# Patient Record
Sex: FEMALE | Race: WHITE | NOT HISPANIC OR LATINO | Employment: UNEMPLOYED | URBAN - METROPOLITAN AREA
[De-identification: names, ages, dates, MRNs, and addresses within clinical notes are randomized per-mention and may not be internally consistent; named-entity substitution may affect disease eponyms.]

---

## 2019-04-18 ENCOUNTER — OFFICE VISIT (OUTPATIENT)
Dept: URGENT CARE | Facility: CLINIC | Age: 39
End: 2019-04-18
Payer: COMMERCIAL

## 2019-04-18 VITALS
SYSTOLIC BLOOD PRESSURE: 122 MMHG | WEIGHT: 144 LBS | HEIGHT: 65 IN | RESPIRATION RATE: 16 BRPM | DIASTOLIC BLOOD PRESSURE: 68 MMHG | OXYGEN SATURATION: 98 % | TEMPERATURE: 97.8 F | BODY MASS INDEX: 23.99 KG/M2 | HEART RATE: 88 BPM

## 2019-04-18 DIAGNOSIS — J01.10 ACUTE FRONTAL SINUSITIS, RECURRENCE NOT SPECIFIED: ICD-10-CM

## 2019-04-18 DIAGNOSIS — J02.9 SORE THROAT: Primary | ICD-10-CM

## 2019-04-18 LAB — S PYO AG THROAT QL: NEGATIVE

## 2019-04-18 PROCEDURE — 99283 EMERGENCY DEPT VISIT LOW MDM: CPT | Performed by: FAMILY MEDICINE

## 2019-04-18 PROCEDURE — G0382 LEV 3 HOSP TYPE B ED VISIT: HCPCS | Performed by: FAMILY MEDICINE

## 2019-04-18 RX ORDER — DROSPIRENONE, ETHINYL ESTRADIOL AND LEVOMEFOLATE CALCIUM AND LEVOMEFOLATE CALCIUM 3-0.02(24)
1 KIT ORAL DAILY
Refills: 3 | COMMUNITY
Start: 2019-04-11 | End: 2020-06-18 | Stop reason: HOSPADM

## 2019-04-18 RX ORDER — AZITHROMYCIN 250 MG/1
TABLET, FILM COATED ORAL
Qty: 6 TABLET | Refills: 0 | Status: SHIPPED | OUTPATIENT
Start: 2019-04-18 | End: 2019-04-23

## 2019-04-18 RX ORDER — HYDROXYZINE PAMOATE 50 MG/1
50 CAPSULE ORAL DAILY
COMMUNITY
Start: 2019-04-16 | End: 2020-04-15 | Stop reason: ALTCHOICE

## 2019-04-18 RX ORDER — BUSPIRONE HYDROCHLORIDE 5 MG/1
5 TABLET ORAL 2 TIMES DAILY
Refills: 1 | COMMUNITY
Start: 2019-04-03 | End: 2019-10-16 | Stop reason: ALTCHOICE

## 2019-04-18 RX ORDER — MOMETASONE FUROATE 50 UG/1
1 SPRAY, METERED NASAL 2 TIMES DAILY
Qty: 17 G | Refills: 0 | Status: SHIPPED | OUTPATIENT
Start: 2019-04-18 | End: 2019-11-04

## 2019-04-18 RX ORDER — BECLOMETHASONE DIPROPIONATE HFA 80 UG/1
AEROSOL, METERED RESPIRATORY (INHALATION)
COMMUNITY
Start: 2019-01-24 | End: 2020-03-17 | Stop reason: SDUPTHER

## 2019-04-18 RX ORDER — ALPRAZOLAM 1 MG/1
1 TABLET ORAL
Refills: 1 | COMMUNITY
Start: 2019-04-05

## 2019-04-18 RX ORDER — VENLAFAXINE HYDROCHLORIDE 150 MG/1
150 CAPSULE, EXTENDED RELEASE ORAL DAILY
COMMUNITY
Start: 2019-04-13 | End: 2021-01-18

## 2019-04-18 RX ORDER — ERGOCALCIFEROL 1.25 MG/1
CAPSULE ORAL
Refills: 3 | COMMUNITY
Start: 2019-04-04 | End: 2020-06-18 | Stop reason: HOSPADM

## 2019-04-26 ENCOUNTER — OFFICE VISIT (OUTPATIENT)
Dept: URGENT CARE | Facility: CLINIC | Age: 39
End: 2019-04-26
Payer: COMMERCIAL

## 2019-04-26 VITALS
RESPIRATION RATE: 16 BRPM | BODY MASS INDEX: 24.07 KG/M2 | WEIGHT: 141 LBS | TEMPERATURE: 98.3 F | DIASTOLIC BLOOD PRESSURE: 85 MMHG | HEIGHT: 64 IN | HEART RATE: 117 BPM | OXYGEN SATURATION: 100 % | SYSTOLIC BLOOD PRESSURE: 139 MMHG

## 2019-04-26 DIAGNOSIS — B02.9 HERPES ZOSTER WITHOUT COMPLICATION: Primary | ICD-10-CM

## 2019-04-26 PROCEDURE — 99283 EMERGENCY DEPT VISIT LOW MDM: CPT | Performed by: PREVENTIVE MEDICINE

## 2019-04-26 PROCEDURE — G0382 LEV 3 HOSP TYPE B ED VISIT: HCPCS | Performed by: PREVENTIVE MEDICINE

## 2019-04-26 RX ORDER — DROSPIRENONE AND ETHINYL ESTRADIOL 0.02-3(28)
1 KIT ORAL DAILY
COMMUNITY
End: 2019-10-03 | Stop reason: SDUPTHER

## 2019-04-26 RX ORDER — VALACYCLOVIR HYDROCHLORIDE 1 G/1
1000 TABLET, FILM COATED ORAL 3 TIMES DAILY
Qty: 21 TABLET | Refills: 0 | Status: SHIPPED | OUTPATIENT
Start: 2019-04-26 | End: 2019-10-03 | Stop reason: ALTCHOICE

## 2019-04-28 ENCOUNTER — HOSPITAL ENCOUNTER (EMERGENCY)
Facility: HOSPITAL | Age: 39
Discharge: HOME | End: 2019-04-28
Payer: COMMERCIAL

## 2019-04-28 VITALS
SYSTOLIC BLOOD PRESSURE: 127 MMHG | HEIGHT: 64 IN | DIASTOLIC BLOOD PRESSURE: 79 MMHG | RESPIRATION RATE: 18 BRPM | HEART RATE: 100 BPM | OXYGEN SATURATION: 100 % | BODY MASS INDEX: 24.07 KG/M2 | TEMPERATURE: 97.3 F | WEIGHT: 141 LBS

## 2019-04-28 DIAGNOSIS — B02.9 HERPES ZOSTER: Primary | ICD-10-CM

## 2019-04-28 PROCEDURE — 96372 THER/PROPH/DIAG INJ SC/IM: CPT

## 2019-04-28 PROCEDURE — NC001 PR NO CHARGE: Performed by: EMERGENCY MEDICINE

## 2019-04-28 PROCEDURE — 99282 EMERGENCY DEPT VISIT SF MDM: CPT

## 2019-04-28 RX ORDER — ONDANSETRON 4 MG/1
4 TABLET, ORALLY DISINTEGRATING ORAL ONCE
Status: COMPLETED | OUTPATIENT
Start: 2019-04-28 | End: 2019-04-28

## 2019-04-28 RX ORDER — KETOROLAC TROMETHAMINE 30 MG/ML
30 INJECTION, SOLUTION INTRAMUSCULAR; INTRAVENOUS ONCE
Status: COMPLETED | OUTPATIENT
Start: 2019-04-28 | End: 2019-04-28

## 2019-04-28 RX ORDER — HYDROCODONE BITARTRATE AND ACETAMINOPHEN 5; 325 MG/1; MG/1
1 TABLET ORAL EVERY 6 HOURS PRN
Qty: 12 TABLET | Refills: 0 | Status: SHIPPED | OUTPATIENT
Start: 2019-04-28 | End: 2019-05-02

## 2019-04-28 RX ADMIN — ONDANSETRON 4 MG: 4 TABLET, ORALLY DISINTEGRATING ORAL at 04:28

## 2019-04-28 RX ADMIN — KETOROLAC TROMETHAMINE 30 MG: 30 INJECTION, SOLUTION INTRAMUSCULAR; INTRAVENOUS at 04:25

## 2019-06-13 ENCOUNTER — OFFICE VISIT (OUTPATIENT)
Dept: URGENT CARE | Facility: CLINIC | Age: 39
End: 2019-06-13
Payer: COMMERCIAL

## 2019-06-13 VITALS
WEIGHT: 145 LBS | SYSTOLIC BLOOD PRESSURE: 118 MMHG | OXYGEN SATURATION: 99 % | HEART RATE: 126 BPM | BODY MASS INDEX: 24.16 KG/M2 | TEMPERATURE: 98.3 F | RESPIRATION RATE: 16 BRPM | DIASTOLIC BLOOD PRESSURE: 72 MMHG | HEIGHT: 65 IN

## 2019-06-13 DIAGNOSIS — B02.29 POST HERPETIC NEURALGIA: Primary | ICD-10-CM

## 2019-06-13 PROCEDURE — G0382 LEV 3 HOSP TYPE B ED VISIT: HCPCS | Performed by: PREVENTIVE MEDICINE

## 2019-06-13 PROCEDURE — 99283 EMERGENCY DEPT VISIT LOW MDM: CPT | Performed by: PREVENTIVE MEDICINE

## 2019-08-12 ENCOUNTER — OFFICE VISIT (OUTPATIENT)
Dept: URGENT CARE | Facility: CLINIC | Age: 39
End: 2019-08-12
Payer: COMMERCIAL

## 2019-08-12 VITALS
OXYGEN SATURATION: 99 % | BODY MASS INDEX: 26.53 KG/M2 | SYSTOLIC BLOOD PRESSURE: 127 MMHG | DIASTOLIC BLOOD PRESSURE: 74 MMHG | TEMPERATURE: 97.2 F | RESPIRATION RATE: 16 BRPM | HEIGHT: 64 IN | HEART RATE: 129 BPM | WEIGHT: 155.4 LBS

## 2019-08-12 DIAGNOSIS — J02.9 SORE THROAT: Primary | ICD-10-CM

## 2019-08-12 DIAGNOSIS — J02.0 STREP PHARYNGITIS: ICD-10-CM

## 2019-08-12 LAB — S PYO AG THROAT QL: POSITIVE

## 2019-08-12 PROCEDURE — G0382 LEV 3 HOSP TYPE B ED VISIT: HCPCS | Performed by: EMERGENCY MEDICINE

## 2019-08-12 PROCEDURE — 87880 STREP A ASSAY W/OPTIC: CPT | Performed by: EMERGENCY MEDICINE

## 2019-08-12 PROCEDURE — 99283 EMERGENCY DEPT VISIT LOW MDM: CPT | Performed by: EMERGENCY MEDICINE

## 2019-08-12 RX ORDER — AZITHROMYCIN 250 MG/1
TABLET, FILM COATED ORAL
Qty: 6 TABLET | Refills: 0 | Status: SHIPPED | OUTPATIENT
Start: 2019-08-12 | End: 2019-08-16

## 2019-08-12 NOTE — LETTER
August 12, 2019     Patient: Asif Ards Sites   YOB: 1980   Date of Visit: 8/12/2019       To Whom It May Concern: It is my medical opinion that [employer] may return to work on 8/14/2019  If you have any questions or concerns, please don't hesitate to call           Sincerely,        Hung Kramer MD    CC: An Eason

## 2019-08-12 NOTE — PROGRESS NOTES
Assessment/Plan:    No problem-specific Assessment & Plan notes found for this encounter  Diagnoses and all orders for this visit:    Sore throat  -     azithromycin (ZITHROMAX) 250 mg tablet; Take 2 tablets today then 1 tablet daily x 4 days  -     POCT rapid strepA    Strep pharyngitis  -     POCT rapid strepA          Subjective:      Patient ID: Astrid Eason is a 44 y o  female  Woke up with sore throat achiness, confusion, fatigue - works in [employer]    This is a new problem  The current episode started today  The problem has been gradually worsening  The pain is worse on the left side  There has been no fever  The pain is at a severity of 4/10  The pain is moderate  Associated symptoms include coughing, a hoarse voice and swollen glands  She has had exposure to strep  She has tried nothing for the symptoms  The treatment provided no relief  The following portions of the patient's history were reviewed and updated as appropriate: allergies, current medications, past family history, past medical history, past social history, past surgical history and problem list     Review of Systems   Constitutional: Negative for fever  HENT: Positive for hoarse voice and sore throat  Respiratory: Positive for cough  All other systems reviewed and are negative  Objective:      /74   Pulse (!) 129   Temp (!) 97 2 °F (36 2 °C)   Resp 16   Ht 5' 4" (1 626 m)   Wt 70 5 kg (155 lb 6 4 oz)   SpO2 99%   BMI 26 67 kg/m²          Physical Exam   Constitutional: She is oriented to person, place, and time  She appears well-developed and well-nourished  She appears ill  HENT:   Head: Normocephalic  Right Ear: Hearing and tympanic membrane normal    Left Ear: Hearing and tympanic membrane normal    Mouth/Throat: Mucous membranes are normal  Posterior oropharyngeal erythema present  Neck: Normal range of motion  Cardiovascular: Normal rate and regular rhythm     Pulmonary/Chest: Effort normal and breath sounds normal    Abdominal: Soft  Neurological: She is alert and oriented to person, place, and time  Skin: Skin is warm and dry  Psychiatric: She has a normal mood and affect  Her behavior is normal    Nursing note and vitals reviewed

## 2019-08-31 ENCOUNTER — OFFICE VISIT (OUTPATIENT)
Dept: URGENT CARE | Facility: CLINIC | Age: 39
End: 2019-08-31
Payer: COMMERCIAL

## 2019-08-31 VITALS
OXYGEN SATURATION: 100 % | DIASTOLIC BLOOD PRESSURE: 78 MMHG | TEMPERATURE: 98.1 F | BODY MASS INDEX: 26.09 KG/M2 | WEIGHT: 152.8 LBS | HEIGHT: 64 IN | RESPIRATION RATE: 16 BRPM | SYSTOLIC BLOOD PRESSURE: 128 MMHG | HEART RATE: 90 BPM

## 2019-08-31 DIAGNOSIS — J02.9 SORE THROAT: Primary | ICD-10-CM

## 2019-08-31 DIAGNOSIS — J02.9 ACUTE PHARYNGITIS, UNSPECIFIED ETIOLOGY: ICD-10-CM

## 2019-08-31 LAB — S PYO AG THROAT QL: NEGATIVE

## 2019-08-31 PROCEDURE — G0382 LEV 3 HOSP TYPE B ED VISIT: HCPCS | Performed by: EMERGENCY MEDICINE

## 2019-08-31 PROCEDURE — 87880 STREP A ASSAY W/OPTIC: CPT | Performed by: EMERGENCY MEDICINE

## 2019-08-31 PROCEDURE — 99283 EMERGENCY DEPT VISIT LOW MDM: CPT | Performed by: EMERGENCY MEDICINE

## 2019-08-31 RX ORDER — AMOXICILLIN 875 MG/1
875 TABLET, COATED ORAL 2 TIMES DAILY
Qty: 20 TABLET | Refills: 0 | Status: SHIPPED | OUTPATIENT
Start: 2019-08-31 | End: 2019-09-10

## 2019-08-31 NOTE — PROGRESS NOTES
Assessment/Plan:    No problem-specific Assessment & Plan notes found for this encounter  Diagnoses and all orders for this visit:    Sore throat  -     POCT rapid strepA  -     Throat culture    Acute pharyngitis, unspecified etiology  -     amoxicillin (AMOXIL) 875 mg tablet; Take 1 tablet (875 mg total) by mouth 2 (two) times a day for 10 days          Subjective:      Patient ID: Sondra Eason is a 44 y o  female  Sore throat since yesterday, no other symptoms    Sore Throat    This is a new problem  The current episode started yesterday  The problem has been unchanged  Neither side of throat is experiencing more pain than the other  There has been no fever  The pain is at a severity of 4/10  The pain is moderate  She has tried nothing for the symptoms  The treatment provided no relief  The following portions of the patient's history were reviewed and updated as appropriate: allergies, current medications, past family history, past medical history, past social history, past surgical history and problem list     Review of Systems   HENT: Positive for sore throat  All other systems reviewed and are negative  Objective:      /78   Pulse 90   Temp 98 1 °F (36 7 °C)   Resp 16   Ht 5' 4" (1 626 m)   Wt 69 3 kg (152 lb 12 8 oz)   SpO2 100%   BMI 26 23 kg/m²          Physical Exam   Constitutional: She is oriented to person, place, and time  She appears well-developed and well-nourished  HENT:   Right Ear: Tympanic membrane and ear canal normal    Left Ear: Tympanic membrane and ear canal normal    Mouth/Throat: Mucous membranes are normal  Posterior oropharyngeal erythema present  Eyes: Pupils are equal, round, and reactive to light  Neck: Normal range of motion  Cardiovascular: Normal rate  Pulmonary/Chest: Effort normal    Abdominal: Soft  Neurological: She is alert and oriented to person, place, and time  Skin: Skin is warm and dry     Psychiatric: She has a normal mood and affect  Her behavior is normal    Nursing note and vitals reviewed

## 2019-08-31 NOTE — PATIENT INSTRUCTIONS
Amoxicillin twice a day, call in 3 days for culture report, warm salt water gargles for throat, Chloraseptic, Cepacol or Sucrets for throat pain, recheck as needed

## 2019-09-02 LAB — B-HEM STREP SPEC QL CULT: NEGATIVE

## 2019-10-03 ENCOUNTER — OFFICE VISIT (OUTPATIENT)
Dept: FAMILY MEDICINE CLINIC | Facility: CLINIC | Age: 39
End: 2019-10-03
Payer: COMMERCIAL

## 2019-10-03 VITALS
RESPIRATION RATE: 18 BRPM | BODY MASS INDEX: 26.29 KG/M2 | HEIGHT: 64 IN | SYSTOLIC BLOOD PRESSURE: 120 MMHG | DIASTOLIC BLOOD PRESSURE: 90 MMHG | WEIGHT: 154 LBS | TEMPERATURE: 98.7 F | HEART RATE: 120 BPM

## 2019-10-03 DIAGNOSIS — R53.83 FATIGUE, UNSPECIFIED TYPE: Primary | ICD-10-CM

## 2019-10-03 DIAGNOSIS — Z13.6 SCREENING FOR CARDIOVASCULAR CONDITION: ICD-10-CM

## 2019-10-03 DIAGNOSIS — B02.29 POSTHERPETIC NEURALGIA: ICD-10-CM

## 2019-10-03 DIAGNOSIS — Z23 IMMUNIZATION DUE: ICD-10-CM

## 2019-10-03 PROBLEM — F33.1 MODERATE EPISODE OF RECURRENT MAJOR DEPRESSIVE DISORDER (HCC): Status: ACTIVE | Noted: 2019-10-03

## 2019-10-03 PROBLEM — J45.40 MODERATE PERSISTENT ASTHMA WITHOUT COMPLICATION: Status: ACTIVE | Noted: 2019-10-03

## 2019-10-03 PROCEDURE — 90471 IMMUNIZATION ADMIN: CPT | Performed by: NURSE PRACTITIONER

## 2019-10-03 PROCEDURE — 99214 OFFICE O/P EST MOD 30 MIN: CPT | Performed by: NURSE PRACTITIONER

## 2019-10-03 PROCEDURE — 90686 IIV4 VACC NO PRSV 0.5 ML IM: CPT | Performed by: NURSE PRACTITIONER

## 2019-10-03 RX ORDER — GABAPENTIN 300 MG/1
300 CAPSULE ORAL 3 TIMES DAILY
Qty: 60 CAPSULE | Refills: 0 | Status: SHIPPED | OUTPATIENT
Start: 2019-10-03 | End: 2020-02-27 | Stop reason: SDUPTHER

## 2019-10-03 RX ORDER — VENLAFAXINE HYDROCHLORIDE 75 MG/1
1 CAPSULE, EXTENDED RELEASE ORAL DAILY
COMMUNITY
Start: 2019-10-02 | End: 2021-01-18
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
## 2019-10-03 NOTE — PATIENT INSTRUCTIONS
1  Take 1 tablet daily 3 days, then increase 2 x's daily for three days then 3 x's daily   2  Obtain lab work   3   Follow up in 3 weeks

## 2019-10-10 ENCOUNTER — OFFICE VISIT (OUTPATIENT)
Dept: URGENT CARE | Facility: CLINIC | Age: 39
End: 2019-10-10
Payer: COMMERCIAL

## 2019-10-10 VITALS
TEMPERATURE: 98.4 F | RESPIRATION RATE: 16 BRPM | SYSTOLIC BLOOD PRESSURE: 130 MMHG | HEART RATE: 132 BPM | BODY MASS INDEX: 25.83 KG/M2 | WEIGHT: 155 LBS | DIASTOLIC BLOOD PRESSURE: 88 MMHG | HEIGHT: 65 IN | OXYGEN SATURATION: 96 %

## 2019-10-10 DIAGNOSIS — R68.89 FLU-LIKE SYMPTOMS: ICD-10-CM

## 2019-10-10 DIAGNOSIS — J11.1 FLU: Primary | ICD-10-CM

## 2019-10-10 DIAGNOSIS — J68.3 REACTIVE AIRWAYS DYSFUNCTION SYNDROME (HCC): ICD-10-CM

## 2019-10-10 PROCEDURE — 99283 EMERGENCY DEPT VISIT LOW MDM: CPT | Performed by: FAMILY MEDICINE

## 2019-10-10 PROCEDURE — G0382 LEV 3 HOSP TYPE B ED VISIT: HCPCS | Performed by: FAMILY MEDICINE

## 2019-10-10 RX ORDER — OSELTAMIVIR PHOSPHATE 75 MG/1
75 CAPSULE ORAL EVERY 12 HOURS SCHEDULED
Qty: 10 CAPSULE | Refills: 0 | Status: SHIPPED | OUTPATIENT
Start: 2019-10-10 | End: 2019-10-16 | Stop reason: ALTCHOICE

## 2019-10-10 RX ORDER — METHYLPREDNISOLONE 4 MG/1
TABLET ORAL
Qty: 21 TABLET | Refills: 0 | Status: SHIPPED | OUTPATIENT
Start: 2019-10-10 | End: 2019-10-16 | Stop reason: ALTCHOICE

## 2019-10-10 RX ORDER — MONTELUKAST SODIUM 10 MG/1
10 TABLET ORAL
Qty: 15 TABLET | Refills: 1 | Status: SHIPPED | OUTPATIENT
Start: 2019-10-10 | End: 2019-11-04

## 2019-10-10 NOTE — LETTER
October 10, 2019     Patient: Nilesh Mckeon [facility]   YOB: 1980   Date of Visit: 10/10/2019       To Whom It May Concern: It is my medical opinion that [employer] may return to work on 10/14/2019  If you have any questions or concerns, please don't hesitate to call           Sincerely,        Kalyani Flores MD    CC: An Eason

## 2019-10-10 NOTE — PATIENT INSTRUCTIONS
We are treating this as possible flu  Use medications as written  Call here tomorrow morning for the results of the flu swab  You can stop the Tamiflu if the results come back negative  Start the steroids at that point if you are not improving

## 2019-10-10 NOTE — PROGRESS NOTES
Assessment/Plan:      Diagnoses and all orders for this visit:    Flu    Flu-like symptoms  -     oseltamivir (TAMIFLU) 75 mg capsule; Take 1 capsule (75 mg total) by mouth every 12 (twelve) hours for 5 days  -     Influenza A/B and RSV by PCR    Reactive airways dysfunction syndrome (HCC)  -     montelukast (SINGULAIR) 10 mg tablet; Take 1 tablet (10 mg total) by mouth daily at bedtime  -     methylPREDNISolone 4 MG tablet therapy pack; Use as directed on package          Subjective:     Patient ID: Marium Eason is a 44 y o  female  All of her symptoms began yesterday  She presents with the complaints of allergies and increased cough as well as sinus pressure and congestion  She states her chest feels tight  She is a nonsmoker  Review of Systems   Constitutional: Positive for fatigue  HENT: Positive for congestion and sinus pressure  Eyes: Negative  Respiratory: Positive for cough, chest tightness and wheezing  Objective:     Physical Exam   Constitutional: She appears well-developed and well-nourished  HENT:   Head: Normocephalic and atraumatic     Eyes: EOM are normal    Pulmonary/Chest: Effort normal and breath sounds normal

## 2019-10-12 ENCOUNTER — OFFICE VISIT (OUTPATIENT)
Dept: URGENT CARE | Facility: CLINIC | Age: 39
End: 2019-10-12
Payer: COMMERCIAL

## 2019-10-12 VITALS
RESPIRATION RATE: 16 BRPM | WEIGHT: 155 LBS | SYSTOLIC BLOOD PRESSURE: 136 MMHG | TEMPERATURE: 99 F | BODY MASS INDEX: 25.83 KG/M2 | DIASTOLIC BLOOD PRESSURE: 67 MMHG | OXYGEN SATURATION: 97 % | HEIGHT: 65 IN | HEART RATE: 132 BPM

## 2019-10-12 DIAGNOSIS — J02.9 SORE THROAT: ICD-10-CM

## 2019-10-12 DIAGNOSIS — J11.1 INFLUENZA: Primary | ICD-10-CM

## 2019-10-12 LAB — S PYO AG THROAT QL: NEGATIVE

## 2019-10-12 PROCEDURE — 87880 STREP A ASSAY W/OPTIC: CPT | Performed by: PREVENTIVE MEDICINE

## 2019-10-12 PROCEDURE — G0382 LEV 3 HOSP TYPE B ED VISIT: HCPCS | Performed by: PREVENTIVE MEDICINE

## 2019-10-12 PROCEDURE — 99283 EMERGENCY DEPT VISIT LOW MDM: CPT | Performed by: PREVENTIVE MEDICINE

## 2019-10-12 NOTE — PROGRESS NOTES
[email or web address]        NAME: Ashley Montes is a 44 y o  female  : 1980    MRN: [id number]  DATE: 2019  TIME: 8:48 AM    Assessment and Plan   Influenza [J11 1]  1  Influenza           Patient Instructions       Follow up with PCP in 3-5 days  Proceed to  ER if symptoms worsen  Chief Complaint     Chief Complaint   Patient presents with   Evalee Last Like Symptoms     Pt reports being seen here Thursday  Pt has been taking singulair and tamiflu since then  Pt states she is feeling worse  Productive cough, fever (ethh=613), b/l ear pain  Pt has been taking Ibuprofen prn  History of Present Illness       She feels she is not improved  Harsh cough fever aches and fatigue  Her flu swab is still not evaluated by the lab  Review of Systems   Review of Systems   Constitutional: Positive for fatigue and fever  HENT: Positive for congestion and sore throat  Respiratory: Positive for cough            Current Medications       Current Outpatient Medications:     ALPRAZolam (XANAX) 1 mg tablet, Take 1 mg by mouth 2 (two) times a day, Disp: , Rfl: 1    Drospiren-Eth Estrad-Levomefol 3-0 02-0 451 MG TABS, Take 1 tablet by mouth daily, Disp: , Rfl: 3    ergocalciferol (VITAMIN D2) 50,000 units, TAKE 1 CAPSULE BY MOUTH ONE TIME PER WEEK, Disp: , Rfl: 3    gabapentin (NEURONTIN) 300 mg capsule, Take 1 capsule (300 mg total) by mouth 3 (three) times a day, Disp: 60 capsule, Rfl: 0    hydrOXYzine pamoate (VISTARIL) 50 mg capsule, , Disp: , Rfl:     lurasidone (LATUDA) 20 mg tablet, Take 20 mg by mouth daily with breakfast, Disp: , Rfl:     methylPREDNISolone 4 MG tablet therapy pack, Use as directed on package, Disp: 21 tablet, Rfl: 0    mometasone (NASONEX) 50 mcg/act nasal spray, 1 spray into each nostril 2 (two) times a day, Disp: 17 g, Rfl: 0    montelukast (SINGULAIR) 10 mg tablet, Take 1 tablet (10 mg total) by mouth daily at bedtime, Disp: 15 tablet, Rfl: 1    oseltamivir (TAMIFLU) 75 mg capsule, Take 1 capsule (75 mg total) by mouth every 12 (twelve) hours for 5 days, Disp: 10 capsule, Rfl: 0    QVAR REDIHALER 80 MCG/ACT inhaler, , Disp: , Rfl:     venlafaxine (EFFEXOR-XR) 150 mg 24 hr capsule, , Disp: , Rfl:     venlafaxine (EFFEXOR-XR) 75 mg 24 hr capsule, Take 1 capsule by mouth daily, Disp: , Rfl:     busPIRone (BUSPAR) 5 mg tablet, Take 5 mg by mouth 2 (two) times a day, Disp: , Rfl: 1    Current Allergies     Allergies as of 10/12/2019 - Reviewed 10/12/2019   Allergen Reaction Noted    Zithromax [azithromycin] GI Intolerance 2019    Cephalosporins Hives 2019    Sulfa antibiotics Hives 2019            The following portions of the patient's history were reviewed and updated as appropriate: allergies, current medications, past family history, past medical history, past social history, past surgical history and problem list      Past Medical History:   Diagnosis Date    Anxiety     Asthma     Depression     Hypoglycemia     Moderate episode of recurrent major depressive disorder ([facility] [address] ) 10/3/2019       Past Surgical History:   Procedure Laterality Date     SECTION      KNEE SURGERY      TONSILLECTOMY         Family History   Problem Relation Age of Onset    Depression Mother          Medications have been verified  Objective   /67 (BP Location: Right arm, Patient Position: Sitting)   Pulse (!) 132   Temp 99 °F (37 2 °C) (Tympanic)   Resp 16   Ht 5' 4 5" (1 638 m)   Wt 70 3 kg (155 lb)   SpO2 97%   BMI 26 19 kg/m²        Physical Exam     Physical Exam   HENT:   Right Ear: External ear normal    Left Ear: External ear normal    Mouth/Throat: Oropharynx is clear and moist    Cardiovascular: Normal heart sounds  Pulmonary/Chest: Breath sounds normal    Lymphadenopathy:     She has no cervical adenopathy       Rapid strep screen negative at 5 minutes

## 2019-10-12 NOTE — PATIENT INSTRUCTIONS
This is still acting like influenza  Copious amounts of Motrin or Tylenol for fever chills or aches  Cough medicine decongestant as needed  If you continue to get worse you must be recheck

## 2019-10-14 ENCOUNTER — OFFICE VISIT (OUTPATIENT)
Dept: URGENT CARE | Facility: CLINIC | Age: 39
End: 2019-10-14
Payer: COMMERCIAL

## 2019-10-14 ENCOUNTER — APPOINTMENT (OUTPATIENT)
Dept: RADIOLOGY | Facility: CLINIC | Age: 39
End: 2019-10-14
Payer: COMMERCIAL

## 2019-10-14 VITALS
BODY MASS INDEX: 26.46 KG/M2 | WEIGHT: 155 LBS | HEART RATE: 129 BPM | HEIGHT: 64 IN | TEMPERATURE: 99.4 F | SYSTOLIC BLOOD PRESSURE: 120 MMHG | RESPIRATION RATE: 18 BRPM | OXYGEN SATURATION: 100 % | DIASTOLIC BLOOD PRESSURE: 74 MMHG

## 2019-10-14 DIAGNOSIS — R05.9 COUGH: Primary | ICD-10-CM

## 2019-10-14 DIAGNOSIS — R05.9 COUGH: ICD-10-CM

## 2019-10-14 DIAGNOSIS — R50.9 FEVER, UNSPECIFIED FEVER CAUSE: ICD-10-CM

## 2019-10-14 PROCEDURE — 71046 X-RAY EXAM CHEST 2 VIEWS: CPT

## 2019-10-14 PROCEDURE — 99284 EMERGENCY DEPT VISIT MOD MDM: CPT | Performed by: FAMILY MEDICINE

## 2019-10-14 PROCEDURE — G0383 LEV 4 HOSP TYPE B ED VISIT: HCPCS | Performed by: FAMILY MEDICINE

## 2019-10-14 RX ORDER — BENZONATATE 100 MG/1
100 CAPSULE ORAL 3 TIMES DAILY PRN
Qty: 20 CAPSULE | Refills: 0 | Status: SHIPPED | OUTPATIENT
Start: 2019-10-14 | End: 2019-10-16 | Stop reason: ALTCHOICE

## 2019-10-14 RX ORDER — ALBUTEROL SULFATE 90 UG/1
2 AEROSOL, METERED RESPIRATORY (INHALATION) EVERY 6 HOURS PRN
Qty: 18 G | Refills: 0 | Status: SHIPPED | OUTPATIENT
Start: 2019-10-14

## 2019-10-14 NOTE — PATIENT INSTRUCTIONS
F/u here as needed  Go to ER if worse symptoms  Flu results pending  CXR- no infiltrate or effusion  nml heart size  Albuterol as needed  Cough meds as needed    mucinex

## 2019-10-14 NOTE — PROGRESS NOTES
NAME: Leatha George is a 44 y o  female  : 1980    MRN: [id number]      Assessment and Plan   Cough [R05]  1  Cough  XR chest pa & lateral    benzonatate (TESSALON PERLES) 100 mg capsule    albuterol (VENTOLIN HFA) 90 mcg/act inhaler   2  Fever, unspecified fever cause  XR chest pa & lateral           Patient Instructions   Patient Instructions   F/u here as needed  Go to ER if worse symptoms  Flu results pending  CXR- no infiltrate or effusion  nml heart size  Albuterol as needed  Cough meds as needed  mucinex    Proceed to ER if symptoms worsen  Chief Complaint     Chief Complaint   Patient presents with    Cough     chest congestion and cough since Thursday/pt was seen in Urgent x2 was told to come back if not feeling better after flu diagnosis to r/o pneumonia         History of Present Illness   Here c/o getting worse  Coughing- green sputum  Fevers for about 4 days  She does have a sore throat  Some SOB  Taking tamiflu  Taking singulair  Review of Systems   Review of Systems   Constitutional: Positive for fatigue and fever  HENT: Positive for sore throat  Negative for ear pain and trouble swallowing  Eyes: Negative for visual disturbance  Respiratory: Positive for cough and shortness of breath  Negative for chest tightness  Cardiovascular: Negative for chest pain  Gastrointestinal: Negative for abdominal pain, diarrhea, nausea and vomiting  Genitourinary: Negative for difficulty urinating and dysuria  Skin: Negative for rash and wound  Neurological: Positive for weakness and headaches  Psychiatric/Behavioral: Negative for behavioral problems and confusion           Current Medications       Current Outpatient Medications:     ALPRAZolam (XANAX) 1 mg tablet, Take 1 mg by mouth 2 (two) times a day, Disp: , Rfl: 1    busPIRone (BUSPAR) 5 mg tablet, Take 5 mg by mouth 2 (two) times a day, Disp: , Rfl: 1    Drospiren-Eth Estrad-Levomefol 3-0 02-0 451 MG TABS, Take 1 tablet by mouth daily, Disp: , Rfl: 3    ergocalciferol (VITAMIN D2) 50,000 units, TAKE 1 CAPSULE BY MOUTH ONE TIME PER WEEK, Disp: , Rfl: 3    gabapentin (NEURONTIN) 300 mg capsule, Take 1 capsule (300 mg total) by mouth 3 (three) times a day, Disp: 60 capsule, Rfl: 0    hydrOXYzine pamoate (VISTARIL) 50 mg capsule, , Disp: , Rfl:     lurasidone (LATUDA) 20 mg tablet, Take 20 mg by mouth daily with breakfast, Disp: , Rfl:     methylPREDNISolone 4 MG tablet therapy pack, Use as directed on package, Disp: 21 tablet, Rfl: 0    mometasone (NASONEX) 50 mcg/act nasal spray, 1 spray into each nostril 2 (two) times a day, Disp: 17 g, Rfl: 0    montelukast (SINGULAIR) 10 mg tablet, Take 1 tablet (10 mg total) by mouth daily at bedtime, Disp: 15 tablet, Rfl: 1    oseltamivir (TAMIFLU) 75 mg capsule, Take 1 capsule (75 mg total) by mouth every 12 (twelve) hours for 5 days, Disp: 10 capsule, Rfl: 0    QVAR REDIHALER 80 MCG/ACT inhaler, , Disp: , Rfl:     venlafaxine (EFFEXOR-XR) 150 mg 24 hr capsule, , Disp: , Rfl:     venlafaxine (EFFEXOR-XR) 75 mg 24 hr capsule, Take 1 capsule by mouth daily, Disp: , Rfl:     albuterol (VENTOLIN HFA) 90 mcg/act inhaler, Inhale 2 puffs every 6 (six) hours as needed for wheezing, Disp: 18 g, Rfl: 0    benzonatate (TESSALON PERLES) 100 mg capsule, Take 1 capsule (100 mg total) by mouth 3 (three) times a day as needed for cough, Disp: 20 capsule, Rfl: 0    Current Allergies     Allergies as of 10/14/2019 - Reviewed 10/14/2019   Allergen Reaction Noted    Zithromax [azithromycin] GI Intolerance 2019    Cephalosporins Hives 2019    Sulfa antibiotics Hives 2019              Past Medical History:   Diagnosis Date    Anxiety     Asthma     Depression     Hypoglycemia     Moderate episode of recurrent major depressive disorder ([facility] [address] ) 10/3/2019       Past Surgical History:   Procedure Laterality Date     SECTION      KNEE SURGERY      TONSILLECTOMY Family History   Problem Relation Age of Onset    Depression Mother          Medications have been verified  The following portions of the patient's history were reviewed and updated as appropriate: allergies, current medications, past family history, past medical history, past social history, past surgical history and problem list     Objective   /74   Pulse (!) 129   Temp 99 4 °F (37 4 °C) (Tympanic)   Resp 18   Ht 5' 4" (1 626 m)   Wt 70 3 kg (155 lb)   SpO2 100%   BMI 26 61 kg/m²      Physical Exam     Physical Exam   Constitutional: She is oriented to person, place, and time  She appears well-developed and well-nourished  HENT:   Head: Normocephalic  Mouth/Throat: Posterior oropharyngeal edema present  Eyes: EOM are normal    Neck: Normal range of motion  Cardiovascular: Normal rate, regular rhythm and normal heart sounds  Exam reveals no gallop and no friction rub  No murmur heard  Pulmonary/Chest: Effort normal and breath sounds normal  No respiratory distress  She has no wheezes  She has no rales  RLL rhonchi   Abdominal: Soft  Bowel sounds are normal  She exhibits no distension  There is no tenderness  There is no rebound and no guarding  Musculoskeletal: Normal range of motion  Neurological: She is oriented to person, place, and time  Skin: Skin is warm and dry  No rash noted  Psychiatric: She has a normal mood and affect  Thought content normal    Nursing note and vitals reviewed

## 2019-10-14 NOTE — LETTER
October 14, 2019     Patient: Nguyễn aEson   YOB: 1980   Date of Visit: 10/14/2019       To Whom it May Concern:    An Eason was seen in my clinic on 10/14/2019  She is excused from work due to illness 10/10-16/2019    If you have any questions or concerns, please don't hesitate to call           Sincerely,          [facility]        CC: An Eason

## 2019-10-15 LAB
FLUAV RNA SPEC QL NAA+PROBE: NEGATIVE
FLUBV RNA SPEC QL NAA+PROBE: NEGATIVE

## 2019-10-16 ENCOUNTER — APPOINTMENT (OUTPATIENT)
Dept: RADIOLOGY | Facility: CLINIC | Age: 39
End: 2019-10-16
Payer: COMMERCIAL

## 2019-10-16 ENCOUNTER — TELEPHONE (OUTPATIENT)
Dept: FAMILY MEDICINE CLINIC | Facility: CLINIC | Age: 39
End: 2019-10-16

## 2019-10-16 ENCOUNTER — OFFICE VISIT (OUTPATIENT)
Dept: FAMILY MEDICINE CLINIC | Facility: CLINIC | Age: 39
End: 2019-10-16
Payer: COMMERCIAL

## 2019-10-16 VITALS
SYSTOLIC BLOOD PRESSURE: 118 MMHG | TEMPERATURE: 99.1 F | RESPIRATION RATE: 18 BRPM | DIASTOLIC BLOOD PRESSURE: 76 MMHG | HEART RATE: 84 BPM | OXYGEN SATURATION: 99 % | WEIGHT: 155 LBS | BODY MASS INDEX: 26.46 KG/M2 | HEIGHT: 64 IN

## 2019-10-16 DIAGNOSIS — J40 BRONCHITIS: ICD-10-CM

## 2019-10-16 DIAGNOSIS — J45.40 MODERATE PERSISTENT ASTHMA WITHOUT COMPLICATION: ICD-10-CM

## 2019-10-16 DIAGNOSIS — R05.9 COUGH: Primary | ICD-10-CM

## 2019-10-16 DIAGNOSIS — R05.9 COUGH: ICD-10-CM

## 2019-10-16 DIAGNOSIS — R06.2 WHEEZING: ICD-10-CM

## 2019-10-16 PROCEDURE — 99214 OFFICE O/P EST MOD 30 MIN: CPT | Performed by: FAMILY MEDICINE

## 2019-10-16 PROCEDURE — 71046 X-RAY EXAM CHEST 2 VIEWS: CPT

## 2019-10-16 RX ORDER — DOXYCYCLINE HYCLATE 100 MG/1
100 CAPSULE ORAL EVERY 12 HOURS SCHEDULED
Qty: 14 CAPSULE | Refills: 0 | Status: SHIPPED | OUTPATIENT
Start: 2019-10-16 | End: 2019-10-23

## 2019-10-16 RX ORDER — PREDNISONE 10 MG/1
TABLET ORAL
Qty: 21 TABLET | Refills: 0 | Status: SHIPPED | OUTPATIENT
Start: 2019-10-16 | End: 2019-11-04

## 2019-10-16 NOTE — PROGRESS NOTES
[facility] 1980 female MRN: [id number]    Family Medicine Acute Visit    ASSESSMENT/PLAN  Problem List Items Addressed This Visit        Respiratory    Moderate persistent asthma without complication    Relevant Medications    predniSONE 10 mg tablet    doxycycline hyclate (VIBRAMYCIN) 100 mg capsule    Other Relevant Orders    XR chest pa & lateral    Bronchitis     Over 1 week of symptoms, history of asthma, abnormal exam  Will treat, numerous antibiotic allergies noted  CXR to r/o PNA         Relevant Medications    predniSONE 10 mg tablet    doxycycline hyclate (VIBRAMYCIN) 100 mg capsule    Other Relevant Orders    XR chest pa & lateral       Other    Cough - Primary    Relevant Medications    predniSONE 10 mg tablet    doxycycline hyclate (VIBRAMYCIN) 100 mg capsule    Other Relevant Orders    XR chest pa & lateral    Wheezing    Relevant Orders    XR chest pa & lateral            Patient works in   Provided work note for her  Follow up if not better or worse after above treatment  Future Appointments   Date Time Provider Elaine Novoa   10/24/2019  1:30 PM [facility]          SUBJECTIVE  CC: Cough (1 week - was treated for Flu @ Urgent Care - still sick - cough expectorates green mucous)      HPI:  Nilesh Eason is a 44 y o  female who presents for sick visit,  States 1 week of symptoms  Body aches, cough, chest congestion  History of asthma  Mucous is thick and green  She is having fever and chills  Was taking tamiflu as prescribed by urgent care for possible flu  Was strep tested and this was negative  Using albulter inhaler  Review of Systems   Constitutional: Positive for chills and fever  Negative for fatigue  HENT: Positive for congestion and rhinorrhea  Negative for postnasal drip and sinus pressure  Eyes: Negative for photophobia and visual disturbance  Respiratory: Positive for cough and wheezing  Negative for shortness of breath  Cardiovascular: Negative for chest pain, palpitations and leg swelling  Gastrointestinal: Negative for abdominal pain, constipation, diarrhea, nausea and vomiting  Genitourinary: Negative for difficulty urinating and dysuria  Musculoskeletal: Negative for arthralgias and myalgias  Skin: Negative for color change and rash  Neurological: Negative for dizziness, weakness, light-headedness and headaches         Historical Information   The patient history was reviewed as follows:  Past Medical History:   Diagnosis Date    Anxiety     Asthma     Depression     Hypoglycemia     Moderate episode of recurrent major depressive disorder ([facility] [address] ) 10/3/2019         Past Surgical History:   Procedure Laterality Date     SECTION      KNEE SURGERY      TONSILLECTOMY       Family History   Problem Relation Age of Onset    Depression Mother       Social History   Social History     Substance and Sexual Activity   Alcohol Use Never    Frequency: Never     Social History     Substance and Sexual Activity   Drug Use Never     Social History     Tobacco Use   Smoking Status Never Smoker   Smokeless Tobacco Never Used       Medications:     Current Outpatient Medications:     albuterol (VENTOLIN HFA) 90 mcg/act inhaler, Inhale 2 puffs every 6 (six) hours as needed for wheezing, Disp: 18 g, Rfl: 0    ALPRAZolam (XANAX) 1 mg tablet, Take 1 mg by mouth 2 (two) times a day, Disp: , Rfl: 1    Drospiren-Eth Estrad-Levomefol 3-0 02-0 451 MG TABS, Take 1 tablet by mouth daily, Disp: , Rfl: 3    ergocalciferol (VITAMIN D2) 50,000 units, TAKE 1 CAPSULE BY MOUTH ONE TIME PER WEEK, Disp: , Rfl: 3    gabapentin (NEURONTIN) 300 mg capsule, Take 1 capsule (300 mg total) by mouth 3 (three) times a day, Disp: 60 capsule, Rfl: 0    hydrOXYzine pamoate (VISTARIL) 50 mg capsule, , Disp: , Rfl:     mometasone (NASONEX) 50 mcg/act nasal spray, 1 spray into each nostril 2 (two) times a day, Disp: 17 g, Rfl: 0    montelukast (SINGULAIR) 10 mg tablet, Take 1 tablet (10 mg total) by mouth daily at bedtime, Disp: 15 tablet, Rfl: 1    QVAR REDIHALER 80 MCG/ACT inhaler, , Disp: , Rfl:     venlafaxine (EFFEXOR-XR) 150 mg 24 hr capsule, , Disp: , Rfl:     venlafaxine (EFFEXOR-XR) 75 mg 24 hr capsule, Take 1 capsule by mouth daily, Disp: , Rfl:     doxycycline hyclate (VIBRAMYCIN) 100 mg capsule, Take 1 capsule (100 mg total) by mouth every 12 (twelve) hours for 7 days, Disp: 14 capsule, Rfl: 0    predniSONE 10 mg tablet, Take 60 mg x 1 day, then 50 mg x1 day, then 40 mg x1 day, then 30 mg x1 day, then 20 mg x 1 day, then  10 mg x1 day, Disp: 21 tablet, Rfl: 0    Allergies   Allergen Reactions    Zithromax [Azithromycin] GI Intolerance    Cephalosporins Hives    Sulfa Antibiotics Hives       OBJECTIVE  Vitals:   Vitals:    10/16/19 0902 10/16/19 0919   BP: 118/76    BP Location: Right arm    Patient Position: Sitting    Cuff Size: Standard    Pulse: 84    Resp: 18    Temp: 99 1 °F (37 3 °C)    TempSrc: Tympanic    SpO2:  99%   Weight: 70 3 kg (155 lb)    Height: 5' 4" (1 626 m)          Physical Exam   Constitutional: She is oriented to person, place, and time  She appears well-developed and well-nourished  HENT:   Head: Normocephalic and atraumatic  Right Ear: Tympanic membrane is erythematous and bulging  Nose: Mucosal edema and rhinorrhea present  Mouth/Throat: Oropharynx is clear and moist    Eyes: Pupils are equal, round, and reactive to light  Neck: Normal range of motion  Neck supple  Cardiovascular: Normal rate, regular rhythm and normal heart sounds  Pulmonary/Chest: Effort normal  No respiratory distress  She has wheezes in the right upper field and the right middle field  Abdominal: Soft  Bowel sounds are normal  She exhibits no distension  There is no tenderness  Musculoskeletal: Normal range of motion  She exhibits no edema or tenderness     Neurological: She is alert and oriented to person, place, and time    Skin: Skin is warm and dry  Psychiatric: She has a normal mood and affect   Her behavior is normal                   Yolande Osuna,     10/16/2019

## 2019-10-16 NOTE — ASSESSMENT & PLAN NOTE
Over 1 week of symptoms, history of asthma, abnormal exam  Will treat, numerous antibiotic allergies noted  CXR to r/o PNA

## 2019-10-16 NOTE — LETTER
October 16, 2019     Patient: Yves Eason   YOB: 1980   Date of Visit: 10/16/2019       To Whom it May Concern:    An Yumi is under my professional care  She was seen in my office on 10/16/2019  She may return to work on 10/21/19  If you have any questions or concerns, please don't hesitate to call           Sincerely,          DO Steffen        CC: No Recipients

## 2019-10-16 NOTE — TELEPHONE ENCOUNTER
Patient called about chest xray results   I informed her chest xray was normal and did not show any pneumonia

## 2019-10-17 ENCOUNTER — TELEPHONE (OUTPATIENT)
Dept: FAMILY MEDICINE CLINIC | Facility: CLINIC | Age: 39
End: 2019-10-17

## 2019-10-17 NOTE — TELEPHONE ENCOUNTER
Stephanie Goodwin you call [facility] and see if they will accept a generic doxycycline for this patient  She has numerous medication allergies and I am very limited with treatment options for her  Does the pharmacist have a suggestion for what would be covered  She is allergic to azithromycin, cephalosporins and sulfa drugs

## 2019-10-17 NOTE — TELEPHONE ENCOUNTER
[facility] can't fill doxy that you put her on yesterday because insurance will not pay for it  Is there anything else you can send in   [facility] [place]

## 2019-10-17 NOTE — TELEPHONE ENCOUNTER
I was able to change the medication over the phone with the pharmacy and pharmacy was not aware of any of her allergies

## 2019-10-28 LAB
ALBUMIN SERPL-MCNC: 4.2 G/DL (ref 3.5–5.5)
ALBUMIN/GLOB SERPL: 1.7 {RATIO} (ref 1.2–2.2)
ALP SERPL-CCNC: 78 IU/L (ref 39–117)
ALT SERPL-CCNC: 17 IU/L (ref 0–32)
AST SERPL-CCNC: 12 IU/L (ref 0–40)
BASOPHILS # BLD AUTO: 0.1 X10E3/UL (ref 0–0.2)
BASOPHILS NFR BLD AUTO: 1 %
BILIRUB SERPL-MCNC: <0.2 MG/DL (ref 0–1.2)
BUN SERPL-MCNC: 11 MG/DL (ref 6–20)
BUN/CREAT SERPL: 12 (ref 9–23)
CALCIUM SERPL-MCNC: 9.2 MG/DL (ref 8.7–10.2)
CHLORIDE SERPL-SCNC: 99 MMOL/L (ref 96–106)
CHOLEST SERPL-MCNC: 222 MG/DL (ref 100–199)
CHOLEST/HDLC SERPL: 3.8 RATIO (ref 0–4.4)
CO2 SERPL-SCNC: 23 MMOL/L (ref 20–29)
CREAT SERPL-MCNC: 0.9 MG/DL (ref 0.57–1)
EOSINOPHIL # BLD AUTO: 0.2 X10E3/UL (ref 0–0.4)
EOSINOPHIL NFR BLD AUTO: 3 %
ERYTHROCYTE [DISTWIDTH] IN BLOOD BY AUTOMATED COUNT: 13.4 % (ref 12.3–15.4)
FERRITIN SERPL-MCNC: 29 NG/ML (ref 15–150)
GLOBULIN SER-MCNC: 2.5 G/DL (ref 1.5–4.5)
GLUCOSE SERPL-MCNC: 101 MG/DL (ref 65–99)
HCT VFR BLD AUTO: 37.9 % (ref 34–46.6)
HDLC SERPL-MCNC: 59 MG/DL
HGB BLD-MCNC: 12.6 G/DL (ref 11.1–15.9)
IMM GRANULOCYTES # BLD: 0 X10E3/UL (ref 0–0.1)
IMM GRANULOCYTES NFR BLD: 0 %
IRON SATN MFR SERPL: 37 % (ref 15–55)
IRON SERPL-MCNC: 135 UG/DL (ref 27–159)
LDLC SERPL CALC-MCNC: 106 MG/DL (ref 0–99)
LDLC SERPL DIRECT ASSAY-MCNC: 113 MG/DL (ref 0–99)
LYMPHOCYTES # BLD AUTO: 4.3 X10E3/UL (ref 0.7–3.1)
LYMPHOCYTES NFR BLD AUTO: 52 %
MCH RBC QN AUTO: 27.8 PG (ref 26.6–33)
MCHC RBC AUTO-ENTMCNC: 33.2 G/DL (ref 31.5–35.7)
MCV RBC AUTO: 84 FL (ref 79–97)
MONOCYTES # BLD AUTO: 0.3 X10E3/UL (ref 0.1–0.9)
MONOCYTES NFR BLD AUTO: 4 %
NEUTROPHILS # BLD AUTO: 3.3 X10E3/UL (ref 1.4–7)
NEUTROPHILS NFR BLD AUTO: 40 %
PLATELET # BLD AUTO: 392 X10E3/UL (ref 150–450)
POTASSIUM SERPL-SCNC: 4.5 MMOL/L (ref 3.5–5.2)
PROT SERPL-MCNC: 6.7 G/DL (ref 6–8.5)
RBC # BLD AUTO: 4.53 X10E6/UL (ref 3.77–5.28)
SL AMB EGFR AFRICAN AMERICAN: 93 ML/MIN/1.73
SL AMB EGFR NON AFRICAN AMERICAN: 81 ML/MIN/1.73
SL AMB VLDL CHOLESTEROL CALC: 57 MG/DL (ref 5–40)
SODIUM SERPL-SCNC: 138 MMOL/L (ref 134–144)
T3FREE SERPL-MCNC: 3.1 PG/ML (ref 2–4.4)
T4 FREE SERPL-MCNC: 1.01 NG/DL (ref 0.82–1.77)
TIBC SERPL-MCNC: 362 UG/DL (ref 250–450)
TRIGL SERPL-MCNC: 283 MG/DL (ref 0–149)
TSH SERPL DL<=0.005 MIU/L-ACNC: 1.95 UIU/ML (ref 0.45–4.5)
UIBC SERPL-MCNC: 227 UG/DL (ref 131–425)
VIT B12 SERPL-MCNC: 943 PG/ML (ref 232–1245)
WBC # BLD AUTO: 8.3 X10E3/UL (ref 3.4–10.8)

## 2019-11-01 ENCOUNTER — OCCMED (OUTPATIENT)
Dept: URGENT CARE | Facility: CLINIC | Age: 39
End: 2019-11-01
Payer: OTHER MISCELLANEOUS

## 2019-11-01 ENCOUNTER — APPOINTMENT (OUTPATIENT)
Dept: RADIOLOGY | Facility: CLINIC | Age: 39
End: 2019-11-01
Payer: OTHER MISCELLANEOUS

## 2019-11-01 DIAGNOSIS — S99.912A INJURY OF LEFT ANKLE, INITIAL ENCOUNTER: Primary | ICD-10-CM

## 2019-11-01 DIAGNOSIS — S99.912A INJURY OF LEFT ANKLE, INITIAL ENCOUNTER: ICD-10-CM

## 2019-11-01 PROCEDURE — 99283 EMERGENCY DEPT VISIT LOW MDM: CPT

## 2019-11-01 PROCEDURE — G0382 LEV 3 HOSP TYPE B ED VISIT: HCPCS

## 2019-11-01 PROCEDURE — 73610 X-RAY EXAM OF ANKLE: CPT

## 2019-11-04 ENCOUNTER — OFFICE VISIT (OUTPATIENT)
Dept: FAMILY MEDICINE CLINIC | Facility: CLINIC | Age: 39
End: 2019-11-04
Payer: COMMERCIAL

## 2019-11-04 VITALS
OXYGEN SATURATION: 99 % | SYSTOLIC BLOOD PRESSURE: 120 MMHG | WEIGHT: 156 LBS | TEMPERATURE: 97.4 F | DIASTOLIC BLOOD PRESSURE: 88 MMHG | RESPIRATION RATE: 15 BRPM | BODY MASS INDEX: 26.63 KG/M2 | HEART RATE: 100 BPM | HEIGHT: 64 IN

## 2019-11-04 DIAGNOSIS — Z12.4 SCREENING FOR CERVICAL CANCER: ICD-10-CM

## 2019-11-04 DIAGNOSIS — F41.9 ANXIETY AND DEPRESSION: Primary | ICD-10-CM

## 2019-11-04 DIAGNOSIS — J45.909 ASTHMA DUE TO SEASONAL ALLERGIES: ICD-10-CM

## 2019-11-04 DIAGNOSIS — F32.A ANXIETY AND DEPRESSION: Primary | ICD-10-CM

## 2019-11-04 PROCEDURE — 99213 OFFICE O/P EST LOW 20 MIN: CPT | Performed by: NURSE PRACTITIONER

## 2019-11-04 PROCEDURE — 3008F BODY MASS INDEX DOCD: CPT | Performed by: NURSE PRACTITIONER

## 2019-11-04 RX ORDER — MONTELUKAST SODIUM 10 MG/1
10 TABLET ORAL
Qty: 30 TABLET | Refills: 2 | Status: SHIPPED | OUTPATIENT
Start: 2019-11-04 | End: 2020-02-27 | Stop reason: SDUPTHER

## 2019-11-04 NOTE — PROGRESS NOTES
Assessment/Plan   Diagnoses and all orders for this visit:    Anxiety and depression    Asthma due to seasonal allergies  -     montelukast (SINGULAIR) 10 mg tablet; Take 1 tablet (10 mg total) by mouth daily at bedtime    Screening for cervical cancer    Other orders  -     Cancel: Ambulatory referral to Gynecology; Future        Chief Complaint   Patient presents with    Follow-up     review lab work        Subjective   Patient ID: Ras Pritchett Sites is a 44 y o  female  Vitals:    11/04/19 0838   BP: 120/88   Pulse: 100   Resp: 15   Temp: (!) 97 4 °F (36 3 °C)   SpO2: 99%     Here today for follow up labs  All labs were WNL with exception of cholesterol-  - discussed changes in eating and exercise  Discussed fatigue symptoms are most likely her depression and anxiety- appointment for therapy This week and medication check on the follow up  Increased stress due to hospitalization 5year old son, increased financial difficulties from this       The following portions of the patient's history were reviewed and updated as appropriate: allergies, current medications, past family history, past medical history, past social history and problem list     Review of Systems   Constitutional: Positive for fatigue  HENT: Positive for congestion  Eyes: Negative  Respiratory: Positive for cough  Cardiovascular: Negative  Gastrointestinal: Negative  Endocrine: Negative  Genitourinary: Negative  Musculoskeletal: Negative  Skin: Negative  Allergic/Immunologic: Negative  Neurological: Negative  Hematological: Negative  Psychiatric/Behavioral: Positive for decreased concentration and sleep disturbance  Negative for suicidal ideas  The patient is nervous/anxious  Objective     Physical Exam   Constitutional: She is oriented to person, place, and time  She appears well-developed and well-nourished  No distress  HENT:   Head: Normocephalic and atraumatic     Right Ear: External ear normal    Left Ear: External ear normal    Nose: Nose normal    Mouth/Throat: Oropharynx is clear and moist  No oropharyngeal exudate  Eyes: Conjunctivae are normal  Right eye exhibits no discharge  Left eye exhibits no discharge  Neck: Normal range of motion  Neck supple  No thyromegaly present  Cardiovascular: Normal rate, regular rhythm, normal heart sounds and intact distal pulses  No murmur heard  Pulmonary/Chest: Effort normal and breath sounds normal  No respiratory distress  She has no wheezes  Abdominal: Soft  Bowel sounds are normal    Musculoskeletal: Normal range of motion  She exhibits no edema, tenderness or deformity  Lymphadenopathy:     She has no cervical adenopathy  Neurological: She is alert and oriented to person, place, and time  Skin: Skin is warm and dry  Capillary refill takes less than 2 seconds  She is not diaphoretic  Psychiatric: She has a normal mood and affect  Her behavior is normal  Judgment and thought content normal    Nursing note and vitals reviewed      Allergies   Allergen Reactions    Zithromax [Azithromycin] GI Intolerance    Cephalosporins Hives    Sulfa Antibiotics Hives

## 2019-12-12 DIAGNOSIS — B02.29 POSTHERPETIC NEURALGIA: ICD-10-CM

## 2019-12-12 RX ORDER — GABAPENTIN 300 MG/1
CAPSULE ORAL
Qty: 60 CAPSULE | Refills: 0 | OUTPATIENT
Start: 2019-12-12

## 2020-01-05 DIAGNOSIS — B02.29 POSTHERPETIC NEURALGIA: ICD-10-CM

## 2020-01-06 RX ORDER — GABAPENTIN 300 MG/1
CAPSULE ORAL
Qty: 60 CAPSULE | Refills: 0 | OUTPATIENT
Start: 2020-01-06

## 2020-02-07 ENCOUNTER — HOSPITAL ENCOUNTER (EMERGENCY)
Facility: HOSPITAL | Age: 40
Discharge: HOME | End: 2020-02-07
Attending: EMERGENCY MEDICINE | Admitting: EMERGENCY MEDICINE
Payer: COMMERCIAL

## 2020-02-07 ENCOUNTER — APPOINTMENT (EMERGENCY)
Dept: CT IMAGING | Facility: HOSPITAL | Age: 40
End: 2020-02-07
Payer: COMMERCIAL

## 2020-02-07 VITALS
OXYGEN SATURATION: 100 % | DIASTOLIC BLOOD PRESSURE: 68 MMHG | RESPIRATION RATE: 16 BRPM | HEART RATE: 89 BPM | TEMPERATURE: 97.8 F | HEIGHT: 64 IN | BODY MASS INDEX: 27.31 KG/M2 | SYSTOLIC BLOOD PRESSURE: 112 MMHG | WEIGHT: 160 LBS

## 2020-02-07 DIAGNOSIS — H91.91 ACUTE HEARING LOSS OF RIGHT EAR: Primary | ICD-10-CM

## 2020-02-07 LAB
ALBUMIN SERPL BCP-MCNC: 3.6 G/DL (ref 3.5–5)
ALP SERPL-CCNC: 46 U/L (ref 46–116)
ALT SERPL W P-5'-P-CCNC: 22 U/L (ref 12–78)
ANION GAP SERPL CALCULATED.3IONS-SCNC: 10 MMOL/L (ref 4–13)
AST SERPL W P-5'-P-CCNC: 43 U/L (ref 5–45)
BASOPHILS # BLD AUTO: 0.08 THOUSANDS/ΜL (ref 0–0.1)
BASOPHILS NFR BLD AUTO: 1 % (ref 0–1)
BILIRUB SERPL-MCNC: 0.5 MG/DL (ref 0.2–1)
BUN SERPL-MCNC: 12 MG/DL (ref 5–25)
CALCIUM SERPL-MCNC: 8.8 MG/DL (ref 8.3–10.1)
CHLORIDE SERPL-SCNC: 105 MMOL/L (ref 100–108)
CO2 SERPL-SCNC: 22 MMOL/L (ref 21–32)
CREAT SERPL-MCNC: 0.63 MG/DL (ref 0.6–1.3)
EOSINOPHIL # BLD AUTO: 0.19 THOUSAND/ΜL (ref 0–0.61)
EOSINOPHIL NFR BLD AUTO: 3 % (ref 0–6)
ERYTHROCYTE [DISTWIDTH] IN BLOOD BY AUTOMATED COUNT: 12.5 % (ref 11.6–15.1)
EXT PREG TEST URINE: NORMAL
EXT. CONTROL ED NAV: NORMAL
GFR SERPL CREATININE-BSD FRML MDRD: 113 ML/MIN/1.73SQ M
GLUCOSE SERPL-MCNC: 99 MG/DL (ref 65–140)
HCT VFR BLD AUTO: 38.9 % (ref 34.8–46.1)
HGB BLD-MCNC: 12.8 G/DL (ref 11.5–15.4)
IMM GRANULOCYTES # BLD AUTO: 0.01 THOUSAND/UL (ref 0–0.2)
IMM GRANULOCYTES NFR BLD AUTO: 0 % (ref 0–2)
LYMPHOCYTES # BLD AUTO: 3.43 THOUSANDS/ΜL (ref 0.6–4.47)
LYMPHOCYTES NFR BLD AUTO: 50 % (ref 14–44)
MCH RBC QN AUTO: 28.1 PG (ref 26.8–34.3)
MCHC RBC AUTO-ENTMCNC: 32.9 G/DL (ref 31.4–37.4)
MCV RBC AUTO: 86 FL (ref 82–98)
MONOCYTES # BLD AUTO: 0.34 THOUSAND/ΜL (ref 0.17–1.22)
MONOCYTES NFR BLD AUTO: 5 % (ref 4–12)
NEUTROPHILS # BLD AUTO: 2.81 THOUSANDS/ΜL (ref 1.85–7.62)
NEUTS SEG NFR BLD AUTO: 41 % (ref 43–75)
NRBC BLD AUTO-RTO: 0 /100 WBCS
PLATELET # BLD AUTO: 290 THOUSANDS/UL (ref 149–390)
PMV BLD AUTO: 9.9 FL (ref 8.9–12.7)
POTASSIUM SERPL-SCNC: 5.9 MMOL/L (ref 3.5–5.3)
PROT SERPL-MCNC: 7.4 G/DL (ref 6.4–8.2)
RBC # BLD AUTO: 4.55 MILLION/UL (ref 3.81–5.12)
SODIUM SERPL-SCNC: 137 MMOL/L (ref 136–145)
WBC # BLD AUTO: 6.86 THOUSAND/UL (ref 4.31–10.16)

## 2020-02-07 PROCEDURE — 85025 COMPLETE CBC W/AUTO DIFF WBC: CPT | Performed by: PHYSICIAN ASSISTANT

## 2020-02-07 PROCEDURE — 36415 COLL VENOUS BLD VENIPUNCTURE: CPT | Performed by: PHYSICIAN ASSISTANT

## 2020-02-07 PROCEDURE — 99283 EMERGENCY DEPT VISIT LOW MDM: CPT

## 2020-02-07 PROCEDURE — 99284 EMERGENCY DEPT VISIT MOD MDM: CPT | Performed by: PHYSICIAN ASSISTANT

## 2020-02-07 PROCEDURE — 80053 COMPREHEN METABOLIC PANEL: CPT | Performed by: PHYSICIAN ASSISTANT

## 2020-02-07 PROCEDURE — 81025 URINE PREGNANCY TEST: CPT | Performed by: PHYSICIAN ASSISTANT

## 2020-02-07 PROCEDURE — 70450 CT HEAD/BRAIN W/O DYE: CPT

## 2020-02-07 RX ORDER — VILAZODONE HYDROCHLORIDE 10 MG/1
20 TABLET ORAL DAILY
COMMUNITY
Start: 2019-12-10 | End: 2020-04-15 | Stop reason: ALTCHOICE

## 2020-02-07 NOTE — ED PROVIDER NOTES
History  Chief Complaint   Patient presents with    Ear Problem     patient complaint of "ringing in ear x 2 years, last nigt felt pressure in right ear and now cannot hear out of ear"     Patient is a 43 y/o F with h/o anxiety, depression, asthma that presents to the ED with trouble hearing from right ear  She states she has had tinnitus for 2 years  She states she saw ENT for this and does not know why she has it  She states last night she felt a pressure in her right ear and now cannot hear out of right ear  No discharge from her ear  She states she does feel dizzy and off balance  She states she has allergies  No sinus pressure  No fevers, but has chills  She states she has tingling in her left hand off and on  She states she once had vertigo, but was never diagnosed with Meniere's  No chest pain or SOB  History provided by:  Patient  Ear Fullness   Location:  Right ear  Severity:  Moderate  Onset quality:  Sudden  Duration:  1 day  Timing:  Constant  Progression:  Unchanged  Chronicity:  New  Context:  Patient unable to hear out of right ear since yesterday  Relieved by:  Nothing  Worsened by:  Nothing  Ineffective treatments:  None tried  Associated symptoms: headaches, rhinorrhea and sore throat    Associated symptoms: no chest pain, no congestion, no cough, no diarrhea, no ear pain, no fever, no loss of consciousness, no nausea, no rash, no shortness of breath and no vomiting        Prior to Admission Medications   Prescriptions Last Dose Informant Patient Reported? Taking?    ALPRAZolam (XANAX) 1 mg tablet  Self Yes No   Sig: Take 1 mg by mouth 2 (two) times a day   Drospiren-Eth Estrad-Levomefol 3-0 02-0 451 MG TABS  Self Yes No   Sig: Take 1 tablet by mouth daily   QVAR REDIHALER 80 MCG/ACT inhaler  Self Yes No   VIIBRYD 10 MG tablet   Yes No   Sig: Take 20 mg by mouth daily   albuterol (VENTOLIN HFA) 90 mcg/act inhaler  Self No No   Sig: Inhale 2 puffs every 6 (six) hours as needed for wheezing   ergocalciferol (VITAMIN D2) 50,000 units  Self Yes No   Sig: TAKE 1 CAPSULE BY MOUTH ONE TIME PER WEEK   gabapentin (NEURONTIN) 300 mg capsule  Self No No   Sig: Take 1 capsule (300 mg total) by mouth 3 (three) times a day   hydrOXYzine pamoate (VISTARIL) 50 mg capsule  Self Yes No   Sig: Take 50 mg by mouth daily    montelukast (SINGULAIR) 10 mg tablet   No No   Sig: Take 1 tablet (10 mg total) by mouth daily at bedtime   venlafaxine (EFFEXOR-XR) 150 mg 24 hr capsule  Self Yes No   Sig: Take 150 mg by mouth daily    venlafaxine (EFFEXOR-XR) 75 mg 24 hr capsule  Self Yes No   Sig: Take 1 capsule by mouth daily      Facility-Administered Medications: None       Past Medical History:   Diagnosis Date    Anxiety     Asthma     Depression     Hypoglycemia     Moderate episode of recurrent major depressive disorder (HCC) 10/3/2019    PTSD (post-traumatic stress disorder)        Past Surgical History:   Procedure Laterality Date     SECTION      KNEE SURGERY      TONSILLECTOMY         Family History   Problem Relation Age of Onset    Depression Mother      I have reviewed and agree with the history as documented  Social History     Tobacco Use    Smoking status: Never Smoker    Smokeless tobacco: Never Used   Substance Use Topics    Alcohol use: Not Currently     Frequency: Never     Drinks per session: Patient refused     Binge frequency: Patient refused    Drug use: Never        Review of Systems   Constitutional: Positive for chills  Negative for activity change, appetite change and fever  HENT: Positive for rhinorrhea and sore throat  Negative for congestion, ear pain and trouble swallowing  Respiratory: Negative for cough and shortness of breath  Cardiovascular: Negative for chest pain  Gastrointestinal: Negative for blood in stool, diarrhea, nausea and vomiting  Musculoskeletal: Negative for back pain and neck pain  Skin: Negative for color change and rash  Neurological: Positive for dizziness and headaches  Negative for tremors, loss of consciousness, syncope, speech difficulty and weakness  Psychiatric/Behavioral: Negative for confusion  All other systems reviewed and are negative  Physical Exam  Physical Exam   Constitutional: She is oriented to person, place, and time  She appears well-developed and well-nourished  She is active and cooperative  She does not appear ill  No distress  HENT:   Head: Normocephalic and atraumatic  Right Ear: Tympanic membrane is retracted  Decreased hearing is noted  Left Ear: Tympanic membrane is retracted  No decreased hearing is noted  Nose: Nose normal    Mouth/Throat: Oropharynx is clear and moist and mucous membranes are normal    Eyes: Pupils are equal, round, and reactive to light  Conjunctivae and EOM are normal    Neck: Normal range of motion  Cardiovascular: Regular rhythm and normal heart sounds  Tachycardia present  No murmur heard  Pulmonary/Chest: Effort normal and breath sounds normal  She has no wheezes  She has no rhonchi  She has no rales  Abdominal: Soft  Normal appearance and bowel sounds are normal  There is no tenderness  Musculoskeletal: Normal range of motion  She exhibits no edema or tenderness  Neurological: She is alert and oriented to person, place, and time  She has normal strength and normal reflexes  She displays no tremor  No cranial nerve deficit or sensory deficit  She exhibits normal muscle tone  She displays a negative Romberg sign  Coordination and gait normal  GCS eye subscore is 4  GCS verbal subscore is 5  GCS motor subscore is 6  Skin: Skin is warm and dry  No rash noted  She is not diaphoretic  No pallor  Psychiatric: She has a normal mood and affect  Her speech is normal  Cognition and memory are normal    Nursing note and vitals reviewed        Vital Signs  ED Triage Vitals   Temperature Pulse Respirations Blood Pressure SpO2   02/07/20 1252 02/07/20 1252 02/07/20 1252 02/07/20 1252 02/07/20 1252   97 8 °F (36 6 °C) 101 18 141/70 100 %      Temp src Heart Rate Source Patient Position - Orthostatic VS BP Location FiO2 (%)   -- 02/07/20 1700 -- -- --    Monitor         Pain Score       02/07/20 1337       5           Vitals:    02/07/20 1252 02/07/20 1530 02/07/20 1645 02/07/20 1700   BP: 141/70 112/68     Pulse: 101 89 87 89         Visual Acuity  Visual Acuity      Most Recent Value   L Pupil Size (mm)  3   R Pupil Size (mm)  3          ED Medications  Medications - No data to display    Diagnostic Studies  Results Reviewed     Procedure Component Value Units Date/Time    Comprehensive metabolic panel [[id number]]  (Abnormal) Collected:  02/07/20 1401    Lab Status:  Final result Specimen:  Blood from Arm, Right Updated:  02/07/20 1427     Sodium 137 mmol/L      Potassium 5 9 mmol/L      Chloride 105 mmol/L      CO2 22 mmol/L      ANION GAP 10 mmol/L      BUN 12 mg/dL      Creatinine 0 63 mg/dL      Glucose 99 mg/dL      Calcium 8 8 mg/dL      AST 43 U/L      ALT 22 U/L      Alkaline Phosphatase 46 U/L      Total Protein 7 4 g/dL      Albumin 3 6 g/dL      Total Bilirubin 0 50 mg/dL      eGFR 113 ml/min/1 73sq m     Narrative:       Adri guidelines for Chronic Kidney Disease (CKD):     Stage 1 with normal or high GFR (GFR > 90 mL/min/1 73 square meters)    Stage 2 Mild CKD (GFR = 60-89 mL/min/1 73 square meters)    Stage 3A Moderate CKD (GFR = 45-59 mL/min/1 73 square meters)    Stage 3B Moderate CKD (GFR = 30-44 mL/min/1 73 square meters)    Stage 4 Severe CKD (GFR = 15-29 mL/min/1 73 square meters)    Stage 5 End Stage CKD (GFR <15 mL/min/1 73 square meters)  Note: GFR calculation is accurate only with a steady state creatinine    CBC and differential [[id number]]  (Abnormal) Collected:  02/07/20 1401    Lab Status:  Final result Specimen:  Blood from Arm, Right Updated:  02/07/20 1415     WBC 6 86 Thousand/uL      RBC 4 55 Million/uL      Hemoglobin 12 8 g/dL      Hematocrit 38 9 %      MCV 86 fL      MCH 28 1 pg      MCHC 32 9 g/dL      RDW 12 5 %      MPV 9 9 fL      Platelets 937 Thousands/uL      nRBC 0 /100 WBCs      Neutrophils Relative 41 %      Immat GRANS % 0 %      Lymphocytes Relative 50 %      Monocytes Relative 5 %      Eosinophils Relative 3 %      Basophils Relative 1 %      Neutrophils Absolute 2 81 Thousands/µL      Immature Grans Absolute 0 01 Thousand/uL      Lymphocytes Absolute 3 43 Thousands/µL      Monocytes Absolute 0 34 Thousand/µL      Eosinophils Absolute 0 19 Thousand/µL      Basophils Absolute 0 08 Thousands/µL     POCT pregnancy, urine [[id number]]  (Normal) Resulted:  02/07/20 1341    Lab Status:  Final result Updated:  02/07/20 1341     EXT PREG TEST UR (Ref: Negative) neg     Control valid                 CT head without contrast   Final Result by Peyton Palomino DO (02/07 1710)      No acute intracranial abnormality  Workstation performed: [id number]                    Procedures  Procedures         ED Course  ED Course as of Feb 07 1849   Kristie Andre Feb 07, 2020   1635 CT scan delay due to PACS downtime  Patient aware of delay and states she is feeling better  MDM  Number of Diagnoses or Management Options  Acute hearing loss of right ear: new and requires workup  Diagnosis management comments: Patient with hearing loss right ear, off balance  No visual abnormalities to ear, will order CT scan to r/o tumor or swelling  Patient did improve while in the ER, will refer to ENT  Delay on CT reading due to PACS system downtime          Amount and/or Complexity of Data Reviewed  Clinical lab tests: ordered and reviewed  Tests in the radiology section of CPT®: ordered and reviewed    Patient Progress  Patient progress: improved        Disposition  Final diagnoses:   Acute hearing loss of right ear     Time reflects when diagnosis was documented in both MDM as applicable and the Disposition within this note     Time User Action Codes Description Comment    2/7/2020  5:12 PM Brooke Valle Add [H91 91] Acute hearing loss of right ear       ED Disposition     ED Disposition Condition Date/Time Comment    Discharge Stable Fri Feb 7, 2020  5:12 PM [facility] discharge to home/self care              Follow-up Information     Follow up With Specialties Details Why Contact Info    Bonita Schilder, MD Otolaryngology Schedule an appointment as soon as possible for a visit  For recheck [address] [place] [id number]  [phone]            Discharge Medication List as of 2/7/2020  5:14 PM      CONTINUE these medications which have NOT CHANGED    Details   albuterol (VENTOLIN HFA) 90 mcg/act inhaler Inhale 2 puffs every 6 (six) hours as needed for wheezing, Starting Mon 10/14/2019, Normal      ALPRAZolam (XANAX) 1 mg tablet Take 1 mg by mouth 2 (two) times a day, Starting Fri 4/5/2019, Historical Med      Drospiren-Eth Estrad-Levomefol 3-0 02-0 451 MG TABS Take 1 tablet by mouth daily, Starting Thu 4/11/2019, Historical Med      ergocalciferol (VITAMIN D2) 50,000 units TAKE 1 CAPSULE BY MOUTH ONE TIME PER WEEK, Historical Med      gabapentin (NEURONTIN) 300 mg capsule Take 1 capsule (300 mg total) by mouth 3 (three) times a day, Starting Thu 10/3/2019, Normal      hydrOXYzine pamoate (VISTARIL) 50 mg capsule Take 50 mg by mouth daily , Starting Tue 4/16/2019, Historical Med      montelukast (SINGULAIR) 10 mg tablet Take 1 tablet (10 mg total) by mouth daily at bedtime, Starting Mon 11/4/2019, Normal      QVAR REDIHALER 80 MCG/ACT inhaler Starting Thu 1/24/2019, Historical Med      !! venlafaxine (EFFEXOR-XR) 150 mg 24 hr capsule Take 150 mg by mouth daily , Starting Sat 4/13/2019, Historical Med      !! venlafaxine (EFFEXOR-XR) 75 mg 24 hr capsule Take 1 capsule by mouth daily, Starting Wed 10/2/2019, Historical Med      VIIBRYD 10 MG tablet Take 20 mg by mouth daily, Starting Tue 12/10/2019, Historical Med       !! - Potential duplicate medications found  Please discuss with provider  No discharge procedures on file      ED Provider  Electronically Signed by           Supriya Baxter PA-C  02/07/20 3765

## 2020-02-07 NOTE — DISCHARGE INSTRUCTIONS
Follow up with ENT doctor concerning hearing loss in your right ear  Try a decongestant over the counter

## 2020-02-21 DIAGNOSIS — J45.909 ASTHMA DUE TO SEASONAL ALLERGIES: ICD-10-CM

## 2020-02-21 RX ORDER — MONTELUKAST SODIUM 10 MG/1
TABLET ORAL
Qty: 30 TABLET | Refills: 2 | OUTPATIENT
Start: 2020-02-21

## 2020-02-26 ENCOUNTER — OFFICE VISIT (OUTPATIENT)
Dept: URGENT CARE | Facility: CLINIC | Age: 40
End: 2020-02-26
Payer: COMMERCIAL

## 2020-02-26 VITALS
SYSTOLIC BLOOD PRESSURE: 116 MMHG | OXYGEN SATURATION: 97 % | BODY MASS INDEX: 26.29 KG/M2 | HEIGHT: 64 IN | WEIGHT: 154 LBS | HEART RATE: 112 BPM | TEMPERATURE: 98.7 F | RESPIRATION RATE: 16 BRPM | DIASTOLIC BLOOD PRESSURE: 62 MMHG

## 2020-02-26 DIAGNOSIS — J02.9 SORE THROAT: ICD-10-CM

## 2020-02-26 DIAGNOSIS — J02.9 ACUTE PHARYNGITIS, UNSPECIFIED ETIOLOGY: Primary | ICD-10-CM

## 2020-02-26 LAB — S PYO AG THROAT QL: NEGATIVE

## 2020-02-26 PROCEDURE — 87880 STREP A ASSAY W/OPTIC: CPT | Performed by: PHYSICIAN ASSISTANT

## 2020-02-26 PROCEDURE — 99283 EMERGENCY DEPT VISIT LOW MDM: CPT | Performed by: FAMILY MEDICINE

## 2020-02-26 PROCEDURE — G0382 LEV 3 HOSP TYPE B ED VISIT: HCPCS | Performed by: FAMILY MEDICINE

## 2020-02-26 RX ORDER — PREDNISONE 10 MG/1
10 TABLET ORAL DAILY
Qty: 4 TABLET | Refills: 0 | Status: SHIPPED | OUTPATIENT
Start: 2020-02-26 | End: 2020-03-01

## 2020-02-26 NOTE — PROGRESS NOTES
Assessment/Plan    Acute pharyngitis, unspecified etiology [J02 9]  1  Acute pharyngitis, unspecified etiology  predniSONE 10 mg tablet    al mag oxide-diphenhydramine-lidocaine viscous (MAGIC MOUTHWASH) 1:1:1 suspension   2  Sore throat  POCT rapid strepA    Throat culture         Subjective:     Patient ID: Mary Eason is a 44 y o  female  Reason For Visit / Chief Complaint  Chief Complaint   Patient presents with    Sore Throat     Pt reports on Monday she developed a sore throat, HA and neck discomfort  Denies fevers  17-year-old female presents the clinic with sore throat, headache, neck discomfort that started on Monday  Patient states that she has right-sided sore throat pain and states that she had tonsillectomy at 1years old but her right tonsil started to grow back  Patient states that she feels a swelling in the neck from lymph nodes and patient denies fevers, chills, nausea, vomiting, difficulty breathing, shortness of breath  Past Medical History:   Diagnosis Date    Anxiety     Asthma     Depression     Hypoglycemia     [facility]    PTSD (post-traumatic stress disorder)        Past Surgical History:   Procedure Laterality Date     SECTION      KNEE SURGERY      TONSILLECTOMY         Family History   Problem Relation Age of Onset    Depression Mother        Review of Systems   Constitutional: Negative for chills and fever  HENT: Positive for sore throat (R sided)  Negative for congestion and rhinorrhea  Respiratory: Positive for cough, chest tightness and wheezing  Gastrointestinal: Positive for nausea  Negative for abdominal pain, diarrhea and vomiting  Musculoskeletal: Positive for neck pain (R sided) and neck stiffness (R sided)  Neurological: Positive for headaches (R sided)         Objective:    /62   Pulse (!) 112   Temp 98 7 °F (37 1 °C)   Resp 16   Ht 5' 4" (1 626 m)   Wt 69 9 kg (154 lb)   LMP 01/31/2020   SpO2 97%   BMI 26 43 kg/m²     Physical Exam   Constitutional: She is oriented to person, place, and time  Vital signs are normal  She appears well-developed and well-nourished  No distress  HENT:   Head: Normocephalic and atraumatic  Nose: Nose normal    Mouth/Throat: Uvula is midline and mucous membranes are normal  Posterior oropharyngeal erythema present  Eyes: Conjunctivae are normal    Neck: Normal range of motion  Neck supple  Pulmonary/Chest: Effort normal    Musculoskeletal: Normal range of motion  Lymphadenopathy:        Head (right side): Tonsillar adenopathy present  Head (left side): No tonsillar adenopathy present  Neurological: She is alert and oriented to person, place, and time  Skin: Skin is warm and dry  No rash noted  She is not diaphoretic  Nursing note and vitals reviewed

## 2020-02-26 NOTE — PATIENT INSTRUCTIONS
Pharyngitis   AMBULATORY CARE:   Pharyngitis , or sore throat, is inflammation of the tissues and structures in your pharynx (throat)  Pharyngitis is most often caused by bacteria  It may also be caused by a cold or flu virus  Other causes include smoking, allergies, or acid reflux  Signs and symptoms that may occur with pharyngitis:   · Sore throat or pain when you swallow    · Fever, chills, and body aches    · Hoarse or raspy voice    · Cough, runny or stuffy nose, itchy or watery eyes    · Headache    · Upset stomach and loss of appetite    · Mild neck stiffness    · Swollen glands that feel like hard lumps when you touch your neck    · White and yellow pus-filled blisters in the back of your throat  Call 911 for any of the following:   · You have trouble breathing or swallowing because your throat is swollen or sore  Seek care immediately if:   · You are drooling because it hurts too much to swallow  · Your fever is higher than 102? F (39?C) or lasts longer than 3 days  · You are confused  · You taste blood in your throat  Contact your healthcare provider if:   · Your throat pain gets worse  · You have a painful lump in your throat that does not go away after 5 days  · Your symptoms do not improve after 5 days  · You have questions or concerns about your condition or care  Treatment for pharyngitis:  Viral pharyngitis will go away on its own without treatment  Your sore throat should start to feel better in 3 to 5 days for both viral and bacterial infections  You may need any of the following:  · Antibiotics  treat a bacterial infection  · NSAIDs , such as ibuprofen, help decrease swelling, pain, and fever  NSAIDs can cause stomach bleeding or kidney problems in certain people  If you take blood thinner medicine, always ask your healthcare provider if NSAIDs are safe for you  Always read the medicine label and follow directions  · Acetaminophen  decreases pain and fever   It is available without a doctor's order  Ask how much to take and how often to take it  Follow directions  Acetaminophen can cause liver damage if not taken correctly  Manage your symptoms:   · Gargle salt water  Mix ¼ teaspoon salt in an 8 ounce glass of warm water and gargle  This may help decrease swelling in your throat  · Drink liquids as directed  You may need to drink more liquids than usual  Liquids may help soothe your throat and prevent dehydration  Ask how much liquid to drink each day and which liquids are best for you  · Use a cool-steam humidifier  to help moisten the air in your room and calm your cough  · Soothe your throat  with cough drops, ice, soft foods, or popsicles  Prevent the spread of pharyngitis:  Cover your mouth and nose when you cough or sneeze  Do not share food or drinks  Wash your hands often  Use soap and water  If soap and water are unavailable, use an alcohol based hand   Follow up with your healthcare provider as directed:  Write down your questions so you remember to ask them during your visits  © 2017 [address] [facility] Information is for End User's use only and may not be sold, redistributed or otherwise used for commercial purposes  All illustrations and images included in CareNotes® are the copyrighted property of [email or web address]  or [employer]  The above information is an  only  It is not intended as medical advice for individual conditions or treatments  Talk to your doctor, nurse or pharmacist before following any medical regimen to see if it is safe and effective for you

## 2020-02-27 ENCOUNTER — OFFICE VISIT (OUTPATIENT)
Dept: FAMILY MEDICINE CLINIC | Facility: CLINIC | Age: 40
End: 2020-02-27
Payer: COMMERCIAL

## 2020-02-27 VITALS
HEART RATE: 96 BPM | DIASTOLIC BLOOD PRESSURE: 90 MMHG | SYSTOLIC BLOOD PRESSURE: 130 MMHG | WEIGHT: 154 LBS | RESPIRATION RATE: 18 BRPM | TEMPERATURE: 97.9 F | HEIGHT: 64 IN | BODY MASS INDEX: 26.29 KG/M2

## 2020-02-27 DIAGNOSIS — J45.909 ASTHMA DUE TO SEASONAL ALLERGIES: ICD-10-CM

## 2020-02-27 DIAGNOSIS — B02.29 POSTHERPETIC NEURALGIA: Primary | ICD-10-CM

## 2020-02-27 DIAGNOSIS — E55.9 VITAMIN D DEFICIENCY: ICD-10-CM

## 2020-02-27 PROCEDURE — 3008F BODY MASS INDEX DOCD: CPT | Performed by: NURSE PRACTITIONER

## 2020-02-27 PROCEDURE — 99213 OFFICE O/P EST LOW 20 MIN: CPT | Performed by: NURSE PRACTITIONER

## 2020-02-27 PROCEDURE — 1036F TOBACCO NON-USER: CPT | Performed by: NURSE PRACTITIONER

## 2020-02-27 RX ORDER — MONTELUKAST SODIUM 10 MG/1
10 TABLET ORAL
Qty: 30 TABLET | Refills: 2 | Status: SHIPPED | OUTPATIENT
Start: 2020-02-27

## 2020-02-27 RX ORDER — GABAPENTIN 300 MG/1
300 CAPSULE ORAL 2 TIMES DAILY
Qty: 60 CAPSULE | Refills: 2 | Status: SHIPPED | OUTPATIENT
Start: 2020-02-27 | End: 2022-05-05

## 2020-02-27 NOTE — PROGRESS NOTES
Assessment/Plan   Problem List Items Addressed This Visit     None      Visit Diagnoses     Postherpetic neuralgia    -  Primary    Relevant Medications    gabapentin (NEURONTIN) 300 mg capsule    Vitamin D deficiency        Relevant Orders    Vitamin D 25 hydroxy                Chief Complaint   Patient presents with    Follow-up     Urgent Care visit 02/26/20 -- wants Vitamin D levels checked -- wants to restart Gabapentin       Subjective   Patient ID: Ras Eason is a 44 y o  female  Vitals:    02/27/20 0934   BP: 130/90   Pulse: 96   Resp: 18   Temp: 97 9 °F (36 6 °C)     Here today with c/o low energy, recent vit D load and requesting repeat levels  Also reports was seen in urgent care yesterday, treated with steroids and strep culture pending, reports feeling poorly  No other symptoms, no other meds    Requesting renewal for Neurontin for post neuralgic pain-renewed          The following portions of the patient's history were reviewed and updated as appropriate: allergies, past family history, past medical history, past social history, past surgical history and problem list     Review of Systems   Constitutional: Positive for chills and fever  HENT: Positive for sore throat  Eyes: Negative  Respiratory: Negative  Cardiovascular: Negative  Gastrointestinal: Positive for constipation  Endocrine: Negative  Genitourinary: Negative  Musculoskeletal: Positive for myalgias  Skin: Negative  Allergic/Immunologic: Negative  Neurological: Negative  Hematological: Negative  Psychiatric/Behavioral: Negative  Objective     Physical Exam   Constitutional: She is oriented to person, place, and time  She appears well-developed and well-nourished  No distress  HENT:   Head: Normocephalic and atraumatic  Right Ear: Tympanic membrane is not perforated and not bulging  No middle ear effusion  Left Ear: Tympanic membrane is not perforated and not bulging    No middle ear effusion  Nose: Nose normal    Mouth/Throat: Uvula is midline and mucous membranes are normal  Posterior oropharyngeal erythema present  Tonsils are 1+ on the right  Tonsils are 2+ on the left  No tonsillar exudate  Eyes: Conjunctivae are normal  Right eye exhibits no discharge  Left eye exhibits no discharge  Neck: Normal range of motion  Neck supple  No thyromegaly present  Cardiovascular: Normal rate, regular rhythm, normal heart sounds and intact distal pulses  No murmur heard  Pulmonary/Chest: Effort normal and breath sounds normal    Abdominal: Soft  Bowel sounds are normal    Musculoskeletal: Normal range of motion  She exhibits no edema or tenderness  Lymphadenopathy:     She has cervical adenopathy  Neurological: She is alert and oriented to person, place, and time  Skin: Skin is warm and dry  She is not diaphoretic  Psychiatric: She has a normal mood and affect  Her behavior is normal  Judgment and thought content normal    Nursing note and vitals reviewed      Allergies   Allergen Reactions    Zithromax [Azithromycin] GI Intolerance    Cephalosporins Hives    Sulfa Antibiotics Hives     Patient Active Problem List   Diagnosis    Moderate episode of recurrent major depressive disorder (HCC)    Moderate persistent asthma without complication    Cough    Wheezing    Bronchitis       Current Outpatient Medications:     al mag oxide-diphenhydramine-lidocaine viscous (MAGIC MOUTHWASH) 1:1:1 suspension, Swish and spit 10 mL every 6 (six) hours as needed for mouth pain or discomfort, Disp: 50 mL, Rfl: 0    albuterol (VENTOLIN HFA) 90 mcg/act inhaler, Inhale 2 puffs every 6 (six) hours as needed for wheezing, Disp: 18 g, Rfl: 0    ALPRAZolam (XANAX) 1 mg tablet, Take 1 mg by mouth 2 (two) times a day, Disp: , Rfl: 1    Drospiren-Eth Estrad-Levomefol 3-0 02-0 451 MG TABS, Take 1 tablet by mouth daily, Disp: , Rfl: 3    ergocalciferol (VITAMIN D2) 50,000 units, TAKE 1 CAPSULE BY MOUTH ONE TIME PER WEEK, Disp: , Rfl: 3    hydrOXYzine pamoate (VISTARIL) 50 mg capsule, Take 50 mg by mouth daily , Disp: , Rfl:     montelukast (SINGULAIR) 10 mg tablet, Take 1 tablet (10 mg total) by mouth daily at bedtime, Disp: 30 tablet, Rfl: 2    predniSONE 10 mg tablet, Take 1 tablet (10 mg total) by mouth daily for 4 days, Disp: 4 tablet, Rfl: 0    QVAR REDIHALER 80 MCG/ACT inhaler, , Disp: , Rfl:     venlafaxine (EFFEXOR-XR) 150 mg 24 hr capsule, Take 150 mg by mouth daily , Disp: , Rfl:     venlafaxine (EFFEXOR-XR) 75 mg 24 hr capsule, Take 1 capsule by mouth daily, Disp: , Rfl:     VIIBRYD 10 MG tablet, Take 20 mg by mouth daily, Disp: , Rfl:     gabapentin (NEURONTIN) 300 mg capsule, Take 1 capsule (300 mg total) by mouth 2 (two) times a day, Disp: 60 capsule, Rfl: 2  Social History     Socioeconomic History    Marital status: Single     Spouse name: Not on file    Number of children: Not on file    Years of education: Not on file    Highest education level: Not on file   Occupational History    Not on file   Social Needs    Financial resource strain: Somewhat hard    Food insecurity:     Worry: Sometimes true     Inability: Sometimes true    Transportation needs:     Medical: Yes     Non-medical: Yes   Tobacco Use    Smoking status: Never Smoker    Smokeless tobacco: Never Used   Substance and Sexual Activity    Alcohol use: Not Currently     Frequency: Never     Drinks per session: Patient refused     Binge frequency: Patient refused    Drug use: Never    Sexual activity: Yes     Partners: Male   Lifestyle    Physical activity:     Days per week: 1 day     Minutes per session: 60 min    Stress: Very much   Relationships    Social connections:     Talks on phone: Twice a week     Gets together: Never     Attends Synagogue service: Never     Active member of club or organization: No     Attends meetings of clubs or organizations: Never     Relationship status: Living with partner    Intimate partner violence:     Fear of current or ex partner: No     Emotionally abused: No     Physically abused: No     Forced sexual activity: No   Other Topics Concern    Not on file   Social History Narrative    Not on file     Family History   Problem Relation Age of Onset    Depression Mother

## 2020-02-27 NOTE — PATIENT INSTRUCTIONS
1  Continue meds buy urgent care  2  Will call with strep culture is available  3   Appointment with ENT today

## 2020-02-28 LAB — B-HEM STREP SPEC QL CULT: POSITIVE

## 2020-03-02 ENCOUNTER — TELEPHONE (OUTPATIENT)
Dept: URGENT CARE | Facility: CLINIC | Age: 40
End: 2020-03-02

## 2020-03-02 NOTE — TELEPHONE ENCOUNTER
Patient states that she saw ENT today and they placed her on an antibiotic in case she was positive for strep

## 2020-03-17 ENCOUNTER — OFFICE VISIT (OUTPATIENT)
Dept: FAMILY MEDICINE CLINIC | Facility: CLINIC | Age: 40
End: 2020-03-17
Payer: COMMERCIAL

## 2020-03-17 ENCOUNTER — TELEPHONE (OUTPATIENT)
Dept: FAMILY MEDICINE CLINIC | Facility: CLINIC | Age: 40
End: 2020-03-17

## 2020-03-17 VITALS
SYSTOLIC BLOOD PRESSURE: 122 MMHG | WEIGHT: 159 LBS | HEART RATE: 127 BPM | TEMPERATURE: 97.2 F | BODY MASS INDEX: 27.14 KG/M2 | HEIGHT: 64 IN | OXYGEN SATURATION: 99 % | DIASTOLIC BLOOD PRESSURE: 82 MMHG

## 2020-03-17 DIAGNOSIS — J45.40 MODERATE PERSISTENT ASTHMA WITHOUT COMPLICATION: ICD-10-CM

## 2020-03-17 DIAGNOSIS — J02.0 STREP PHARYNGITIS: ICD-10-CM

## 2020-03-17 DIAGNOSIS — J02.9 SORE THROAT: Primary | ICD-10-CM

## 2020-03-17 LAB — S PYO AG THROAT QL: NEGATIVE

## 2020-03-17 PROCEDURE — 87880 STREP A ASSAY W/OPTIC: CPT | Performed by: NURSE PRACTITIONER

## 2020-03-17 PROCEDURE — 99213 OFFICE O/P EST LOW 20 MIN: CPT | Performed by: NURSE PRACTITIONER

## 2020-03-17 PROCEDURE — 1036F TOBACCO NON-USER: CPT | Performed by: NURSE PRACTITIONER

## 2020-03-17 PROCEDURE — 3008F BODY MASS INDEX DOCD: CPT | Performed by: NURSE PRACTITIONER

## 2020-03-17 RX ORDER — AMOXICILLIN 875 MG/1
875 TABLET, COATED ORAL 2 TIMES DAILY
Qty: 20 TABLET | Refills: 0 | Status: SHIPPED | OUTPATIENT
Start: 2020-03-17 | End: 2020-03-26

## 2020-03-17 RX ORDER — BECLOMETHASONE DIPROPIONATE HFA 80 UG/1
2 AEROSOL, METERED RESPIRATORY (INHALATION) 2 TIMES DAILY
Qty: 1 INHALER | Refills: 2 | Status: SHIPPED | OUTPATIENT
Start: 2020-03-17 | End: 2020-04-15 | Stop reason: ALTCHOICE

## 2020-03-17 RX ORDER — BECLOMETHASONE DIPROPIONATE HFA 80 UG/1
2 AEROSOL, METERED RESPIRATORY (INHALATION) 2 TIMES DAILY
Qty: 1 INHALER | Refills: 2 | Status: SHIPPED | OUTPATIENT
Start: 2020-03-17 | End: 2020-03-17 | Stop reason: SDUPTHER

## 2020-03-17 NOTE — TELEPHONE ENCOUNTER
Please call An and ask her to call her allergist to resend the inhaler to the appropriate pharmacy, they will not fill it for this office without pre-authorization which will take over a week   Since she has obviously alreaddy completed this they should be able to call it in for you

## 2020-03-17 NOTE — PROGRESS NOTES
Assessment/Plan   Problem List Items Addressed This Visit        Respiratory    Moderate persistent asthma without complication    Relevant Medications    QVAR REDIHALER 80 MCG/ACT inhaler      Other Visit Diagnoses     Sore throat    -  Primary    Relevant Orders    POCT rapid strepA (Completed)    Throat culture    Strep pharyngitis        Relevant Medications    amoxicillin (AMOXIL) 875 mg tablet                Chief Complaint   Patient presents with    Cough     sore throat 2 days        Subjective   Patient ID: Nima Eason is a 44 y o  female  Vitals:    03/17/20 1053   BP: 122/82   Pulse: (!) 127   Temp: (!) 97 2 °F (36 2 °C)   SpO2: 99%     Cough   This is a recurrent (Had strep 10 days ago and saw ENT, she reports that when the antibiotic was completed she began with symptoms again  Has appointment with ENT in 5 days, will cover with additional antibiotic until seen  or cultrue results return) problem  The cough is non-productive  Associated symptoms include postnasal drip, a sore throat and wheezing  Pertinent negatives include no chills, ear pain, fever, headaches, heartburn, hemoptysis, myalgias or nasal congestion  Associated symptoms comments: Sinus pressure  The symptoms are aggravated by lying down  Risk factors: nonsmoker  She has tried a beta-agonist inhaler and rest for the symptoms  The treatment provided mild relief  Her past medical history is significant for asthma, bronchitis and environmental allergies  The following portions of the patient's history were reviewed and updated as appropriate: allergies, current medications, past medical history, past social history, past surgical history and problem list     Review of Systems   Constitutional: Positive for activity change  Negative for chills and fever  HENT: Positive for postnasal drip and sore throat  Negative for congestion and ear pain  Eyes: Negative  Respiratory: Positive for cough and wheezing  Negative for hemoptysis  Cardiovascular: Negative  Gastrointestinal: Negative  Negative for heartburn  Endocrine: Negative  Genitourinary: Negative  Musculoskeletal: Negative  Negative for myalgias  Skin: Negative  Allergic/Immunologic: Positive for environmental allergies  Neurological: Negative  Negative for headaches  Hematological: Negative  Psychiatric/Behavioral: Negative  Objective     Physical Exam   Constitutional: She is oriented to person, place, and time  She appears well-developed and well-nourished  No distress  HENT:   Head: Normocephalic and atraumatic  Right Ear: Hearing, tympanic membrane, external ear and ear canal normal    Left Ear: Hearing, tympanic membrane, external ear and ear canal normal    Nose: Nose normal  No mucosal edema or sinus tenderness  Mouth/Throat: Uvula is midline and mucous membranes are normal  Posterior oropharyngeal erythema (right gretaer than left) present  No oropharyngeal exudate  Tonsils are 1+ on the right  Tonsils are 0 on the left  No tonsillar exudate  Eyes: Pupils are equal, round, and reactive to light  Conjunctivae are normal  Right eye exhibits no discharge  Left eye exhibits no discharge  Neck: Normal range of motion  Neck supple  No thyromegaly present  Cardiovascular: Normal rate, regular rhythm, normal heart sounds and intact distal pulses  No murmur heard  Pulmonary/Chest: Effort normal and breath sounds normal  She has no wheezes  Abdominal: Soft  Bowel sounds are normal  She exhibits no distension  There is no rebound  Musculoskeletal: Normal range of motion  She exhibits no edema or tenderness  Lymphadenopathy:     She has no cervical adenopathy  Neurological: She is alert and oriented to person, place, and time  Skin: Skin is warm and dry  Capillary refill takes less than 2 seconds  No rash noted  She is not diaphoretic  Psychiatric: She has a normal mood and affect   Her behavior is normal  Judgment and thought content normal    Nursing note and vitals reviewed

## 2020-03-17 NOTE — TELEPHONE ENCOUNTER
Pharmacy called to informed that this medication is not covered under the patients insurance, they gave 2 options   Start a prior authorization or sent in a new medication     Please advice

## 2020-03-17 NOTE — PATIENT INSTRUCTIONS
Strep Throat   WHAT YOU NEED TO KNOW:   Strep throat is a throat infection caused by bacteria  It is easily spread from person to person  DISCHARGE INSTRUCTIONS:   Call 911 for any of the following:   · You have trouble breathing  Return to the emergency department if:   · You have new symptoms like a bad headache, stiff neck, chest pain, or vomiting  · You are drooling because you cannot swallow your spit  Contact your healthcare provider if:   · You have a fever  · You have a rash or ear pain  · You have green, yellow-brown, or bloody mucus when you cough or blow your nose  · You are unable to drink anything  · You have questions or concerns about your condition or care  Medicines:   · Antibiotics  help treat your strep throat  You should feel better within 2 to 3 days after you start antibiotics  · Take your medicine as directed  Contact your healthcare provider if you think your medicine is not helping or if you have side effects  Tell him or her if you are allergic to any medicine  Keep a list of the medicines, vitamins, and herbs you take  Include the amounts, and when and why you take them  Bring the list or the pill bottles to follow-up visits  Carry your medicine list with you in case of an emergency  Manage your symptoms:   · Use lozenges, ice, soft foods, or popsicles  to soothe your throat  · Drink juice, milk shakes, or soup  if your throat is too sore to eat solid food  Drinking liquids can also help prevent dehydration  · Gargle with salt water  Mix ¼ teaspoon salt in a glass of warm water and gargle  This may help reduce swelling in your throat  · Do not smoke  Nicotine and other chemicals in cigarettes and cigars can cause lung damage and make your symptoms worse  Ask your healthcare provider for information if you currently smoke and need help to quit  E-cigarettes or smokeless tobacco still contain nicotine   Talk to your healthcare provider before you use these products  Return to work or school  24 hours after you start antibiotic medicine  Prevent the spread of strep throat:   · Wash your hands often  Use soap and water  Wash your hands after you use the bathroom, change a child's diapers, or sneeze  Wash your hands before you prepare or eat food  · Do not share food or drinks  Replace your toothbrush after you have taken antibiotics for 24 hours  Follow up with your healthcare provider as directed:  Write down your questions so you remember to ask them during your visits  © 2017 [address] [employer] Information is for End User's use only and may not be sold, redistributed or otherwise used for commercial purposes  All illustrations and images included in CareNotes® are the copyrighted property of [employer] M [employer] Inc  or [employer] Faust[employer]  The above information is an  only  It is not intended as medical advice for individual conditions or treatments  Talk to your doctor, nurse or pharmacist before following any medical regimen to see if it is safe and effective for you

## 2020-03-19 LAB — B-HEM STREP SPEC QL CULT: NEGATIVE

## 2020-03-26 ENCOUNTER — NURSE TRIAGE (OUTPATIENT)
Dept: OTHER | Facility: OTHER | Age: 40
End: 2020-03-26

## 2020-03-26 ENCOUNTER — HOSPITAL ENCOUNTER (EMERGENCY)
Facility: HOSPITAL | Age: 40
Discharge: HOME | End: 2020-03-27
Attending: EMERGENCY MEDICINE | Admitting: EMERGENCY MEDICINE
Payer: COMMERCIAL

## 2020-03-26 DIAGNOSIS — R10.9 RIGHT FLANK PAIN: Primary | ICD-10-CM

## 2020-03-26 LAB
ANION GAP SERPL CALCULATED.3IONS-SCNC: 6 MMOL/L (ref 4–13)
BASOPHILS # BLD AUTO: 0.05 THOUSANDS/ΜL (ref 0–0.1)
BASOPHILS NFR BLD AUTO: 1 % (ref 0–1)
BUN SERPL-MCNC: 13 MG/DL (ref 5–25)
CALCIUM SERPL-MCNC: 8.8 MG/DL (ref 8.3–10.1)
CHLORIDE SERPL-SCNC: 102 MMOL/L (ref 100–108)
CO2 SERPL-SCNC: 29 MMOL/L (ref 21–32)
CREAT SERPL-MCNC: 0.92 MG/DL (ref 0.6–1.3)
EOSINOPHIL # BLD AUTO: 0.29 THOUSAND/ΜL (ref 0–0.61)
EOSINOPHIL NFR BLD AUTO: 3 % (ref 0–6)
ERYTHROCYTE [DISTWIDTH] IN BLOOD BY AUTOMATED COUNT: 13 % (ref 11.6–15.1)
GFR SERPL CREATININE-BSD FRML MDRD: 79 ML/MIN/1.73SQ M
GLUCOSE SERPL-MCNC: 93 MG/DL (ref 65–140)
HCT VFR BLD AUTO: 38.1 % (ref 34.8–46.1)
HGB BLD-MCNC: 12.4 G/DL (ref 11.5–15.4)
IMM GRANULOCYTES # BLD AUTO: 0.03 THOUSAND/UL (ref 0–0.2)
IMM GRANULOCYTES NFR BLD AUTO: 0 % (ref 0–2)
LYMPHOCYTES # BLD AUTO: 3.95 THOUSANDS/ΜL (ref 0.6–4.47)
LYMPHOCYTES NFR BLD AUTO: 40 % (ref 14–44)
MCH RBC QN AUTO: 28 PG (ref 26.8–34.3)
MCHC RBC AUTO-ENTMCNC: 32.5 G/DL (ref 31.4–37.4)
MCV RBC AUTO: 86 FL (ref 82–98)
MONOCYTES # BLD AUTO: 0.45 THOUSAND/ΜL (ref 0.17–1.22)
MONOCYTES NFR BLD AUTO: 5 % (ref 4–12)
NEUTROPHILS # BLD AUTO: 5.13 THOUSANDS/ΜL (ref 1.85–7.62)
NEUTS SEG NFR BLD AUTO: 51 % (ref 43–75)
NRBC BLD AUTO-RTO: 0 /100 WBCS
PLATELET # BLD AUTO: 296 THOUSANDS/UL (ref 149–390)
PMV BLD AUTO: 9.9 FL (ref 8.9–12.7)
POTASSIUM SERPL-SCNC: 4 MMOL/L (ref 3.5–5.3)
RBC # BLD AUTO: 4.43 MILLION/UL (ref 3.81–5.12)
SODIUM SERPL-SCNC: 137 MMOL/L (ref 136–145)
WBC # BLD AUTO: 9.9 THOUSAND/UL (ref 4.31–10.16)

## 2020-03-26 PROCEDURE — 36415 COLL VENOUS BLD VENIPUNCTURE: CPT | Performed by: EMERGENCY MEDICINE

## 2020-03-26 PROCEDURE — 81025 URINE PREGNANCY TEST: CPT | Performed by: EMERGENCY MEDICINE

## 2020-03-26 PROCEDURE — 80048 BASIC METABOLIC PNL TOTAL CA: CPT | Performed by: EMERGENCY MEDICINE

## 2020-03-26 PROCEDURE — 96374 THER/PROPH/DIAG INJ IV PUSH: CPT

## 2020-03-26 PROCEDURE — 93005 ELECTROCARDIOGRAM TRACING: CPT

## 2020-03-26 PROCEDURE — 99285 EMERGENCY DEPT VISIT HI MDM: CPT | Performed by: EMERGENCY MEDICINE

## 2020-03-26 PROCEDURE — 85025 COMPLETE CBC W/AUTO DIFF WBC: CPT | Performed by: EMERGENCY MEDICINE

## 2020-03-26 PROCEDURE — 99284 EMERGENCY DEPT VISIT MOD MDM: CPT

## 2020-03-26 RX ORDER — KETOROLAC TROMETHAMINE 30 MG/ML
15 INJECTION, SOLUTION INTRAMUSCULAR; INTRAVENOUS ONCE
Status: COMPLETED | OUTPATIENT
Start: 2020-03-26 | End: 2020-03-26

## 2020-03-26 RX ADMIN — KETOROLAC TROMETHAMINE 15 MG: 30 INJECTION, SOLUTION INTRAMUSCULAR; INTRAVENOUS at 23:35

## 2020-03-27 ENCOUNTER — APPOINTMENT (EMERGENCY)
Dept: CT IMAGING | Facility: HOSPITAL | Age: 40
End: 2020-03-27
Payer: COMMERCIAL

## 2020-03-27 VITALS
HEART RATE: 85 BPM | SYSTOLIC BLOOD PRESSURE: 136 MMHG | WEIGHT: 158.95 LBS | OXYGEN SATURATION: 99 % | HEIGHT: 64 IN | DIASTOLIC BLOOD PRESSURE: 83 MMHG | RESPIRATION RATE: 20 BRPM | BODY MASS INDEX: 27.14 KG/M2

## 2020-03-27 LAB
ATRIAL RATE: 100 BPM
CLARITY, POC: CLEAR
COLOR, POC: YELLOW
EXT BILIRUBIN, UA: NEGATIVE
EXT BLOOD URINE: NEGATIVE
EXT GLUCOSE, UA: NEGATIVE
EXT KETONES: NEGATIVE
EXT NITRITE, UA: NEGATIVE
EXT PH, UA: 8
EXT PREG TEST URINE: NEGATIVE
EXT PROTEIN, UA: NEGATIVE
EXT SPECIFIC GRAVITY, UA: 1.01
EXT UROBILINOGEN: 0.2
EXT. CONTROL ED NAV: NORMAL
P AXIS: 54 DEGREES
PR INTERVAL: 142 MS
QRS AXIS: 28 DEGREES
QRSD INTERVAL: 78 MS
QT INTERVAL: 350 MS
QTC INTERVAL: 451 MS
T WAVE AXIS: 37 DEGREES
VENTRICULAR RATE: 100 BPM
WBC # BLD EST: NEGATIVE 10*3/UL

## 2020-03-27 PROCEDURE — 74176 CT ABD & PELVIS W/O CONTRAST: CPT

## 2020-03-27 PROCEDURE — 93010 ELECTROCARDIOGRAM REPORT: CPT | Performed by: INTERNAL MEDICINE

## 2020-03-27 NOTE — ED PROVIDER NOTES
History  Chief Complaint   Patient presents with    Flank Pain     flank pain that started on the right side and that travels upward and to bilateral scapula for one hour  Pt  states she has nausea, abdominal pain  States she took aleve at  around 2015 with no relief  Denies shortness of breath but states she has an asthmatic cough  44year old female presents for evaluation of right flank pain described as a burning sensation associated with generalized chest pressure for the past hour  Patient states the she had been in her usual state of health prior to the onset of symptoms  Flank pain is similar to prior kidney stone  No history of chest pressure previously, however  She states she has a chronic mild nonproductive cough associated with her asthma which is unchanged from baseline  She denies shortness of breath  She has been compliant with her asthma controller inhaler and has not required the use of her rescue inhaler  Patient denies fevers or chills  She states that her only sick contact has been her son who has croup  History provided by:  Patient  Flank Pain   Pain location:  R flank  Pain quality: burning    Pain radiates to:  Chest (pressure)  Pain severity:  Severe  Onset quality:  Sudden  Duration:  1 hour  Timing:  Constant  Progression:  Worsening  Chronicity:  New  Relieved by:  None tried  Worsened by:  Nothing  Ineffective treatments:  None tried  Associated symptoms: chest pain (generalized, pressure) and cough (chronic, mild, nonproductive, unchanged)    Associated symptoms: no chills, no constipation, no diarrhea, no dysuria, no fever, no hematuria, no nausea, no shortness of breath, no sore throat and no vomiting    Risk factors comment:  History of kidney stone      Prior to Admission Medications   Prescriptions Last Dose Informant Patient Reported? Taking?    ALPRAZolam (XANAX) 1 mg tablet  Self Yes No   Sig: Take 1 mg by mouth 2 (two) times a day   Drospiren-Eth Estrad-Levomefol 3-0  02-0 451 MG TABS  Self Yes No   Sig: Take 1 tablet by mouth daily   QVAR REDIHALER 80 MCG/ACT inhaler   No No   Sig: Inhale 2 puffs 2 (two) times a day   VIIBRYD 10 MG tablet  Self Yes No   Sig: Take 20 mg by mouth daily   albuterol (VENTOLIN HFA) 90 mcg/act inhaler  Self No No   Sig: Inhale 2 puffs every 6 (six) hours as needed for wheezing   ergocalciferol (VITAMIN D2) 50,000 units  Self Yes No   Sig: TAKE 1 CAPSULE BY MOUTH ONE TIME PER WEEK   gabapentin (NEURONTIN) 300 mg capsule   No No   Sig: Take 1 capsule (300 mg total) by mouth 2 (two) times a day   hydrOXYzine pamoate (VISTARIL) 50 mg capsule  Self Yes No   Sig: Take 50 mg by mouth daily    montelukast (SINGULAIR) 10 mg tablet   No No   Sig: Take 1 tablet (10 mg total) by mouth daily at bedtime   venlafaxine (EFFEXOR-XR) 150 mg 24 hr capsule  Self Yes No   Sig: Take 150 mg by mouth daily    venlafaxine (EFFEXOR-XR) 75 mg 24 hr capsule  Self Yes No   Sig: Take 1 capsule by mouth daily      Facility-Administered Medications: None       Past Medical History:   Diagnosis Date    Anxiety     Asthma     Depression     Hypoglycemia     Moderate episode of recurrent major depressive disorder (HCC) 10/3/2019    PTSD (post-traumatic stress disorder)        Past Surgical History:   Procedure Laterality Date    ANKLE LIGAMENT RECONSTRUCTION       SECTION      KNEE SURGERY      SINUS SURGERY      TONSILLECTOMY         Family History   Problem Relation Age of Onset    Depression Mother      I have reviewed and agree with the history as documented      E-Cigarette/Vaping    E-Cigarette Use Never User      E-Cigarette/Vaping Substances     Social History     Tobacco Use    Smoking status: Never Smoker    Smokeless tobacco: Never Used   Substance Use Topics    Alcohol use: Yes     Frequency: Monthly or less     Drinks per session: 1 or 2     Binge frequency: Never    Drug use: Never       Review of Systems   Constitutional: Negative for appetite change, chills and fever  HENT: Negative for congestion, rhinorrhea and sore throat  Respiratory: Positive for cough (chronic, mild, nonproductive, unchanged)  Negative for chest tightness and shortness of breath  Cardiovascular: Positive for chest pain (generalized, pressure)  Negative for palpitations and leg swelling  Gastrointestinal: Negative for abdominal pain, constipation, diarrhea, nausea and vomiting  Genitourinary: Positive for flank pain  Negative for dysuria, frequency and hematuria  Musculoskeletal: Negative for myalgias, neck pain and neck stiffness  Skin: Negative for pallor  Neurological: Negative for syncope, weakness and headaches  All other systems reviewed and are negative  Physical Exam  Physical Exam   Constitutional: She is oriented to person, place, and time  She appears well-developed and well-nourished  Non-toxic appearance  No distress  HENT:   Head: Normocephalic and atraumatic  Eyes: Pupils are equal, round, and reactive to light  Conjunctivae and EOM are normal    Neck: Normal range of motion  Neck supple  No tracheal deviation present  No thyromegaly present  Cardiovascular: Normal rate, regular rhythm, normal heart sounds and intact distal pulses  Pulmonary/Chest: Effort normal and breath sounds normal    Abdominal: Soft  Bowel sounds are normal  She exhibits no distension  There is no tenderness  There is CVA tenderness (right)  Lymphadenopathy:     She has no cervical adenopathy  Neurological: She is alert and oriented to person, place, and time  Skin: Skin is warm and dry  She is not diaphoretic  Psychiatric: Her mood appears anxious  Nursing note and vitals reviewed        Vital Signs  ED Triage Vitals   Temp Pulse Respirations Blood Pressure SpO2   -- 03/26/20 2309 03/26/20 2309 03/26/20 2309 03/26/20 2309    97 18 136/83 100 %      Temp src Heart Rate Source Patient Position - Orthostatic VS BP Location FiO2 (%)   -- 03/26/20 2309 03/26/20 2309 03/26/20 2309 --    Monitor Lying Right arm       Pain Score       03/26/20 2335       7           Vitals:    03/26/20 2309   BP: 136/83   Pulse: 97   Patient Position - Orthostatic VS: Lying         Visual Acuity      ED Medications  Medications   ketorolac (TORADOL) injection 15 mg (15 mg Intravenous Given 3/26/20 2335)       Diagnostic Studies  Results Reviewed     Procedure Component Value Units Date/Time    POCT urinalysis dipstick [[id number]]  (Normal) Resulted:  03/27/20 0001    Lab Status:  Final result Specimen:  Urine Updated:  03/27/20 0001     Color, UA yellow     Clarity, UA clear     Glucose, UA (Ref: Negative) negative     Bilirubin, UA (Ref: Negative) negative     Ketones, UA (Ref: Negative) negative     Spec Grav, UA (Ref:1 003-1 030) 1 010     Blood, UA (Ref: Negative) negative     pH, UA (Ref: 4 5-8 0) 8 0     Protein, UA (Ref: Negative) negative     Urobilinogen, UA (Ref: 0 2- 1 0) 0 2      Leukocytes, UA (Ref: Negative) negative     Nitrite, UA (Ref: Negative) negative    POCT pregnancy, urine [[id number]]  (Normal) Resulted:  03/27/20 0001    Lab Status:  Final result Updated:  03/27/20 0001     EXT PREG TEST UR (Ref: Negative) negative     Control valid    Basic metabolic panel [[id number]] Collected:  03/26/20 2335    Lab Status:  Final result Specimen:  Blood from Arm, Right Updated:  03/26/20 2359     Sodium 137 mmol/L      Potassium 4 0 mmol/L      Chloride 102 mmol/L      CO2 29 mmol/L      ANION GAP 6 mmol/L      BUN 13 mg/dL      Creatinine 0 92 mg/dL      Glucose 93 mg/dL      Calcium 8 8 mg/dL      eGFR 79 ml/min/1 73sq m     Narrative:       [place] guidelines for Chronic Kidney Disease (CKD):     Stage 1 with normal or high GFR (GFR > 90 mL/min/1 73 square meters)    Stage 2 Mild CKD (GFR = 60-89 mL/min/1 73 square meters)    Stage 3A Moderate CKD (GFR = 45-59 mL/min/1 73 square meters)    Stage 3B Moderate CKD (GFR = 30-44 mL/min/1 73 square meters)    Stage 4 Severe CKD (GFR = 15-29 mL/min/1 73 square meters)    Stage 5 End Stage CKD (GFR <15 mL/min/1 73 square meters)  Note: GFR calculation is accurate only with a steady state creatinine    CBC and differential [[id number]] Collected:  03/26/20 2335    Lab Status:  Final result Specimen:  Blood from Arm, Right Updated:  03/26/20 2348     WBC 9 90 Thousand/uL      RBC 4 43 Million/uL      Hemoglobin 12 4 g/dL      Hematocrit 38 1 %      MCV 86 fL      MCH 28 0 pg      MCHC 32 5 g/dL      RDW 13 0 %      MPV 9 9 fL      Platelets 509 Thousands/uL      nRBC 0 /100 WBCs      Neutrophils Relative 51 %      Immat GRANS % 0 %      Lymphocytes Relative 40 %      Monocytes Relative 5 %      Eosinophils Relative 3 %      Basophils Relative 1 %      Neutrophils Absolute 5 13 Thousands/µL      Immature Grans Absolute 0 03 Thousand/uL      Lymphocytes Absolute 3 95 Thousands/µL      Monocytes Absolute 0 45 Thousand/µL      Eosinophils Absolute 0 29 Thousand/µL      Basophils Absolute 0 05 Thousands/µL                  CT renal stone study abdomen pelvis without contrast   Final Result by Ina Chapa MD (03/27 0040)      No significant renal, ureteral, or bladder calculi  No hydronephrosis               Workstation performed: [id number]                    Procedures  ECG 12 Lead Documentation Only  Date/Time: 3/26/2020 11:24 PM  Performed by: Jim Bobo MD  Authorized by: Jim Bobo MD     Indications / Diagnosis:  Chest pressure  ECG reviewed by me, the ED Provider: yes    Patient location:  ED  Previous ECG:     Previous ECG:  Unavailable  Interpretation:     Interpretation: normal    Rate:     ECG rate:  100    ECG rate assessment: tachycardic    Rhythm:     Rhythm: sinus tachycardia    Ectopy:     Ectopy: none    QRS:     QRS axis:  Normal    QRS intervals:  Normal  Conduction:     Conduction: normal    ST segments:     ST segments:  Normal  T waves:     T waves: normal               ED Course                                 MDM  Number of Diagnoses or Management Options  Right flank pain: new and requires workup  Diagnosis management comments: 44year old female presents for evaluation of right flank pain  CVA tenderness on exam  Toradol for pain  Labs unremarkable  CT negative for kidney stones  Pain well controlled with toradol  PCP follow up  Discussed return precautions with patient  Amount and/or Complexity of Data Reviewed  Clinical lab tests: ordered and reviewed  Tests in the radiology section of CPT®: ordered and reviewed    Patient Progress  Patient progress: stable        Disposition  Final diagnoses:   Right flank pain     Time reflects when diagnosis was documented in both MDM as applicable and the Disposition within this note     Time User Action Codes Description Comment    3/27/2020  1:00 AM Emilysarah Zulyl Add [R10 9] Right flank pain       ED Disposition     ED Disposition Condition Date/Time Comment    Discharge Stable Fri Mar 27, 2020  1:00 AM [facility] discharge to home/self care  Follow-up Information     Follow up With Specialties Details Why Contact Info Additional [address], [address], Nurse Practitioner Schedule an appointment as soon as possible for a visit in 3 days for re-evaluation [address] [facility] 200  [facility] [phone]        [address] Emergency Department Emergency Medicine Go to  If symptoms worsen [address] [place] [id number] [phone]  ED, [address] [place] [address], [facility] [address] 10          Patient's Medications   Discharge Prescriptions    No medications on file     No discharge procedures on file      PDMP Review     None          ED Provider  Electronically Signed by           Quinn Vela MD  03/27/20 9724

## 2020-03-27 NOTE — DISCHARGE INSTRUCTIONS
Flank Pain   WHAT YOU NEED TO KNOW:   Flank pain is felt in the area below your ribcage and above your hip bones, often in the lower back  Your pain may be dull or so severe that you cannot get comfortable  The pain may stay in one area or radiate to another area  It may worsen and lighten in waves  Flank pain is often a sign of problems with your urinary tract, such as a kidney stone or infection  DISCHARGE INSTRUCTIONS:   Return to the emergency department if:   · You have a fever  · Your heart is fluttering or jumping  · You see blood in your urine  · Your pain radiates into your lower abdomen and genital area  · You have intense pain in your low back next to your spine  · You are much more tired than usual and have no desire to eat  · You have a headache and your muscles jerk  Contact your healthcare provider if:   · You have an upset stomach and are vomiting  · You have to urinate more often, and with urgency  · Your pain worsens or does not improve, and you cannot get comfortable  · You pass a stone when you urinate  · You have questions or concerns about your condition or care  Medicines: The following medicines may be ordered for you:  · Pain medicine  may help decrease or relieve your pain  Do not wait until the pain is severe before you take your medicine  · Antibiotics  may help treat a urinary tract infection caused by bacteria  · Take your medicine as directed  Contact your healthcare provider if you think your medicine is not helping or if you have side effects  Tell him of her if you are allergic to any medicine  Keep a list of the medicines, vitamins, and herbs you take  Include the amounts, and when and why you take them  Bring the list or the pill bottles to follow-up visits  Carry your medicine list with you in case of an emergency    Follow up with your healthcare provider in 1 to 2 days or as directed:  Write down your questions so you remember to ask them during your visits  © 2017 [address] [employer] Information is for End User's use only and may not be sold, redistributed or otherwise used for commercial purposes  All illustrations and images included in CareNotes® are the copyrighted property of [employer]  or Curtis Faustin  The above information is an  only  It is not intended as medical advice for individual conditions or treatments  Talk to your doctor, nurse or pharmacist before following any medical regimen to see if it is safe and effective for you

## 2020-03-27 NOTE — TELEPHONE ENCOUNTER
Reason for Disposition   [1] Cough occurs AND [2] within 14 days of COVID-19 EXPOSURE    Answer Assessment - Initial Assessment Questions  1  CONFIRMED CASE: "Who is the person with the confirmed COVID-19 infection that you were exposed to?"      Stay at home mom  2  PLACE of CONTACT: "Where were you when you were exposed to COVID-19  (coronavirus disease 2019)?" (e g , city, state, country)      Community spread  3  TYPE of CONTACT: "How much contact was there?" (e g , live in same house, work in same office, same school)      N/A  4  DATE of CONTACT: "When did you have contact with a coronavirus patient?" (e g , days)      N/A  5  DURATION of CONTACT: "How long were you in contact with the COVID-19 (coronavirus disease) patient?" (e g , a few seconds, passed by person, a few minutes, live with the patient)        6  SYMPTOMS: "Do you have any symptoms?" (e g , fever, cough, breathing difficulty)     Fever 98 9 (temporal)   7  PREGNANCY OR POSTPARTUM: "Is there any chance you are pregnant?" "When was your last menstrual period?" "Did you deliver in the last 2 weeks?"     Irregular menses due to pain  8  HIGH RISK: "Do you have any heart or lung problems? Do you have a weakened immune system?" (e g , CHF, COPD, asthma, HIV positive, chemotherapy, renal failure, diabetes mellitus, sickle cell anemia)      Asthma    Asthmanex LS 1900  Albuterol MDI - not used    Chest pressure/pain  "I feel like someone is grabbing my lungs " Voice is hoarse  Pt able to converse with ease, no increased RR    Nausea    Denies: vomiting    Protocols used: CORONAVIRUS (COVID-19) EXPOSURE-ADULT-

## 2020-03-27 NOTE — TELEPHONE ENCOUNTER
Regarding: Chest pains/breathing  ----- Message from Mookie Champion sent at 3/26/2020 10:00 PM EDT -----  "I have asthma and am having extreme chest pains  "

## 2020-03-30 ENCOUNTER — VBI (OUTPATIENT)
Dept: FAMILY MEDICINE CLINIC | Facility: CLINIC | Age: 40
End: 2020-03-30

## 2020-03-30 NOTE — TELEPHONE ENCOUNTER
[facility]    [facility]     Ed visit date: 3/26/2020  Diagnosis Description: Right flank pain  In Network? Yes [place]  Discharge status: Home  Discharged with meds ? No  Number of ED visits to date: 2  ED Severity:N/A     Outreach Information    Outreach successful: Yes 1  Date letter mailed:N/A  Date Finalized:3/30/2020    Care Coordination    Follow up appointment with pcp: no (advised to schedule with PCP in 3 days)  Transportation issues ? No    [employer] type:  3 Day Outreach  Emergent necessity warranted by diagnosis:  No  [facility] PCP:  Yes  Transportation:  Friend/Family Transport  Called PCP first?:  Yes  Told to go to ED by PCP?:  Yes  Feels able to call PCP for urgent problems ?:  Yes  Understands what emergencies can be handled by PCP ?:  Yes  Ever any problems getting appointment with PCP for minor emergency/urgency problems?:  No  Urgent care Education?:  Yes  03/30/2020 02:48 PM Phone ([place]) Sammie Eason (Self) [phone] (H) Call Complete    Spoke with patient regarding ED visit on 3/26/2020  for Right flank pain  Patient states she feels better (chest pain is gone)  Patient called and spoke with [facility] office prior ED visit  Patient is educated about the after-hours on-call triaging service and UC locations and treatments  Current concerns: cough

## 2020-04-15 ENCOUNTER — TELEMEDICINE (OUTPATIENT)
Dept: FAMILY MEDICINE CLINIC | Facility: CLINIC | Age: 40
End: 2020-04-15
Payer: COMMERCIAL

## 2020-04-15 VITALS — HEIGHT: 64 IN | WEIGHT: 155 LBS | BODY MASS INDEX: 26.46 KG/M2 | TEMPERATURE: 98.3 F

## 2020-04-15 DIAGNOSIS — G47.00 INSOMNIA, UNSPECIFIED TYPE: Primary | ICD-10-CM

## 2020-04-15 PROCEDURE — 1036F TOBACCO NON-USER: CPT | Performed by: NURSE PRACTITIONER

## 2020-04-15 PROCEDURE — 3008F BODY MASS INDEX DOCD: CPT | Performed by: NURSE PRACTITIONER

## 2020-04-15 PROCEDURE — 99214 OFFICE O/P EST MOD 30 MIN: CPT | Performed by: NURSE PRACTITIONER

## 2020-05-20 ENCOUNTER — HOSPITAL ENCOUNTER (INPATIENT)
Facility: HOSPITAL | Age: 40
LOS: 1 days | Discharge: HOME | DRG: 284 | End: 2020-05-23
Attending: EMERGENCY MEDICINE | Admitting: INTERNAL MEDICINE
Payer: COMMERCIAL

## 2020-05-20 ENCOUNTER — APPOINTMENT (EMERGENCY)
Dept: CT IMAGING | Facility: HOSPITAL | Age: 40
DRG: 284 | End: 2020-05-20
Payer: COMMERCIAL

## 2020-05-20 ENCOUNTER — APPOINTMENT (EMERGENCY)
Dept: ULTRASOUND IMAGING | Facility: HOSPITAL | Age: 40
DRG: 284 | End: 2020-05-20
Payer: COMMERCIAL

## 2020-05-20 DIAGNOSIS — K83.8 COMMON BILE DUCT DILATATION: Primary | ICD-10-CM

## 2020-05-20 DIAGNOSIS — D72.829 LEUKOCYTOSIS: ICD-10-CM

## 2020-05-20 DIAGNOSIS — K81.9 CHOLECYSTITIS: ICD-10-CM

## 2020-05-20 DIAGNOSIS — R10.9 INTRACTABLE ABDOMINAL PAIN: ICD-10-CM

## 2020-05-20 PROBLEM — F32.A DEPRESSION: Status: ACTIVE | Noted: 2020-05-20

## 2020-05-20 PROBLEM — J45.909 ASTHMA: Status: ACTIVE | Noted: 2020-05-20

## 2020-05-20 LAB
ALBUMIN SERPL BCP-MCNC: 4 G/DL (ref 3.5–5)
ALP SERPL-CCNC: 59 U/L (ref 46–116)
ALT SERPL W P-5'-P-CCNC: 71 U/L (ref 12–78)
ANION GAP SERPL CALCULATED.3IONS-SCNC: 12 MMOL/L (ref 4–13)
AST SERPL W P-5'-P-CCNC: 85 U/L (ref 5–45)
BASOPHILS # BLD AUTO: 0.06 THOUSANDS/ΜL (ref 0–0.1)
BASOPHILS NFR BLD AUTO: 1 % (ref 0–1)
BILIRUB SERPL-MCNC: 0.7 MG/DL (ref 0.2–1)
BUN SERPL-MCNC: 14 MG/DL (ref 5–25)
CALCIUM SERPL-MCNC: 8.7 MG/DL (ref 8.3–10.1)
CHLORIDE SERPL-SCNC: 102 MMOL/L (ref 100–108)
CLARITY, POC: CLEAR
CO2 SERPL-SCNC: 24 MMOL/L (ref 21–32)
COLOR, POC: YELLOW
CREAT SERPL-MCNC: 0.94 MG/DL (ref 0.6–1.3)
EOSINOPHIL # BLD AUTO: 0.13 THOUSAND/ΜL (ref 0–0.61)
EOSINOPHIL NFR BLD AUTO: 1 % (ref 0–6)
ERYTHROCYTE [DISTWIDTH] IN BLOOD BY AUTOMATED COUNT: 12.4 % (ref 11.6–15.1)
EXT BILIRUBIN, UA: NORMAL
EXT BLOOD URINE: NORMAL
EXT GLUCOSE, UA: NORMAL
EXT KETONES: NORMAL
EXT NITRITE, UA: NORMAL
EXT PH, UA: 5
EXT PREG TEST URINE: NEGATIVE
EXT PROTEIN, UA: NORMAL
EXT SPECIFIC GRAVITY, UA: 1.03
EXT UROBILINOGEN: NORMAL
EXT. CONTROL ED NAV: NORMAL
GFR SERPL CREATININE-BSD FRML MDRD: 77 ML/MIN/1.73SQ M
GLUCOSE SERPL-MCNC: 109 MG/DL (ref 65–140)
HCT VFR BLD AUTO: 41.8 % (ref 34.8–46.1)
HGB BLD-MCNC: 13.4 G/DL (ref 11.5–15.4)
IMM GRANULOCYTES # BLD AUTO: 0.04 THOUSAND/UL (ref 0–0.2)
IMM GRANULOCYTES NFR BLD AUTO: 0 % (ref 0–2)
LIPASE SERPL-CCNC: 131 U/L (ref 73–393)
LYMPHOCYTES # BLD AUTO: 2.67 THOUSANDS/ΜL (ref 0.6–4.47)
LYMPHOCYTES NFR BLD AUTO: 23 % (ref 14–44)
MCH RBC QN AUTO: 27.7 PG (ref 26.8–34.3)
MCHC RBC AUTO-ENTMCNC: 32.1 G/DL (ref 31.4–37.4)
MCV RBC AUTO: 86 FL (ref 82–98)
MONOCYTES # BLD AUTO: 0.64 THOUSAND/ΜL (ref 0.17–1.22)
MONOCYTES NFR BLD AUTO: 6 % (ref 4–12)
NEUTROPHILS # BLD AUTO: 8 THOUSANDS/ΜL (ref 1.85–7.62)
NEUTS SEG NFR BLD AUTO: 69 % (ref 43–75)
NRBC BLD AUTO-RTO: 0 /100 WBCS
PLATELET # BLD AUTO: 332 THOUSANDS/UL (ref 149–390)
PMV BLD AUTO: 10.2 FL (ref 8.9–12.7)
POTASSIUM SERPL-SCNC: 3.7 MMOL/L (ref 3.5–5.3)
PROT SERPL-MCNC: 7.8 G/DL (ref 6.4–8.2)
RBC # BLD AUTO: 4.84 MILLION/UL (ref 3.81–5.12)
SODIUM SERPL-SCNC: 138 MMOL/L (ref 136–145)
WBC # BLD AUTO: 11.54 THOUSAND/UL (ref 4.31–10.16)
WBC # BLD EST: NORMAL 10*3/UL

## 2020-05-20 PROCEDURE — 99285 EMERGENCY DEPT VISIT HI MDM: CPT

## 2020-05-20 PROCEDURE — 80053 COMPREHEN METABOLIC PANEL: CPT

## 2020-05-20 PROCEDURE — 83690 ASSAY OF LIPASE: CPT

## 2020-05-20 PROCEDURE — 99220 PR INITIAL OBSERVATION CARE/DAY 70 MINUTES: CPT | Performed by: PHYSICIAN ASSISTANT

## 2020-05-20 PROCEDURE — 96375 TX/PRO/DX INJ NEW DRUG ADDON: CPT

## 2020-05-20 PROCEDURE — 96361 HYDRATE IV INFUSION ADD-ON: CPT

## 2020-05-20 PROCEDURE — 96376 TX/PRO/DX INJ SAME DRUG ADON: CPT

## 2020-05-20 PROCEDURE — 81025 URINE PREGNANCY TEST: CPT

## 2020-05-20 PROCEDURE — 99285 EMERGENCY DEPT VISIT HI MDM: CPT | Performed by: EMERGENCY MEDICINE

## 2020-05-20 PROCEDURE — 96374 THER/PROPH/DIAG INJ IV PUSH: CPT

## 2020-05-20 PROCEDURE — C9113 INJ PANTOPRAZOLE SODIUM, VIA: HCPCS | Performed by: PHYSICIAN ASSISTANT

## 2020-05-20 PROCEDURE — 76705 ECHO EXAM OF ABDOMEN: CPT

## 2020-05-20 PROCEDURE — 74177 CT ABD & PELVIS W/CONTRAST: CPT

## 2020-05-20 PROCEDURE — 81002 URINALYSIS NONAUTO W/O SCOPE: CPT

## 2020-05-20 PROCEDURE — 85025 COMPLETE CBC W/AUTO DIFF WBC: CPT

## 2020-05-20 PROCEDURE — 36415 COLL VENOUS BLD VENIPUNCTURE: CPT

## 2020-05-20 RX ORDER — MONTELUKAST SODIUM 10 MG/1
10 TABLET ORAL
Status: DISCONTINUED | OUTPATIENT
Start: 2020-05-20 | End: 2020-05-23 | Stop reason: HOSPADM

## 2020-05-20 RX ORDER — HYDROXYZINE PAMOATE 50 MG/1
50 CAPSULE ORAL
COMMUNITY
End: 2021-01-18

## 2020-05-20 RX ORDER — PANTOPRAZOLE SODIUM 40 MG/1
40 INJECTION, POWDER, FOR SOLUTION INTRAVENOUS
Status: DISCONTINUED | OUTPATIENT
Start: 2020-05-20 | End: 2020-05-23 | Stop reason: HOSPADM

## 2020-05-20 RX ORDER — ONDANSETRON 2 MG/ML
4 INJECTION INTRAMUSCULAR; INTRAVENOUS ONCE
Status: COMPLETED | OUTPATIENT
Start: 2020-05-20 | End: 2020-05-20

## 2020-05-20 RX ORDER — FLUTICASONE PROPIONATE 110 UG/1
2 AEROSOL, METERED RESPIRATORY (INHALATION) 2 TIMES DAILY
Status: DISCONTINUED | OUTPATIENT
Start: 2020-05-21 | End: 2020-05-23 | Stop reason: HOSPADM

## 2020-05-20 RX ORDER — CALCIUM CARBONATE 200(500)MG
1000 TABLET,CHEWABLE ORAL DAILY PRN
Status: DISCONTINUED | OUTPATIENT
Start: 2020-05-20 | End: 2020-05-23 | Stop reason: HOSPADM

## 2020-05-20 RX ORDER — BUPROPION HYDROCHLORIDE 150 MG/1
150 TABLET ORAL DAILY
Status: DISCONTINUED | OUTPATIENT
Start: 2020-05-21 | End: 2020-05-23 | Stop reason: HOSPADM

## 2020-05-20 RX ORDER — ALPRAZOLAM 0.5 MG/1
0.5 TABLET ORAL
Status: DISCONTINUED | OUTPATIENT
Start: 2020-05-20 | End: 2020-05-23 | Stop reason: HOSPADM

## 2020-05-20 RX ORDER — ONDANSETRON 2 MG/ML
4 INJECTION INTRAMUSCULAR; INTRAVENOUS EVERY 6 HOURS PRN
Status: DISCONTINUED | OUTPATIENT
Start: 2020-05-20 | End: 2020-05-22

## 2020-05-20 RX ORDER — SODIUM CHLORIDE 9 MG/ML
125 INJECTION, SOLUTION INTRAVENOUS CONTINUOUS
Status: DISCONTINUED | OUTPATIENT
Start: 2020-05-20 | End: 2020-05-22

## 2020-05-20 RX ORDER — GABAPENTIN 300 MG/1
300 CAPSULE ORAL 2 TIMES DAILY
Status: DISCONTINUED | OUTPATIENT
Start: 2020-05-20 | End: 2020-05-23 | Stop reason: HOSPADM

## 2020-05-20 RX ORDER — ALBUTEROL SULFATE 90 UG/1
2 AEROSOL, METERED RESPIRATORY (INHALATION) EVERY 6 HOURS PRN
Status: DISCONTINUED | OUTPATIENT
Start: 2020-05-20 | End: 2020-05-23 | Stop reason: HOSPADM

## 2020-05-20 RX ORDER — HYDROMORPHONE HCL/PF 1 MG/ML
0.5 SYRINGE (ML) INJECTION ONCE
Status: COMPLETED | OUTPATIENT
Start: 2020-05-20 | End: 2020-05-20

## 2020-05-20 RX ORDER — ACETAMINOPHEN 325 MG/1
650 TABLET ORAL EVERY 6 HOURS PRN
Status: DISCONTINUED | OUTPATIENT
Start: 2020-05-20 | End: 2020-05-23 | Stop reason: HOSPADM

## 2020-05-20 RX ORDER — HYDROMORPHONE HCL/PF 1 MG/ML
1 SYRINGE (ML) INJECTION
Status: DISCONTINUED | OUTPATIENT
Start: 2020-05-20 | End: 2020-05-22

## 2020-05-20 RX ORDER — HYDROXYZINE HYDROCHLORIDE 25 MG/1
50 TABLET, FILM COATED ORAL
Status: DISCONTINUED | OUTPATIENT
Start: 2020-05-20 | End: 2020-05-23 | Stop reason: HOSPADM

## 2020-05-20 RX ORDER — DROSPIRENONE, ETHINYL ESTRADIOL AND LEVOMEFOLATE CALCIUM AND LEVOMEFOLATE CALCIUM 3-0.02(24)
1 KIT ORAL DAILY
Status: DISCONTINUED | OUTPATIENT
Start: 2020-05-21 | End: 2020-05-23 | Stop reason: HOSPADM

## 2020-05-20 RX ORDER — KETOROLAC TROMETHAMINE 30 MG/ML
15 INJECTION, SOLUTION INTRAMUSCULAR; INTRAVENOUS ONCE
Status: COMPLETED | OUTPATIENT
Start: 2020-05-20 | End: 2020-05-20

## 2020-05-20 RX ORDER — VENLAFAXINE HYDROCHLORIDE 37.5 MG/1
150 CAPSULE, EXTENDED RELEASE ORAL DAILY
Status: DISCONTINUED | OUTPATIENT
Start: 2020-05-21 | End: 2020-05-23 | Stop reason: HOSPADM

## 2020-05-20 RX ORDER — BUPROPION HYDROCHLORIDE 150 MG/1
150 TABLET ORAL DAILY
COMMUNITY
End: 2020-06-18 | Stop reason: HOSPADM

## 2020-05-20 RX ORDER — VENLAFAXINE HYDROCHLORIDE 37.5 MG/1
75 CAPSULE, EXTENDED RELEASE ORAL DAILY
Status: DISCONTINUED | OUTPATIENT
Start: 2020-05-21 | End: 2020-05-23 | Stop reason: HOSPADM

## 2020-05-20 RX ADMIN — ONDANSETRON 4 MG: 2 INJECTION INTRAMUSCULAR; INTRAVENOUS at 20:35

## 2020-05-20 RX ADMIN — HYDROMORPHONE HYDROCHLORIDE 0.5 MG: 1 INJECTION, SOLUTION INTRAMUSCULAR; INTRAVENOUS; SUBCUTANEOUS at 16:50

## 2020-05-20 RX ADMIN — MONTELUKAST 10 MG: 10 TABLET, FILM COATED ORAL at 22:38

## 2020-05-20 RX ADMIN — PANTOPRAZOLE SODIUM 40 MG: 40 INJECTION, POWDER, FOR SOLUTION INTRAVENOUS at 22:38

## 2020-05-20 RX ADMIN — SODIUM CHLORIDE 500 ML: 0.9 INJECTION, SOLUTION INTRAVENOUS at 16:26

## 2020-05-20 RX ADMIN — HYDROXYZINE HYDROCHLORIDE 50 MG: 25 TABLET ORAL at 22:38

## 2020-05-20 RX ADMIN — HYDROMORPHONE HYDROCHLORIDE 0.5 MG: 1 INJECTION, SOLUTION INTRAMUSCULAR; INTRAVENOUS; SUBCUTANEOUS at 20:35

## 2020-05-20 RX ADMIN — IOHEXOL 100 ML: 350 INJECTION, SOLUTION INTRAVENOUS at 18:20

## 2020-05-20 RX ADMIN — SODIUM CHLORIDE 125 ML/HR: 0.9 INJECTION, SOLUTION INTRAVENOUS at 22:38

## 2020-05-20 RX ADMIN — KETOROLAC TROMETHAMINE 15 MG: 30 INJECTION, SOLUTION INTRAMUSCULAR at 16:50

## 2020-05-20 RX ADMIN — ALPRAZOLAM 0.5 MG: 0.5 TABLET ORAL at 22:39

## 2020-05-20 RX ADMIN — ONDANSETRON 4 MG: 2 INJECTION INTRAMUSCULAR; INTRAVENOUS at 16:26

## 2020-05-21 ENCOUNTER — APPOINTMENT (OUTPATIENT)
Dept: MRI IMAGING | Facility: HOSPITAL | Age: 40
DRG: 284 | End: 2020-05-21
Payer: COMMERCIAL

## 2020-05-21 PROBLEM — R10.11 RUQ PAIN: Status: ACTIVE | Noted: 2020-05-21

## 2020-05-21 LAB
ALBUMIN SERPL BCP-MCNC: 3.2 G/DL (ref 3.5–5)
ALP SERPL-CCNC: 61 U/L (ref 46–116)
ALT SERPL W P-5'-P-CCNC: 101 U/L (ref 12–78)
ANION GAP SERPL CALCULATED.3IONS-SCNC: 11 MMOL/L (ref 4–13)
AST SERPL W P-5'-P-CCNC: 67 U/L (ref 5–45)
BACTERIA UR QL AUTO: ABNORMAL /HPF
BILIRUB DIRECT SERPL-MCNC: 0.11 MG/DL (ref 0–0.2)
BILIRUB SERPL-MCNC: 0.5 MG/DL (ref 0.2–1)
BILIRUB UR QL STRIP: NEGATIVE
BUN SERPL-MCNC: 15 MG/DL (ref 5–25)
CALCIUM SERPL-MCNC: 7.8 MG/DL (ref 8.3–10.1)
CHLORIDE SERPL-SCNC: 105 MMOL/L (ref 100–108)
CLARITY UR: ABNORMAL
CO2 SERPL-SCNC: 20 MMOL/L (ref 21–32)
COLOR UR: YELLOW
CREAT SERPL-MCNC: 0.81 MG/DL (ref 0.6–1.3)
ERYTHROCYTE [DISTWIDTH] IN BLOOD BY AUTOMATED COUNT: 12.2 % (ref 11.6–15.1)
GFR SERPL CREATININE-BSD FRML MDRD: 92 ML/MIN/1.73SQ M
GLUCOSE P FAST SERPL-MCNC: 95 MG/DL (ref 65–99)
GLUCOSE SERPL-MCNC: 95 MG/DL (ref 65–140)
GLUCOSE UR STRIP-MCNC: NEGATIVE MG/DL
HCT VFR BLD AUTO: 37 % (ref 34.8–46.1)
HGB BLD-MCNC: 11.8 G/DL (ref 11.5–15.4)
HGB UR QL STRIP.AUTO: ABNORMAL
KETONES UR STRIP-MCNC: NEGATIVE MG/DL
LEUKOCYTE ESTERASE UR QL STRIP: NEGATIVE
MCH RBC QN AUTO: 28.2 PG (ref 26.8–34.3)
MCHC RBC AUTO-ENTMCNC: 31.9 G/DL (ref 31.4–37.4)
MCV RBC AUTO: 88 FL (ref 82–98)
MUCOUS THREADS UR QL AUTO: ABNORMAL
NITRITE UR QL STRIP: NEGATIVE
NON-SQ EPI CELLS URNS QL MICRO: ABNORMAL /HPF
PH UR STRIP.AUTO: 5.5 [PH]
PLATELET # BLD AUTO: 276 THOUSANDS/UL (ref 149–390)
PMV BLD AUTO: 10.7 FL (ref 8.9–12.7)
POTASSIUM SERPL-SCNC: 3.4 MMOL/L (ref 3.5–5.3)
PROT SERPL-MCNC: 6.4 G/DL (ref 6.4–8.2)
PROT UR STRIP-MCNC: ABNORMAL MG/DL
RBC # BLD AUTO: 4.19 MILLION/UL (ref 3.81–5.12)
RBC #/AREA URNS AUTO: ABNORMAL /HPF
SODIUM SERPL-SCNC: 136 MMOL/L (ref 136–145)
SP GR UR STRIP.AUTO: 1.02 (ref 1–1.03)
UROBILINOGEN UR QL STRIP.AUTO: 0.2 E.U./DL
WBC # BLD AUTO: 7.42 THOUSAND/UL (ref 4.31–10.16)
WBC #/AREA URNS AUTO: ABNORMAL /HPF

## 2020-05-21 PROCEDURE — 74181 MRI ABDOMEN W/O CONTRAST: CPT

## 2020-05-21 PROCEDURE — 80048 BASIC METABOLIC PNL TOTAL CA: CPT | Performed by: PHYSICIAN ASSISTANT

## 2020-05-21 PROCEDURE — 99254 IP/OBS CNSLTJ NEW/EST MOD 60: CPT | Performed by: INTERNAL MEDICINE

## 2020-05-21 PROCEDURE — 80076 HEPATIC FUNCTION PANEL: CPT | Performed by: NURSE PRACTITIONER

## 2020-05-21 PROCEDURE — 99225 PR SBSQ OBSERVATION CARE/DAY 25 MINUTES: CPT | Performed by: INTERNAL MEDICINE

## 2020-05-21 PROCEDURE — 81001 URINALYSIS AUTO W/SCOPE: CPT | Performed by: INTERNAL MEDICINE

## 2020-05-21 PROCEDURE — 85027 COMPLETE CBC AUTOMATED: CPT | Performed by: PHYSICIAN ASSISTANT

## 2020-05-21 PROCEDURE — 99244 OFF/OP CNSLTJ NEW/EST MOD 40: CPT | Performed by: SURGERY

## 2020-05-21 PROCEDURE — C9113 INJ PANTOPRAZOLE SODIUM, VIA: HCPCS | Performed by: PHYSICIAN ASSISTANT

## 2020-05-21 RX ADMIN — HYDROMORPHONE HYDROCHLORIDE 1 MG: 1 INJECTION, SOLUTION INTRAMUSCULAR; INTRAVENOUS; SUBCUTANEOUS at 13:04

## 2020-05-21 RX ADMIN — HYDROMORPHONE HYDROCHLORIDE 1 MG: 1 INJECTION, SOLUTION INTRAMUSCULAR; INTRAVENOUS; SUBCUTANEOUS at 22:15

## 2020-05-21 RX ADMIN — PANTOPRAZOLE SODIUM 40 MG: 40 INJECTION, POWDER, FOR SOLUTION INTRAVENOUS at 11:29

## 2020-05-21 RX ADMIN — HYDROMORPHONE HYDROCHLORIDE 1 MG: 1 INJECTION, SOLUTION INTRAMUSCULAR; INTRAVENOUS; SUBCUTANEOUS at 19:18

## 2020-05-21 RX ADMIN — ACETAMINOPHEN 650 MG: 325 TABLET ORAL at 19:17

## 2020-05-21 RX ADMIN — ALPRAZOLAM 0.5 MG: 0.5 TABLET ORAL at 22:07

## 2020-05-21 RX ADMIN — MONTELUKAST 10 MG: 10 TABLET, FILM COATED ORAL at 22:07

## 2020-05-21 RX ADMIN — SODIUM CHLORIDE 125 ML/HR: 0.9 INJECTION, SOLUTION INTRAVENOUS at 23:46

## 2020-05-21 RX ADMIN — HYDROXYZINE HYDROCHLORIDE 50 MG: 25 TABLET ORAL at 22:06

## 2020-05-21 RX ADMIN — SODIUM CHLORIDE 125 ML/HR: 0.9 INJECTION, SOLUTION INTRAVENOUS at 13:04

## 2020-05-21 RX ADMIN — HYDROMORPHONE HYDROCHLORIDE 1 MG: 1 INJECTION, SOLUTION INTRAMUSCULAR; INTRAVENOUS; SUBCUTANEOUS at 16:12

## 2020-05-21 RX ADMIN — HYDROMORPHONE HYDROCHLORIDE 1 MG: 1 INJECTION, SOLUTION INTRAMUSCULAR; INTRAVENOUS; SUBCUTANEOUS at 08:48

## 2020-05-21 RX ADMIN — ONDANSETRON 4 MG: 2 INJECTION INTRAMUSCULAR; INTRAVENOUS at 07:24

## 2020-05-21 RX ADMIN — ONDANSETRON 4 MG: 2 INJECTION INTRAMUSCULAR; INTRAVENOUS at 22:15

## 2020-05-21 RX ADMIN — GABAPENTIN 300 MG: 300 CAPSULE ORAL at 19:17

## 2020-05-21 RX ADMIN — ONDANSETRON 4 MG: 2 INJECTION INTRAMUSCULAR; INTRAVENOUS at 15:18

## 2020-05-22 ENCOUNTER — APPOINTMENT (OUTPATIENT)
Dept: ULTRASOUND IMAGING | Facility: HOSPITAL | Age: 40
DRG: 284 | End: 2020-05-22
Payer: COMMERCIAL

## 2020-05-22 LAB
ALBUMIN SERPL BCP-MCNC: 3.6 G/DL (ref 3.5–5)
ALP SERPL-CCNC: 67 U/L (ref 46–116)
ALT SERPL W P-5'-P-CCNC: 86 U/L (ref 12–78)
ANION GAP SERPL CALCULATED.3IONS-SCNC: 9 MMOL/L (ref 4–13)
AST SERPL W P-5'-P-CCNC: 42 U/L (ref 5–45)
BILIRUB DIRECT SERPL-MCNC: 0.08 MG/DL (ref 0–0.2)
BILIRUB SERPL-MCNC: 0.4 MG/DL (ref 0.2–1)
BUN SERPL-MCNC: 9 MG/DL (ref 5–25)
CALCIUM SERPL-MCNC: 8 MG/DL (ref 8.3–10.1)
CHLORIDE SERPL-SCNC: 104 MMOL/L (ref 100–108)
CO2 SERPL-SCNC: 25 MMOL/L (ref 21–32)
CREAT SERPL-MCNC: 0.73 MG/DL (ref 0.6–1.3)
ERYTHROCYTE [DISTWIDTH] IN BLOOD BY AUTOMATED COUNT: 12.2 % (ref 11.6–15.1)
GFR SERPL CREATININE-BSD FRML MDRD: 104 ML/MIN/1.73SQ M
GLUCOSE P FAST SERPL-MCNC: 97 MG/DL (ref 65–99)
GLUCOSE SERPL-MCNC: 97 MG/DL (ref 65–140)
HCT VFR BLD AUTO: 40.6 % (ref 34.8–46.1)
HGB BLD-MCNC: 12.8 G/DL (ref 11.5–15.4)
MCH RBC QN AUTO: 28.1 PG (ref 26.8–34.3)
MCHC RBC AUTO-ENTMCNC: 31.5 G/DL (ref 31.4–37.4)
MCV RBC AUTO: 89 FL (ref 82–98)
PLATELET # BLD AUTO: 266 THOUSANDS/UL (ref 149–390)
PMV BLD AUTO: 10.4 FL (ref 8.9–12.7)
POTASSIUM SERPL-SCNC: 3.6 MMOL/L (ref 3.5–5.3)
PROT SERPL-MCNC: 7.1 G/DL (ref 6.4–8.2)
RBC # BLD AUTO: 4.56 MILLION/UL (ref 3.81–5.12)
SODIUM SERPL-SCNC: 138 MMOL/L (ref 136–145)
WBC # BLD AUTO: 7.12 THOUSAND/UL (ref 4.31–10.16)

## 2020-05-22 PROCEDURE — 76770 US EXAM ABDO BACK WALL COMP: CPT

## 2020-05-22 PROCEDURE — 80048 BASIC METABOLIC PNL TOTAL CA: CPT | Performed by: INTERNAL MEDICINE

## 2020-05-22 PROCEDURE — C9113 INJ PANTOPRAZOLE SODIUM, VIA: HCPCS | Performed by: PHYSICIAN ASSISTANT

## 2020-05-22 PROCEDURE — 99232 SBSQ HOSP IP/OBS MODERATE 35: CPT | Performed by: INTERNAL MEDICINE

## 2020-05-22 PROCEDURE — 85027 COMPLETE CBC AUTOMATED: CPT | Performed by: INTERNAL MEDICINE

## 2020-05-22 PROCEDURE — 99232 SBSQ HOSP IP/OBS MODERATE 35: CPT | Performed by: SURGERY

## 2020-05-22 PROCEDURE — 80076 HEPATIC FUNCTION PANEL: CPT | Performed by: NURSE PRACTITIONER

## 2020-05-22 PROCEDURE — 99232 SBSQ HOSP IP/OBS MODERATE 35: CPT | Performed by: NURSE PRACTITIONER

## 2020-05-22 RX ORDER — PROMETHAZINE HYDROCHLORIDE 25 MG/ML
25 INJECTION, SOLUTION INTRAMUSCULAR; INTRAVENOUS EVERY 6 HOURS PRN
Status: DISCONTINUED | OUTPATIENT
Start: 2020-05-22 | End: 2020-05-22

## 2020-05-22 RX ORDER — HYDROMORPHONE HCL/PF 1 MG/ML
0.2 SYRINGE (ML) INJECTION
Status: DISCONTINUED | OUTPATIENT
Start: 2020-05-22 | End: 2020-05-23 | Stop reason: HOSPADM

## 2020-05-22 RX ORDER — SODIUM CHLORIDE, SODIUM GLUCONATE, SODIUM ACETATE, POTASSIUM CHLORIDE, MAGNESIUM CHLORIDE, SODIUM PHOSPHATE, DIBASIC, AND POTASSIUM PHOSPHATE .53; .5; .37; .037; .03; .012; .00082 G/100ML; G/100ML; G/100ML; G/100ML; G/100ML; G/100ML; G/100ML
75 INJECTION, SOLUTION INTRAVENOUS CONTINUOUS
Status: DISCONTINUED | OUTPATIENT
Start: 2020-05-22 | End: 2020-05-23 | Stop reason: HOSPADM

## 2020-05-22 RX ORDER — OXYCODONE HYDROCHLORIDE 5 MG/1
5 TABLET ORAL EVERY 4 HOURS PRN
Status: DISCONTINUED | OUTPATIENT
Start: 2020-05-22 | End: 2020-05-23 | Stop reason: HOSPADM

## 2020-05-22 RX ORDER — PROMETHAZINE HYDROCHLORIDE 25 MG/ML
25 INJECTION, SOLUTION INTRAMUSCULAR; INTRAVENOUS EVERY 6 HOURS PRN
Status: DISCONTINUED | OUTPATIENT
Start: 2020-05-22 | End: 2020-05-23 | Stop reason: HOSPADM

## 2020-05-22 RX ADMIN — SODIUM CHLORIDE, SODIUM GLUCONATE, SODIUM ACETATE, POTASSIUM CHLORIDE, MAGNESIUM CHLORIDE, SODIUM PHOSPHATE, DIBASIC, AND POTASSIUM PHOSPHATE 75 ML/HR: .53; .5; .37; .037; .03; .012; .00082 INJECTION, SOLUTION INTRAVENOUS at 22:19

## 2020-05-22 RX ADMIN — HYDROXYZINE HYDROCHLORIDE 50 MG: 25 TABLET ORAL at 22:18

## 2020-05-22 RX ADMIN — OXYCODONE HYDROCHLORIDE 5 MG: 5 TABLET ORAL at 20:30

## 2020-05-22 RX ADMIN — BUPROPION HYDROCHLORIDE 150 MG: 150 TABLET, FILM COATED, EXTENDED RELEASE ORAL at 18:52

## 2020-05-22 RX ADMIN — HYDROMORPHONE HYDROCHLORIDE 1 MG: 1 INJECTION, SOLUTION INTRAMUSCULAR; INTRAVENOUS; SUBCUTANEOUS at 02:05

## 2020-05-22 RX ADMIN — HYDROMORPHONE HYDROCHLORIDE 0.2 MG: 1 INJECTION, SOLUTION INTRAMUSCULAR; INTRAVENOUS; SUBCUTANEOUS at 18:50

## 2020-05-22 RX ADMIN — OXYCODONE HYDROCHLORIDE 5 MG: 5 TABLET ORAL at 16:10

## 2020-05-22 RX ADMIN — ALPRAZOLAM 0.5 MG: 0.5 TABLET ORAL at 22:18

## 2020-05-22 RX ADMIN — SODIUM CHLORIDE, SODIUM GLUCONATE, SODIUM ACETATE, POTASSIUM CHLORIDE, MAGNESIUM CHLORIDE, SODIUM PHOSPHATE, DIBASIC, AND POTASSIUM PHOSPHATE 75 ML/HR: .53; .5; .37; .037; .03; .012; .00082 INJECTION, SOLUTION INTRAVENOUS at 08:17

## 2020-05-22 RX ADMIN — VENLAFAXINE HYDROCHLORIDE 150 MG: 37.5 CAPSULE, EXTENDED RELEASE ORAL at 18:52

## 2020-05-22 RX ADMIN — PANTOPRAZOLE SODIUM 40 MG: 40 INJECTION, POWDER, FOR SOLUTION INTRAVENOUS at 08:18

## 2020-05-22 RX ADMIN — MONTELUKAST 10 MG: 10 TABLET, FILM COATED ORAL at 22:18

## 2020-05-22 RX ADMIN — GABAPENTIN 300 MG: 300 CAPSULE ORAL at 18:52

## 2020-05-22 RX ADMIN — VENLAFAXINE HYDROCHLORIDE 75 MG: 37.5 CAPSULE, EXTENDED RELEASE ORAL at 18:56

## 2020-05-22 RX ADMIN — PROMETHAZINE HYDROCHLORIDE 25 MG: 25 INJECTION INTRAMUSCULAR; INTRAVENOUS at 20:34

## 2020-05-22 RX ADMIN — ONDANSETRON 4 MG: 2 INJECTION INTRAMUSCULAR; INTRAVENOUS at 05:17

## 2020-05-22 RX ADMIN — PROMETHAZINE HYDROCHLORIDE 25 MG: 25 INJECTION INTRAMUSCULAR; INTRAVENOUS at 08:18

## 2020-05-23 ENCOUNTER — APPOINTMENT (INPATIENT)
Dept: NUCLEAR MEDICINE | Facility: HOSPITAL | Age: 40
DRG: 284 | End: 2020-05-23
Payer: COMMERCIAL

## 2020-05-23 VITALS
SYSTOLIC BLOOD PRESSURE: 129 MMHG | BODY MASS INDEX: 27.66 KG/M2 | DIASTOLIC BLOOD PRESSURE: 84 MMHG | RESPIRATION RATE: 17 BRPM | HEIGHT: 64 IN | TEMPERATURE: 98.7 F | WEIGHT: 162 LBS | OXYGEN SATURATION: 98 % | HEART RATE: 106 BPM

## 2020-05-23 PROCEDURE — 99239 HOSP IP/OBS DSCHRG MGMT >30: CPT | Performed by: INTERNAL MEDICINE

## 2020-05-23 PROCEDURE — 78227 HEPATOBIL SYST IMAGE W/DRUG: CPT

## 2020-05-23 PROCEDURE — A9537 TC99M MEBROFENIN: HCPCS

## 2020-05-23 PROCEDURE — C9113 INJ PANTOPRAZOLE SODIUM, VIA: HCPCS | Performed by: PHYSICIAN ASSISTANT

## 2020-05-23 PROCEDURE — 99232 SBSQ HOSP IP/OBS MODERATE 35: CPT | Performed by: NURSE PRACTITIONER

## 2020-05-23 RX ADMIN — OXYCODONE HYDROCHLORIDE 5 MG: 5 TABLET ORAL at 13:13

## 2020-05-23 RX ADMIN — GABAPENTIN 300 MG: 300 CAPSULE ORAL at 11:56

## 2020-05-23 RX ADMIN — BUPROPION HYDROCHLORIDE 150 MG: 150 TABLET, FILM COATED, EXTENDED RELEASE ORAL at 11:57

## 2020-05-23 RX ADMIN — VENLAFAXINE HYDROCHLORIDE 150 MG: 37.5 CAPSULE, EXTENDED RELEASE ORAL at 11:53

## 2020-05-23 RX ADMIN — SODIUM CHLORIDE, SODIUM GLUCONATE, SODIUM ACETATE, POTASSIUM CHLORIDE, MAGNESIUM CHLORIDE, SODIUM PHOSPHATE, DIBASIC, AND POTASSIUM PHOSPHATE 75 ML/HR: .53; .5; .37; .037; .03; .012; .00082 INJECTION, SOLUTION INTRAVENOUS at 11:52

## 2020-05-23 RX ADMIN — FLUTICASONE PROPIONATE 2 PUFF: 110 AEROSOL, METERED RESPIRATORY (INHALATION) at 12:02

## 2020-05-23 RX ADMIN — ALBUTEROL SULFATE 2 PUFF: 90 AEROSOL, METERED RESPIRATORY (INHALATION) at 11:59

## 2020-05-23 RX ADMIN — VENLAFAXINE HYDROCHLORIDE 75 MG: 37.5 CAPSULE, EXTENDED RELEASE ORAL at 12:00

## 2020-05-23 RX ADMIN — VENLAFAXINE HYDROCHLORIDE 150 MG: 37.5 CAPSULE, EXTENDED RELEASE ORAL at 11:55

## 2020-05-23 RX ADMIN — PANTOPRAZOLE SODIUM 40 MG: 40 INJECTION, POWDER, FOR SOLUTION INTRAVENOUS at 11:56

## 2020-05-23 RX ADMIN — SINCALIDE 1.5 MCG: 5 INJECTION, POWDER, LYOPHILIZED, FOR SOLUTION INTRAVENOUS at 09:40

## 2020-05-26 ENCOUNTER — HOSPITAL ENCOUNTER (EMERGENCY)
Facility: HOSPITAL | Age: 40
Discharge: HOME | End: 2020-05-26
Attending: EMERGENCY MEDICINE | Admitting: EMERGENCY MEDICINE
Payer: COMMERCIAL

## 2020-05-26 ENCOUNTER — TRANSITIONAL CARE MANAGEMENT (OUTPATIENT)
Dept: FAMILY MEDICINE CLINIC | Facility: CLINIC | Age: 40
End: 2020-05-26

## 2020-05-26 ENCOUNTER — APPOINTMENT (EMERGENCY)
Dept: CT IMAGING | Facility: HOSPITAL | Age: 40
End: 2020-05-26
Payer: COMMERCIAL

## 2020-05-26 ENCOUNTER — TELEPHONE (OUTPATIENT)
Dept: GASTROENTEROLOGY | Facility: CLINIC | Age: 40
End: 2020-05-26

## 2020-05-26 VITALS
DIASTOLIC BLOOD PRESSURE: 93 MMHG | RESPIRATION RATE: 18 BRPM | TEMPERATURE: 98.7 F | OXYGEN SATURATION: 100 % | SYSTOLIC BLOOD PRESSURE: 146 MMHG | HEART RATE: 100 BPM | HEIGHT: 64 IN | BODY MASS INDEX: 27.66 KG/M2 | WEIGHT: 162 LBS

## 2020-05-26 DIAGNOSIS — R10.9 ABDOMINAL PAIN: Primary | ICD-10-CM

## 2020-05-26 DIAGNOSIS — K59.00 CONSTIPATION: ICD-10-CM

## 2020-05-26 LAB
ALBUMIN SERPL BCP-MCNC: 4.1 G/DL (ref 3.5–5)
ALP SERPL-CCNC: 67 U/L (ref 46–116)
ALT SERPL W P-5'-P-CCNC: 52 U/L (ref 12–78)
ANION GAP SERPL CALCULATED.3IONS-SCNC: 8 MMOL/L (ref 4–13)
AST SERPL W P-5'-P-CCNC: 48 U/L (ref 5–45)
BASOPHILS # BLD AUTO: 0.06 THOUSANDS/ΜL (ref 0–0.1)
BASOPHILS NFR BLD AUTO: 1 % (ref 0–1)
BILIRUB SERPL-MCNC: 0.3 MG/DL (ref 0.2–1)
BUN SERPL-MCNC: 10 MG/DL (ref 5–25)
CALCIUM SERPL-MCNC: 9.2 MG/DL (ref 8.3–10.1)
CHLORIDE SERPL-SCNC: 103 MMOL/L (ref 100–108)
CLARITY, POC: NORMAL
CO2 SERPL-SCNC: 30 MMOL/L (ref 21–32)
COLOR, POC: YELLOW
CREAT SERPL-MCNC: 0.87 MG/DL (ref 0.6–1.3)
EOSINOPHIL # BLD AUTO: 0.21 THOUSAND/ΜL (ref 0–0.61)
EOSINOPHIL NFR BLD AUTO: 3 % (ref 0–6)
ERYTHROCYTE [DISTWIDTH] IN BLOOD BY AUTOMATED COUNT: 13.2 % (ref 11.6–15.1)
EXT BILIRUBIN, UA: NEGATIVE
EXT BLOOD URINE: NEGATIVE
EXT GLUCOSE, UA: NEGATIVE
EXT KETONES: NORMAL
EXT NITRITE, UA: NEGATIVE
EXT PH, UA: 6
EXT PREG TEST URINE: NORMAL
EXT PROTEIN, UA: NORMAL
EXT SPECIFIC GRAVITY, UA: 1.02
EXT UROBILINOGEN: 0.2
EXT. CONTROL ED NAV: NORMAL
GFR SERPL CREATININE-BSD FRML MDRD: 84 ML/MIN/1.73SQ M
GLUCOSE SERPL-MCNC: 98 MG/DL (ref 65–140)
HCT VFR BLD AUTO: 37.3 % (ref 34.8–46.1)
HGB BLD-MCNC: 12 G/DL (ref 11.5–15.4)
IMM GRANULOCYTES # BLD AUTO: 0.01 THOUSAND/UL (ref 0–0.2)
IMM GRANULOCYTES NFR BLD AUTO: 0 % (ref 0–2)
LIPASE SERPL-CCNC: 276 U/L (ref 73–393)
LYMPHOCYTES # BLD AUTO: 2.92 THOUSANDS/ΜL (ref 0.6–4.47)
LYMPHOCYTES NFR BLD AUTO: 40 % (ref 14–44)
MCH RBC QN AUTO: 27.5 PG (ref 26.8–34.3)
MCHC RBC AUTO-ENTMCNC: 32.2 G/DL (ref 31.4–37.4)
MCV RBC AUTO: 85 FL (ref 82–98)
MONOCYTES # BLD AUTO: 0.53 THOUSAND/ΜL (ref 0.17–1.22)
MONOCYTES NFR BLD AUTO: 7 % (ref 4–12)
NEUTROPHILS # BLD AUTO: 3.52 THOUSANDS/ΜL (ref 1.85–7.62)
NEUTS SEG NFR BLD AUTO: 49 % (ref 43–75)
PLATELET # BLD AUTO: 341 THOUSANDS/UL (ref 149–390)
PMV BLD AUTO: 10.3 FL (ref 8.9–12.7)
POTASSIUM SERPL-SCNC: 3.8 MMOL/L (ref 3.5–5.3)
PROT SERPL-MCNC: 7.6 G/DL (ref 6.4–8.2)
RBC # BLD AUTO: 4.37 MILLION/UL (ref 3.81–5.12)
SODIUM SERPL-SCNC: 141 MMOL/L (ref 136–145)
WBC # BLD AUTO: 7.25 THOUSAND/UL (ref 4.31–10.16)
WBC # BLD EST: NEGATIVE 10*3/UL

## 2020-05-26 PROCEDURE — 74177 CT ABD & PELVIS W/CONTRAST: CPT

## 2020-05-26 PROCEDURE — 36415 COLL VENOUS BLD VENIPUNCTURE: CPT | Performed by: PHYSICIAN ASSISTANT

## 2020-05-26 PROCEDURE — 80053 COMPREHEN METABOLIC PANEL: CPT | Performed by: PHYSICIAN ASSISTANT

## 2020-05-26 PROCEDURE — 96375 TX/PRO/DX INJ NEW DRUG ADDON: CPT

## 2020-05-26 PROCEDURE — 96374 THER/PROPH/DIAG INJ IV PUSH: CPT

## 2020-05-26 PROCEDURE — 99285 EMERGENCY DEPT VISIT HI MDM: CPT | Performed by: PHYSICIAN ASSISTANT

## 2020-05-26 PROCEDURE — 99284 EMERGENCY DEPT VISIT MOD MDM: CPT

## 2020-05-26 PROCEDURE — 96361 HYDRATE IV INFUSION ADD-ON: CPT

## 2020-05-26 PROCEDURE — C9113 INJ PANTOPRAZOLE SODIUM, VIA: HCPCS | Performed by: PHYSICIAN ASSISTANT

## 2020-05-26 PROCEDURE — 85025 COMPLETE CBC W/AUTO DIFF WBC: CPT | Performed by: PHYSICIAN ASSISTANT

## 2020-05-26 PROCEDURE — 83690 ASSAY OF LIPASE: CPT | Performed by: PHYSICIAN ASSISTANT

## 2020-05-26 PROCEDURE — 81025 URINE PREGNANCY TEST: CPT | Performed by: PHYSICIAN ASSISTANT

## 2020-05-26 RX ORDER — KETOROLAC TROMETHAMINE 30 MG/ML
30 INJECTION, SOLUTION INTRAMUSCULAR; INTRAVENOUS ONCE
Status: COMPLETED | OUTPATIENT
Start: 2020-05-26 | End: 2020-05-26

## 2020-05-26 RX ORDER — PANTOPRAZOLE SODIUM 40 MG/1
40 INJECTION, POWDER, FOR SOLUTION INTRAVENOUS ONCE
Status: COMPLETED | OUTPATIENT
Start: 2020-05-26 | End: 2020-05-26

## 2020-05-26 RX ORDER — METOCLOPRAMIDE HYDROCHLORIDE 5 MG/ML
10 INJECTION INTRAMUSCULAR; INTRAVENOUS ONCE
Status: COMPLETED | OUTPATIENT
Start: 2020-05-26 | End: 2020-05-26

## 2020-05-26 RX ORDER — SODIUM CHLORIDE 9 MG/ML
125 INJECTION, SOLUTION INTRAVENOUS CONTINUOUS
Status: DISCONTINUED | OUTPATIENT
Start: 2020-05-26 | End: 2020-05-27 | Stop reason: HOSPADM

## 2020-05-26 RX ORDER — ONDANSETRON 2 MG/ML
4 INJECTION INTRAMUSCULAR; INTRAVENOUS ONCE
Status: COMPLETED | OUTPATIENT
Start: 2020-05-26 | End: 2020-05-26

## 2020-05-26 RX ORDER — DICYCLOMINE HCL 20 MG
20 TABLET ORAL ONCE
Status: COMPLETED | OUTPATIENT
Start: 2020-05-26 | End: 2020-05-26

## 2020-05-26 RX ORDER — METOCLOPRAMIDE 5 MG/1
5 TABLET ORAL EVERY 6 HOURS PRN
Qty: 12 TABLET | Refills: 0 | Status: SHIPPED | OUTPATIENT
Start: 2020-05-26 | End: 2020-05-29

## 2020-05-26 RX ADMIN — KETOROLAC TROMETHAMINE 30 MG: 30 INJECTION, SOLUTION INTRAMUSCULAR; INTRAVENOUS at 17:57

## 2020-05-26 RX ADMIN — PANTOPRAZOLE SODIUM 40 MG: 40 INJECTION, POWDER, FOR SOLUTION INTRAVENOUS at 20:59

## 2020-05-26 RX ADMIN — DICYCLOMINE HYDROCHLORIDE 20 MG: 20 TABLET ORAL at 20:59

## 2020-05-26 RX ADMIN — SODIUM CHLORIDE 125 ML/HR: 0.9 INJECTION, SOLUTION INTRAVENOUS at 17:56

## 2020-05-26 RX ADMIN — METOCLOPRAMIDE HYDROCHLORIDE 10 MG: 5 INJECTION INTRAMUSCULAR; INTRAVENOUS at 20:59

## 2020-05-26 RX ADMIN — ONDANSETRON 4 MG: 2 INJECTION INTRAMUSCULAR; INTRAVENOUS at 17:57

## 2020-05-26 RX ADMIN — IOHEXOL 100 ML: 350 INJECTION, SOLUTION INTRAVENOUS at 18:53

## 2020-05-27 ENCOUNTER — OFFICE VISIT (OUTPATIENT)
Dept: GASTROENTEROLOGY | Facility: CLINIC | Age: 40
End: 2020-05-27
Payer: COMMERCIAL

## 2020-05-27 ENCOUNTER — TELEPHONE (OUTPATIENT)
Dept: GASTROENTEROLOGY | Facility: CLINIC | Age: 40
End: 2020-05-27

## 2020-05-27 VITALS
WEIGHT: 162 LBS | SYSTOLIC BLOOD PRESSURE: 140 MMHG | TEMPERATURE: 97.3 F | BODY MASS INDEX: 27.66 KG/M2 | HEIGHT: 64 IN | DIASTOLIC BLOOD PRESSURE: 86 MMHG

## 2020-05-27 DIAGNOSIS — K21.9 GASTROESOPHAGEAL REFLUX DISEASE, ESOPHAGITIS PRESENCE NOT SPECIFIED: Primary | ICD-10-CM

## 2020-05-27 DIAGNOSIS — K59.00 CONSTIPATION, UNSPECIFIED CONSTIPATION TYPE: ICD-10-CM

## 2020-05-27 DIAGNOSIS — Z11.59 SCREENING FOR VIRAL DISEASE: ICD-10-CM

## 2020-05-27 DIAGNOSIS — R10.11 INTERMITTENT RIGHT UPPER QUADRANT ABDOMINAL PAIN: ICD-10-CM

## 2020-05-27 DIAGNOSIS — K83.8 COMMON BILE DUCT DILATATION: ICD-10-CM

## 2020-05-27 PROCEDURE — U0003 INFECTIOUS AGENT DETECTION BY NUCLEIC ACID (DNA OR RNA); SEVERE ACUTE RESPIRATORY SYNDROME CORONAVIRUS 2 (SARS-COV-2) (CORONAVIRUS DISEASE [COVID-19]), AMPLIFIED PROBE TECHNIQUE, MAKING USE OF HIGH THROUGHPUT TECHNOLOGIES AS DESCRIBED BY CMS-2020-01-R: HCPCS

## 2020-05-27 PROCEDURE — 1036F TOBACCO NON-USER: CPT | Performed by: NURSE PRACTITIONER

## 2020-05-27 PROCEDURE — 99214 OFFICE O/P EST MOD 30 MIN: CPT | Performed by: NURSE PRACTITIONER

## 2020-05-27 PROCEDURE — 3008F BODY MASS INDEX DOCD: CPT | Performed by: NURSE PRACTITIONER

## 2020-05-27 PROCEDURE — 1111F DSCHRG MED/CURRENT MED MERGE: CPT | Performed by: NURSE PRACTITIONER

## 2020-05-27 RX ORDER — PANTOPRAZOLE SODIUM 40 MG/1
40 TABLET, DELAYED RELEASE ORAL DAILY
Qty: 30 TABLET | Refills: 2 | Status: SHIPPED | OUTPATIENT
Start: 2020-05-27 | End: 2020-08-10

## 2020-05-28 LAB — SARS-COV-2 RNA SPEC QL NAA+PROBE: NOT DETECTED

## 2020-06-03 ENCOUNTER — HOSPITAL ENCOUNTER (OUTPATIENT)
Dept: GASTROENTEROLOGY | Facility: AMBULATORY SURGERY CENTER | Age: 40
Discharge: HOME | End: 2020-06-03
Payer: COMMERCIAL

## 2020-06-03 ENCOUNTER — ANESTHESIA (OUTPATIENT)
Dept: GASTROENTEROLOGY | Facility: AMBULATORY SURGERY CENTER | Age: 40
End: 2020-06-03

## 2020-06-03 ENCOUNTER — ANESTHESIA EVENT (OUTPATIENT)
Dept: GASTROENTEROLOGY | Facility: AMBULATORY SURGERY CENTER | Age: 40
End: 2020-06-03

## 2020-06-03 VITALS
OXYGEN SATURATION: 100 % | HEART RATE: 93 BPM | TEMPERATURE: 98.7 F | DIASTOLIC BLOOD PRESSURE: 68 MMHG | RESPIRATION RATE: 20 BRPM | SYSTOLIC BLOOD PRESSURE: 116 MMHG

## 2020-06-03 DIAGNOSIS — K21.9 GASTROESOPHAGEAL REFLUX DISEASE, ESOPHAGITIS PRESENCE NOT SPECIFIED: ICD-10-CM

## 2020-06-03 DIAGNOSIS — R10.11 INTERMITTENT RIGHT UPPER QUADRANT ABDOMINAL PAIN: ICD-10-CM

## 2020-06-03 LAB
EXT PREGNANCY TEST URINE: NEGATIVE
EXT. CONTROL: NORMAL

## 2020-06-03 PROCEDURE — 43239 EGD BIOPSY SINGLE/MULTIPLE: CPT | Performed by: INTERNAL MEDICINE

## 2020-06-03 RX ORDER — PROPOFOL 10 MG/ML
INJECTION, EMULSION INTRAVENOUS AS NEEDED
Status: DISCONTINUED | OUTPATIENT
Start: 2020-06-03 | End: 2020-06-03 | Stop reason: SURG

## 2020-06-03 RX ORDER — SODIUM CHLORIDE 9 MG/ML
50 INJECTION, SOLUTION INTRAVENOUS CONTINUOUS
Status: DISCONTINUED | OUTPATIENT
Start: 2020-06-03 | End: 2020-06-07 | Stop reason: HOSPADM

## 2020-06-03 RX ADMIN — PROPOFOL 50 MG: 10 INJECTION, EMULSION INTRAVENOUS at 12:10

## 2020-06-03 RX ADMIN — PROPOFOL 150 MG: 10 INJECTION, EMULSION INTRAVENOUS at 12:05

## 2020-06-03 RX ADMIN — SODIUM CHLORIDE 50 ML/HR: 9 INJECTION, SOLUTION INTRAVENOUS at 11:34

## 2020-06-10 DIAGNOSIS — B02.29 POSTHERPETIC NEURALGIA: ICD-10-CM

## 2020-06-10 RX ORDER — GABAPENTIN 300 MG/1
CAPSULE ORAL
Qty: 60 CAPSULE | Refills: 2 | OUTPATIENT
Start: 2020-06-10

## 2020-06-12 ENCOUNTER — TELEPHONE (OUTPATIENT)
Dept: GASTROENTEROLOGY | Facility: CLINIC | Age: 40
End: 2020-06-12

## 2020-06-15 ENCOUNTER — HOSPITAL ENCOUNTER (EMERGENCY)
Facility: HOSPITAL | Age: 40
Discharge: HOME | End: 2020-06-15
Attending: EMERGENCY MEDICINE | Admitting: EMERGENCY MEDICINE
Payer: COMMERCIAL

## 2020-06-15 ENCOUNTER — APPOINTMENT (EMERGENCY)
Dept: ULTRASOUND IMAGING | Facility: HOSPITAL | Age: 40
End: 2020-06-15
Payer: COMMERCIAL

## 2020-06-15 VITALS
HEART RATE: 79 BPM | DIASTOLIC BLOOD PRESSURE: 93 MMHG | RESPIRATION RATE: 18 BRPM | SYSTOLIC BLOOD PRESSURE: 163 MMHG | TEMPERATURE: 97.6 F | OXYGEN SATURATION: 100 %

## 2020-06-15 DIAGNOSIS — K80.50 RECURRENT BILIARY COLIC: Primary | ICD-10-CM

## 2020-06-15 LAB
ALBUMIN SERPL BCP-MCNC: 4.1 G/DL (ref 3.5–5)
ALP SERPL-CCNC: 58 U/L (ref 46–116)
ALT SERPL W P-5'-P-CCNC: 24 U/L (ref 12–78)
ANION GAP SERPL CALCULATED.3IONS-SCNC: 8 MMOL/L (ref 4–13)
AST SERPL W P-5'-P-CCNC: 27 U/L (ref 5–45)
BASOPHILS # BLD AUTO: 0.08 THOUSANDS/ΜL (ref 0–0.1)
BASOPHILS NFR BLD AUTO: 1 % (ref 0–1)
BILIRUB SERPL-MCNC: 0.4 MG/DL (ref 0.2–1)
BUN SERPL-MCNC: 14 MG/DL (ref 5–25)
CALCIUM SERPL-MCNC: 8.9 MG/DL (ref 8.3–10.1)
CHLORIDE SERPL-SCNC: 102 MMOL/L (ref 100–108)
CO2 SERPL-SCNC: 27 MMOL/L (ref 21–32)
CREAT SERPL-MCNC: 1.03 MG/DL (ref 0.6–1.3)
EOSINOPHIL # BLD AUTO: 0.15 THOUSAND/ΜL (ref 0–0.61)
EOSINOPHIL NFR BLD AUTO: 1 % (ref 0–6)
ERYTHROCYTE [DISTWIDTH] IN BLOOD BY AUTOMATED COUNT: 12.6 % (ref 11.6–15.1)
GFR SERPL CREATININE-BSD FRML MDRD: 68 ML/MIN/1.73SQ M
GLUCOSE SERPL-MCNC: 98 MG/DL (ref 65–140)
HCT VFR BLD AUTO: 39.2 % (ref 34.8–46.1)
HGB BLD-MCNC: 12.7 G/DL (ref 11.5–15.4)
IMM GRANULOCYTES # BLD AUTO: 0.04 THOUSAND/UL (ref 0–0.2)
IMM GRANULOCYTES NFR BLD AUTO: 0 % (ref 0–2)
LIPASE SERPL-CCNC: 171 U/L (ref 73–393)
LYMPHOCYTES # BLD AUTO: 4.78 THOUSANDS/ΜL (ref 0.6–4.47)
LYMPHOCYTES NFR BLD AUTO: 40 % (ref 14–44)
MCH RBC QN AUTO: 27.8 PG (ref 26.8–34.3)
MCHC RBC AUTO-ENTMCNC: 32.4 G/DL (ref 31.4–37.4)
MCV RBC AUTO: 86 FL (ref 82–98)
MONOCYTES # BLD AUTO: 0.55 THOUSAND/ΜL (ref 0.17–1.22)
MONOCYTES NFR BLD AUTO: 5 % (ref 4–12)
NEUTROPHILS # BLD AUTO: 6.44 THOUSANDS/ΜL (ref 1.85–7.62)
NEUTS SEG NFR BLD AUTO: 53 % (ref 43–75)
NRBC BLD AUTO-RTO: 0 /100 WBCS
PLATELET # BLD AUTO: 370 THOUSANDS/UL (ref 149–390)
PMV BLD AUTO: 9.7 FL (ref 8.9–12.7)
POTASSIUM SERPL-SCNC: 4.3 MMOL/L (ref 3.5–5.3)
PROT SERPL-MCNC: 7.8 G/DL (ref 6.4–8.2)
RBC # BLD AUTO: 4.57 MILLION/UL (ref 3.81–5.12)
SODIUM SERPL-SCNC: 137 MMOL/L (ref 136–145)
WBC # BLD AUTO: 12.04 THOUSAND/UL (ref 4.31–10.16)

## 2020-06-15 PROCEDURE — 99284 EMERGENCY DEPT VISIT MOD MDM: CPT

## 2020-06-15 PROCEDURE — 36415 COLL VENOUS BLD VENIPUNCTURE: CPT | Performed by: EMERGENCY MEDICINE

## 2020-06-15 PROCEDURE — 96375 TX/PRO/DX INJ NEW DRUG ADDON: CPT

## 2020-06-15 PROCEDURE — 80053 COMPREHEN METABOLIC PANEL: CPT | Performed by: EMERGENCY MEDICINE

## 2020-06-15 PROCEDURE — 83690 ASSAY OF LIPASE: CPT | Performed by: EMERGENCY MEDICINE

## 2020-06-15 PROCEDURE — 76705 ECHO EXAM OF ABDOMEN: CPT

## 2020-06-15 PROCEDURE — 96361 HYDRATE IV INFUSION ADD-ON: CPT

## 2020-06-15 PROCEDURE — 99285 EMERGENCY DEPT VISIT HI MDM: CPT | Performed by: EMERGENCY MEDICINE

## 2020-06-15 PROCEDURE — 96374 THER/PROPH/DIAG INJ IV PUSH: CPT

## 2020-06-15 PROCEDURE — 85025 COMPLETE CBC W/AUTO DIFF WBC: CPT | Performed by: EMERGENCY MEDICINE

## 2020-06-15 RX ORDER — HYDROCODONE BITARTRATE AND ACETAMINOPHEN 5; 325 MG/1; MG/1
1 TABLET ORAL EVERY 6 HOURS PRN
Qty: 12 TABLET | Refills: 0 | Status: SHIPPED | OUTPATIENT
Start: 2020-06-15 | End: 2020-06-25

## 2020-06-15 RX ORDER — ONDANSETRON 4 MG/1
4 TABLET, ORALLY DISINTEGRATING ORAL EVERY 6 HOURS PRN
Qty: 20 TABLET | Refills: 0 | Status: ON HOLD | OUTPATIENT
Start: 2020-06-15 | End: 2020-06-18 | Stop reason: SDUPTHER

## 2020-06-15 RX ORDER — KETOROLAC TROMETHAMINE 30 MG/ML
15 INJECTION, SOLUTION INTRAMUSCULAR; INTRAVENOUS ONCE
Status: COMPLETED | OUTPATIENT
Start: 2020-06-15 | End: 2020-06-15

## 2020-06-15 RX ORDER — ONDANSETRON 2 MG/ML
4 INJECTION INTRAMUSCULAR; INTRAVENOUS ONCE
Status: COMPLETED | OUTPATIENT
Start: 2020-06-15 | End: 2020-06-15

## 2020-06-15 RX ORDER — FENTANYL CITRATE 50 UG/ML
25 INJECTION, SOLUTION INTRAMUSCULAR; INTRAVENOUS ONCE
Status: COMPLETED | OUTPATIENT
Start: 2020-06-15 | End: 2020-06-15

## 2020-06-15 RX ADMIN — SODIUM CHLORIDE 1000 ML: 0.9 INJECTION, SOLUTION INTRAVENOUS at 17:44

## 2020-06-15 RX ADMIN — FENTANYL CITRATE 25 MCG: 50 INJECTION, SOLUTION INTRAMUSCULAR; INTRAVENOUS at 18:08

## 2020-06-15 RX ADMIN — KETOROLAC TROMETHAMINE 15 MG: 30 INJECTION, SOLUTION INTRAMUSCULAR at 17:47

## 2020-06-15 RX ADMIN — ONDANSETRON 4 MG: 2 INJECTION INTRAMUSCULAR; INTRAVENOUS at 17:48

## 2020-06-16 ENCOUNTER — TELEPHONE (OUTPATIENT)
Dept: GASTROENTEROLOGY | Facility: CLINIC | Age: 40
End: 2020-06-16

## 2020-06-18 ENCOUNTER — HOSPITAL ENCOUNTER (OUTPATIENT)
Facility: HOSPITAL | Age: 40
Setting detail: OBSERVATION
Discharge: HOME | End: 2020-06-18
Attending: EMERGENCY MEDICINE | Admitting: INTERNAL MEDICINE
Payer: COMMERCIAL

## 2020-06-18 ENCOUNTER — ANESTHESIA EVENT (OUTPATIENT)
Dept: PERIOP | Facility: HOSPITAL | Age: 40
End: 2020-06-18
Payer: COMMERCIAL

## 2020-06-18 ENCOUNTER — ANESTHESIA (OUTPATIENT)
Dept: PERIOP | Facility: HOSPITAL | Age: 40
End: 2020-06-18
Payer: COMMERCIAL

## 2020-06-18 VITALS
HEART RATE: 114 BPM | BODY MASS INDEX: 27.66 KG/M2 | WEIGHT: 162 LBS | DIASTOLIC BLOOD PRESSURE: 98 MMHG | OXYGEN SATURATION: 97 % | SYSTOLIC BLOOD PRESSURE: 153 MMHG | TEMPERATURE: 98.3 F | RESPIRATION RATE: 18 BRPM | HEIGHT: 64 IN

## 2020-06-18 DIAGNOSIS — R10.11 RIGHT UPPER QUADRANT ABDOMINAL PAIN: ICD-10-CM

## 2020-06-18 DIAGNOSIS — K80.20 CALCULUS OF GALLBLADDER WITHOUT CHOLECYSTITIS WITHOUT OBSTRUCTION: Primary | ICD-10-CM

## 2020-06-18 DIAGNOSIS — R11.2 NAUSEA AND VOMITING: ICD-10-CM

## 2020-06-18 DIAGNOSIS — K80.20 CHOLELITHIASIS: ICD-10-CM

## 2020-06-18 DIAGNOSIS — K80.50 RECURRENT BILIARY COLIC: ICD-10-CM

## 2020-06-18 DIAGNOSIS — R10.9 INTRACTABLE ABDOMINAL PAIN: ICD-10-CM

## 2020-06-18 PROBLEM — K81.0 ACUTE CHOLECYSTITIS: Status: ACTIVE | Noted: 2020-06-18

## 2020-06-18 PROBLEM — K21.00 GASTROESOPHAGEAL REFLUX DISEASE WITH ESOPHAGITIS: Status: ACTIVE | Noted: 2020-05-27

## 2020-06-18 LAB
ALBUMIN SERPL BCP-MCNC: 3.9 G/DL (ref 3.5–5)
ALP SERPL-CCNC: 57 U/L (ref 46–116)
ALT SERPL W P-5'-P-CCNC: 21 U/L (ref 12–78)
ANION GAP SERPL CALCULATED.3IONS-SCNC: 9 MMOL/L (ref 4–13)
AST SERPL W P-5'-P-CCNC: 13 U/L (ref 5–45)
BASOPHILS # BLD AUTO: 0.04 THOUSANDS/ΜL (ref 0–0.1)
BASOPHILS NFR BLD AUTO: 0 % (ref 0–1)
BILIRUB SERPL-MCNC: 0.3 MG/DL (ref 0.2–1)
BUN SERPL-MCNC: 11 MG/DL (ref 5–25)
CALCIUM SERPL-MCNC: 9 MG/DL (ref 8.3–10.1)
CHLORIDE SERPL-SCNC: 103 MMOL/L (ref 100–108)
CO2 SERPL-SCNC: 26 MMOL/L (ref 21–32)
CREAT SERPL-MCNC: 1.05 MG/DL (ref 0.6–1.3)
EOSINOPHIL # BLD AUTO: 0.06 THOUSAND/ΜL (ref 0–0.61)
EOSINOPHIL NFR BLD AUTO: 1 % (ref 0–6)
ERYTHROCYTE [DISTWIDTH] IN BLOOD BY AUTOMATED COUNT: 13 % (ref 11.6–15.1)
EXT PREG TEST URINE: NEGATIVE
EXT. CONTROL ED NAV: NORMAL
GFR SERPL CREATININE-BSD FRML MDRD: 67 ML/MIN/1.73SQ M
GLUCOSE SERPL-MCNC: 111 MG/DL (ref 65–140)
HCT VFR BLD AUTO: 38.5 % (ref 34.8–46.1)
HGB BLD-MCNC: 12.6 G/DL (ref 11.5–15.4)
IMM GRANULOCYTES # BLD AUTO: 0.01 THOUSAND/UL (ref 0–0.2)
IMM GRANULOCYTES NFR BLD AUTO: 0 % (ref 0–2)
LACTATE SERPL-SCNC: 1.3 MMOL/L (ref 0.5–2)
LYMPHOCYTES # BLD AUTO: 3.24 THOUSANDS/ΜL (ref 0.6–4.47)
LYMPHOCYTES NFR BLD AUTO: 36 % (ref 14–44)
MCH RBC QN AUTO: 27.3 PG (ref 26.8–34.3)
MCHC RBC AUTO-ENTMCNC: 32.7 G/DL (ref 31.4–37.4)
MCV RBC AUTO: 83 FL (ref 82–98)
MONOCYTES # BLD AUTO: 0.42 THOUSAND/ΜL (ref 0.17–1.22)
MONOCYTES NFR BLD AUTO: 5 % (ref 4–12)
NEUTROPHILS # BLD AUTO: 5.21 THOUSANDS/ΜL (ref 1.85–7.62)
NEUTS SEG NFR BLD AUTO: 58 % (ref 43–75)
PLATELET # BLD AUTO: 342 THOUSANDS/UL (ref 149–390)
PMV BLD AUTO: 9.9 FL (ref 8.9–12.7)
POTASSIUM SERPL-SCNC: 3.5 MMOL/L (ref 3.5–5.3)
PROT SERPL-MCNC: 7.5 G/DL (ref 6.4–8.2)
RBC # BLD AUTO: 4.62 MILLION/UL (ref 3.81–5.12)
SARS-COV-2 RNA RESP QL NAA+PROBE: NEGATIVE
SODIUM SERPL-SCNC: 138 MMOL/L (ref 136–145)
WBC # BLD AUTO: 8.98 THOUSAND/UL (ref 4.31–10.16)

## 2020-06-18 PROCEDURE — 96375 TX/PRO/DX INJ NEW DRUG ADDON: CPT

## 2020-06-18 PROCEDURE — 96361 HYDRATE IV INFUSION ADD-ON: CPT

## 2020-06-18 PROCEDURE — 88304 TISSUE EXAM BY PATHOLOGIST: CPT | Performed by: PATHOLOGY

## 2020-06-18 PROCEDURE — 99244 OFF/OP CNSLTJ NEW/EST MOD 40: CPT | Performed by: SURGERY

## 2020-06-18 PROCEDURE — 85025 COMPLETE CBC W/AUTO DIFF WBC: CPT | Performed by: EMERGENCY MEDICINE

## 2020-06-18 PROCEDURE — 96374 THER/PROPH/DIAG INJ IV PUSH: CPT

## 2020-06-18 PROCEDURE — 99284 EMERGENCY DEPT VISIT MOD MDM: CPT

## 2020-06-18 PROCEDURE — 83605 ASSAY OF LACTIC ACID: CPT | Performed by: EMERGENCY MEDICINE

## 2020-06-18 PROCEDURE — 87635 SARS-COV-2 COVID-19 AMP PRB: CPT | Performed by: PHYSICIAN ASSISTANT

## 2020-06-18 PROCEDURE — 47562 LAPAROSCOPIC CHOLECYSTECTOMY: CPT | Performed by: SURGERY

## 2020-06-18 PROCEDURE — 81025 URINE PREGNANCY TEST: CPT | Performed by: EMERGENCY MEDICINE

## 2020-06-18 PROCEDURE — 80053 COMPREHEN METABOLIC PANEL: CPT | Performed by: EMERGENCY MEDICINE

## 2020-06-18 PROCEDURE — 47562 LAPAROSCOPIC CHOLECYSTECTOMY: CPT | Performed by: PHYSICIAN ASSISTANT

## 2020-06-18 PROCEDURE — 99236 HOSP IP/OBS SAME DATE HI 85: CPT | Performed by: INTERNAL MEDICINE

## 2020-06-18 PROCEDURE — 36415 COLL VENOUS BLD VENIPUNCTURE: CPT | Performed by: EMERGENCY MEDICINE

## 2020-06-18 PROCEDURE — 99285 EMERGENCY DEPT VISIT HI MDM: CPT | Performed by: EMERGENCY MEDICINE

## 2020-06-18 RX ORDER — ONDANSETRON 2 MG/ML
4 INJECTION INTRAMUSCULAR; INTRAVENOUS ONCE
Status: COMPLETED | OUTPATIENT
Start: 2020-06-18 | End: 2020-06-18

## 2020-06-18 RX ORDER — ONDANSETRON 2 MG/ML
4 INJECTION INTRAMUSCULAR; INTRAVENOUS EVERY 6 HOURS PRN
Status: DISCONTINUED | OUTPATIENT
Start: 2020-06-18 | End: 2020-06-18 | Stop reason: HOSPADM

## 2020-06-18 RX ORDER — ACETAMINOPHEN 325 MG/1
650 TABLET ORAL EVERY 6 HOURS PRN
Status: DISCONTINUED | OUTPATIENT
Start: 2020-06-18 | End: 2020-06-18 | Stop reason: HOSPADM

## 2020-06-18 RX ORDER — ALBUTEROL SULFATE 90 UG/1
2 AEROSOL, METERED RESPIRATORY (INHALATION) EVERY 6 HOURS PRN
Status: DISCONTINUED | OUTPATIENT
Start: 2020-06-18 | End: 2020-06-18 | Stop reason: HOSPADM

## 2020-06-18 RX ORDER — ONDANSETRON 4 MG/1
4 TABLET, ORALLY DISINTEGRATING ORAL EVERY 6 HOURS PRN
Qty: 12 TABLET | Refills: 0 | Status: SHIPPED | OUTPATIENT
Start: 2020-06-18 | End: 2020-06-21

## 2020-06-18 RX ORDER — SODIUM CHLORIDE 9 MG/ML
125 INJECTION, SOLUTION INTRAVENOUS CONTINUOUS
Status: DISCONTINUED | OUTPATIENT
Start: 2020-06-18 | End: 2020-06-18 | Stop reason: HOSPADM

## 2020-06-18 RX ORDER — DIPHENHYDRAMINE HYDROCHLORIDE 50 MG/ML
25 INJECTION INTRAMUSCULAR; INTRAVENOUS ONCE
Status: COMPLETED | OUTPATIENT
Start: 2020-06-18 | End: 2020-06-18

## 2020-06-18 RX ORDER — ROCURONIUM BROMIDE 10 MG/ML
INJECTION, SOLUTION INTRAVENOUS AS NEEDED
Status: DISCONTINUED | OUTPATIENT
Start: 2020-06-18 | End: 2020-06-18 | Stop reason: SURG

## 2020-06-18 RX ORDER — DOCUSATE SODIUM 100 MG/1
100 CAPSULE, LIQUID FILLED ORAL 2 TIMES DAILY
Status: DISCONTINUED | OUTPATIENT
Start: 2020-06-18 | End: 2020-06-18

## 2020-06-18 RX ORDER — DEXAMETHASONE SODIUM PHOSPHATE 10 MG/ML
INJECTION, SOLUTION INTRAMUSCULAR; INTRAVENOUS AS NEEDED
Status: DISCONTINUED | OUTPATIENT
Start: 2020-06-18 | End: 2020-06-18 | Stop reason: SURG

## 2020-06-18 RX ORDER — POTASSIUM CHLORIDE 20 MEQ/1
40 TABLET, EXTENDED RELEASE ORAL ONCE
Status: DISCONTINUED | OUTPATIENT
Start: 2020-06-18 | End: 2020-06-18 | Stop reason: HOSPADM

## 2020-06-18 RX ORDER — LIDOCAINE HYDROCHLORIDE 10 MG/ML
INJECTION, SOLUTION EPIDURAL; INFILTRATION; INTRACAUDAL; PERINEURAL AS NEEDED
Status: DISCONTINUED | OUTPATIENT
Start: 2020-06-18 | End: 2020-06-18 | Stop reason: SURG

## 2020-06-18 RX ORDER — METOCLOPRAMIDE HYDROCHLORIDE 5 MG/ML
10 INJECTION INTRAMUSCULAR; INTRAVENOUS ONCE
Status: COMPLETED | OUTPATIENT
Start: 2020-06-18 | End: 2020-06-18

## 2020-06-18 RX ORDER — FENTANYL CITRATE/PF 50 MCG/ML
25 SYRINGE (ML) INJECTION
Status: DISCONTINUED | OUTPATIENT
Start: 2020-06-18 | End: 2020-06-18

## 2020-06-18 RX ORDER — MIDAZOLAM HYDROCHLORIDE 2 MG/2ML
INJECTION, SOLUTION INTRAMUSCULAR; INTRAVENOUS AS NEEDED
Status: DISCONTINUED | OUTPATIENT
Start: 2020-06-18 | End: 2020-06-18 | Stop reason: SURG

## 2020-06-18 RX ORDER — HEPARIN SODIUM 5000 [USP'U]/ML
5000 INJECTION, SOLUTION INTRAVENOUS; SUBCUTANEOUS EVERY 8 HOURS SCHEDULED
Status: DISCONTINUED | OUTPATIENT
Start: 2020-06-18 | End: 2020-06-18 | Stop reason: HOSPADM

## 2020-06-18 RX ORDER — VENLAFAXINE HYDROCHLORIDE 150 MG/1
150 CAPSULE, EXTENDED RELEASE ORAL DAILY
Status: DISCONTINUED | OUTPATIENT
Start: 2020-06-18 | End: 2020-06-18

## 2020-06-18 RX ORDER — HYDROMORPHONE HCL/PF 1 MG/ML
0.5 SYRINGE (ML) INJECTION
Status: DISCONTINUED | OUTPATIENT
Start: 2020-06-18 | End: 2020-06-18

## 2020-06-18 RX ORDER — LEVOFLOXACIN 5 MG/ML
750 INJECTION, SOLUTION INTRAVENOUS ONCE
Status: COMPLETED | OUTPATIENT
Start: 2020-06-18 | End: 2020-06-18

## 2020-06-18 RX ORDER — HYDROMORPHONE HCL/PF 1 MG/ML
0.5 SYRINGE (ML) INJECTION EVERY 4 HOURS PRN
Status: DISCONTINUED | OUTPATIENT
Start: 2020-06-18 | End: 2020-06-18 | Stop reason: HOSPADM

## 2020-06-18 RX ORDER — HYDROXYZINE HYDROCHLORIDE 25 MG/1
50 TABLET, FILM COATED ORAL
Status: DISCONTINUED | OUTPATIENT
Start: 2020-06-18 | End: 2020-06-18 | Stop reason: HOSPADM

## 2020-06-18 RX ORDER — SUCCINYLCHOLINE/SOD CL,ISO/PF 100 MG/5ML
SYRINGE (ML) INTRAVENOUS AS NEEDED
Status: DISCONTINUED | OUTPATIENT
Start: 2020-06-18 | End: 2020-06-18 | Stop reason: SURG

## 2020-06-18 RX ORDER — VENLAFAXINE HYDROCHLORIDE 75 MG/1
75 CAPSULE, EXTENDED RELEASE ORAL DAILY
Status: DISCONTINUED | OUTPATIENT
Start: 2020-06-18 | End: 2020-06-18

## 2020-06-18 RX ORDER — PROPOFOL 10 MG/ML
INJECTION, EMULSION INTRAVENOUS AS NEEDED
Status: DISCONTINUED | OUTPATIENT
Start: 2020-06-18 | End: 2020-06-18 | Stop reason: SURG

## 2020-06-18 RX ORDER — MONTELUKAST SODIUM 10 MG/1
10 TABLET ORAL
Status: DISCONTINUED | OUTPATIENT
Start: 2020-06-18 | End: 2020-06-18

## 2020-06-18 RX ORDER — ALPRAZOLAM 0.25 MG/1
0.5 TABLET ORAL
Status: DISCONTINUED | OUTPATIENT
Start: 2020-06-18 | End: 2020-06-18

## 2020-06-18 RX ORDER — OXYCODONE HYDROCHLORIDE 5 MG/1
10 TABLET ORAL EVERY 6 HOURS PRN
Status: DISCONTINUED | OUTPATIENT
Start: 2020-06-18 | End: 2020-06-18 | Stop reason: HOSPADM

## 2020-06-18 RX ORDER — GABAPENTIN 300 MG/1
300 CAPSULE ORAL 2 TIMES DAILY
Status: DISCONTINUED | OUTPATIENT
Start: 2020-06-18 | End: 2020-06-18

## 2020-06-18 RX ORDER — FLUTICASONE PROPIONATE 110 UG/1
2 AEROSOL, METERED RESPIRATORY (INHALATION)
Status: DISCONTINUED | OUTPATIENT
Start: 2020-06-18 | End: 2020-06-18 | Stop reason: HOSPADM

## 2020-06-18 RX ORDER — NEOSTIGMINE METHYLSULFATE 1 MG/ML
INJECTION INTRAVENOUS AS NEEDED
Status: DISCONTINUED | OUTPATIENT
Start: 2020-06-18 | End: 2020-06-18 | Stop reason: SURG

## 2020-06-18 RX ORDER — FENTANYL CITRATE 50 UG/ML
50 INJECTION, SOLUTION INTRAMUSCULAR; INTRAVENOUS ONCE
Status: COMPLETED | OUTPATIENT
Start: 2020-06-18 | End: 2020-06-18

## 2020-06-18 RX ORDER — PANTOPRAZOLE SODIUM 40 MG/1
40 TABLET, DELAYED RELEASE ORAL DAILY
Status: DISCONTINUED | OUTPATIENT
Start: 2020-06-18 | End: 2020-06-18 | Stop reason: HOSPADM

## 2020-06-18 RX ORDER — OXYCODONE HYDROCHLORIDE 5 MG/1
5 TABLET ORAL EVERY 6 HOURS PRN
Status: DISCONTINUED | OUTPATIENT
Start: 2020-06-18 | End: 2020-06-18 | Stop reason: HOSPADM

## 2020-06-18 RX ORDER — SENNOSIDES 8.6 MG
1 TABLET ORAL DAILY
Status: DISCONTINUED | OUTPATIENT
Start: 2020-06-18 | End: 2020-06-18 | Stop reason: HOSPADM

## 2020-06-18 RX ORDER — GLYCOPYRROLATE 0.2 MG/ML
INJECTION INTRAMUSCULAR; INTRAVENOUS AS NEEDED
Status: DISCONTINUED | OUTPATIENT
Start: 2020-06-18 | End: 2020-06-18 | Stop reason: SURG

## 2020-06-18 RX ADMIN — DIPHENHYDRAMINE HYDROCHLORIDE 25 MG: 50 INJECTION INTRAMUSCULAR; INTRAVENOUS at 02:34

## 2020-06-18 RX ADMIN — OXYCODONE HYDROCHLORIDE 5 MG: 5 TABLET ORAL at 16:27

## 2020-06-18 RX ADMIN — Medication 100 MG: at 12:02

## 2020-06-18 RX ADMIN — FENTANYL CITRATE 50 MCG: 50 INJECTION, SOLUTION INTRAMUSCULAR; INTRAVENOUS at 01:06

## 2020-06-18 RX ADMIN — SODIUM CHLORIDE 1000 ML: 0.9 INJECTION, SOLUTION INTRAVENOUS at 01:09

## 2020-06-18 RX ADMIN — ONDANSETRON 4 MG: 2 INJECTION INTRAMUSCULAR; INTRAVENOUS at 01:06

## 2020-06-18 RX ADMIN — SODIUM CHLORIDE 125 ML/HR: 0.9 INJECTION, SOLUTION INTRAVENOUS at 15:51

## 2020-06-18 RX ADMIN — FENTANYL CITRATE 25 MCG: 50 INJECTION, SOLUTION INTRAMUSCULAR; INTRAVENOUS at 13:32

## 2020-06-18 RX ADMIN — Medication 3.38 G: at 04:00

## 2020-06-18 RX ADMIN — LEVOFLOXACIN 750 MG: 5 INJECTION, SOLUTION INTRAVENOUS at 02:34

## 2020-06-18 RX ADMIN — PROPOFOL 150 MG: 10 INJECTION, EMULSION INTRAVENOUS at 12:01

## 2020-06-18 RX ADMIN — HEPARIN SODIUM 5000 UNITS: 5000 INJECTION INTRAVENOUS; SUBCUTANEOUS at 04:51

## 2020-06-18 RX ADMIN — PHENYLEPHRINE HYDROCHLORIDE 50 MCG: 10 INJECTION INTRAVENOUS at 12:26

## 2020-06-18 RX ADMIN — Medication 3.38 G: at 16:08

## 2020-06-18 RX ADMIN — ROCURONIUM BROMIDE 30 MG: 10 INJECTION, SOLUTION INTRAVENOUS at 12:15

## 2020-06-18 RX ADMIN — PHENYLEPHRINE HYDROCHLORIDE 100 MCG: 10 INJECTION INTRAVENOUS at 12:22

## 2020-06-18 RX ADMIN — HYDROMORPHONE HYDROCHLORIDE 0.5 MG: 1 INJECTION, SOLUTION INTRAMUSCULAR; INTRAVENOUS; SUBCUTANEOUS at 14:33

## 2020-06-18 RX ADMIN — SODIUM CHLORIDE: 0.9 INJECTION, SOLUTION INTRAVENOUS at 12:01

## 2020-06-18 RX ADMIN — METOCLOPRAMIDE HYDROCHLORIDE 10 MG: 5 INJECTION INTRAMUSCULAR; INTRAVENOUS at 02:34

## 2020-06-18 RX ADMIN — HYDROMORPHONE HYDROCHLORIDE 0.5 MG: 1 INJECTION, SOLUTION INTRAMUSCULAR; INTRAVENOUS; SUBCUTANEOUS at 13:54

## 2020-06-18 RX ADMIN — MIDAZOLAM 2 MG: 1 INJECTION INTRAMUSCULAR; INTRAVENOUS at 11:49

## 2020-06-18 RX ADMIN — ONDANSETRON 4 MG: 2 INJECTION INTRAMUSCULAR; INTRAVENOUS at 08:14

## 2020-06-18 RX ADMIN — FENTANYL CITRATE 25 MCG: 50 INJECTION, SOLUTION INTRAMUSCULAR; INTRAVENOUS at 13:41

## 2020-06-18 RX ADMIN — FENTANYL CITRATE 75 MCG: 50 INJECTION, SOLUTION INTRAMUSCULAR; INTRAVENOUS at 11:54

## 2020-06-18 RX ADMIN — SODIUM CHLORIDE 125 ML/HR: 0.9 INJECTION, SOLUTION INTRAVENOUS at 02:35

## 2020-06-18 RX ADMIN — FENTANYL CITRATE 25 MCG: 50 INJECTION, SOLUTION INTRAMUSCULAR; INTRAVENOUS at 12:00

## 2020-06-18 RX ADMIN — Medication 3.38 G: at 10:30

## 2020-06-18 RX ADMIN — OXYCODONE HYDROCHLORIDE 10 MG: 5 TABLET ORAL at 08:14

## 2020-06-18 RX ADMIN — ONDANSETRON 4 MG: 2 INJECTION INTRAMUSCULAR; INTRAVENOUS at 12:32

## 2020-06-18 RX ADMIN — PROPOFOL 50 MG: 10 INJECTION, EMULSION INTRAVENOUS at 12:18

## 2020-06-18 RX ADMIN — GLYCOPYRROLATE 0.15 MG: 0.2 INJECTION, SOLUTION INTRAMUSCULAR; INTRAVENOUS at 11:49

## 2020-06-18 RX ADMIN — DEXAMETHASONE SODIUM PHOSPHATE 8 MG: 10 INJECTION, SOLUTION INTRAMUSCULAR; INTRAVENOUS at 12:10

## 2020-06-18 RX ADMIN — HYDROMORPHONE HYDROCHLORIDE 0.5 MG: 1 INJECTION, SOLUTION INTRAMUSCULAR; INTRAVENOUS; SUBCUTANEOUS at 04:51

## 2020-06-18 RX ADMIN — METOCLOPRAMIDE 10 MG: 5 INJECTION, SOLUTION INTRAMUSCULAR; INTRAVENOUS at 13:00

## 2020-06-18 RX ADMIN — PANTOPRAZOLE SODIUM 40 MG: 40 TABLET, DELAYED RELEASE ORAL at 08:59

## 2020-06-18 RX ADMIN — NEOSTIGMINE METHYLSULFATE 3 MG: 1 INJECTION, SOLUTION INTRAVENOUS at 12:40

## 2020-06-18 RX ADMIN — LIDOCAINE HYDROCHLORIDE 30 MG: 10 INJECTION, SOLUTION EPIDURAL; INFILTRATION; INTRACAUDAL; PERINEURAL at 12:00

## 2020-06-18 RX ADMIN — GLYCOPYRROLATE 0.5 MG: 0.2 INJECTION, SOLUTION INTRAMUSCULAR; INTRAVENOUS at 12:40

## 2020-06-19 ENCOUNTER — ANTICOAG VISIT (OUTPATIENT)
Dept: FAMILY MEDICINE CLINIC | Facility: CLINIC | Age: 40
End: 2020-06-19

## 2020-06-19 ENCOUNTER — TRANSITIONAL CARE MANAGEMENT (OUTPATIENT)
Dept: FAMILY MEDICINE CLINIC | Facility: CLINIC | Age: 40
End: 2020-06-19

## 2020-06-25 DIAGNOSIS — J45.909 ASTHMA DUE TO SEASONAL ALLERGIES: ICD-10-CM

## 2020-06-25 RX ORDER — MONTELUKAST SODIUM 10 MG/1
TABLET ORAL
Qty: 30 TABLET | Refills: 2 | OUTPATIENT
Start: 2020-06-25

## 2020-07-02 ENCOUNTER — OFFICE VISIT (OUTPATIENT)
Dept: SURGERY | Facility: HOSPITAL | Age: 40
End: 2020-07-02

## 2020-07-02 VITALS
DIASTOLIC BLOOD PRESSURE: 85 MMHG | HEART RATE: 91 BPM | RESPIRATION RATE: 16 BRPM | BODY MASS INDEX: 28.38 KG/M2 | SYSTOLIC BLOOD PRESSURE: 118 MMHG | HEIGHT: 64 IN | TEMPERATURE: 98.4 F | WEIGHT: 166.2 LBS

## 2020-07-02 DIAGNOSIS — Z09 POSTOP CHECK: Primary | ICD-10-CM

## 2020-07-02 PROCEDURE — 1111F DSCHRG MED/CURRENT MED MERGE: CPT | Performed by: SURGERY

## 2020-07-02 PROCEDURE — 99024 POSTOP FOLLOW-UP VISIT: CPT | Performed by: SURGERY

## 2020-07-22 NOTE — PROGRESS NOTES
Seen and examined no acute events no issues  AVSS afebrile  Soft +BS ND NT incision cdi    S/p lap chantel and UHR    Path reviewed, cont light duty for two weeks f/u prn

## 2020-08-08 DIAGNOSIS — K21.9 GASTROESOPHAGEAL REFLUX DISEASE, ESOPHAGITIS PRESENCE NOT SPECIFIED: ICD-10-CM

## 2020-08-10 RX ORDER — PANTOPRAZOLE SODIUM 40 MG/1
TABLET, DELAYED RELEASE ORAL
Qty: 30 TABLET | Refills: 2 | Status: SHIPPED | OUTPATIENT
Start: 2020-08-10 | End: 2020-12-28

## 2020-12-22 DIAGNOSIS — K21.9 GASTROESOPHAGEAL REFLUX DISEASE: ICD-10-CM

## 2020-12-28 RX ORDER — PANTOPRAZOLE SODIUM 40 MG/1
TABLET, DELAYED RELEASE ORAL
Qty: 30 TABLET | Refills: 6 | OUTPATIENT
Start: 2020-12-28 | End: 2022-05-05

## 2021-01-18 ENCOUNTER — HOSPITAL ENCOUNTER (EMERGENCY)
Facility: HOSPITAL | Age: 41
Discharge: HOME | End: 2021-01-18
Attending: EMERGENCY MEDICINE | Admitting: EMERGENCY MEDICINE
Payer: COMMERCIAL

## 2021-01-18 ENCOUNTER — APPOINTMENT (EMERGENCY)
Dept: CT IMAGING | Facility: HOSPITAL | Age: 41
End: 2021-01-18
Payer: COMMERCIAL

## 2021-01-18 VITALS
RESPIRATION RATE: 18 BRPM | OXYGEN SATURATION: 100 % | DIASTOLIC BLOOD PRESSURE: 71 MMHG | SYSTOLIC BLOOD PRESSURE: 122 MMHG | WEIGHT: 166 LBS | BODY MASS INDEX: 28.34 KG/M2 | TEMPERATURE: 98.6 F | HEART RATE: 88 BPM | HEIGHT: 64 IN

## 2021-01-18 DIAGNOSIS — R10.9 ABDOMINAL PAIN: Primary | ICD-10-CM

## 2021-01-18 LAB
ALBUMIN SERPL BCP-MCNC: 4 G/DL (ref 3.5–5)
ALP SERPL-CCNC: 58 U/L (ref 46–116)
ALT SERPL W P-5'-P-CCNC: 35 U/L (ref 12–78)
ANION GAP SERPL CALCULATED.3IONS-SCNC: 9 MMOL/L (ref 4–13)
APTT PPP: 27 SECONDS (ref 23–37)
AST SERPL W P-5'-P-CCNC: 22 U/L (ref 5–45)
BASOPHILS # BLD AUTO: 0.06 THOUSANDS/ΜL (ref 0–0.1)
BASOPHILS NFR BLD AUTO: 1 % (ref 0–1)
BILIRUB SERPL-MCNC: 0.2 MG/DL (ref 0.2–1)
BUN SERPL-MCNC: 12 MG/DL (ref 5–25)
CALCIUM SERPL-MCNC: 9.1 MG/DL (ref 8.3–10.1)
CHLORIDE SERPL-SCNC: 101 MMOL/L (ref 100–108)
CLARITY, POC: CLEAR
CO2 SERPL-SCNC: 28 MMOL/L (ref 21–32)
COLOR, POC: YELLOW
CREAT SERPL-MCNC: 0.87 MG/DL (ref 0.6–1.3)
EOSINOPHIL # BLD AUTO: 0.13 THOUSAND/ΜL (ref 0–0.61)
EOSINOPHIL NFR BLD AUTO: 2 % (ref 0–6)
ERYTHROCYTE [DISTWIDTH] IN BLOOD BY AUTOMATED COUNT: 12 % (ref 11.6–15.1)
EXT BILIRUBIN, UA: NEGATIVE
EXT BLOOD URINE: NORMAL
EXT GLUCOSE, UA: NEGATIVE
EXT KETONES: NEGATIVE
EXT NITRITE, UA: NEGATIVE
EXT PH, UA: 7
EXT PREG TEST URINE: NEGATIVE
EXT PROTEIN, UA: NEGATIVE
EXT SPECIFIC GRAVITY, UA: 1.01
EXT UROBILINOGEN: 0.2
EXT. CONTROL ED NAV: NORMAL
GFR SERPL CREATININE-BSD FRML MDRD: 84 ML/MIN/1.73SQ M
GLUCOSE SERPL-MCNC: 97 MG/DL (ref 65–140)
HCT VFR BLD AUTO: 40.2 % (ref 34.8–46.1)
HGB BLD-MCNC: 13 G/DL (ref 11.5–15.4)
IMM GRANULOCYTES # BLD AUTO: 0.01 THOUSAND/UL (ref 0–0.2)
IMM GRANULOCYTES NFR BLD AUTO: 0 % (ref 0–2)
INR PPP: 0.95 (ref 0.84–1.19)
LYMPHOCYTES # BLD AUTO: 2.56 THOUSANDS/ΜL (ref 0.6–4.47)
LYMPHOCYTES NFR BLD AUTO: 33 % (ref 14–44)
MCH RBC QN AUTO: 28.3 PG (ref 26.8–34.3)
MCHC RBC AUTO-ENTMCNC: 32.3 G/DL (ref 31.4–37.4)
MCV RBC AUTO: 88 FL (ref 82–98)
MONOCYTES # BLD AUTO: 0.37 THOUSAND/ΜL (ref 0.17–1.22)
MONOCYTES NFR BLD AUTO: 5 % (ref 4–12)
NEUTROPHILS # BLD AUTO: 4.58 THOUSANDS/ΜL (ref 1.85–7.62)
NEUTS SEG NFR BLD AUTO: 59 % (ref 43–75)
NRBC BLD AUTO-RTO: 0 /100 WBCS
PLATELET # BLD AUTO: 274 THOUSANDS/UL (ref 149–390)
PMV BLD AUTO: 10.1 FL (ref 8.9–12.7)
POTASSIUM SERPL-SCNC: 3.9 MMOL/L (ref 3.5–5.3)
PROT SERPL-MCNC: 7.7 G/DL (ref 6.4–8.2)
PROTHROMBIN TIME: 12.6 SECONDS (ref 11.6–14.5)
RBC # BLD AUTO: 4.59 MILLION/UL (ref 3.81–5.12)
SODIUM SERPL-SCNC: 138 MMOL/L (ref 136–145)
WBC # BLD AUTO: 7.71 THOUSAND/UL (ref 4.31–10.16)
WBC # BLD EST: NEGATIVE 10*3/UL

## 2021-01-18 PROCEDURE — 96375 TX/PRO/DX INJ NEW DRUG ADDON: CPT

## 2021-01-18 PROCEDURE — 85025 COMPLETE CBC W/AUTO DIFF WBC: CPT | Performed by: EMERGENCY MEDICINE

## 2021-01-18 PROCEDURE — G1004 CDSM NDSC: HCPCS

## 2021-01-18 PROCEDURE — 81002 URINALYSIS NONAUTO W/O SCOPE: CPT | Performed by: EMERGENCY MEDICINE

## 2021-01-18 PROCEDURE — 99285 EMERGENCY DEPT VISIT HI MDM: CPT | Performed by: EMERGENCY MEDICINE

## 2021-01-18 PROCEDURE — 80053 COMPREHEN METABOLIC PANEL: CPT | Performed by: EMERGENCY MEDICINE

## 2021-01-18 PROCEDURE — 85610 PROTHROMBIN TIME: CPT | Performed by: EMERGENCY MEDICINE

## 2021-01-18 PROCEDURE — 36415 COLL VENOUS BLD VENIPUNCTURE: CPT | Performed by: EMERGENCY MEDICINE

## 2021-01-18 PROCEDURE — 99284 EMERGENCY DEPT VISIT MOD MDM: CPT

## 2021-01-18 PROCEDURE — 74177 CT ABD & PELVIS W/CONTRAST: CPT

## 2021-01-18 PROCEDURE — 96374 THER/PROPH/DIAG INJ IV PUSH: CPT

## 2021-01-18 PROCEDURE — 85730 THROMBOPLASTIN TIME PARTIAL: CPT | Performed by: EMERGENCY MEDICINE

## 2021-01-18 PROCEDURE — 81025 URINE PREGNANCY TEST: CPT | Performed by: EMERGENCY MEDICINE

## 2021-01-18 PROCEDURE — 96361 HYDRATE IV INFUSION ADD-ON: CPT

## 2021-01-18 RX ORDER — SERTRALINE HYDROCHLORIDE 100 MG/1
100 TABLET, FILM COATED ORAL DAILY
COMMUNITY

## 2021-01-18 RX ORDER — KETOROLAC TROMETHAMINE 30 MG/ML
30 INJECTION, SOLUTION INTRAMUSCULAR; INTRAVENOUS ONCE
Status: COMPLETED | OUTPATIENT
Start: 2021-01-18 | End: 2021-01-18

## 2021-01-18 RX ORDER — FENTANYL CITRATE 50 UG/ML
25 INJECTION, SOLUTION INTRAMUSCULAR; INTRAVENOUS ONCE
Status: COMPLETED | OUTPATIENT
Start: 2021-01-18 | End: 2021-01-18

## 2021-01-18 RX ORDER — ONDANSETRON 2 MG/ML
4 INJECTION INTRAMUSCULAR; INTRAVENOUS ONCE
Status: COMPLETED | OUTPATIENT
Start: 2021-01-18 | End: 2021-01-18

## 2021-01-18 RX ADMIN — ONDANSETRON 4 MG: 2 INJECTION INTRAMUSCULAR; INTRAVENOUS at 16:46

## 2021-01-18 RX ADMIN — FENTANYL CITRATE 25 MCG: 50 INJECTION, SOLUTION INTRAMUSCULAR; INTRAVENOUS at 18:22

## 2021-01-18 RX ADMIN — IOHEXOL 100 ML: 350 INJECTION, SOLUTION INTRAVENOUS at 19:01

## 2021-01-18 RX ADMIN — KETOROLAC TROMETHAMINE 30 MG: 30 INJECTION, SOLUTION INTRAMUSCULAR; INTRAVENOUS at 16:46

## 2021-01-18 RX ADMIN — SODIUM CHLORIDE 1000 ML: 0.9 INJECTION, SOLUTION INTRAVENOUS at 16:47

## 2021-01-18 NOTE — ED PROVIDER NOTES
History  Chief Complaint   Patient presents with    Abdominal Pain     Patient states that she has  been having intermitent abmdominal pain for the past two months and over wally past week it has become consistent  RLQ     This is a 40-year-old female who presents for evaluation of right-sided abdominal pain that has present for approximately 2 months but worsening over the past few weeks  She has nausea but no significant vomiting or diarrhea denies any vaginal bleeding or discharge  Has some pain into the right groin area and right low back area  Patient was seen at her primary care physician's office today for same complaints also at [facility] on the 15th of this month where she had an ultrasound of the pelvis which did not show any ovarian cyst or torsion or pelvic fluid  She also had an unenhanced CT scan at that time which was unremarkable  Also her white blood cell count during that visit was 7 7 prior surgery includes laparoscopic cholecystectomy and C-sections  History provided by:  Patient  Medical Problem  Location:  Right abdominal  Quality:  Achy pain  Severity:  Moderate  Onset quality:  Gradual  Duration:  2 months  Progression:  Worsening  Context:  Right-sided abdominal pain increasing over the past 2 months  Relieved by:  Nothing  Worsened by:  Palpation  Associated symptoms: abdominal pain and nausea        Prior to Admission Medications   Prescriptions Last Dose Informant Patient Reported? Taking?    ALPRAZolam (XANAX) 1 mg tablet  Self Yes No   Sig: Take 1 mg by mouth daily at bedtime    albuterol (VENTOLIN HFA) 90 mcg/act inhaler  Self No No   Sig: Inhale 2 puffs every 6 (six) hours as needed for wheezing   gabapentin (NEURONTIN) 300 mg capsule  Self No No   Sig: Take 1 capsule (300 mg total) by mouth 2 (two) times a day   Patient taking differently: Take 300 mg by mouth 2 (two) times a day as needed    mometasone (Asmanex, 60 Metered Doses,) 220 MCG/INH inhaler Self Yes No   Sig: Inhale 220 mcg daily   montelukast (SINGULAIR) 10 mg tablet  Self No No   Sig: Take 1 tablet (10 mg total) by mouth daily at bedtime   ondansetron (ZOFRAN-ODT) 4 mg disintegrating tablet   No No   Sig: Take 1 tablet (4 mg total) by mouth every 6 (six) hours as needed for nausea or vomiting for up to 3 days   pantoprazole (PROTONIX) 40 mg tablet   No No   Sig: TAKE 1 TABLET BY MOUTH EVERY DAY   sertraline (ZOLOFT) 100 mg tablet   Yes Yes   Sig: Take 100 mg by mouth daily      Facility-Administered Medications: None       Past Medical History:   Diagnosis Date    Anxiety     Asthma     Depression     Hypoglycemia     Moderate episode of recurrent major depressive disorder ([facility] [address] ) 10/3/2019    PTSD (post-traumatic stress disorder)        Past Surgical History:   Procedure Laterality Date    ANKLE LIGAMENT RECONSTRUCTION       SECTION      CHOLECYSTECTOMY LAPAROSCOPIC N/A 2020    Procedure: CHOLECYSTECTOMY LAPAROSCOPIC;  Surgeon: Lisa Ramirez MD;  Location:  MAIN OR;  Service: General    KNEE SURGERY      SINUS SURGERY      TONSILLECTOMY      TONSILLECTOMY      TUBAL LIGATION      UMBILICAL HERNIA REPAIR N/A 2020    Procedure: REPAIR HERNIA UMBILICAL, Primary;  Surgeon: Lisa Ramirez MD;  Location:  MAIN OR;  Service: General       Family History   Problem Relation Age of Onset    Depression Mother      I have reviewed and agree with the history as documented  E-Cigarette/Vaping    E-Cigarette Use Never User      E-Cigarette/Vaping Substances     Social History     Tobacco Use    Smoking status: Never Smoker    Smokeless tobacco: Never Used   Substance Use Topics    Alcohol use: Yes     Frequency: Monthly or less     Drinks per session: 1 or 2     Binge frequency: Never    Drug use: Never       Review of Systems   Gastrointestinal: Positive for abdominal pain and nausea  Genitourinary: Positive for flank pain   Negative for vaginal bleeding and vaginal discharge  All other systems reviewed and are negative  Physical Exam  Physical Exam  Vitals signs and nursing note reviewed  Constitutional:       General: She is not in acute distress  Appearance: She is not ill-appearing, toxic-appearing or diaphoretic  HENT:      Head: Normocephalic and atraumatic  Right Ear: Tympanic membrane, ear canal and external ear normal       Left Ear: Tympanic membrane, ear canal and external ear normal    Eyes:      General:         Right eye: No discharge  Left eye: No discharge  Extraocular Movements: Extraocular movements intact  Pupils: Pupils are equal, round, and reactive to light  Neck:      Musculoskeletal: Normal range of motion and neck supple  No neck rigidity or muscular tenderness  Cardiovascular:      Rate and Rhythm: Normal rate and regular rhythm  Pulses: Normal pulses  Heart sounds: Normal heart sounds  No murmur  No friction rub  No gallop  Pulmonary:      Effort: Pulmonary effort is normal  No respiratory distress  Breath sounds: Normal breath sounds  No stridor  No wheezing, rhonchi or rales  Abdominal:      General: Abdomen is flat  There is no distension  Tenderness: There is abdominal tenderness (Generalized right lower quadrant area)  There is guarding ( voluntary)  There is no rebound  Musculoskeletal: Normal range of motion  General: No swelling, tenderness, deformity or signs of injury  Right lower leg: No edema  Left lower leg: No edema  Skin:     General: Skin is warm and dry  Coloration: Skin is not jaundiced  Findings: No rash  Neurological:      General: No focal deficit present  Mental Status: She is alert and oriented to person, place, and time  Cranial Nerves: No cranial nerve deficit  Sensory: No sensory deficit  Motor: No weakness        Coordination: Coordination normal    Psychiatric:         Mood and Affect: Mood normal  Behavior: Behavior normal          Thought Content:  Thought content normal          Vital Signs  ED Triage Vitals   Temperature Pulse Respirations Blood Pressure SpO2   01/18/21 1557 01/18/21 1557 01/18/21 1557 01/18/21 1557 01/18/21 1557   98 6 °F (37 °C) (!) 106 18 128/72 100 %      Temp Source Heart Rate Source Patient Position - Orthostatic VS BP Location FiO2 (%)   01/18/21 1557 -- 01/18/21 1557 01/18/21 1557 --   Tympanic  Sitting Left arm       Pain Score       01/18/21 1646       9           Vitals:    01/18/21 1557   BP: 128/72   Pulse: (!) 106   Patient Position - Orthostatic VS: Sitting         Visual Acuity      ED Medications  Medications   iohexol (OMNIPAQUE) 240 MG/ML solution 50 mL (has no administration in time range)   sodium chloride 0 9 % bolus 1,000 mL (1,000 mL Intravenous New Bag 1/18/21 1647)   ketorolac (TORADOL) injection 30 mg (30 mg Intravenous Given 1/18/21 1646)   ondansetron (ZOFRAN) injection 4 mg (4 mg Intravenous Given 1/18/21 1646)   fentanyl citrate (PF) 100 MCG/2ML 25 mcg (25 mcg Intravenous Given 1/18/21 1822)   iohexol (OMNIPAQUE) 350 MG/ML injection (MULTI-DOSE) 100 mL (100 mL Intravenous Given 1/18/21 1901)       Diagnostic Studies  Results Reviewed     Procedure Component Value Units Date/Time    Comprehensive metabolic panel [[id number]] Collected: 01/18/21 1646    Lab Status: Final result Specimen: Blood from Arm, Right Updated: 01/18/21 1725     Sodium 138 mmol/L      Potassium 3 9 mmol/L      Chloride 101 mmol/L      CO2 28 mmol/L      ANION GAP 9 mmol/L      BUN 12 mg/dL      Creatinine 0 87 mg/dL      Glucose 97 mg/dL      Calcium 9 1 mg/dL      AST 22 U/L      ALT 35 U/L      Alkaline Phosphatase 58 U/L      Total Protein 7 7 g/dL      Albumin 4 0 g/dL      Total Bilirubin 0 20 mg/dL      eGFR 84 ml/min/1 73sq m     Narrative:      [place] guidelines for Chronic Kidney Disease (CKD):     Stage 1 with normal or high GFR (GFR > 90 mL/min/1 73 square meters)    Stage 2 Mild CKD (GFR = 60-89 mL/min/1 73 square meters)    Stage 3A Moderate CKD (GFR = 45-59 mL/min/1 73 square meters)    Stage 3B Moderate CKD (GFR = 30-44 mL/min/1 73 square meters)    Stage 4 Severe CKD (GFR = 15-29 mL/min/1 73 square meters)    Stage 5 End Stage CKD (GFR <15 mL/min/1 73 square meters)  Note: GFR calculation is accurate only with a steady state creatinine    Protime-INR [[id number]]  (Normal) Collected: 01/18/21 1646    Lab Status: Final result Specimen: Blood from Arm, Right Updated: 01/18/21 1713     Protime 12 6 seconds      INR 0 95    APTT [[id number]]  (Normal) Collected: 01/18/21 1646    Lab Status: Final result Specimen: Blood from Arm, Right Updated: 01/18/21 1713     PTT 27 seconds     CBC and differential [[id number]] Collected: 01/18/21 1646    Lab Status: Final result Specimen: Blood from Arm, Right Updated: 01/18/21 1655     WBC 7 71 Thousand/uL      RBC 4 59 Million/uL      Hemoglobin 13 0 g/dL      Hematocrit 40 2 %      MCV 88 fL      MCH 28 3 pg      MCHC 32 3 g/dL      RDW 12 0 %      MPV 10 1 fL      Platelets 229 Thousands/uL      nRBC 0 /100 WBCs      Neutrophils Relative 59 %      Immat GRANS % 0 %      Lymphocytes Relative 33 %      Monocytes Relative 5 %      Eosinophils Relative 2 %      Basophils Relative 1 %      Neutrophils Absolute 4 58 Thousands/µL      Immature Grans Absolute 0 01 Thousand/uL      Lymphocytes Absolute 2 56 Thousands/µL      Monocytes Absolute 0 37 Thousand/µL      Eosinophils Absolute 0 13 Thousand/µL      Basophils Absolute 0 06 Thousands/µL     POCT urinalysis dipstick [[id number]]  (Normal) Resulted: 01/18/21 1649    Lab Status: Final result Specimen: Urine Updated: 01/18/21 1649     Color, UA yellow     Clarity, UA clear     Glucose, UA (Ref: Negative) negative     Bilirubin, UA (Ref: Negative) negative     Ketones, UA (Ref: Negative) negative     Spec Grav, UA (Ref:1 003-1 030) 1 015     Blood, UA (Ref: Negative) trace     pH, UA (Ref: 4 5-8 0) 7 0     Protein, UA (Ref: Negative) negative     Urobilinogen, UA (Ref: 0 2- 1 0) 0 2      Leukocytes, UA (Ref: Negative) negative     Nitrite, UA (Ref: Negative) negative    POCT pregnancy, urine [[id number]]  (Normal) Resulted: 01/18/21 1649    Lab Status: Final result Updated: 01/18/21 1649     EXT PREG TEST UR (Ref: Negative) negative     Control valid                 CT abdomen pelvis with contrast   Final Result by Kurt Black MD (01/18 1950)      1  No acute CT findings in the abdomen or pelvis  Workstation performed: [id number]                    Procedures  Procedures         ED Course                                           MDM  Number of Diagnoses or Management Options  Diagnosis management comments: Right-sided abdominal pain with recent noncontrast CT scan and ultrasound which were unremarkable for kidney stone ovarian cyst or torsion  Differential includes appendicitis musculoskeletal radicular back pain versus inflammatory bowel disease will check CT scan with contrast and recheck lab work  Amount and/or Complexity of Data Reviewed  Clinical lab tests: ordered  Tests in the radiology section of CPT®: ordered        Disposition  Final diagnoses:   Abdominal pain     Time reflects when diagnosis was documented in both MDM as applicable and the Disposition within this note     Time User Action Codes Description Comment    1/18/2021  8:09 PM Genaro Singh Add [R10 9] Abdominal pain       ED Disposition     ED Disposition Condition Date/Time Comment    Discharge Stable Mon Jan 18, 2021  8:09 PM [facility] discharge to home/self care              Follow-up Information     Follow up With Specialties Details Why Contact Info    Marine Mccarty MD [facility]   [address] [id number] [facility] In 1 week Further evaluation abdominal pain [address] [facility] [place] [facility] [id number]  [phone]            Patient's Medications   Discharge Prescriptions    No medications on file     No discharge procedures on file      PDMP Review       Value Time User    PDMP Reviewed  Yes 1/18/2021  6:12 PM Melia Hui DO          ED Provider  Electronically Signed by           Melia Hui DO  01/18/21 2010

## 2021-01-27 ENCOUNTER — HOSPITAL ENCOUNTER (EMERGENCY)
Facility: HOSPITAL | Age: 41
Discharge: HOME | End: 2021-01-27
Attending: EMERGENCY MEDICINE | Admitting: EMERGENCY MEDICINE
Payer: COMMERCIAL

## 2021-01-27 VITALS
OXYGEN SATURATION: 98 % | RESPIRATION RATE: 18 BRPM | BODY MASS INDEX: 27.49 KG/M2 | HEART RATE: 68 BPM | WEIGHT: 165 LBS | HEIGHT: 65 IN | DIASTOLIC BLOOD PRESSURE: 75 MMHG | SYSTOLIC BLOOD PRESSURE: 120 MMHG | TEMPERATURE: 97.1 F

## 2021-01-27 DIAGNOSIS — R10.13 EPIGASTRIC PAIN: Primary | ICD-10-CM

## 2021-01-27 LAB
ALBUMIN SERPL BCP-MCNC: 3.8 G/DL (ref 3.5–5)
ALP SERPL-CCNC: 57 U/L (ref 46–116)
ALT SERPL W P-5'-P-CCNC: 43 U/L (ref 12–78)
ANION GAP SERPL CALCULATED.3IONS-SCNC: 9 MMOL/L (ref 4–13)
AST SERPL W P-5'-P-CCNC: 22 U/L (ref 5–45)
BASOPHILS # BLD AUTO: 0.04 THOUSANDS/ΜL (ref 0–0.1)
BASOPHILS NFR BLD AUTO: 1 % (ref 0–1)
BILIRUB SERPL-MCNC: 0.3 MG/DL (ref 0.2–1)
BUN SERPL-MCNC: 11 MG/DL (ref 5–25)
CALCIUM SERPL-MCNC: 8.9 MG/DL (ref 8.3–10.1)
CHLORIDE SERPL-SCNC: 102 MMOL/L (ref 100–108)
CLARITY, POC: CLEAR
CO2 SERPL-SCNC: 29 MMOL/L (ref 21–32)
COLOR, POC: YELLOW
CREAT SERPL-MCNC: 1 MG/DL (ref 0.6–1.3)
EOSINOPHIL # BLD AUTO: 0.12 THOUSAND/ΜL (ref 0–0.61)
EOSINOPHIL NFR BLD AUTO: 2 % (ref 0–6)
ERYTHROCYTE [DISTWIDTH] IN BLOOD BY AUTOMATED COUNT: 12.6 % (ref 11.6–15.1)
EXT BILIRUBIN, UA: NORMAL
EXT BLOOD URINE: NORMAL
EXT GLUCOSE, UA: NORMAL
EXT KETONES: NORMAL
EXT NITRITE, UA: NORMAL
EXT PH, UA: 5
EXT PREG TEST URINE: NEGATIVE
EXT PROTEIN, UA: NORMAL
EXT SPECIFIC GRAVITY, UA: 1.02
EXT UROBILINOGEN: 1
EXT. CONTROL ED NAV: NORMAL
GFR SERPL CREATININE-BSD FRML MDRD: 71 ML/MIN/1.73SQ M
GLUCOSE SERPL-MCNC: 97 MG/DL (ref 65–140)
HCT VFR BLD AUTO: 37.6 % (ref 34.8–46.1)
HGB BLD-MCNC: 12.4 G/DL (ref 11.5–15.4)
IMM GRANULOCYTES # BLD AUTO: 0.01 THOUSAND/UL (ref 0–0.2)
IMM GRANULOCYTES NFR BLD AUTO: 0 % (ref 0–2)
LIPASE SERPL-CCNC: 151 U/L (ref 73–393)
LYMPHOCYTES # BLD AUTO: 2.92 THOUSANDS/ΜL (ref 0.6–4.47)
LYMPHOCYTES NFR BLD AUTO: 39 % (ref 14–44)
MCH RBC QN AUTO: 28.6 PG (ref 26.8–34.3)
MCHC RBC AUTO-ENTMCNC: 33 G/DL (ref 31.4–37.4)
MCV RBC AUTO: 87 FL (ref 82–98)
MONOCYTES # BLD AUTO: 0.4 THOUSAND/ΜL (ref 0.17–1.22)
MONOCYTES NFR BLD AUTO: 5 % (ref 4–12)
NEUTROPHILS # BLD AUTO: 3.93 THOUSANDS/ΜL (ref 1.85–7.62)
NEUTS SEG NFR BLD AUTO: 53 % (ref 43–75)
PLATELET # BLD AUTO: 260 THOUSANDS/UL (ref 149–390)
PMV BLD AUTO: 10.1 FL (ref 8.9–12.7)
POTASSIUM SERPL-SCNC: 3.7 MMOL/L (ref 3.5–5.3)
PROT SERPL-MCNC: 7.3 G/DL (ref 6.4–8.2)
RBC # BLD AUTO: 4.33 MILLION/UL (ref 3.81–5.12)
SODIUM SERPL-SCNC: 140 MMOL/L (ref 136–145)
WBC # BLD AUTO: 7.42 THOUSAND/UL (ref 4.31–10.16)
WBC # BLD EST: NORMAL 10*3/UL

## 2021-01-27 PROCEDURE — 83690 ASSAY OF LIPASE: CPT | Performed by: EMERGENCY MEDICINE

## 2021-01-27 PROCEDURE — 96361 HYDRATE IV INFUSION ADD-ON: CPT

## 2021-01-27 PROCEDURE — 80053 COMPREHEN METABOLIC PANEL: CPT | Performed by: EMERGENCY MEDICINE

## 2021-01-27 PROCEDURE — 99284 EMERGENCY DEPT VISIT MOD MDM: CPT

## 2021-01-27 PROCEDURE — 36415 COLL VENOUS BLD VENIPUNCTURE: CPT | Performed by: EMERGENCY MEDICINE

## 2021-01-27 PROCEDURE — 81025 URINE PREGNANCY TEST: CPT | Performed by: EMERGENCY MEDICINE

## 2021-01-27 PROCEDURE — 81002 URINALYSIS NONAUTO W/O SCOPE: CPT | Performed by: EMERGENCY MEDICINE

## 2021-01-27 PROCEDURE — 85025 COMPLETE CBC W/AUTO DIFF WBC: CPT | Performed by: EMERGENCY MEDICINE

## 2021-01-27 PROCEDURE — 99284 EMERGENCY DEPT VISIT MOD MDM: CPT | Performed by: EMERGENCY MEDICINE

## 2021-01-27 PROCEDURE — 96374 THER/PROPH/DIAG INJ IV PUSH: CPT

## 2021-01-27 RX ORDER — KETOROLAC TROMETHAMINE 30 MG/ML
15 INJECTION, SOLUTION INTRAMUSCULAR; INTRAVENOUS ONCE
Status: COMPLETED | OUTPATIENT
Start: 2021-01-27 | End: 2021-01-27

## 2021-01-27 RX ORDER — DICYCLOMINE HCL 20 MG
20 TABLET ORAL 3 TIMES DAILY PRN
Qty: 20 TABLET | Refills: 0 | Status: SHIPPED | OUTPATIENT
Start: 2021-01-27 | End: 2022-05-05

## 2021-01-27 RX ADMIN — SODIUM CHLORIDE 1000 ML: 0.9 INJECTION, SOLUTION INTRAVENOUS at 17:03

## 2021-01-27 RX ADMIN — KETOROLAC TROMETHAMINE 15 MG: 30 INJECTION, SOLUTION INTRAMUSCULAR; INTRAVENOUS at 17:54

## 2021-01-27 NOTE — ED PROVIDER NOTES
History  Chief Complaint   Patient presents with    Abdominal Pain     Pt with RLQ pain "for a couple months when I would try to go to the bathroom to stool" Pt reports diarrhea this am, started vomiting 1/22/21  Reports she has not vomited today  This 70-year-old female with history of anxiety, depression, PTSD, asthma and hypoglycemia complains of recurrent right-sided abdominal pain  She states that she has had intermittent right-sided abdominal pain over the past 2 months  Over the last week or so it is become more frequent  It is sharp and nonradiating  Over the last 48 hours it has been constant  She says at times certain movements do make it a little bit worse  Patient had associated nausea with intermittent vomiting and diarrhea over the past 6 days  She says the diarrhea is watery and looks a little  orange and is foul-smelling  She had a few episodes of subjective fever over last 2 days  She denies exposure to others with recent GI illness, spoiled food and recent use of antibiotics  Patient felt there was some blood in her urine recently but denies dysuria and abnormal vaginal discharge  She is compliant with oral contraceptives  Patient denies any respiratory complaints  She is status post remote cholecystectomy and C-sections  There is no family history of inflammatory bowel disease  States her grandmother had ulcerative colitis  Patient was seen by a gastroenterologist for her gallbladder workup in the past           Prior to Admission Medications   Prescriptions Last Dose Informant Patient Reported? Taking?    ALPRAZolam (XANAX) 1 mg tablet  Self Yes No   Sig: Take 1 mg by mouth daily at bedtime    albuterol (VENTOLIN HFA) 90 mcg/act inhaler  Self No No   Sig: Inhale 2 puffs every 6 (six) hours as needed for wheezing   gabapentin (NEURONTIN) 300 mg capsule  Self No No   Sig: Take 1 capsule (300 mg total) by mouth 2 (two) times a day   Patient taking differently: Take 300 mg by mouth 2 (two) times a day as needed    mometasone (Asmanex, 60 Metered Doses,) 220 MCG/INH inhaler  Self Yes No   Sig: Inhale 220 mcg daily   montelukast (SINGULAIR) 10 mg tablet  Self No No   Sig: Take 1 tablet (10 mg total) by mouth daily at bedtime   ondansetron (ZOFRAN-ODT) 4 mg disintegrating tablet   No No   Sig: Take 1 tablet (4 mg total) by mouth every 6 (six) hours as needed for nausea or vomiting for up to 3 days   pantoprazole (PROTONIX) 40 mg tablet   No No   Sig: TAKE 1 TABLET BY MOUTH EVERY DAY   sertraline (ZOLOFT) 100 mg tablet   Yes No   Sig: Take 100 mg by mouth daily      Facility-Administered Medications: None       Past Medical History:   Diagnosis Date    Anxiety     Asthma     Depression     Hypoglycemia     Moderate episode of recurrent major depressive disorder ([facility] [address] ) 10/3/2019    PTSD (post-traumatic stress disorder)        Past Surgical History:   Procedure Laterality Date    ANKLE LIGAMENT RECONSTRUCTION       SECTION      CHOLECYSTECTOMY LAPAROSCOPIC N/A 2020    Procedure: CHOLECYSTECTOMY LAPAROSCOPIC;  Surgeon: Denae Almeida MD;  Location:  MAIN OR;  Service: General    KNEE SURGERY      SINUS SURGERY      TONSILLECTOMY      TONSILLECTOMY      TUBAL LIGATION      UMBILICAL HERNIA REPAIR N/A 2020    Procedure: REPAIR HERNIA UMBILICAL, Primary;  Surgeon: Denae Almeida MD;  Location:  MAIN OR;  Service: General       Family History   Problem Relation Age of Onset    Depression Mother      I have reviewed and agree with the history as documented      E-Cigarette/Vaping    E-Cigarette Use Never User      E-Cigarette/Vaping Substances     Social History     Tobacco Use    Smoking status: Never Smoker    Smokeless tobacco: Never Used   Substance Use Topics    Alcohol use: Yes     Frequency: Monthly or less     Drinks per session: 1 or 2     Binge frequency: Never    Drug use: Never       Review of Systems   Constitutional: Positive for appetite change and chills  Fever: Subjective  HENT: Negative  Eyes: Negative  Respiratory: Negative  Cardiovascular: Negative  Gastrointestinal: Positive for abdominal pain, diarrhea ( intermittently), nausea and vomiting ( intermittently)  Negative for abdominal distention, anal bleeding, blood in stool, constipation and rectal pain  Endocrine: Negative  Genitourinary: Positive for hematuria  Negative for difficulty urinating, dysuria, flank pain, frequency, pelvic pain, vaginal bleeding and vaginal discharge  Musculoskeletal: Negative  Skin: Negative  Allergic/Immunologic: Negative  Neurological: Negative  Hematological: Negative  Psychiatric/Behavioral: The patient is nervous/anxious  All other systems reviewed and are negative  Physical Exam  Physical Exam  Vitals signs and nursing note reviewed  Constitutional:       General: She is not in acute distress  Appearance: She is well-developed and normal weight  She is not ill-appearing, toxic-appearing or diaphoretic  HENT:      Head: Normocephalic and atraumatic  Mouth/Throat:      Pharynx: No pharyngeal swelling or oropharyngeal exudate  Comments: Dry oral mucosa  Eyes:      General: No scleral icterus  Conjunctiva/sclera: Conjunctivae normal       Pupils: Pupils are equal, round, and reactive to light  Neck:      Musculoskeletal: Normal range of motion and neck supple  Cardiovascular:      Rate and Rhythm: Normal rate and regular rhythm  Heart sounds: Normal heart sounds  No murmur  Pulmonary:      Effort: Pulmonary effort is normal       Breath sounds: Normal breath sounds  Abdominal:      General: Abdomen is flat  Bowel sounds are normal  There is no distension  There are no signs of injury  Palpations: Abdomen is soft  There is no fluid wave or mass  Tenderness: There is abdominal tenderness in the right upper quadrant and periumbilical area   There is no right CVA tenderness, left CVA tenderness, guarding or rebound  Negative signs include Pena's sign, Rovsing's sign, McBurney's sign and psoas sign  Hernia: No hernia is present  Musculoskeletal: Normal range of motion  Skin:     General: Skin is warm and dry  Capillary Refill: Capillary refill takes less than 2 seconds  Findings: No rash  Neurological:      General: No focal deficit present  Mental Status: She is alert and oriented to person, place, and time  Cranial Nerves: No cranial nerve deficit  Coordination: Coordination normal       Deep Tendon Reflexes: Reflexes are normal and symmetric     Psychiatric:         Mood and Affect: Mood normal          Behavior: Behavior normal          Vital Signs  ED Triage Vitals [01/27/21 1438]   Temperature Pulse Respirations Blood Pressure SpO2   (!) 97 1 °F (36 2 °C) 85 16 131/64 99 %      Temp Source Heart Rate Source Patient Position - Orthostatic VS BP Location FiO2 (%)   Temporal Monitor Sitting Left arm --      Pain Score       Worst Possible Pain           Vitals:    01/27/21 1438   BP: 131/64   Pulse: 85   Patient Position - Orthostatic VS: Sitting         Visual Acuity      ED Medications  Medications   sodium chloride 0 9 % bolus 1,000 mL (1,000 mL Intravenous New Bag 1/27/21 1703)   ketorolac (TORADOL) injection 15 mg (has no administration in time range)       Diagnostic Studies  Results Reviewed     Procedure Component Value Units Date/Time    Comprehensive metabolic panel [[id number]] Collected: 01/27/21 1702    Lab Status: Final result Specimen: Blood from Arm, Left Updated: 01/27/21 1728     Sodium 140 mmol/L      Potassium 3 7 mmol/L      Chloride 102 mmol/L      CO2 29 mmol/L      ANION GAP 9 mmol/L      BUN 11 mg/dL      Creatinine 1 00 mg/dL      Glucose 97 mg/dL      Calcium 8 9 mg/dL      AST 22 U/L      ALT 43 U/L      Alkaline Phosphatase 57 U/L      Total Protein 7 3 g/dL      Albumin 3 8 g/dL      Total Bilirubin 0 30 mg/dL      eGFR 70 ml/min/1 73sq m     Narrative:      [place] guidelines for Chronic Kidney Disease (CKD):     Stage 1 with normal or high GFR (GFR > 90 mL/min/1 73 square meters)    Stage 2 Mild CKD (GFR = 60-89 mL/min/1 73 square meters)    Stage 3A Moderate CKD (GFR = 45-59 mL/min/1 73 square meters)    Stage 3B Moderate CKD (GFR = 30-44 mL/min/1 73 square meters)    Stage 4 Severe CKD (GFR = 15-29 mL/min/1 73 square meters)    Stage 5 End Stage CKD (GFR <15 mL/min/1 73 square meters)  Note: GFR calculation is accurate only with a steady state creatinine    Lipase [[id number]]  (Normal) Collected: 01/27/21 1702    Lab Status: Final result Specimen: Blood from Arm, Left Updated: 01/27/21 1728     Lipase 151 u/L     CBC and differential [[id number]]  (Normal) Collected: 01/27/21 1702    Lab Status: Final result Specimen: Blood from Arm, Left Updated: 01/27/21 1711     WBC 7 42 Thousand/uL      RBC 4 33 Million/uL      Hemoglobin 12 4 g/dL      Hematocrit 37 6 %      MCV 87 fL      MCH 28 6 pg      MCHC 33 0 g/dL      RDW 12 6 %      MPV 10 1 fL      Platelets 808 Thousands/uL      Neutrophils Relative 53 %      Immat GRANS % 0 %      Lymphocytes Relative 39 %      Monocytes Relative 5 %      Eosinophils Relative 2 %      Basophils Relative 1 %      Neutrophils Absolute 3 93 Thousands/µL      Immature Grans Absolute 0 01 Thousand/uL      Lymphocytes Absolute 2 92 Thousands/µL      Monocytes Absolute 0 40 Thousand/µL      Eosinophils Absolute 0 12 Thousand/µL      Basophils Absolute 0 04 Thousands/µL     POCT urinalysis dipstick [[id number]]  (Normal) Resulted: 01/27/21 1605    Lab Status: Final result Specimen: Urine Updated: 01/27/21 1606     Color, UA yellow     Clarity, UA clear     Glucose, UA (Ref: Negative) neg     Bilirubin, UA (Ref: Negative) neg     Ketones, UA (Ref: Negative) moderate     Spec Grav, UA (Ref:1 003-1 030) 1 025     Blood, UA (Ref: Negative) neg     pH, UA (Ref: 4 5-8 0) 5 0 Protein, UA (Ref: Negative) trace     Urobilinogen, UA (Ref: 0 2- 1 0) 1      Leukocytes, UA (Ref: Negative) neg     Nitrite, UA (Ref: Negative) neg    POCT pregnancy, urine [[id number]]  (Normal) Resulted: 01/27/21 1605    Lab Status: Final result Updated: 01/27/21 1605     EXT PREG TEST UR (Ref: Negative) negative     Control valid    Clostridium difficile toxin by PCR with EIA [[id number]]     Lab Status: No result Specimen: Stool from Per Rectum                  No orders to display              Procedures  Procedures         ED Course                             SBIRT 20yo+      Most Recent Value   SBIRT (24 yo +)   In order to provide better care to our patients, we are screening all of our patients for alcohol and drug use  Would it be okay to ask you these screening questions? Yes Filed at: 01/27/2021 1440   Initial Alcohol Screen: US AUDIT-C    1  How often do you have a drink containing alcohol?  0 Filed at: 01/27/2021 1440   2  How many drinks containing alcohol do you have on a typical day you are drinking? 0 Filed at: 01/27/2021 1440   3a  Male UNDER 65: How often do you have five or more drinks on one occasion? 0 Filed at: 01/27/2021 1440   3b  FEMALE Any Age, or MALE 65+: How often do you have 4 or more drinks on one occassion? 0 Filed at: 01/27/2021 1440   Audit-C Score  0 Filed at: 01/27/2021 1440   UMAIR: How many times in the past year have you    Used an illegal drug or used a prescription medication for non-medical reasons? Never Filed at: 01/27/2021 1440                    MDM  Number of Diagnoses or Management Options  Epigastric pain: established and worsening  Diagnosis management comments: Patient with subacute abdominal pain  This has been recurrent and at times associated with nausea and diarrhea  There has been no fever  Patient has no peritoneal signs  She has had recent pelvic ultrasound and CT of abdomen and pelvis with contrast with no abnormalities noted    Today's vital signs and repeat labs are within normal range  Amount and/or Complexity of Data Reviewed  Clinical lab tests: ordered and reviewed  Review and summarize past medical records: yes        Disposition  Final diagnoses:   Epigastric pain     Time reflects when diagnosis was documented in both MDM as applicable and the Disposition within this note     Time User Action Codes Description Comment    1/27/2021  5:46 PM Caleb Brown Add [R10 13] Epigastric pain       ED Disposition     ED Disposition Condition Date/Time Comment    Discharge Stable Wed Jan 27, 2021  5:46 PM [facility] discharge to home/self care  Follow-up Information     Follow up With Specialties Details Why Contact Info Additional [phone] [facility] Schedule an appointment as soon as possible for a visit   [address] [place] [id number]  [facility] [address], [id number] [facility], [phone]          Patient's Medications   Discharge Prescriptions    DICYCLOMINE (BENTYL) 20 MG TABLET    Take 1 tablet (20 mg total) by mouth 3 (three) times a day as needed (Abdominal pain or diarrhea)       Start Date: 1/27/2021 End Date: --       Order Dose: 20 mg       Quantity: 20 tablet    Refills: 0     No discharge procedures on file      PDMP Review       Value Time User    PDMP Reviewed  Yes 1/27/2021  5:46 PM Kimberly Pruett DO          ED Provider  Electronically Signed by           Kimberly Pruett DO  01/27/21 2241

## 2021-01-27 NOTE — ED NOTES
Two attempts for IV access/blood draw  Patient states "I'm always a hard stick they needed to use that ultrasound machine " provider notified        Elder Hernandez RN  01/27/21 0384

## 2021-03-18 ENCOUNTER — APPOINTMENT (OUTPATIENT)
Dept: RADIOLOGY | Facility: CLINIC | Age: 41
End: 2021-03-18
Payer: COMMERCIAL

## 2021-03-18 ENCOUNTER — OFFICE VISIT (OUTPATIENT)
Dept: OBGYN CLINIC | Facility: CLINIC | Age: 41
End: 2021-03-18
Payer: COMMERCIAL

## 2021-03-18 ENCOUNTER — OFFICE VISIT (OUTPATIENT)
Dept: URGENT CARE | Facility: CLINIC | Age: 41
End: 2021-03-18
Payer: COMMERCIAL

## 2021-03-18 ENCOUNTER — TELEPHONE (OUTPATIENT)
Dept: OBGYN CLINIC | Facility: HOSPITAL | Age: 41
End: 2021-03-18

## 2021-03-18 VITALS
BODY MASS INDEX: 27.49 KG/M2 | TEMPERATURE: 99.6 F | HEIGHT: 65 IN | OXYGEN SATURATION: 98 % | HEART RATE: 82 BPM | WEIGHT: 165 LBS | RESPIRATION RATE: 18 BRPM

## 2021-03-18 VITALS
SYSTOLIC BLOOD PRESSURE: 116 MMHG | WEIGHT: 166 LBS | HEIGHT: 64 IN | BODY MASS INDEX: 28.34 KG/M2 | DIASTOLIC BLOOD PRESSURE: 72 MMHG | TEMPERATURE: 97.5 F

## 2021-03-18 DIAGNOSIS — M25.511 ACUTE PAIN OF RIGHT SHOULDER: ICD-10-CM

## 2021-03-18 DIAGNOSIS — M25.511 RIGHT SHOULDER PAIN, UNSPECIFIED CHRONICITY: ICD-10-CM

## 2021-03-18 DIAGNOSIS — S46.211A SPRAIN, BICEP, RIGHT, INITIAL ENCOUNTER: Primary | ICD-10-CM

## 2021-03-18 DIAGNOSIS — M75.31 CALCIFIC TENDONITIS OF RIGHT SHOULDER: Primary | ICD-10-CM

## 2021-03-18 PROCEDURE — 3008F BODY MASS INDEX DOCD: CPT | Performed by: PHYSICIAN ASSISTANT

## 2021-03-18 PROCEDURE — 1036F TOBACCO NON-USER: CPT | Performed by: PHYSICIAN ASSISTANT

## 2021-03-18 PROCEDURE — G0382 LEV 3 HOSP TYPE B ED VISIT: HCPCS | Performed by: PHYSICIAN ASSISTANT

## 2021-03-18 PROCEDURE — 99203 OFFICE O/P NEW LOW 30 MIN: CPT | Performed by: PHYSICIAN ASSISTANT

## 2021-03-18 PROCEDURE — 20610 DRAIN/INJ JOINT/BURSA W/O US: CPT | Performed by: PHYSICIAN ASSISTANT

## 2021-03-18 PROCEDURE — 73030 X-RAY EXAM OF SHOULDER: CPT

## 2021-03-18 PROCEDURE — 99283 EMERGENCY DEPT VISIT LOW MDM: CPT | Performed by: PHYSICIAN ASSISTANT

## 2021-03-18 RX ORDER — LIDOCAINE HYDROCHLORIDE 10 MG/ML
7 INJECTION, SOLUTION INFILTRATION; PERINEURAL
Status: COMPLETED | OUTPATIENT
Start: 2021-03-18 | End: 2021-03-18

## 2021-03-18 RX ORDER — BETAMETHASONE SODIUM PHOSPHATE AND BETAMETHASONE ACETATE 3; 3 MG/ML; MG/ML
6 INJECTION, SUSPENSION INTRA-ARTICULAR; INTRALESIONAL; INTRAMUSCULAR; SOFT TISSUE
Status: COMPLETED | OUTPATIENT
Start: 2021-03-18 | End: 2021-03-18

## 2021-03-18 RX ORDER — INDOMETHACIN 50 MG/1
50 CAPSULE ORAL 2 TIMES DAILY WITH MEALS
Qty: 10 CAPSULE | Refills: 0 | Status: SHIPPED | OUTPATIENT
Start: 2021-03-18

## 2021-03-18 RX ORDER — TRAZODONE HYDROCHLORIDE 50 MG/1
50 TABLET ORAL
COMMUNITY

## 2021-03-18 RX ADMIN — BETAMETHASONE SODIUM PHOSPHATE AND BETAMETHASONE ACETATE 6 MG: 3; 3 INJECTION, SUSPENSION INTRA-ARTICULAR; INTRALESIONAL; INTRAMUSCULAR; SOFT TISSUE at 16:49

## 2021-03-18 RX ADMIN — LIDOCAINE HYDROCHLORIDE 7 ML: 10 INJECTION, SOLUTION INFILTRATION; PERINEURAL at 16:49

## 2021-03-18 NOTE — PROGRESS NOTES
Assessment:     1  Calcific tendonitis of right shoulder    2  Acute pain of right shoulder        Plan:     Problem List Items Addressed This Visit        Musculoskeletal and Integument    Calcific tendonitis of right shoulder - Primary    Relevant Medications    indomethacin (INDOCIN) 50 mg capsule    lidocaine (XYLOCAINE) 1 % injection 7 mL (Completed)    betamethasone acetate-betamethasone sodium phosphate (CELESTONE) injection 6 mg (Completed)    Other Relevant Orders    Ambulatory referral to Physical Therapy    Large joint arthrocentesis: R subacromial bursa (Completed)       Other    Acute pain of right shoulder    Relevant Medications    lidocaine (XYLOCAINE) 1 % injection 7 mL (Completed)    betamethasone acetate-betamethasone sodium phosphate (CELESTONE) injection 6 mg (Completed)    Other Relevant Orders    Large joint arthrocentesis: R subacromial bursa (Completed)            The patient was seen and examined by Dr Ruba Campos and myself  Findings consistent with right acute shoulder pain most likely calcific tendinitis  Findings and treatment options were discussed with the patient  X-rays were reviewed with her  Recommend cortisone injection to the subacromial space today  She tolerated the procedure well  Advised to apply cold compress today  She was also given a prescription to start formal physical therapy  She was prescribed a 5 day supply of Indocin to take to reduce inflammation  She is then to start taking an over-the-counter NSAIDs such as Motrin, ibuprofen, Advil or Aleve  Follow-up in 6 weeks for re-evaluation  All questions were answered to patient's satisfaction  Subjective:     Patient ID: Manohar Eason is a 36 y o  female  Chief Complaint: This is a 80-year-old right-hand-dominant female here for evaluation of her right shoulder  She states that she woke up with sudden and severe pain in the right shoulder  She denies any change in her activities leading up to this morning  She has been unable to move her arm due to pain  She was seen at urgent care today and x-rays were taken  No other treatment  She is taking 2 Aleve at home with no relief  She denies any history of similar symptoms in the past   She has a history of a right shoulder dislocation as a teenager with no complications  Patient intake form was reviewed today  Allergy:  Allergies   Allergen Reactions    Azithromycin GI Intolerance     Other reaction(s): GI Intolerance    Cephalosporins Hives    Sulfa Antibiotics Hives     Medications:  all current active meds have been reviewed  Past Medical History:  Past Medical History:   Diagnosis Date    Anxiety     Asthma     Depression     Hypoglycemia     Moderate episode of recurrent major depressive disorder ([facility] [address] ) 10/3/2019    PTSD (post-traumatic stress disorder)      Past Surgical History:  Past Surgical History:   Procedure Laterality Date    ANKLE LIGAMENT RECONSTRUCTION       SECTION      CHOLECYSTECTOMY LAPAROSCOPIC N/A 2020    Procedure: CHOLECYSTECTOMY LAPAROSCOPIC;  Surgeon: Breonna Sagastume MD;  Location:  MAIN OR;  Service: General    KNEE SURGERY      SINUS SURGERY      TONSILLECTOMY      TONSILLECTOMY      TUBAL LIGATION      UMBILICAL HERNIA REPAIR N/A 2020    Procedure: REPAIR HERNIA UMBILICAL, Primary;  Surgeon: Breonna Sagastume MD;  Location:  MAIN OR;  Service: General     Family History:  Family History   Problem Relation Age of Onset    Depression Mother      Social History:  Social History     Substance and Sexual Activity   Alcohol Use Yes    Frequency: Monthly or less    Drinks per session: 1 or 2    Binge frequency: Never     Social History     Substance and Sexual Activity   Drug Use Never     Social History     Tobacco Use   Smoking Status Never Smoker   Smokeless Tobacco Never Used     Review of Systems   Constitutional: Negative  HENT: Positive for sinus pain and tinnitus  Eyes: Negative  Respiratory: Negative  Cardiovascular: Negative  Gastrointestinal: Positive for diarrhea  Endocrine: Negative  Genitourinary: Positive for frequency  Musculoskeletal: Positive for arthralgias and myalgias  Skin: Negative  Allergic/Immunologic: Negative  Neurological: Negative  Hematological: Negative  Psychiatric/Behavioral: Positive for dysphoric mood  The patient is nervous/anxious  Objective:  BP Readings from Last 1 Encounters:   03/18/21 116/72      Wt Readings from Last 1 Encounters:   03/18/21 75 3 kg (166 lb)      BMI:   Estimated body mass index is 28 49 kg/m² as calculated from the following:    Height as of this encounter: 5' 4" (1 626 m)  Weight as of this encounter: 75 3 kg (166 lb)  BSA:   Estimated body surface area is 1 81 meters squared as calculated from the following:    Height as of this encounter: 5' 4" (1 626 m)  Weight as of this encounter: 75 3 kg (166 lb)  Physical Exam  Constitutional:       General: She is not in acute distress  Appearance: She is well-developed  HENT:      Head: Normocephalic  Eyes:      Conjunctiva/sclera: Conjunctivae normal       Pupils: Pupils are equal, round, and reactive to light  Pulmonary:      Effort: Pulmonary effort is normal  No respiratory distress  Musculoskeletal:         General: Tenderness present  Skin:     General: Skin is warm and dry  Neurological:      Mental Status: She is alert and oriented to person, place, and time  Psychiatric:         Behavior: Behavior normal        Right Shoulder Exam     Tenderness   The patient is experiencing tenderness in the biceps tendon      Range of Motion   Active abduction: 30 (pain)   Forward flexion: 30 (pain)     Muscle Strength   Abduction: 3/5   Internal rotation: 5/5   External rotation: 5/5   Biceps: 5/5     Tests   Drop arm: negative    Other   Erythema: absent  Scars: absent  Sensation: normal  Pulse: present    Comments:  Pain with resisted abduction            I have personally reviewed pertinent films in PACS and my interpretation is x-rays of the right shoulder reveal a type 2 acromion  Large joint arthrocentesis: R subacromial bursa  Universal Protocol:  Consent: Verbal consent obtained    Risks and benefits: risks, benefits and alternatives were discussed  Consent given by: patient  Patient understanding: patient states understanding of the procedure being performed    Supporting Documentation  Indications: pain   Procedure Details  Location: shoulder - R subacromial bursa  Preparation: Patient was prepped and draped in the usual sterile fashion  Needle size: 22 G  Approach: posterior  Medications administered: 7 mL lidocaine 1 %; 6 mg betamethasone acetate-betamethasone sodium phosphate 6 (3-3) mg/mL    Patient tolerance: patient tolerated the procedure well with no immediate complications  Dressing:  Sterile dressing applied

## 2021-03-18 NOTE — PATIENT INSTRUCTIONS
Shoulder Pain   AMBULATORY CARE:   Shoulder pain  is a common problem that can affect your daily activities  Pain can be caused by a problem within your shoulder, such as soreness of a tendon or bursa  A tendon is a cord of tough tissue that connects your muscles to your bones  The bursa is a fluid-filled sac that acts as a cushion between a bone and a tendon  Shoulder pain may also be caused by pain that spreads to your shoulder from another part of your body  Seek care immediately if:   · You have severe pain  · You cannot move your arm or shoulder  · You have numbness or tingling in your shoulder or arm  Contact your healthcare provider if:   · Your pain gets worse or does not go away with treatment  · You have trouble moving your arm or shoulder  · You have questions or concerns about your condition or care  Treatment for shoulder pain  may include any of the following:  · Acetaminophen  decreases pain and fever  It is available without a doctor's order  Ask how much to take and how often to take it  Follow directions  Read the labels of all other medicines you are using to see if they also contain acetaminophen, or ask your doctor or pharmacist  Acetaminophen can cause liver damage if not taken correctly  Do not use more than 4 grams (4,000 milligrams) total of acetaminophen in one day  · NSAIDs , such as ibuprofen, help decrease swelling, pain, and fever  This medicine is available with or without a doctor's order  NSAIDs can cause stomach bleeding or kidney problems in certain people  If you take blood thinner medicine, always ask your healthcare provider if NSAIDs are safe for you  Always read the medicine label and follow directions  · A steroid injection  may help decrease pain and swelling  · Surgery  may be needed for long-term pain and loss of function  Manage your symptoms:   · Apply ice  on your shoulder for 20 to 30 minutes every 2 hours or as directed   Use an ice pack, or put crushed ice in a plastic bag  Cover it with a towel before you apply it to your shoulder  Ice helps prevent tissue damage and decreases swelling and pain  · Apply heat if ice does not help your symptoms  Apply heat on your shoulder for 20 to 30 minutes every 2 hours for as many days as directed  Heat helps decrease pain and muscle spasms  · Limit activities as directed  Try to avoid repeated overhead movements  · Go to physical or occupational therapy as directed  A physical therapist teaches you exercises to help improve movement and strength, and to decrease pain  An occupational therapist teaches you skills to help with your daily activities  Prevent shoulder pain:   · Maintain a good range of motion in your shoulder  Ask your healthcare provider which exercises you should do on a regular basis after you have healed  · Stretch and strengthen your shoulder  Use proper technique during exercises and sports  Follow up with your healthcare provider or orthopedist as directed:  Write down your questions so you remember to ask them during your visits  © Copyright [address] Information is for End User's use only and may not be sold, redistributed or otherwise used for commercial purposes  All illustrations and images included in CareNotes® are the copyrighted property of [employer]  or [address] [place], [place] [address]pepito gonzález   The above information is an  only  It is not intended as medical advice for individual conditions or treatments  Talk to your doctor, nurse or pharmacist before following any medical regimen to see if it is safe and effective for you

## 2021-03-18 NOTE — PROGRESS NOTES
[facility]        NAME: Mercedez Woo is a 36 y o  female  : 1980    MRN: [id number]  DATE: 2021  TIME: 3:58 AM    Assessment and Plan   Sprain, bicep, right, initial encounter [S46 211A]  1  Sprain, bicep, right, initial encounter  Ambulatory referral to Orthopedic Surgery   2  Right shoulder pain, unspecified chronicity  XR shoulder 2+ vw right    Ambulatory referral to Orthopedic Surgery         Patient Instructions       Follow up with PCP in 3-5 days  Proceed to  ER if symptoms worsen  Chief Complaint     Chief Complaint   Patient presents with    Shoulder Pain     right shoulder pain  began today  pt denies injury         History of Present Illness         61-year-old female presents the clinic with right shoulder pain that started today  Patient states that she woke up this morning unable to move her right shoulder and denies any specific injury to the area  Patient states that she has a history of right shoulder dislocation when she was in high school without any lingering side effects from this  Patient has very limited movement to her shoulder due to pain and states that she does have some numbness and tingling that radiates down to her fingers  Review of Systems   Review of Systems   Constitutional: Negative for chills and fever  Musculoskeletal: Positive for arthralgias and myalgias  Negative for joint swelling  Skin: Negative for color change, rash and wound  Neurological: Positive for numbness  Negative for weakness           Current Medications       Current Outpatient Medications:     montelukast (SINGULAIR) 10 mg tablet, Take 1 tablet (10 mg total) by mouth daily at bedtime, Disp: 30 tablet, Rfl: 2    sertraline (ZOLOFT) 100 mg tablet, Take 100 mg by mouth daily, Disp: , Rfl:     traZODone (DESYREL) 50 mg tablet, Take 50 mg by mouth daily at bedtime, Disp: , Rfl:     albuterol (VENTOLIN HFA) 90 mcg/act inhaler, Inhale 2 puffs every 6 (six) hours as needed for wheezing, Disp: 18 g, Rfl: 0    ALPRAZolam (XANAX) 1 mg tablet, Take 1 mg by mouth daily at bedtime , Disp: , Rfl: 1    dicyclomine (BENTYL) 20 mg tablet, Take 1 tablet (20 mg total) by mouth 3 (three) times a day as needed (Abdominal pain or diarrhea) (Patient not taking: Reported on 3/18/2021), Disp: 20 tablet, Rfl: 0    gabapentin (NEURONTIN) 300 mg capsule, Take 1 capsule (300 mg total) by mouth 2 (two) times a day (Patient not taking: Reported on 3/18/2021), Disp: 60 capsule, Rfl: 2    indomethacin (INDOCIN) 50 mg capsule, Take 1 capsule (50 mg total) by mouth 2 (two) times a day with meals, Disp: 10 capsule, Rfl: 0    mometasone (Asmanex, 60 Metered Doses,) 220 MCG/INH inhaler, Inhale 220 mcg daily, Disp: , Rfl:     ondansetron (ZOFRAN-ODT) 4 mg disintegrating tablet, Take 1 tablet (4 mg total) by mouth every 6 (six) hours as needed for nausea or vomiting for up to 3 days, Disp: 12 tablet, Rfl: 0    pantoprazole (PROTONIX) 40 mg tablet, TAKE 1 TABLET BY MOUTH EVERY DAY (Patient not taking: Reported on 3/18/2021), Disp: 30 tablet, Rfl: 6    Current Allergies     Allergies as of 2021 - Reviewed 2021   Allergen Reaction Noted    Azithromycin GI Intolerance 2019    Cephalosporins Hives 2019    Sulfa antibiotics Hives 2019            The following portions of the patient's history were reviewed and updated as appropriate: allergies, current medications, past family history, past medical history, past social history, past surgical history and problem list      Past Medical History:   Diagnosis Date    Anxiety     Asthma     Depression     Hypoglycemia     Moderate episode of recurrent major depressive disorder ([facility] [address] ) 10/3/2019    PTSD (post-traumatic stress disorder)        Past Surgical History:   Procedure Laterality Date    ANKLE LIGAMENT RECONSTRUCTION       SECTION      CHOLECYSTECTOMY LAPAROSCOPIC N/A 2020    Procedure: CHOLECYSTECTOMY LAPAROSCOPIC;  Surgeon: Rupinder Mooney MD;  Location:  MAIN OR;  Service: General    KNEE SURGERY      SINUS SURGERY      TONSILLECTOMY      TONSILLECTOMY      TUBAL LIGATION      UMBILICAL HERNIA REPAIR N/A 6/18/2020    Procedure: REPAIR HERNIA UMBILICAL, Primary;  Surgeon: Rupinder Mooney MD;  Location:  MAIN OR;  Service: General       Family History   Problem Relation Age of Onset    Depression Mother          Medications have been verified  Objective   Pulse 82   Temp 99 6 °F (37 6 °C)   Resp 18   Ht 5' 4 5" (1 638 m)   Wt 74 8 kg (165 lb)   SpO2 98%   BMI 27 88 kg/m²   No LMP recorded  Patient has had an ablation  Physical Exam     Physical Exam  Vitals signs and nursing note reviewed  Constitutional:       General: She is not in acute distress  Appearance: She is well-developed  She is not diaphoretic  HENT:      Head: Normocephalic and atraumatic  Cardiovascular:      Pulses: Normal pulses  Pulmonary:      Effort: Pulmonary effort is normal    Musculoskeletal:      Right shoulder: She exhibits decreased range of motion, tenderness, bony tenderness, pain and decreased strength  Comments:   TTP noted to anterior, superior and posterior shoulder joint with radiation to deltoid and insertion area of bicep  No pain to posterior upper arm  Limited ROM of arm with forward flexion and abduction, sensation intact, pulses intact, cap refill < 2 sec    shoulder x-ray: no acute osseous abnormality or dislocation noted   Skin:     General: Skin is warm and dry  Capillary Refill: Capillary refill takes less than 2 seconds  Neurological:      Mental Status: She is alert and oriented to person, place, and time

## 2021-05-14 ENCOUNTER — APPOINTMENT (EMERGENCY)
Dept: CT IMAGING | Facility: HOSPITAL | Age: 41
End: 2021-05-14
Payer: COMMERCIAL

## 2021-05-14 ENCOUNTER — HOSPITAL ENCOUNTER (EMERGENCY)
Facility: HOSPITAL | Age: 41
Discharge: HOME | End: 2021-05-14
Attending: EMERGENCY MEDICINE | Admitting: EMERGENCY MEDICINE
Payer: COMMERCIAL

## 2021-05-14 VITALS
OXYGEN SATURATION: 99 % | WEIGHT: 157 LBS | TEMPERATURE: 97.9 F | BODY MASS INDEX: 26.8 KG/M2 | RESPIRATION RATE: 20 BRPM | HEART RATE: 71 BPM | HEIGHT: 64 IN | SYSTOLIC BLOOD PRESSURE: 109 MMHG | DIASTOLIC BLOOD PRESSURE: 80 MMHG

## 2021-05-14 DIAGNOSIS — R10.9 RIGHT SIDED ABDOMINAL PAIN: Primary | ICD-10-CM

## 2021-05-14 LAB
ALBUMIN SERPL BCP-MCNC: 4.1 G/DL (ref 3.5–5)
ALP SERPL-CCNC: 56 U/L (ref 46–116)
ALT SERPL W P-5'-P-CCNC: 27 U/L (ref 12–78)
ANION GAP SERPL CALCULATED.3IONS-SCNC: 12 MMOL/L (ref 4–13)
AST SERPL W P-5'-P-CCNC: 13 U/L (ref 5–45)
BACTERIA UR QL AUTO: NORMAL /HPF
BASOPHILS # BLD AUTO: 0.05 THOUSANDS/ΜL (ref 0–0.1)
BASOPHILS NFR BLD AUTO: 1 % (ref 0–1)
BILIRUB SERPL-MCNC: 0.3 MG/DL (ref 0.2–1)
BILIRUB UR QL STRIP: NEGATIVE
BUN SERPL-MCNC: 8 MG/DL (ref 5–25)
CALCIUM SERPL-MCNC: 9.1 MG/DL (ref 8.3–10.1)
CHLORIDE SERPL-SCNC: 104 MMOL/L (ref 100–108)
CLARITY UR: CLEAR
CO2 SERPL-SCNC: 23 MMOL/L (ref 21–32)
COLOR UR: YELLOW
CREAT SERPL-MCNC: 1 MG/DL (ref 0.6–1.3)
EOSINOPHIL # BLD AUTO: 0.1 THOUSAND/ΜL (ref 0–0.61)
EOSINOPHIL NFR BLD AUTO: 2 % (ref 0–6)
ERYTHROCYTE [DISTWIDTH] IN BLOOD BY AUTOMATED COUNT: 11.7 % (ref 11.6–15.1)
EXT PREG TEST URINE: NEGATIVE
EXT. CONTROL ED NAV: NORMAL
GFR SERPL CREATININE-BSD FRML MDRD: 71 ML/MIN/1.73SQ M
GLUCOSE SERPL-MCNC: 104 MG/DL (ref 65–140)
GLUCOSE UR STRIP-MCNC: NEGATIVE MG/DL
HCT VFR BLD AUTO: 39.5 % (ref 34.8–46.1)
HGB BLD-MCNC: 12.9 G/DL (ref 11.5–15.4)
HGB UR QL STRIP.AUTO: ABNORMAL
IMM GRANULOCYTES # BLD AUTO: 0.01 THOUSAND/UL (ref 0–0.2)
IMM GRANULOCYTES NFR BLD AUTO: 0 % (ref 0–2)
KETONES UR STRIP-MCNC: NEGATIVE MG/DL
LEUKOCYTE ESTERASE UR QL STRIP: NEGATIVE
LIPASE SERPL-CCNC: 89 U/L (ref 73–393)
LYMPHOCYTES # BLD AUTO: 2.46 THOUSANDS/ΜL (ref 0.6–4.47)
LYMPHOCYTES NFR BLD AUTO: 38 % (ref 14–44)
MCH RBC QN AUTO: 28.5 PG (ref 26.8–34.3)
MCHC RBC AUTO-ENTMCNC: 32.7 G/DL (ref 31.4–37.4)
MCV RBC AUTO: 87 FL (ref 82–98)
MONOCYTES # BLD AUTO: 0.36 THOUSAND/ΜL (ref 0.17–1.22)
MONOCYTES NFR BLD AUTO: 6 % (ref 4–12)
MUCOUS THREADS UR QL AUTO: NORMAL
NEUTROPHILS # BLD AUTO: 3.58 THOUSANDS/ΜL (ref 1.85–7.62)
NEUTS SEG NFR BLD AUTO: 53 % (ref 43–75)
NITRITE UR QL STRIP: NEGATIVE
NON-SQ EPI CELLS URNS QL MICRO: NORMAL /HPF
NRBC BLD AUTO-RTO: 0 /100 WBCS
PH UR STRIP.AUTO: 6 [PH]
PLATELET # BLD AUTO: 195 THOUSANDS/UL (ref 149–390)
PMV BLD AUTO: 10.6 FL (ref 8.9–12.7)
POTASSIUM SERPL-SCNC: 3.6 MMOL/L (ref 3.5–5.3)
PROT SERPL-MCNC: 7.3 G/DL (ref 6.4–8.2)
PROT UR STRIP-MCNC: NEGATIVE MG/DL
RBC # BLD AUTO: 4.53 MILLION/UL (ref 3.81–5.12)
RBC #/AREA URNS AUTO: NORMAL /HPF
SODIUM SERPL-SCNC: 139 MMOL/L (ref 136–145)
SP GR UR STRIP.AUTO: <=1.005 (ref 1–1.03)
UROBILINOGEN UR QL STRIP.AUTO: 0.2 E.U./DL
WBC # BLD AUTO: 6.56 THOUSAND/UL (ref 4.31–10.16)
WBC #/AREA URNS AUTO: NORMAL /HPF

## 2021-05-14 PROCEDURE — 99284 EMERGENCY DEPT VISIT MOD MDM: CPT

## 2021-05-14 PROCEDURE — 83690 ASSAY OF LIPASE: CPT | Performed by: PHYSICIAN ASSISTANT

## 2021-05-14 PROCEDURE — 85025 COMPLETE CBC W/AUTO DIFF WBC: CPT | Performed by: PHYSICIAN ASSISTANT

## 2021-05-14 PROCEDURE — 99284 EMERGENCY DEPT VISIT MOD MDM: CPT | Performed by: PHYSICIAN ASSISTANT

## 2021-05-14 PROCEDURE — 36415 COLL VENOUS BLD VENIPUNCTURE: CPT | Performed by: PHYSICIAN ASSISTANT

## 2021-05-14 PROCEDURE — 96361 HYDRATE IV INFUSION ADD-ON: CPT

## 2021-05-14 PROCEDURE — 96374 THER/PROPH/DIAG INJ IV PUSH: CPT

## 2021-05-14 PROCEDURE — 80053 COMPREHEN METABOLIC PANEL: CPT | Performed by: PHYSICIAN ASSISTANT

## 2021-05-14 PROCEDURE — 74177 CT ABD & PELVIS W/CONTRAST: CPT

## 2021-05-14 PROCEDURE — G1004 CDSM NDSC: HCPCS

## 2021-05-14 PROCEDURE — 81025 URINE PREGNANCY TEST: CPT | Performed by: PHYSICIAN ASSISTANT

## 2021-05-14 PROCEDURE — 96375 TX/PRO/DX INJ NEW DRUG ADDON: CPT

## 2021-05-14 PROCEDURE — 81001 URINALYSIS AUTO W/SCOPE: CPT | Performed by: PHYSICIAN ASSISTANT

## 2021-05-14 RX ORDER — ONDANSETRON 2 MG/ML
4 INJECTION INTRAMUSCULAR; INTRAVENOUS ONCE
Status: COMPLETED | OUTPATIENT
Start: 2021-05-14 | End: 2021-05-14

## 2021-05-14 RX ORDER — KETOROLAC TROMETHAMINE 30 MG/ML
15 INJECTION, SOLUTION INTRAMUSCULAR; INTRAVENOUS ONCE
Status: COMPLETED | OUTPATIENT
Start: 2021-05-14 | End: 2021-05-14

## 2021-05-14 RX ADMIN — IOHEXOL 100 ML: 350 INJECTION, SOLUTION INTRAVENOUS at 15:53

## 2021-05-14 RX ADMIN — KETOROLAC TROMETHAMINE 15 MG: 30 INJECTION, SOLUTION INTRAMUSCULAR; INTRAVENOUS at 15:00

## 2021-05-14 RX ADMIN — ONDANSETRON 4 MG: 2 INJECTION INTRAMUSCULAR; INTRAVENOUS at 15:00

## 2021-05-14 RX ADMIN — SODIUM CHLORIDE 1000 ML: 0.9 INJECTION, SOLUTION INTRAVENOUS at 15:00

## 2021-05-14 NOTE — ED PROVIDER NOTES
History  Chief Complaint   Patient presents with    Groin Pain     right side, radiates to the right side of her back (on/off for a few weeks)     Patient is a 37 y/o F with h/o depression, anxiety, asthma presents to the ED with RLQ abdominal pain off and on for a few weeks  She states eating makes the pain worse  She did not take anything for pain  She has urinary frequency, but no dysuria  No fevers, chills  Pain is also worse with movement and walking  Patient states she has been under a lot of stress lately because her son is currently at [facility] and was diagnosed with Bipolar d/o  History provided by:  Patient  Abdominal Pain  Pain location:  RLQ  Pain quality: aching    Pain radiates to:  Groin  Pain severity:  Moderate  Onset quality:  Gradual  Duration:  2 weeks  Timing:  Intermittent  Progression:  Worsening  Chronicity:  New  Context: not sick contacts, not suspicious food intake and not trauma    Relieved by:  Nothing  Worsened by: Movement and eating  Ineffective treatments:  None tried  Associated symptoms: diarrhea and nausea    Associated symptoms: no chest pain, no chills, no constipation, no cough, no dysuria, no fever, no shortness of breath, no vaginal bleeding, no vaginal discharge and no vomiting    Risk factors: no alcohol abuse, no aspirin use, not elderly, no NSAID use, not obese and no recent hospitalization        Prior to Admission Medications   Prescriptions Last Dose Informant Patient Reported? Taking?    ALPRAZolam (XANAX) 1 mg tablet  Self Yes No   Sig: Take 1 mg by mouth daily at bedtime    albuterol (VENTOLIN HFA) 90 mcg/act inhaler  Self No No   Sig: Inhale 2 puffs every 6 (six) hours as needed for wheezing   dicyclomine (BENTYL) 20 mg tablet   No No   Sig: Take 1 tablet (20 mg total) by mouth 3 (three) times a day as needed (Abdominal pain or diarrhea)   Patient not taking: Reported on 3/18/2021   gabapentin (NEURONTIN) 300 mg capsule  Self No No   Sig: Take 1 capsule (300 mg total) by mouth 2 (two) times a day   Patient not taking: Reported on 3/18/2021   indomethacin (INDOCIN) 50 mg capsule   No No   Sig: Take 1 capsule (50 mg total) by mouth 2 (two) times a day with meals   mometasone (Asmanex, 60 Metered Doses,) 220 MCG/INH inhaler  Self Yes No   Sig: Inhale 220 mcg daily   montelukast (SINGULAIR) 10 mg tablet  Self No No   Sig: Take 1 tablet (10 mg total) by mouth daily at bedtime   ondansetron (ZOFRAN-ODT) 4 mg disintegrating tablet   No No   Sig: Take 1 tablet (4 mg total) by mouth every 6 (six) hours as needed for nausea or vomiting for up to 3 days   pantoprazole (PROTONIX) 40 mg tablet   No No   Sig: TAKE 1 TABLET BY MOUTH EVERY DAY   Patient not taking: Reported on 3/18/2021   sertraline (ZOLOFT) 100 mg tablet   Yes No   Sig: Take 100 mg by mouth daily   traZODone (DESYREL) 50 mg tablet   Yes No   Sig: Take 50 mg by mouth daily at bedtime      Facility-Administered Medications: None       Past Medical History:   Diagnosis Date    Anxiety     Asthma     Depression     Hypoglycemia     Moderate episode of recurrent major depressive disorder ([facility] [address] ) 10/3/2019    PTSD (post-traumatic stress disorder)        Past Surgical History:   Procedure Laterality Date    ANKLE LIGAMENT RECONSTRUCTION       SECTION      CHOLECYSTECTOMY LAPAROSCOPIC N/A 2020    Procedure: CHOLECYSTECTOMY LAPAROSCOPIC;  Surgeon: Tre Mckeon MD;  Location:  MAIN OR;  Service: General    KNEE SURGERY      SINUS SURGERY      TONSILLECTOMY      TONSILLECTOMY      TUBAL LIGATION      UMBILICAL HERNIA REPAIR N/A 2020    Procedure: REPAIR HERNIA UMBILICAL, Primary;  Surgeon: Tre Mckeon MD;  Location:  MAIN OR;  Service: General       Family History   Problem Relation Age of Onset    Depression Mother      I have reviewed and agree with the history as documented      E-Cigarette/Vaping    E-Cigarette Use Never User      E-Cigarette/Vaping Substances    Nicotine No     THC No     CBD No     Flavoring No     Other No     Unknown No      Social History     Tobacco Use    Smoking status: Never Smoker    Smokeless tobacco: Never Used   Substance Use Topics    Alcohol use: Yes     Frequency: Monthly or less     Drinks per session: 1 or 2     Binge frequency: Never    Drug use: Never       Review of Systems   Constitutional: Negative for chills and fever  HENT: Negative  Eyes: Negative for visual disturbance  Respiratory: Negative for cough and shortness of breath  Cardiovascular: Negative for chest pain, palpitations and leg swelling  Gastrointestinal: Positive for abdominal pain, diarrhea and nausea  Negative for blood in stool, constipation and vomiting  Genitourinary: Negative for dysuria, vaginal bleeding and vaginal discharge  Musculoskeletal: Negative for back pain and neck pain  Skin: Negative for color change and rash  Neurological: Negative for dizziness, syncope, facial asymmetry, speech difficulty, weakness, light-headedness, numbness and headaches  Psychiatric/Behavioral: Negative for confusion  All other systems reviewed and are negative  Physical Exam  Physical Exam  Vitals signs and nursing note reviewed  Constitutional:       General: She is not in acute distress  Appearance: Normal appearance  She is well-developed, well-groomed and normal weight  She is not ill-appearing or diaphoretic  HENT:      Head: Normocephalic and atraumatic  Right Ear: Hearing normal       Left Ear: Hearing normal       Nose: Nose normal    Eyes:      Conjunctiva/sclera: Conjunctivae normal       Pupils: Pupils are equal    Neck:      Musculoskeletal: Normal range of motion  Cardiovascular:      Rate and Rhythm: Normal rate and regular rhythm  Heart sounds: Normal heart sounds  Pulmonary:      Effort: Pulmonary effort is normal       Breath sounds: Normal breath sounds  No wheezing, rhonchi or rales     Abdominal:      General: Abdomen is flat  Bowel sounds are normal       Palpations: Abdomen is soft  Tenderness: There is abdominal tenderness in the right lower quadrant and suprapubic area  There is no guarding or rebound  Musculoskeletal: Normal range of motion  Right lower leg: No edema  Left lower leg: No edema  Skin:     General: Skin is warm and dry  Findings: No rash  Neurological:      General: No focal deficit present  Mental Status: She is alert and oriented to person, place, and time  Motor: No weakness  Psychiatric:         Mood and Affect: Mood normal          Behavior: Behavior is cooperative           Vital Signs  ED Triage Vitals   Temperature Pulse Respirations Blood Pressure SpO2   05/14/21 1451 05/14/21 1427 05/14/21 1427 05/14/21 1427 05/14/21 1427   97 9 °F (36 6 °C) 71 20 109/80 99 %      Temp Source Heart Rate Source Patient Position - Orthostatic VS BP Location FiO2 (%)   05/14/21 1451 05/14/21 1427 05/14/21 1427 05/14/21 1427 --   Temporal Monitor Lying Right arm       Pain Score       05/14/21 1427       5           Vitals:    05/14/21 1427   BP: 109/80   Pulse: 71   Patient Position - Orthostatic VS: Lying         Visual Acuity      ED Medications  Medications   sodium chloride 0 9 % bolus 1,000 mL (0 mL Intravenous Stopped 5/14/21 1629)   ketorolac (TORADOL) injection 15 mg (15 mg Intravenous Given 5/14/21 1500)   ondansetron (ZOFRAN) injection 4 mg (4 mg Intravenous Given 5/14/21 1500)   iohexol (OMNIPAQUE) 350 MG/ML injection (SINGLE-DOSE) 100 mL (100 mL Intravenous Given 5/14/21 1553)       Diagnostic Studies  Results Reviewed     Procedure Component Value Units Date/Time    Comprehensive metabolic panel [[id number]] Collected: 05/14/21 1459    Lab Status: Final result Specimen: Blood from Arm, Right Updated: 05/14/21 1531     Sodium 139 mmol/L      Potassium 3 6 mmol/L      Chloride 104 mmol/L      CO2 23 mmol/L      ANION GAP 12 mmol/L      BUN 8 mg/dL      Creatinine 1 00 mg/dL      Glucose 104 mg/dL      Calcium 9 1 mg/dL      AST 13 U/L      ALT 27 U/L      Alkaline Phosphatase 56 U/L      Total Protein 7 3 g/dL      Albumin 4 1 g/dL      Total Bilirubin 0 30 mg/dL      eGFR 71 ml/min/1 73sq m     Narrative:      [place] guidelines for Chronic Kidney Disease (CKD):     Stage 1 with normal or high GFR (GFR > 90 mL/min/1 73 square meters)    Stage 2 Mild CKD (GFR = 60-89 mL/min/1 73 square meters)    Stage 3A Moderate CKD (GFR = 45-59 mL/min/1 73 square meters)    Stage 3B Moderate CKD (GFR = 30-44 mL/min/1 73 square meters)    Stage 4 Severe CKD (GFR = 15-29 mL/min/1 73 square meters)    Stage 5 End Stage CKD (GFR <15 mL/min/1 73 square meters)  Note: GFR calculation is accurate only with a steady state creatinine    Lipase [[id number]]  (Normal) Collected: 05/14/21 1459    Lab Status: Final result Specimen: Blood from Arm, Right Updated: 05/14/21 1531     Lipase 89 u/L     Urine Microscopic [[id number]]  (Normal) Collected: 05/14/21 1438    Lab Status: Final result Specimen: Urine, Clean Catch Updated: 05/14/21 1516     RBC, UA None Seen /hpf      WBC, UA None Seen /hpf      Epithelial Cells Occasional /hpf      Bacteria, UA None Seen /hpf      MUCUS THREADS None Seen    CBC and differential [[id number]] Collected: 05/14/21 1459    Lab Status: Final result Specimen: Blood from Arm, Right Updated: 05/14/21 1513     WBC 6 56 Thousand/uL      RBC 4 53 Million/uL      Hemoglobin 12 9 g/dL      Hematocrit 39 5 %      MCV 87 fL      MCH 28 5 pg      MCHC 32 7 g/dL      RDW 11 7 %      MPV 10 6 fL      Platelets 105 Thousands/uL      nRBC 0 /100 WBCs      Neutrophils Relative 53 %      Immat GRANS % 0 %      Lymphocytes Relative 38 %      Monocytes Relative 6 %      Eosinophils Relative 2 %      Basophils Relative 1 %      Neutrophils Absolute 3 58 Thousands/µL      Immature Grans Absolute 0 01 Thousand/uL      Lymphocytes Absolute 2 46 Thousands/µL Monocytes Absolute 0 36 Thousand/µL      Eosinophils Absolute 0 10 Thousand/µL      Basophils Absolute 0 05 Thousands/µL     UA w Reflex to Microscopic w Reflex to Culture [[id number]]  (Abnormal) Collected: 05/14/21 1438    Lab Status: Final result Specimen: Urine, Clean Catch Updated: 05/14/21 1503     Color, UA Yellow     Clarity, UA Clear     Specific Gravity, UA <=1 005     pH, UA 6 0     Leukocytes, UA Negative     Nitrite, UA Negative     Protein, UA Negative mg/dl      Glucose, UA Negative mg/dl      Ketones, UA Negative mg/dl      Urobilinogen, UA 0 2 E U /dl      Bilirubin, UA Negative     Blood, UA Trace-Intact    POCT pregnancy, urine [[id number]]  (Normal) Resulted: 05/14/21 1444    Lab Status: Final result Updated: 05/14/21 1444     EXT PREG TEST UR (Ref: Negative) negative     Control valid                 CT abdomen pelvis with contrast   Final Result by Jesus Huitron MD (05/14 1615)      No acute abnormality in the abdomen or pelvis  Workstation performed: [id number]                    Procedures  Procedures         ED Course                             SBIRT 20yo+      Most Recent Value   SBIRT (24 yo +)   In order to provide better care to our patients, we are screening all of our patients for alcohol and drug use  Would it be okay to ask you these screening questions? Unable to answer at this time Filed at: 05/14/2021 1530                    MDM  Number of Diagnoses or Management Options  Right sided abdominal pain: new and requires workup  Diagnosis management comments: Patient with RLQ abdominal pain, will order labs, CT to r/o appy vs stone  No abnormalities on labs or CT scan, advised f/u with PCP for recheck         Amount and/or Complexity of Data Reviewed  Clinical lab tests: ordered and reviewed  Tests in the radiology section of CPT®: ordered and reviewed    Patient Progress  Patient progress: stable      Disposition  Final diagnoses:   Right sided abdominal pain     Time reflects when diagnosis was documented in both MDM as applicable and the Disposition within this note     Time User Action Codes Description Comment    5/14/2021  4:22 PM Saran Coto Add [R10 9] Right sided abdominal pain       ED Disposition     ED Disposition Condition Date/Time Comment    Discharge Stable Fri May 14, 2021  4:22 PM [facility] discharge to home/self care              Follow-up Information     Follow up With Specialties Details Why Contact Info    Lauryn Orr MD Family Medicine Call in 3 days For recheck [address] [facility]  [address] [facility] [address]  [address]            Discharge Medication List as of 5/14/2021  4:22 PM      CONTINUE these medications which have NOT CHANGED    Details   albuterol (VENTOLIN HFA) 90 mcg/act inhaler Inhale 2 puffs every 6 (six) hours as needed for wheezing, Starting Mon 10/14/2019, Normal      ALPRAZolam (XANAX) 1 mg tablet Take 1 mg by mouth daily at bedtime , Starting Fri 4/5/2019, Historical Med      dicyclomine (BENTYL) 20 mg tablet Take 1 tablet (20 mg total) by mouth 3 (three) times a day as needed (Abdominal pain or diarrhea), Starting Wed 1/27/2021, Normal      gabapentin (NEURONTIN) 300 mg capsule Take 1 capsule (300 mg total) by mouth 2 (two) times a day, Starting Thu 2/27/2020, Normal      indomethacin (INDOCIN) 50 mg capsule Take 1 capsule (50 mg total) by mouth 2 (two) times a day with meals, Starting Thu 3/18/2021, Normal      mometasone (Asmanex, 60 Metered Doses,) 220 MCG/INH inhaler Inhale 220 mcg daily, Historical Med      montelukast (SINGULAIR) 10 mg tablet Take 1 tablet (10 mg total) by mouth daily at bedtime, Starting Thu 2/27/2020, Normal      ondansetron (ZOFRAN-ODT) 4 mg disintegrating tablet Take 1 tablet (4 mg total) by mouth every 6 (six) hours as needed for nausea or vomiting for up to 3 days, Starting Thu 6/18/2020, Until Sun 6/21/2020, Normal      pantoprazole (PROTONIX) 40 mg tablet TAKE 1 TABLET BY MOUTH EVERY DAY, Normal      sertraline (ZOLOFT) 100 mg tablet Take 100 mg by mouth daily, Historical Med      traZODone (DESYREL) 50 mg tablet Take 50 mg by mouth daily at bedtime, Historical Med           No discharge procedures on file      PDMP Review       Value Time User    PDMP Reviewed  Yes 1/27/2021  5:46 PM Erik Medico, DO          ED Provider  Electronically Signed by           Caitlin Boston PA-C  05/14/21 6266

## 2021-05-20 ENCOUNTER — HOSPITAL ENCOUNTER (EMERGENCY)
Facility: HOSPITAL | Age: 41
Discharge: HOME | End: 2021-05-20
Attending: EMERGENCY MEDICINE
Payer: COMMERCIAL

## 2021-05-20 VITALS
HEART RATE: 62 BPM | SYSTOLIC BLOOD PRESSURE: 145 MMHG | RESPIRATION RATE: 14 BRPM | WEIGHT: 160 LBS | HEIGHT: 64 IN | OXYGEN SATURATION: 99 % | TEMPERATURE: 98.4 F | DIASTOLIC BLOOD PRESSURE: 75 MMHG | BODY MASS INDEX: 27.31 KG/M2

## 2021-05-20 DIAGNOSIS — T81.72XA THROMBOPHLEBITIS DUE TO PROCEDURE: ICD-10-CM

## 2021-05-20 DIAGNOSIS — R42 VERTIGO: Primary | ICD-10-CM

## 2021-05-20 DIAGNOSIS — I80.9 THROMBOPHLEBITIS DUE TO PROCEDURE: ICD-10-CM

## 2021-05-20 PROCEDURE — 99284 EMERGENCY DEPT VISIT MOD MDM: CPT

## 2021-05-20 PROCEDURE — 99284 EMERGENCY DEPT VISIT MOD MDM: CPT | Performed by: EMERGENCY MEDICINE

## 2021-05-20 PROCEDURE — 93005 ELECTROCARDIOGRAM TRACING: CPT

## 2021-05-21 NOTE — ED PROVIDER NOTES
History  Chief Complaint   Patient presents with    Dizziness     Pt was seen friday and had IV, since the IV in left arm pt has had pain, was seen at urgent care yesterday and had ultrasound of left arm and states she has a blood clot  Pt c/o feeling dizzy since she woke up  Denies n/v/d      72-year-old female with history of asthma, anxiety, depression and PTSD complains of right arm soreness and warmth and intermittent episodes of vertigo for the past several months  She states she had IV with IV contrast for CT scan several days ago  The next morning she developed some pain in that IV site  Over the course next air to pain increased  She has some swelling in the area and went to urgent care  She had a Doppler scan performed  I was able to read the official results showing no DVT but there was thrombophlebitis of the basilic vein on the right  She has been taking Motrin for this  She states that she still feels warm and tender slightly edematous although the edema has improved  She is unsure if her dizziness is secondary to this although admits that she has had this same type of vertigo intermittently for the last several months  Her doctor has been doing workup for the dizziness  Patient denies dyspnea, palpitations, chest pain, fever, cough and any change in sensation or strength of the right upper extremity  Prior to Admission Medications   Prescriptions Last Dose Informant Patient Reported? Taking?    ALPRAZolam (XANAX) 1 mg tablet  Self Yes No   Sig: Take 1 mg by mouth daily at bedtime    albuterol (VENTOLIN HFA) 90 mcg/act inhaler  Self No No   Sig: Inhale 2 puffs every 6 (six) hours as needed for wheezing   dicyclomine (BENTYL) 20 mg tablet   No No   Sig: Take 1 tablet (20 mg total) by mouth 3 (three) times a day as needed (Abdominal pain or diarrhea)   Patient not taking: Reported on 3/18/2021   gabapentin (NEURONTIN) 300 mg capsule  Self No No   Sig: Take 1 capsule (300 mg total) by mouth 2 (two) times a day   Patient not taking: Reported on 3/18/2021   indomethacin (INDOCIN) 50 mg capsule   No No   Sig: Take 1 capsule (50 mg total) by mouth 2 (two) times a day with meals   mometasone (Asmanex, 60 Metered Doses,) 220 MCG/INH inhaler  Self Yes No   Sig: Inhale 220 mcg daily   montelukast (SINGULAIR) 10 mg tablet  Self No No   Sig: Take 1 tablet (10 mg total) by mouth daily at bedtime   ondansetron (ZOFRAN-ODT) 4 mg disintegrating tablet   No No   Sig: Take 1 tablet (4 mg total) by mouth every 6 (six) hours as needed for nausea or vomiting for up to 3 days   pantoprazole (PROTONIX) 40 mg tablet   No No   Sig: TAKE 1 TABLET BY MOUTH EVERY DAY   Patient not taking: Reported on 3/18/2021   sertraline (ZOLOFT) 100 mg tablet   Yes No   Sig: Take 100 mg by mouth daily   traZODone (DESYREL) 50 mg tablet   Yes No   Sig: Take 50 mg by mouth daily at bedtime      Facility-Administered Medications: None       Past Medical History:   Diagnosis Date    Anxiety     Asthma     Depression     Hypoglycemia     Moderate episode of recurrent major depressive disorder ([facility] [address] ) 10/3/2019    PTSD (post-traumatic stress disorder)        Past Surgical History:   Procedure Laterality Date    ANKLE LIGAMENT RECONSTRUCTION       SECTION      CHOLECYSTECTOMY LAPAROSCOPIC N/A 2020    Procedure: CHOLECYSTECTOMY LAPAROSCOPIC;  Surgeon: Bertram James MD;  Location:  MAIN OR;  Service: General    KNEE SURGERY      SINUS SURGERY      TONSILLECTOMY      TONSILLECTOMY      TUBAL LIGATION      UMBILICAL HERNIA REPAIR N/A 2020    Procedure: REPAIR HERNIA UMBILICAL, Primary;  Surgeon: Bertram James MD;  Location:  MAIN OR;  Service: General       Family History   Problem Relation Age of Onset    Depression Mother      I have reviewed and agree with the history as documented      E-Cigarette/Vaping    E-Cigarette Use Never User      E-Cigarette/Vaping Substances    Nicotine No     THC No  CBD No     Flavoring No     Other No     Unknown No      Social History     Tobacco Use    Smoking status: Never Smoker    Smokeless tobacco: Never Used   Substance Use Topics    Alcohol use: Yes     Frequency: Monthly or less     Drinks per session: 1 or 2     Binge frequency: Never    Drug use: Never       Review of Systems   Constitutional: Negative  HENT: Negative  Eyes: Negative  Respiratory: Negative  Cardiovascular: Negative  Gastrointestinal: Negative  Endocrine: Negative  Genitourinary: Negative  Musculoskeletal: Negative  Right upper extremity tenderness and edema as per HPI   Skin: Negative  Allergic/Immunologic: Negative  Neurological: Positive for dizziness  Negative for seizures, syncope, facial asymmetry, speech difficulty, numbness and headaches  Hematological: Negative  Psychiatric/Behavioral: The patient is nervous/anxious  All other systems reviewed and are negative  Physical Exam  Physical Exam  Vitals signs and nursing note reviewed  Constitutional:       General: She is not in acute distress  Appearance: She is well-developed and normal weight  She is not ill-appearing or diaphoretic  HENT:      Head: Normocephalic and atraumatic  Right Ear: External ear normal       Left Ear: External ear normal    Eyes:      General: No scleral icterus  Extraocular Movements: Extraocular movements intact  Conjunctiva/sclera: Conjunctivae normal       Pupils: Pupils are equal, round, and reactive to light  Neck:      Musculoskeletal: Normal range of motion and neck supple  No muscular tenderness  Cardiovascular:      Rate and Rhythm: Normal rate and regular rhythm  Pulses: Normal pulses  Heart sounds: No murmur  Pulmonary:      Effort: Pulmonary effort is normal       Breath sounds: Normal breath sounds  Abdominal:      General: Bowel sounds are normal       Palpations: Abdomen is soft  Tenderness:  There is no abdominal tenderness  Musculoskeletal: Normal range of motion  General: Swelling and tenderness present  Comments: Subtle swelling over the medial aspect of the right elbow  There is slight tenderness and warmth without induration or erythema  There is no palpable vein  No distal edema  Normal sensation, skin color, tendon function and capillary refill  No adenopathy in the right upper extremity or axilla  No neck vein distension or neck tenderness  Skin:     General: Skin is warm and dry  Capillary Refill: Capillary refill takes less than 2 seconds  Findings: No rash  Neurological:      General: No focal deficit present  Mental Status: She is alert and oriented to person, place, and time  Mental status is at baseline  Cranial Nerves: No cranial nerve deficit  Coordination: Coordination normal       Deep Tendon Reflexes: Reflexes are normal and symmetric     Psychiatric:         Behavior: Behavior normal       Comments: Anxious         Vital Signs  ED Triage Vitals [05/20/21 1910]   Temperature Pulse Respirations Blood Pressure SpO2   98 4 °F (36 9 °C) 70 16 125/70 99 %      Temp Source Heart Rate Source Patient Position - Orthostatic VS BP Location FiO2 (%)   Temporal Monitor Sitting Left arm --      Pain Score       --           Vitals:    05/20/21 1910 05/20/21 2000   BP: 125/70 145/75   Pulse: 70 62   Patient Position - Orthostatic VS: Sitting Sitting         Visual Acuity      ED Medications  Medications - No data to display    Diagnostic Studies  Results Reviewed     None                 No orders to display              Procedures  ECG 12 Lead Documentation Only    Date/Time: 5/20/2021 8:25 PM  Performed by: Rebeca Romo DO  Authorized by: Rebeca Romo DO     ECG reviewed by me, the ED Provider: yes    Patient location:  ED  Previous ECG:     Previous ECG:  Unavailable    Comparison to cardiac monitor: Yes    Interpretation:     Interpretation: normal               ED Course                             SBIRT 22yo+      Most Recent Value   SBIRT (24 yo +)   In order to provide better care to our patients, we are screening all of our patients for alcohol and drug use  Would it be okay to ask you these screening questions? Yes Filed at: 05/20/2021 1912   Initial Alcohol Screen: US AUDIT-C    1  How often do you have a drink containing alcohol?  0 Filed at: 05/20/2021 1912   2  How many drinks containing alcohol do you have on a typical day you are drinking? 0 Filed at: 05/20/2021 1912   3a  Male UNDER 65: How often do you have five or more drinks on one occasion? 0 Filed at: 05/20/2021 1912   3b  FEMALE Any Age, or MALE 65+: How often do you have 4 or more drinks on one occassion? 0 Filed at: 05/20/2021 1912   Audit-C Score  0 Filed at: 05/20/2021 1912   UMAIR: How many times in the past year have you    Used an illegal drug or used a prescription medication for non-medical reasons? Never Filed at: 05/20/2021 1912                    MDM  Number of Diagnoses or Management Options  Thrombophlebitis due to procedure: established and improving  Vertigo: established and improving  Diagnosis management comments:   24-year-old female with documented superficial thrombophlebitis from IV  placement in the right arm on 05/14/2021  At the time of the injection there was no known infiltrate or symptoms  Patient and tenderness and swelling and had Doppler exam done showing superficial thrombophlebitis in no DVT  Since then symptoms have improved  She has dizziness but admits the this is a chronic problem, is no different than her prior episodes and is likely due to stress  Patient has no focal neurologic symptoms and no signs of DVT, cardiac or pulmonary disease         Amount and/or Complexity of Data Reviewed  Review and summarize past medical records: yes  Independent visualization of images, tracings, or specimens: yes        Disposition  Final diagnoses: Vertigo   Thrombophlebitis due to procedure     Time reflects when diagnosis was documented in both MDM as applicable and the Disposition within this note     Time User Action Codes Description Comment    5/20/2021  8:30 PM David Zazueta Add [R42] Vertigo     5/20/2021  8:30 PM David Zazueta Add [Y85 [id number],  I80 9] Thrombophlebitis due to procedure       ED Disposition     ED Disposition Condition Date/Time Comment    Discharge Stable Thu May 20, 2021  8:30 PM [facility] discharge to home/self care              Follow-up Information     Follow up With Specialties Details Why Contact Info    Martin Kincaid MD Family Medicine Call  As needed [address] [facility]  [address] [facility] [address]  [address]            Discharge Medication List as of 5/20/2021  8:33 PM      CONTINUE these medications which have NOT CHANGED    Details   albuterol (VENTOLIN HFA) 90 mcg/act inhaler Inhale 2 puffs every 6 (six) hours as needed for wheezing, Starting Mon 10/14/2019, Normal      ALPRAZolam (XANAX) 1 mg tablet Take 1 mg by mouth daily at bedtime , Starting Fri 4/5/2019, Historical Med      dicyclomine (BENTYL) 20 mg tablet Take 1 tablet (20 mg total) by mouth 3 (three) times a day as needed (Abdominal pain or diarrhea), Starting Wed 1/27/2021, Normal      gabapentin (NEURONTIN) 300 mg capsule Take 1 capsule (300 mg total) by mouth 2 (two) times a day, Starting Thu 2/27/2020, Normal      indomethacin (INDOCIN) 50 mg capsule Take 1 capsule (50 mg total) by mouth 2 (two) times a day with meals, Starting Thu 3/18/2021, Normal      mometasone (Asmanex, 60 Metered Doses,) 220 MCG/INH inhaler Inhale 220 mcg daily, Historical Med      montelukast (SINGULAIR) 10 mg tablet Take 1 tablet (10 mg total) by mouth daily at bedtime, Starting Thu 2/27/2020, Normal      ondansetron (ZOFRAN-ODT) 4 mg disintegrating tablet Take 1 tablet (4 mg total) by mouth every 6 (six) hours as needed for nausea or vomiting for up to 3 days, Starting Thu 6/18/2020, Until Sun 6/21/2020, Normal      pantoprazole (PROTONIX) 40 mg tablet TAKE 1 TABLET BY MOUTH EVERY DAY, Normal      sertraline (ZOLOFT) 100 mg tablet Take 100 mg by mouth daily, Historical Med      traZODone (DESYREL) 50 mg tablet Take 50 mg by mouth daily at bedtime, Historical Med           No discharge procedures on file      PDMP Review       Value Time User    PDMP Reviewed  Yes 1/27/2021  5:46 PM Billie Abdi DO          ED Provider  Electronically Signed by           Billie Abdi DO  05/21/21 4206

## 2021-05-21 NOTE — DISCHARGE INSTRUCTIONS
Continue ibuprofen, warm soaks and elevation of the arm  Ask your doctor for further workup of your recurrent vertigo

## 2021-05-22 LAB
ATRIAL RATE: 61 BPM
P AXIS: 47 DEGREES
PR INTERVAL: 154 MS
QRS AXIS: 23 DEGREES
QRSD INTERVAL: 80 MS
QT INTERVAL: 410 MS
QTC INTERVAL: 412 MS
T WAVE AXIS: 40 DEGREES
VENTRICULAR RATE: 61 BPM

## 2021-05-22 PROCEDURE — 93010 ELECTROCARDIOGRAM REPORT: CPT | Performed by: INTERNAL MEDICINE

## 2021-06-12 ENCOUNTER — APPOINTMENT (EMERGENCY)
Dept: RADIOLOGY | Facility: HOSPITAL | Age: 41
End: 2021-06-12
Payer: COMMERCIAL

## 2021-06-12 ENCOUNTER — APPOINTMENT (EMERGENCY)
Dept: CT IMAGING | Facility: HOSPITAL | Age: 41
End: 2021-06-12
Payer: COMMERCIAL

## 2021-06-12 ENCOUNTER — HOSPITAL ENCOUNTER (EMERGENCY)
Facility: HOSPITAL | Age: 41
Discharge: HOME | End: 2021-06-12
Attending: EMERGENCY MEDICINE | Admitting: EMERGENCY MEDICINE
Payer: COMMERCIAL

## 2021-06-12 VITALS
WEIGHT: 160 LBS | HEIGHT: 64 IN | OXYGEN SATURATION: 95 % | RESPIRATION RATE: 13 BRPM | TEMPERATURE: 97.9 F | DIASTOLIC BLOOD PRESSURE: 73 MMHG | SYSTOLIC BLOOD PRESSURE: 116 MMHG | BODY MASS INDEX: 27.31 KG/M2 | HEART RATE: 83 BPM

## 2021-06-12 DIAGNOSIS — S09.90XA HEAD INJURY: ICD-10-CM

## 2021-06-12 DIAGNOSIS — R00.0 SINUS TACHYCARDIA: ICD-10-CM

## 2021-06-12 DIAGNOSIS — R55 SYNCOPE: Primary | ICD-10-CM

## 2021-06-12 DIAGNOSIS — S06.0X9A CONCUSSION: ICD-10-CM

## 2021-06-12 DIAGNOSIS — S93.402A LEFT ANKLE SPRAIN: ICD-10-CM

## 2021-06-12 LAB
ALBUMIN SERPL BCP-MCNC: 3.7 G/DL (ref 3.5–5)
ALP SERPL-CCNC: 52 U/L (ref 46–116)
ALT SERPL W P-5'-P-CCNC: 37 U/L (ref 12–78)
AMPHETAMINES SERPL QL SCN: NEGATIVE
ANION GAP SERPL CALCULATED.3IONS-SCNC: 10 MMOL/L (ref 4–13)
APAP SERPL-MCNC: <2 UG/ML (ref 10–20)
AST SERPL W P-5'-P-CCNC: 27 U/L (ref 5–45)
BARBITURATES UR QL: NEGATIVE
BASOPHILS # BLD AUTO: 0.05 THOUSANDS/ΜL (ref 0–0.1)
BASOPHILS NFR BLD AUTO: 1 % (ref 0–1)
BENZODIAZ UR QL: NEGATIVE
BILIRUB SERPL-MCNC: 0.4 MG/DL (ref 0.2–1)
BILIRUB UR QL STRIP: NEGATIVE
BUN SERPL-MCNC: 9 MG/DL (ref 5–25)
CALCIUM SERPL-MCNC: 8.6 MG/DL (ref 8.3–10.1)
CHLORIDE SERPL-SCNC: 105 MMOL/L (ref 100–108)
CLARITY UR: CLEAR
CO2 SERPL-SCNC: 27 MMOL/L (ref 21–32)
COCAINE UR QL: NEGATIVE
COLOR UR: YELLOW
CREAT SERPL-MCNC: 0.82 MG/DL (ref 0.6–1.3)
D DIMER PPP FEU-MCNC: 5.86 UG/ML FEU
EOSINOPHIL # BLD AUTO: 0.07 THOUSAND/ΜL (ref 0–0.61)
EOSINOPHIL NFR BLD AUTO: 1 % (ref 0–6)
ERYTHROCYTE [DISTWIDTH] IN BLOOD BY AUTOMATED COUNT: 13.2 % (ref 11.6–15.1)
ETHANOL SERPL-MCNC: 3 MG/DL (ref 0–3)
EXT PREG TEST URINE: NEGATIVE
EXT. CONTROL ED NAV: NORMAL
GFR SERPL CREATININE-BSD FRML MDRD: 89 ML/MIN/1.73SQ M
GLUCOSE SERPL-MCNC: 122 MG/DL (ref 65–140)
GLUCOSE UR STRIP-MCNC: NEGATIVE MG/DL
HCT VFR BLD AUTO: 36.5 % (ref 34.8–46.1)
HGB BLD-MCNC: 11.7 G/DL (ref 11.5–15.4)
HGB UR QL STRIP.AUTO: NEGATIVE
IMM GRANULOCYTES # BLD AUTO: 0.01 THOUSAND/UL (ref 0–0.2)
IMM GRANULOCYTES NFR BLD AUTO: 0 % (ref 0–2)
KETONES UR STRIP-MCNC: NEGATIVE MG/DL
LEUKOCYTE ESTERASE UR QL STRIP: NEGATIVE
LYMPHOCYTES # BLD AUTO: 1.85 THOUSANDS/ΜL (ref 0.6–4.47)
LYMPHOCYTES NFR BLD AUTO: 30 % (ref 14–44)
MCH RBC QN AUTO: 28.7 PG (ref 26.8–34.3)
MCHC RBC AUTO-ENTMCNC: 32.1 G/DL (ref 31.4–37.4)
MCV RBC AUTO: 90 FL (ref 82–98)
METHADONE UR QL: NEGATIVE
MONOCYTES # BLD AUTO: 0.33 THOUSAND/ΜL (ref 0.17–1.22)
MONOCYTES NFR BLD AUTO: 5 % (ref 4–12)
NEUTROPHILS # BLD AUTO: 3.83 THOUSANDS/ΜL (ref 1.85–7.62)
NEUTS SEG NFR BLD AUTO: 63 % (ref 43–75)
NITRITE UR QL STRIP: NEGATIVE
NRBC BLD AUTO-RTO: 0 /100 WBCS
OPIATES UR QL SCN: NEGATIVE
OXYCODONE+OXYMORPHONE UR QL SCN: NEGATIVE
PCP UR QL: NEGATIVE
PH UR STRIP.AUTO: 6 [PH]
PLATELET # BLD AUTO: 197 THOUSANDS/UL (ref 149–390)
PMV BLD AUTO: 10.7 FL (ref 8.9–12.7)
POTASSIUM SERPL-SCNC: 3.3 MMOL/L (ref 3.5–5.3)
PROT SERPL-MCNC: 6.7 G/DL (ref 6.4–8.2)
PROT UR STRIP-MCNC: NEGATIVE MG/DL
RBC # BLD AUTO: 4.07 MILLION/UL (ref 3.81–5.12)
SALICYLATES SERPL-MCNC: <3 MG/DL (ref 3–20)
SODIUM SERPL-SCNC: 142 MMOL/L (ref 136–145)
SP GR UR STRIP.AUTO: 1.01 (ref 1–1.03)
THC UR QL: POSITIVE
TROPONIN I SERPL-MCNC: <0.02 NG/ML
TSH SERPL DL<=0.05 MIU/L-ACNC: 1.16 UIU/ML (ref 0.36–3.74)
UROBILINOGEN UR QL STRIP.AUTO: 0.2 E.U./DL
WBC # BLD AUTO: 6.14 THOUSAND/UL (ref 4.31–10.16)

## 2021-06-12 PROCEDURE — 80143 DRUG ASSAY ACETAMINOPHEN: CPT | Performed by: PHYSICIAN ASSISTANT

## 2021-06-12 PROCEDURE — 81025 URINE PREGNANCY TEST: CPT | Performed by: PHYSICIAN ASSISTANT

## 2021-06-12 PROCEDURE — 93005 ELECTROCARDIOGRAM TRACING: CPT

## 2021-06-12 PROCEDURE — 96361 HYDRATE IV INFUSION ADD-ON: CPT

## 2021-06-12 PROCEDURE — 81003 URINALYSIS AUTO W/O SCOPE: CPT | Performed by: PHYSICIAN ASSISTANT

## 2021-06-12 PROCEDURE — 36415 COLL VENOUS BLD VENIPUNCTURE: CPT | Performed by: PHYSICIAN ASSISTANT

## 2021-06-12 PROCEDURE — 80307 DRUG TEST PRSMV CHEM ANLYZR: CPT | Performed by: PHYSICIAN ASSISTANT

## 2021-06-12 PROCEDURE — 80053 COMPREHEN METABOLIC PANEL: CPT | Performed by: PHYSICIAN ASSISTANT

## 2021-06-12 PROCEDURE — 73610 X-RAY EXAM OF ANKLE: CPT

## 2021-06-12 PROCEDURE — 99285 EMERGENCY DEPT VISIT HI MDM: CPT | Performed by: PHYSICIAN ASSISTANT

## 2021-06-12 PROCEDURE — 96374 THER/PROPH/DIAG INJ IV PUSH: CPT

## 2021-06-12 PROCEDURE — 99284 EMERGENCY DEPT VISIT MOD MDM: CPT

## 2021-06-12 PROCEDURE — 82077 ASSAY SPEC XCP UR&BREATH IA: CPT | Performed by: PHYSICIAN ASSISTANT

## 2021-06-12 PROCEDURE — 84484 ASSAY OF TROPONIN QUANT: CPT | Performed by: PHYSICIAN ASSISTANT

## 2021-06-12 PROCEDURE — 70450 CT HEAD/BRAIN W/O DYE: CPT

## 2021-06-12 PROCEDURE — 71275 CT ANGIOGRAPHY CHEST: CPT

## 2021-06-12 PROCEDURE — G1004 CDSM NDSC: HCPCS

## 2021-06-12 PROCEDURE — 85025 COMPLETE CBC W/AUTO DIFF WBC: CPT | Performed by: PHYSICIAN ASSISTANT

## 2021-06-12 PROCEDURE — 71045 X-RAY EXAM CHEST 1 VIEW: CPT

## 2021-06-12 PROCEDURE — 84443 ASSAY THYROID STIM HORMONE: CPT | Performed by: PHYSICIAN ASSISTANT

## 2021-06-12 PROCEDURE — 80179 DRUG ASSAY SALICYLATE: CPT | Performed by: PHYSICIAN ASSISTANT

## 2021-06-12 PROCEDURE — 85379 FIBRIN DEGRADATION QUANT: CPT | Performed by: PHYSICIAN ASSISTANT

## 2021-06-12 RX ORDER — KETOROLAC TROMETHAMINE 30 MG/ML
15 INJECTION, SOLUTION INTRAMUSCULAR; INTRAVENOUS ONCE
Status: COMPLETED | OUTPATIENT
Start: 2021-06-12 | End: 2021-06-12

## 2021-06-12 RX ORDER — FENTANYL CITRATE 50 UG/ML
1 INJECTION, SOLUTION INTRAMUSCULAR; INTRAVENOUS ONCE
Status: COMPLETED | OUTPATIENT
Start: 2021-06-12 | End: 2021-06-12

## 2021-06-12 RX ORDER — ONDANSETRON 2 MG/ML
1 INJECTION INTRAMUSCULAR; INTRAVENOUS ONCE
Status: COMPLETED | OUTPATIENT
Start: 2021-06-12 | End: 2021-06-12

## 2021-06-12 RX ADMIN — IOHEXOL 85 ML: 350 INJECTION, SOLUTION INTRAVENOUS at 11:58

## 2021-06-12 RX ADMIN — KETOROLAC TROMETHAMINE 15 MG: 30 INJECTION, SOLUTION INTRAMUSCULAR; INTRAVENOUS at 11:13

## 2021-06-12 RX ADMIN — SODIUM CHLORIDE 1000 ML: 0.9 INJECTION, SOLUTION INTRAVENOUS at 10:06

## 2021-06-12 NOTE — DISCHARGE INSTRUCTIONS
Rest, increase fluids  Tylenol as needed for pain  No tablets, computers, video games for next couple days  Follow up with family doctor in 2 days for recheck  Return to ER if symptoms worsen  Ice, elevate left foot

## 2021-06-12 NOTE — ED NOTES
Patient made aware that we are still in need of a urine sample  Patient's fiance reports she has been "trying"  Asked patient if she felt like she had to void and she shrugged her shoulders   Will bladder scan and proceed with straight cath urine if appropriate     Lajoyce Blizzard, RN  06/12/21 0896

## 2021-06-12 NOTE — ED NOTES
Patient reports she is unable to void, offered to reposition or to assist to [facility] and patient stated "I can't"  Patient aware she will need to be straight cathed for urine and is agreeable        Lay Campbell RN  06/12/21 [id number]

## 2021-06-12 NOTE — ED PROVIDER NOTES
History  Chief Complaint   Patient presents with    Ankle Injury     Patient presents to the ER uncontrollably crying post fall and left ankle injury at home  Patients' family member reported an unwitnessed fall and 'helping the patient back into bed' prior to calling EMS  Patient is a 38 y/o F with h/o anxiety, depression that was BIBA from home after either a fall or syncopal episode  Patient states she was in the bathroom and does not remember falling  She remembers reaching for the door handle and then the next thing she remembers is lying in bed  She is c/o headache and left ankle pain  She denies any other injuries  History is limited due to patient crying and not answering questions  She refuses to open her eyes  No bruising to body  She has tenderness to left ankle  No tenderness to RLE or B/L UE  She states she feels lightheaded when she sits up  She denies drinking alcohol or illegal drugs  She denies taking an overdose  History provided by:  Patient  Ankle Injury  Location:  Left lateral ankle  Severity:  Moderate  Onset quality:  Sudden  Timing:  Constant  Progression:  Unchanged  Chronicity:  New  Context:  Patient either fell or had a syncopal episode, unable to get a reliable history  Ineffective treatments:  Patient given fentanyl in the ambulance  Associated symptoms: no abdominal pain, no chest pain, no cough, no diarrhea, no fever, no shortness of breath and no vomiting        Prior to Admission Medications   Prescriptions Last Dose Informant Patient Reported? Taking?    ALPRAZolam (XANAX) 1 mg tablet  Self Yes No   Sig: Take 1 mg by mouth daily at bedtime    albuterol (VENTOLIN HFA) 90 mcg/act inhaler  Self No No   Sig: Inhale 2 puffs every 6 (six) hours as needed for wheezing   dicyclomine (BENTYL) 20 mg tablet   No No   Sig: Take 1 tablet (20 mg total) by mouth 3 (three) times a day as needed (Abdominal pain or diarrhea)   Patient not taking: Reported on 3/18/2021 gabapentin (NEURONTIN) 300 mg capsule  Self No No   Sig: Take 1 capsule (300 mg total) by mouth 2 (two) times a day   Patient not taking: Reported on 3/18/2021   indomethacin (INDOCIN) 50 mg capsule   No No   Sig: Take 1 capsule (50 mg total) by mouth 2 (two) times a day with meals   mometasone (Asmanex, 60 Metered Doses,) 220 MCG/INH inhaler  Self Yes No   Sig: Inhale 220 mcg daily   montelukast (SINGULAIR) 10 mg tablet  Self No No   Sig: Take 1 tablet (10 mg total) by mouth daily at bedtime   ondansetron (ZOFRAN-ODT) 4 mg disintegrating tablet   No No   Sig: Take 1 tablet (4 mg total) by mouth every 6 (six) hours as needed for nausea or vomiting for up to 3 days   pantoprazole (PROTONIX) 40 mg tablet   No No   Sig: TAKE 1 TABLET BY MOUTH EVERY DAY   Patient not taking: Reported on 3/18/2021   sertraline (ZOLOFT) 100 mg tablet   Yes No   Sig: Take 100 mg by mouth daily   traZODone (DESYREL) 50 mg tablet   Yes No   Sig: Take 50 mg by mouth daily at bedtime      Facility-Administered Medications: None       Past Medical History:   Diagnosis Date    Anxiety     Asthma     Depression     Hypoglycemia     Moderate episode of recurrent major depressive disorder ([facility] [address] ) 10/3/2019    PTSD (post-traumatic stress disorder)        Past Surgical History:   Procedure Laterality Date    ANKLE LIGAMENT RECONSTRUCTION       SECTION      CHOLECYSTECTOMY LAPAROSCOPIC N/A 2020    Procedure: CHOLECYSTECTOMY LAPAROSCOPIC;  Surgeon: Taqueria Hein MD;  Location:  MAIN OR;  Service: General    KNEE SURGERY      SINUS SURGERY      TONSILLECTOMY      TONSILLECTOMY      TUBAL LIGATION      UMBILICAL HERNIA REPAIR N/A 2020    Procedure: REPAIR HERNIA UMBILICAL, Primary;  Surgeon: Taqueria Hein MD;  Location: [facility] MAIN OR;  Service: General       Family History   Problem Relation Age of Onset    Depression Mother      I have reviewed and agree with the history as documented      E-Cigarette/Vaping    E-Cigarette Use Never User      E-Cigarette/Vaping Substances    Nicotine No     THC No     CBD No     Flavoring No     Other No     Unknown No      Social History     Tobacco Use    Smoking status: Never Smoker    Smokeless tobacco: Never Used   Substance Use Topics    Alcohol use: Yes     Frequency: Monthly or less     Drinks per session: 1 or 2     Binge frequency: Never    Drug use: Never       Review of Systems   Unable to perform ROS: Acuity of condition   Constitutional: Negative for chills and fever  HENT: Negative  Respiratory: Negative for cough and shortness of breath  Cardiovascular: Negative for chest pain  Gastrointestinal: Negative for abdominal pain, diarrhea and vomiting  Musculoskeletal:        Left ankle pain   Skin: Negative for color change and wound  Neurological: Positive for dizziness  Psychiatric/Behavioral: Positive for confusion  Physical Exam  Physical Exam  Vitals signs and nursing note reviewed  Constitutional:       General: She is awake  She is not in acute distress  Appearance: She is well-developed and well-groomed  She is not diaphoretic  Comments: Patient tearful, states she is crying because her ankle hurts and she does not remember falling  HENT:      Head: Normocephalic and atraumatic  Right Ear: External ear normal       Left Ear: External ear normal       Nose: Nose normal    Eyes:      Comments: Patient refusing to open eyes  I did force her eyes open, conjunctiva injected b/l Pupils are round and equal     Neck:      Musculoskeletal: Normal range of motion  No spinous process tenderness or muscular tenderness  Cardiovascular:      Rate and Rhythm: Regular rhythm  Tachycardia present  Pulses:           Dorsalis pedis pulses are 2+ on the left side  Heart sounds: Normal heart sounds  Pulmonary:      Effort: Pulmonary effort is normal       Breath sounds: Normal breath sounds  No wheezing, rhonchi or rales  Abdominal:      General: Abdomen is flat  Bowel sounds are normal       Palpations: Abdomen is soft  Tenderness: There is no abdominal tenderness  Musculoskeletal:      Left hip: Normal       Left knee: Normal       Left ankle: She exhibits normal range of motion, no swelling, no ecchymosis, no deformity, no laceration and normal pulse  Tenderness  Lateral malleolus and AITFL tenderness found  No medial malleolus, no head of 5th metatarsal and no proximal fibula tenderness found  Thoracic back: Normal       Lumbar back: Normal       Left foot: Normal       Comments: B/L UE and RLE non tender to palpation, FROM  Skin:     General: Skin is warm and dry  Coloration: Skin is not jaundiced or pale  Findings: No abrasion, bruising or rash  Neurological:      GCS: GCS eye subscore is 2  GCS verbal subscore is 5  GCS motor subscore is 6  Sensory: Sensation is intact  Motor: Motor function is intact  Psychiatric:         Mood and Affect: Mood is depressed  Affect is tearful           Vital Signs  ED Triage Vitals [06/12/21 0914]   Temperature Pulse Respirations Blood Pressure SpO2   97 9 °F (36 6 °C) (!) 149 18 119/68 95 %      Temp Source Heart Rate Source Patient Position - Orthostatic VS BP Location FiO2 (%)   Temporal Monitor Lying Right arm --      Pain Score       Worst Possible Pain           Vitals:    06/12/21 1330 06/12/21 1400 06/12/21 1430 06/12/21 1500   BP: 110/68 111/68 113/69 116/73   Pulse: 98 100 91 83   Patient Position - Orthostatic VS: Lying Lying Lying Lying         Visual Acuity      ED Medications  Medications   fentanyl citrate (PF) (FOR EMS ONLY) 100 mcg/2 mL injection 100 mcg (0 mcg Does not apply Given to EMS 6/12/21 1005)   ondansetron (FOR EMS ONLY) (ZOFRAN) 4 mg/2 mL injection 4 mg (0 mg Does not apply Given to EMS 6/12/21 1005)   sodium chloride 0 9 % bolus 1,000 mL (0 mL Intravenous Stopped 6/12/21 1238)   ketorolac (TORADOL) injection 15 mg (15 mg Intravenous Given 6/12/21 1113)   iohexol (OMNIPAQUE) 350 MG/ML injection (SINGLE-DOSE) 85 mL (85 mL Intravenous Given 6/12/21 1158)       Diagnostic Studies  Results Reviewed     Procedure Component Value Units Date/Time    Rapid drug screen, urine [[id number]]  (Abnormal) Collected: 06/12/21 1426    Lab Status: Final result Specimen: Urine, Catheter Updated: 06/12/21 1443     Amph/Meth UR Negative     Barbiturate Ur Negative     Benzodiazepine Urine Negative     Cocaine Urine Negative     Methadone Urine Negative     Opiate Urine Negative     PCP Ur Negative     THC Urine Positive     Oxycodone Urine Negative    Narrative:      Presumptive report  If requested, specimen will be sent to reference lab for confirmation  FOR MEDICAL PURPOSES ONLY  IF CONFIRMATION NEEDED PLEASE CONTACT THE LAB WITHIN 5 DAYS      Drug Screen Cutoff Levels:  AMPHETAMINE/METHAMPHETAMINES  1000 ng/mL  BARBITURATES     200 ng/mL  BENZODIAZEPINES     200 ng/mL  COCAINE      300 ng/mL  METHADONE      300 ng/mL  OPIATES      300 ng/mL  PHENCYCLIDINE     25 ng/mL  THC       50 ng/mL  OXYCODONE      100 ng/mL    UA w Reflex to Microscopic w Reflex to Culture [[id number]] Collected: 06/12/21 1427    Lab Status: Final result Specimen: Urine, Straight Cath Updated: 06/12/21 1434     Color, UA Yellow     Clarity, UA Clear     Specific Gravity, UA 1 015     pH, UA 6 0     Leukocytes, UA Negative     Nitrite, UA Negative     Protein, UA Negative mg/dl      Glucose, UA Negative mg/dl      Ketones, UA Negative mg/dl      Urobilinogen, UA 0 2 E U /dl      Bilirubin, UA Negative     Blood, UA Negative    POCT pregnancy, urine [[id number]]  (Normal) Resulted: 06/12/21 1427    Lab Status: Final result Updated: 06/12/21 1428     EXT PREG TEST UR (Ref: Negative) negative     Control valid    D-dimer, quantitative [[id number]]  (Abnormal) Collected: 06/12/21 1005    Lab Status: Final result Specimen: Blood from Arm, Right Updated: 06/12/21 1115     D-Dimer, Quant 5 86 ug/ml FEU     Acetaminophen level-If concentration is detectable, please discuss with medical  on call  [[id number]]  (Abnormal) Collected: 06/12/21 1005    Lab Status: Final result Specimen: Blood from Arm, Right Updated: 06/12/21 1102     Acetaminophen Level <2 0 ug/mL     Comprehensive metabolic panel [[id number]]  (Abnormal) Collected: 06/12/21 1005    Lab Status: Final result Specimen: Blood from Arm, Right Updated: 06/12/21 1101     Sodium 142 mmol/L      Potassium 3 3 mmol/L      Chloride 105 mmol/L      CO2 27 mmol/L      ANION GAP 10 mmol/L      BUN 9 mg/dL      Creatinine 0 82 mg/dL      Glucose 122 mg/dL      Calcium 8 6 mg/dL      AST 27 U/L      ALT 37 U/L      Alkaline Phosphatase 52 U/L      Total Protein 6 7 g/dL      Albumin 3 7 g/dL      Total Bilirubin 0 40 mg/dL      eGFR 89 ml/min/1 73sq m     Narrative:      [place] guidelines for Chronic Kidney Disease (CKD):     Stage 1 with normal or high GFR (GFR > 90 mL/min/1 73 square meters)    Stage 2 Mild CKD (GFR = 60-89 mL/min/1 73 square meters)    Stage 3A Moderate CKD (GFR = 45-59 mL/min/1 73 square meters)    Stage 3B Moderate CKD (GFR = 30-44 mL/min/1 73 square meters)    Stage 4 Severe CKD (GFR = 15-29 mL/min/1 73 square meters)    Stage 5 End Stage CKD (GFR <15 mL/min/1 73 square meters)  Note: GFR calculation is accurate only with a steady state creatinine    Salicylate level [[id number]]  (Abnormal) Collected: 06/12/21 1005    Lab Status: Final result Specimen: Blood from Arm, Right Updated: 83/71/79 1229     Salicylate Lvl <3 mg/dL     TSH [[id number]]  (Normal) Collected: 06/12/21 1005    Lab Status: Final result Specimen: Blood from Arm, Right Updated: 06/12/21 1059     TSH 3RD GENERATON 1 161 uIU/mL     Narrative:      Patients undergoing fluorescein dye angiography may retain small amounts of fluorescein in the body for 48-72 hours post procedure   Samples containing fluorescein can produce falsely depressed TSH values  If the patient had this procedure,a specimen should be resubmitted post fluorescein clearance  Troponin I [[id number]]  (Normal) Collected: 06/12/21 1005    Lab Status: Final result Specimen: Blood from Arm, Right Updated: 06/12/21 1054     Troponin I <0 02 ng/mL     Ethanol [[id number]]  (Normal) Collected: 06/12/21 1005    Lab Status: Final result Specimen: Blood from Arm, Right Updated: 06/12/21 1046     Ethanol Lvl 3 mg/dL     CBC and differential [[id number]] Collected: 06/12/21 1005    Lab Status: Final result Specimen: Blood from Arm, Right Updated: 06/12/21 1030     WBC 6 14 Thousand/uL      RBC 4 07 Million/uL      Hemoglobin 11 7 g/dL      Hematocrit 36 5 %      MCV 90 fL      MCH 28 7 pg      MCHC 32 1 g/dL      RDW 13 2 %      MPV 10 7 fL      Platelets 232 Thousands/uL      nRBC 0 /100 WBCs      Neutrophils Relative 63 %      Immat GRANS % 0 %      Lymphocytes Relative 30 %      Monocytes Relative 5 %      Eosinophils Relative 1 %      Basophils Relative 1 %      Neutrophils Absolute 3 83 Thousands/µL      Immature Grans Absolute 0 01 Thousand/uL      Lymphocytes Absolute 1 85 Thousands/µL      Monocytes Absolute 0 33 Thousand/µL      Eosinophils Absolute 0 07 Thousand/µL      Basophils Absolute 0 05 Thousands/µL                  CTA ED chest PE study   Final Result by Yahir Skelton MD (06/12 1256)   1  No pulmonary embolus  2   Mild cardiomegaly  Workstation performed: [id number]         CT head without contrast   Final Result by Je Allen DO (06/12 1034)   No acute intracranial abnormality  No significant change from prior  Workstation performed: [id number]         XR chest 1 view portable   ED Interpretation by Donell Rush PA-C (06/12 1011)   No acute abnormalities  XR ankle 3+ views LEFT   ED Interpretation by Donell Rush PA-C (06/12 1010)   No acute fracture                    Procedures  ECG 12 Lead Documentation Only    Date/Time: 6/12/2021 9:43 AM  Performed by: Leslie Mercado PA-C  Authorized by: Leslie Mercado PA-C     Indications / Diagnosis:  Syncope  ECG reviewed by me, the ED Provider: yes    Patient location:  ED  Previous ECG:     Previous ECG:  Unavailable  Rate:     ECG rate:  117    ECG rate assessment: tachycardic    Rhythm:     Rhythm: sinus tachycardia    Conduction:     Conduction: normal    ST segments:     ST segments:  Normal  T waves:     T waves: normal      Orthopedic injury treatment    Date/Time: 6/12/2021 10:07 AM  Performed by: Leslie Mercado PA-C  Authorized by: Leslie Mercado PA-C     Patient Location:  ED  Other Assisting Provider: No    Injury location:  Ankle  Location details:  Left ankle  Injury type: Soft tissue  Neurovascular status: Neurovascularly intact    Immobilization:  Ace wrap  Supplies used:  Elastic bandage  Neurovascular status: Neurovascularly intact    Patient tolerance:  Patient tolerated the procedure well with no immediate complications             ED Course  ED Course as of Jun 12 1636   Sat Jun 12, 2021   1006 Patient returned from CT scan and states she is having trouble closing her mouth, but prior to CT had her mouth closed  Patient continues to have tearful effect  [phone] Patient still tearful, states her ankle and her head still hurts  1307 Discussed labs with patient  She is aware we are waiting for urine specimen   in the room  He states the fall was witnessed by his daughter  She states patient was ambulating in the hallway and stopped and fell onto buttocks and fell backward and hit her head on the door frame  He is unsure if she lost consciousness  SBIRT 22yo+      Most Recent Value   SBIRT (22 yo +)   In order to provide better care to our patients, we are screening all of our patients for alcohol and drug use  Would it be okay to ask you these screening questions? Yes Filed at: 06/12/2021 1134   Initial Alcohol Screen: US AUDIT-C    1  How often do you have a drink containing alcohol?  0 Filed at: 06/12/2021 1134   2  How many drinks containing alcohol do you have on a typical day you are drinking? 0 Filed at: 06/12/2021 1134   3a  Male UNDER 65: How often do you have five or more drinks on one occasion? 0 Filed at: 06/12/2021 1134   3b  FEMALE Any Age, or MALE 65+: How often do you have 4 or more drinks on one occassion? 0 Filed at: 06/12/2021 1134   Audit-C Score  0 Filed at: 06/12/2021 1134   UMAIR: How many times in the past year have you    Used an illegal drug or used a prescription medication for non-medical reasons? Never Filed at: 06/12/2021 1134          Wells' Criteria for PE      Most Recent Value   Wells' Criteria for PE   Clinical signs and symptoms of DVT  0 Filed at: 06/12/2021 1119   PE is primary diagnosis or equally likely  3 Filed at: 06/12/2021 1119   HR >100  1 5 Filed at: 06/12/2021 1119   Immobilization at least 3 days or Surgery in the previous 4 weeks  0 Filed at: 06/12/2021 1119   Previous, objectively diagnosed PE or DVT  0 Filed at: 06/12/2021 1119   Hemoptysis  0 Filed at: 06/12/2021 1119   Malignancy with treatment within 6 months or palliative  0 Filed at: 06/12/2021 1119   Wells' Criteria Total  4 5 Filed at: 06/12/2021 1119                MDM  Number of Diagnoses or Management Options  Concussion: new and requires workup  Head injury: new and requires workup  Left ankle sprain: new and requires workup  Sinus tachycardia: new and requires workup  Syncope: new and requires workup  Diagnosis management comments: Patient with possible syncopal episode, will order labs, EGK, CXR to r/o cardiopulmonary disease  Patient with left ankle pain and headache, will order xray and CT scan to r/o fracture or brain hemorrhage  Patient has elevated ddimer, tachycardia, syncope, will order CTA chest to r/o PE    No abnormalities on labs or imaging, will discharge and patient given head injury and concussion instructions  Return precautions given  Amount and/or Complexity of Data Reviewed  Clinical lab tests: ordered and reviewed  Tests in the radiology section of CPT®: ordered and reviewed  Independent visualization of images, tracings, or specimens: yes    Patient Progress  Patient progress: stable      Disposition  Final diagnoses:   Syncope   Left ankle sprain   Head injury   Concussion   Sinus tachycardia     Time reflects when diagnosis was documented in both MDM as applicable and the Disposition within this note     Time User Action Codes Description Comment    6/12/2021  2:12 PM Ruthell Francisco Add [R55] Syncope     6/12/2021  2:12 PM Ruthell Francisco Add [I10 834S] Left ankle sprain     6/12/2021  2:13 PM Flakito Pew, [address] [X77 74XF] Head injury     6/12/2021  2:13 PM Ruthell Francisco Add [S06 0X9A] Concussion     6/12/2021  2:13 PM Ruthell Francisco Add [R00 0] Sinus tachycardia       ED Disposition     ED Disposition Condition Date/Time Comment    Discharge Stable Sat Jun 12, 2021  2:55 PM [facility] discharge to home/self care              Follow-up Information     Follow up With Specialties Details Why Contact Info    Melissa Salinas MD Family Medicine Call in 2 days For recheck [address]  [id number] [place] [address] [address] [place] [address]            Discharge Medication List as of 6/12/2021  2:56 PM      CONTINUE these medications which have NOT CHANGED    Details   albuterol (VENTOLIN HFA) 90 mcg/act inhaler Inhale 2 puffs every 6 (six) hours as needed for wheezing, Starting Mon 10/14/2019, Normal      ALPRAZolam (XANAX) 1 mg tablet Take 1 mg by mouth daily at bedtime , Starting Fri 4/5/2019, Historical Med      dicyclomine (BENTYL) 20 mg tablet Take 1 tablet (20 mg total) by mouth 3 (three) times a day as needed (Abdominal pain or diarrhea), Starting Wed 1/27/2021, Normal      gabapentin (NEURONTIN) 300 mg capsule Take 1 capsule (300 mg total) by mouth 2 (two) times a day, Starting Thu 2/27/2020, Normal      indomethacin (INDOCIN) 50 mg capsule Take 1 capsule (50 mg total) by mouth 2 (two) times a day with meals, Starting Thu 3/18/2021, Normal      mometasone (Asmanex, 60 Metered Doses,) 220 MCG/INH inhaler Inhale 220 mcg daily, Historical Med      montelukast (SINGULAIR) 10 mg tablet Take 1 tablet (10 mg total) by mouth daily at bedtime, Starting Thu 2/27/2020, Normal      ondansetron (ZOFRAN-ODT) 4 mg disintegrating tablet Take 1 tablet (4 mg total) by mouth every 6 (six) hours as needed for nausea or vomiting for up to 3 days, Starting Thu 6/18/2020, Until Sun 6/21/2020, Normal      pantoprazole (PROTONIX) 40 mg tablet TAKE 1 TABLET BY MOUTH EVERY DAY, Normal      sertraline (ZOLOFT) 100 mg tablet Take 100 mg by mouth daily, Historical Med      traZODone (DESYREL) 50 mg tablet Take 50 mg by mouth daily at bedtime, Historical Med           No discharge procedures on file      PDMP Review       Value Time User    PDMP Reviewed  Yes 1/27/2021  5:46 PM Mariposa Jiménez DO          ED Provider  Electronically Signed by           Obed Ireland PA-C  06/12/21 0218

## 2021-06-12 NOTE — ED NOTES
Patient's fiance reports the patient needs to void, when RN entered the room, patient in bed with her eyes closed but would nod yes and no when asked direct questions  Patient's fiance reported that she will not be able to ambulate due to her ankle, patient shook her head no when offered a bedpan  Perineal area cleansed and pure-wik placed to allow the patient to void  Patient remains on monitor, VSS, airway patient, breathing spontaneously at a normal rate, responds to pain appropriately as evidenced by facial grimace and withdraw of LLE when ankle was briefly touched, however patient refuses to open her eyes or verbalize anything at this time  Will continue to monitor        Haley Blount RN  06/12/21 2760

## 2021-06-14 LAB
ATRIAL RATE: 117 BPM
P AXIS: 56 DEGREES
PR INTERVAL: 170 MS
QRS AXIS: 11 DEGREES
QRSD INTERVAL: 82 MS
QT INTERVAL: 288 MS
QTC INTERVAL: 401 MS
T WAVE AXIS: -34 DEGREES
VENTRICULAR RATE: 117 BPM

## 2021-06-14 PROCEDURE — 93010 ELECTROCARDIOGRAM REPORT: CPT | Performed by: INTERNAL MEDICINE

## 2021-09-27 ENCOUNTER — HOSPITAL ENCOUNTER (EMERGENCY)
Facility: HOSPITAL | Age: 41
Discharge: HOME | End: 2021-09-27
Attending: EMERGENCY MEDICINE
Payer: COMMERCIAL

## 2021-09-27 ENCOUNTER — APPOINTMENT (EMERGENCY)
Dept: CT IMAGING | Facility: HOSPITAL | Age: 41
End: 2021-09-27
Payer: COMMERCIAL

## 2021-09-27 VITALS
RESPIRATION RATE: 18 BRPM | DIASTOLIC BLOOD PRESSURE: 80 MMHG | SYSTOLIC BLOOD PRESSURE: 131 MMHG | TEMPERATURE: 97.4 F | OXYGEN SATURATION: 99 % | HEART RATE: 114 BPM

## 2021-09-27 DIAGNOSIS — R51.9 HEADACHE: Primary | ICD-10-CM

## 2021-09-27 PROCEDURE — G1004 CDSM NDSC: HCPCS

## 2021-09-27 PROCEDURE — 96372 THER/PROPH/DIAG INJ SC/IM: CPT

## 2021-09-27 PROCEDURE — 70450 CT HEAD/BRAIN W/O DYE: CPT

## 2021-09-27 PROCEDURE — 99284 EMERGENCY DEPT VISIT MOD MDM: CPT | Performed by: EMERGENCY MEDICINE

## 2021-09-27 PROCEDURE — 99283 EMERGENCY DEPT VISIT LOW MDM: CPT

## 2021-09-27 RX ORDER — METOCLOPRAMIDE 10 MG/1
10 TABLET ORAL ONCE
Status: COMPLETED | OUTPATIENT
Start: 2021-09-27 | End: 2021-09-27

## 2021-09-27 RX ORDER — KETOROLAC TROMETHAMINE 30 MG/ML
15 INJECTION, SOLUTION INTRAMUSCULAR; INTRAVENOUS ONCE
Status: COMPLETED | OUTPATIENT
Start: 2021-09-27 | End: 2021-09-27

## 2021-09-27 RX ADMIN — KETOROLAC TROMETHAMINE 15 MG: 30 INJECTION, SOLUTION INTRAMUSCULAR; INTRAVENOUS at 18:23

## 2021-09-27 RX ADMIN — METOCLOPRAMIDE 10 MG: 10 TABLET ORAL at 18:23

## 2022-05-05 ENCOUNTER — HOSPITAL ENCOUNTER (EMERGENCY)
Facility: HOSPITAL | Age: 42
Discharge: HOME | End: 2022-05-06
Attending: EMERGENCY MEDICINE
Payer: COMMERCIAL

## 2022-05-05 ENCOUNTER — APPOINTMENT (EMERGENCY)
Dept: CT IMAGING | Facility: HOSPITAL | Age: 42
End: 2022-05-05
Payer: COMMERCIAL

## 2022-05-05 DIAGNOSIS — K59.00 CONSTIPATION: ICD-10-CM

## 2022-05-05 DIAGNOSIS — R10.9 ABDOMINAL PAIN: Primary | ICD-10-CM

## 2022-05-05 LAB
ALBUMIN SERPL BCP-MCNC: 4.1 G/DL (ref 3.5–5)
ALP SERPL-CCNC: 61 U/L (ref 46–116)
ALT SERPL W P-5'-P-CCNC: 36 U/L (ref 12–78)
ANION GAP SERPL CALCULATED.3IONS-SCNC: 8 MMOL/L (ref 4–13)
AST SERPL W P-5'-P-CCNC: 16 U/L (ref 5–45)
BASOPHILS # BLD AUTO: 0.1 THOUSANDS/ΜL (ref 0–0.1)
BASOPHILS NFR BLD AUTO: 1 % (ref 0–1)
BILIRUB SERPL-MCNC: 0.2 MG/DL (ref 0.2–1)
BILIRUB UR QL STRIP: NEGATIVE
BUN SERPL-MCNC: 6 MG/DL (ref 5–25)
CALCIUM SERPL-MCNC: 8.4 MG/DL (ref 8.3–10.1)
CHLORIDE SERPL-SCNC: 104 MMOL/L (ref 100–108)
CLARITY UR: CLEAR
CO2 SERPL-SCNC: 24 MMOL/L (ref 21–32)
COLOR UR: YELLOW
CREAT SERPL-MCNC: 0.92 MG/DL (ref 0.6–1.3)
EOSINOPHIL # BLD AUTO: 0.18 THOUSAND/ΜL (ref 0–0.61)
EOSINOPHIL NFR BLD AUTO: 2 % (ref 0–6)
ERYTHROCYTE [DISTWIDTH] IN BLOOD BY AUTOMATED COUNT: 13 % (ref 11.6–15.1)
EXT PREG TEST URINE: NEGATIVE
EXT. CONTROL ED NAV: NORMAL
GFR SERPL CREATININE-BSD FRML MDRD: 77 ML/MIN/1.73SQ M
GLUCOSE SERPL-MCNC: 94 MG/DL (ref 65–140)
GLUCOSE UR STRIP-MCNC: NEGATIVE MG/DL
HCT VFR BLD AUTO: 39.3 % (ref 34.8–46.1)
HGB BLD-MCNC: 12.6 G/DL (ref 11.5–15.4)
HGB UR QL STRIP.AUTO: NEGATIVE
IMM GRANULOCYTES # BLD AUTO: 0.03 THOUSAND/UL (ref 0–0.2)
IMM GRANULOCYTES NFR BLD AUTO: 0 % (ref 0–2)
KETONES UR STRIP-MCNC: NEGATIVE MG/DL
LEUKOCYTE ESTERASE UR QL STRIP: NEGATIVE
LIPASE SERPL-CCNC: 84 U/L (ref 73–393)
LYMPHOCYTES # BLD AUTO: 3.18 THOUSANDS/ΜL (ref 0.6–4.47)
LYMPHOCYTES NFR BLD AUTO: 41 % (ref 14–44)
MCH RBC QN AUTO: 28.6 PG (ref 26.8–34.3)
MCHC RBC AUTO-ENTMCNC: 32.1 G/DL (ref 31.4–37.4)
MCV RBC AUTO: 89 FL (ref 82–98)
MONOCYTES # BLD AUTO: 0.3 THOUSAND/ΜL (ref 0.17–1.22)
MONOCYTES NFR BLD AUTO: 4 % (ref 4–12)
NEUTROPHILS # BLD AUTO: 3.95 THOUSANDS/ΜL (ref 1.85–7.62)
NEUTS SEG NFR BLD AUTO: 52 % (ref 43–75)
NITRITE UR QL STRIP: NEGATIVE
NRBC BLD AUTO-RTO: 0 /100 WBCS
PH UR STRIP.AUTO: 7 [PH]
PLATELET # BLD AUTO: 262 THOUSANDS/UL (ref 149–390)
PMV BLD AUTO: 9.6 FL (ref 8.9–12.7)
POTASSIUM SERPL-SCNC: 3.8 MMOL/L (ref 3.5–5.3)
PROT SERPL-MCNC: 7.3 G/DL (ref 6.4–8.2)
PROT UR STRIP-MCNC: NEGATIVE MG/DL
RBC # BLD AUTO: 4.41 MILLION/UL (ref 3.81–5.12)
SODIUM SERPL-SCNC: 136 MMOL/L (ref 136–145)
SP GR UR STRIP.AUTO: 1.01 (ref 1–1.03)
UROBILINOGEN UR QL STRIP.AUTO: 0.2 E.U./DL
WBC # BLD AUTO: 7.74 THOUSAND/UL (ref 4.31–10.16)

## 2022-05-05 PROCEDURE — 96375 TX/PRO/DX INJ NEW DRUG ADDON: CPT

## 2022-05-05 PROCEDURE — 99284 EMERGENCY DEPT VISIT MOD MDM: CPT | Performed by: EMERGENCY MEDICINE

## 2022-05-05 PROCEDURE — 81003 URINALYSIS AUTO W/O SCOPE: CPT | Performed by: EMERGENCY MEDICINE

## 2022-05-05 PROCEDURE — 83690 ASSAY OF LIPASE: CPT | Performed by: EMERGENCY MEDICINE

## 2022-05-05 PROCEDURE — 85025 COMPLETE CBC W/AUTO DIFF WBC: CPT | Performed by: EMERGENCY MEDICINE

## 2022-05-05 PROCEDURE — 80053 COMPREHEN METABOLIC PANEL: CPT | Performed by: EMERGENCY MEDICINE

## 2022-05-05 PROCEDURE — 96361 HYDRATE IV INFUSION ADD-ON: CPT

## 2022-05-05 PROCEDURE — 81025 URINE PREGNANCY TEST: CPT | Performed by: EMERGENCY MEDICINE

## 2022-05-05 PROCEDURE — 74176 CT ABD & PELVIS W/O CONTRAST: CPT

## 2022-05-05 PROCEDURE — 99284 EMERGENCY DEPT VISIT MOD MDM: CPT

## 2022-05-05 PROCEDURE — 96374 THER/PROPH/DIAG INJ IV PUSH: CPT

## 2022-05-05 PROCEDURE — 36415 COLL VENOUS BLD VENIPUNCTURE: CPT

## 2022-05-05 PROCEDURE — G1004 CDSM NDSC: HCPCS

## 2022-05-05 RX ORDER — DICYCLOMINE HCL 20 MG
20 TABLET ORAL EVERY 6 HOURS PRN
Qty: 30 TABLET | Refills: 0 | Status: SHIPPED | OUTPATIENT
Start: 2022-05-05 | End: 2022-05-31 | Stop reason: SDUPTHER

## 2022-05-05 RX ORDER — KETOROLAC TROMETHAMINE 30 MG/ML
15 INJECTION, SOLUTION INTRAMUSCULAR; INTRAVENOUS ONCE
Status: COMPLETED | OUTPATIENT
Start: 2022-05-05 | End: 2022-05-05

## 2022-05-05 RX ORDER — FAMOTIDINE 10 MG/ML
40 INJECTION, SOLUTION INTRAVENOUS ONCE
Status: COMPLETED | OUTPATIENT
Start: 2022-05-05 | End: 2022-05-05

## 2022-05-05 RX ORDER — MAGNESIUM CARB/ALUMINUM HYDROX 105-160MG
296 TABLET,CHEWABLE ORAL ONCE
Status: COMPLETED | OUTPATIENT
Start: 2022-05-05 | End: 2022-05-06

## 2022-05-05 RX ORDER — DICYCLOMINE HCL 20 MG
20 TABLET ORAL ONCE
Status: COMPLETED | OUTPATIENT
Start: 2022-05-05 | End: 2022-05-05

## 2022-05-05 RX ORDER — ONDANSETRON 2 MG/ML
4 INJECTION INTRAMUSCULAR; INTRAVENOUS ONCE
Status: COMPLETED | OUTPATIENT
Start: 2022-05-05 | End: 2022-05-05

## 2022-05-05 RX ORDER — DOCUSATE SODIUM 100 MG/1
100 CAPSULE, LIQUID FILLED ORAL EVERY 12 HOURS
Qty: 60 CAPSULE | Refills: 0 | Status: SHIPPED | OUTPATIENT
Start: 2022-05-05

## 2022-05-05 RX ORDER — DOCUSATE SODIUM 100 MG/1
100 CAPSULE, LIQUID FILLED ORAL ONCE
Status: COMPLETED | OUTPATIENT
Start: 2022-05-05 | End: 2022-05-06

## 2022-05-05 RX ADMIN — ONDANSETRON 4 MG: 2 INJECTION INTRAMUSCULAR; INTRAVENOUS at 20:33

## 2022-05-05 RX ADMIN — FAMOTIDINE 40 MG: 10 INJECTION INTRAVENOUS at 20:34

## 2022-05-05 RX ADMIN — KETOROLAC TROMETHAMINE 15 MG: 30 INJECTION, SOLUTION INTRAMUSCULAR at 20:36

## 2022-05-05 RX ADMIN — DICYCLOMINE HYDROCHLORIDE 20 MG: 20 TABLET ORAL at 22:13

## 2022-05-05 RX ADMIN — SODIUM CHLORIDE 1000 ML: 0.9 INJECTION, SOLUTION INTRAVENOUS at 20:32

## 2022-05-05 RX ADMIN — IOHEXOL 50 ML: 240 INJECTION, SOLUTION INTRATHECAL; INTRAVASCULAR; INTRAVENOUS; ORAL at 21:54

## 2022-05-05 NOTE — Clinical Note
accompanied An Sites to the emergency department on 5/5/2022  Return date if applicable: 02/31/0906        If you have any questions or concerns, please don't hesitate to call        Milla Lee, DO

## 2022-05-05 NOTE — Clinical Note
An Eason was seen and treated in our emergency department on 5/5/2022  No restrictions            Diagnosis:     nA  may return to school on return date  She may return on this date: 05/09/2022         If you have any questions or concerns, please don't hesitate to call        Colin Broussard DO    ______________________________           _______________          _______________  Hospital Representative                              Date                                Time

## 2022-05-05 NOTE — Clinical Note
An Eason was seen and treated in our emergency department on 5/5/2022  No restrictions            Diagnosis:     An  may return to school on return date  She may return on this date: 05/09/2022         If you have any questions or concerns, please don't hesitate to call        Camila Fuller DO    ______________________________           _______________          _______________  Hospital Representative                              Date                                Time

## 2022-05-05 NOTE — Clinical Note
lloyd Nolan Sites to the emergency department on 5/5/2022  Return date if applicable: 06/72/3132        If you have any questions or concerns, please don't hesitate to call        Leopold Alba, DO

## 2022-05-05 NOTE — Clinical Note
An Eason was seen and treated in our emergency department on 5/5/2022  No restrictions            Diagnosis:     An  may return to school on return date  She may return on this date: 05/09/2022         If you have any questions or concerns, please don't hesitate to call        Edu Briscoe DO    ______________________________           _______________          _______________  Hospital Representative                              Date                                Time

## 2022-05-06 VITALS
SYSTOLIC BLOOD PRESSURE: 119 MMHG | BODY MASS INDEX: 29.53 KG/M2 | HEART RATE: 88 BPM | WEIGHT: 173 LBS | TEMPERATURE: 98.7 F | DIASTOLIC BLOOD PRESSURE: 73 MMHG | RESPIRATION RATE: 18 BRPM | OXYGEN SATURATION: 100 % | HEIGHT: 64 IN

## 2022-05-06 RX ADMIN — MAGNESIUM CITRATE 296 ML: 1.75 LIQUID ORAL at 00:06

## 2022-05-06 RX ADMIN — DOCUSATE SODIUM 100 MG: 100 CAPSULE, LIQUID FILLED ORAL at 00:06

## 2022-05-06 NOTE — ED PROVIDER NOTES
History  Chief Complaint   Patient presents with    Abdominal Pain     Pt wioth left flank pain and left lower abd  pain since last week, +n/-v/-d  Pt is scheduled for ultrasound on 5/19/22, states she would like it today  30-year-old female with past history of asthma, anxiety, depression, PTSD, presents to the ED for evaluation of intermittent lower abdominal pain over the past year  Pain increased about a week ago  Patient states that her last bowel movement was 3 days ago  Prior to that, her last bowel movement was 3 days ago  Patient states that she does not eat healthy  She barely eats any vegetables  She mostly eats carbs  Patient denies any fevers or chills  Patient denies any nausea, vomiting  Patient is taking over-the-counter pain medication without relief  Patient came to the ED for further evaluation  History provided by:  Patient  Abdominal Pain  Associated symptoms: no chest pain, no chills, no constipation, no cough, no diarrhea, no dysuria, no fever, no nausea and no shortness of breath        Prior to Admission Medications   Prescriptions Last Dose Informant Patient Reported? Taking?    ALPRAZolam (XANAX) 1 mg tablet  Self Yes No   Sig: Take 1 mg by mouth daily at bedtime    albuterol (VENTOLIN HFA) 90 mcg/act inhaler  Self No No   Sig: Inhale 2 puffs every 6 (six) hours as needed for wheezing   dicyclomine (BENTYL) 20 mg tablet   No No   Sig: Take 1 tablet (20 mg total) by mouth 3 (three) times a day as needed (Abdominal pain or diarrhea)   Patient not taking: Reported on 3/18/2021   gabapentin (NEURONTIN) 300 mg capsule  Self No No   Sig: Take 1 capsule (300 mg total) by mouth 2 (two) times a day   Patient not taking: Reported on 3/18/2021   indomethacin (INDOCIN) 50 mg capsule   No No   Sig: Take 1 capsule (50 mg total) by mouth 2 (two) times a day with meals   mometasone (Asmanex, 60 Metered Doses,) 220 MCG/INH inhaler  Self Yes No   Sig: Inhale 220 mcg daily montelukast (SINGULAIR) 10 mg tablet  Self No No   Sig: Take 1 tablet (10 mg total) by mouth daily at bedtime   ondansetron (ZOFRAN-ODT) 4 mg disintegrating tablet   No No   Sig: Take 1 tablet (4 mg total) by mouth every 6 (six) hours as needed for nausea or vomiting for up to 3 days   pantoprazole (PROTONIX) 40 mg tablet   No No   Sig: TAKE 1 TABLET BY MOUTH EVERY DAY   Patient not taking: Reported on 3/18/2021   sertraline (ZOLOFT) 100 mg tablet   Yes No   Sig: Take 100 mg by mouth daily   traZODone (DESYREL) 50 mg tablet   Yes No   Sig: Take 50 mg by mouth daily at bedtime      Facility-Administered Medications: None       Past Medical History:   Diagnosis Date    Anxiety     Asthma     Depression     Hypoglycemia     Moderate episode of recurrent major depressive disorder ([facility] [address] ) 10/3/2019    PTSD (post-traumatic stress disorder)        Past Surgical History:   Procedure Laterality Date    ANKLE LIGAMENT RECONSTRUCTION       SECTION      CHOLECYSTECTOMY LAPAROSCOPIC N/A 2020    Procedure: CHOLECYSTECTOMY LAPAROSCOPIC;  Surgeon: Igor Pierre MD;  Location:  MAIN OR;  Service: General    KNEE SURGERY      SINUS SURGERY      TONSILLECTOMY      TONSILLECTOMY      TUBAL LIGATION      UMBILICAL HERNIA REPAIR N/A 2020    Procedure: REPAIR HERNIA UMBILICAL, Primary;  Surgeon: Igor Pierre MD;  Location:  MAIN OR;  Service: General       Family History   Problem Relation Age of Onset    Depression Mother      I have reviewed and agree with the history as documented  E-Cigarette/Vaping    E-Cigarette Use Never User      E-Cigarette/Vaping Substances    Nicotine No     THC No     CBD No     Flavoring No     Other No     Unknown No      Social History     Tobacco Use    Smoking status: Never Smoker    Smokeless tobacco: Never Used   Vaping Use    Vaping Use: Never used   Substance Use Topics    Alcohol use:  Yes    Drug use: Never       Review of Systems Constitutional: Negative for activity change, appetite change, chills and fever  HENT: Negative for congestion and ear pain  Eyes: Negative for pain and discharge  Respiratory: Negative for cough, chest tightness, shortness of breath, wheezing and stridor  Cardiovascular: Negative for chest pain and palpitations  Gastrointestinal: Positive for abdominal pain  Negative for abdominal distention, constipation, diarrhea and nausea  Endocrine: Negative for cold intolerance  Genitourinary: Negative for dysuria, frequency and urgency  Musculoskeletal: Negative for arthralgias and back pain  Skin: Negative for color change and rash  Allergic/Immunologic: Negative for environmental allergies and food allergies  Neurological: Negative for dizziness, weakness, numbness and headaches  Hematological: Negative for adenopathy  Psychiatric/Behavioral: Negative for agitation, behavioral problems and confusion  The patient is not nervous/anxious  All other systems reviewed and are negative  Physical Exam  Physical Exam  Vitals and nursing note reviewed  Constitutional:       Appearance: She is well-developed  HENT:      Head: Normocephalic and atraumatic  Eyes:      Conjunctiva/sclera: Conjunctivae normal    Cardiovascular:      Rate and Rhythm: Normal rate and regular rhythm  Heart sounds: Normal heart sounds  Pulmonary:      Effort: Pulmonary effort is normal       Breath sounds: Normal breath sounds  Abdominal:      General: Bowel sounds are normal  There is no distension  Palpations: Abdomen is soft  Tenderness: There is no abdominal tenderness  Comments: Abdomen is soft, nondistended, with bowel sounds present all 4 quadrants  Mild tenderness to palpation noted along lower abdomen and left side of abdomen  Musculoskeletal:         General: Normal range of motion  Cervical back: Normal range of motion and neck supple     Skin:     General: Skin is warm and dry    Neurological:      Mental Status: She is alert and oriented to person, place, and time  Psychiatric:         Behavior: Behavior normal          Thought Content:  Thought content normal          Judgment: Judgment normal          Vital Signs  ED Triage Vitals [05/05/22 1826]   Temperature Pulse Respirations Blood Pressure SpO2   98 7 °F (37 1 °C) (!) 114 20 121/63 99 %      Temp Source Heart Rate Source Patient Position - Orthostatic VS BP Location FiO2 (%)   Temporal Monitor Sitting Left arm --      Pain Score       7           Vitals:    05/05/22 2044 05/05/22 2045 05/05/22 2200 05/05/22 2215   BP:  127/86 129/61 129/61   Pulse: 80 74 82 83   Patient Position - Orthostatic VS:   Sitting          Visual Acuity      ED Medications  Medications   magnesium citrate (CITROMA) oral solution 296 mL (has no administration in time range)   docusate sodium (COLACE) capsule 100 mg (has no administration in time range)   sodium chloride 0 9 % bolus 1,000 mL (0 mL Intravenous Stopped 5/5/22 2132)   ketorolac (TORADOL) injection 15 mg (15 mg Intravenous Given 5/5/22 2036)   ondansetron (ZOFRAN) injection 4 mg (4 mg Intravenous Given 5/5/22 2033)   Famotidine (PF) (PEPCID) injection 40 mg (40 mg Intravenous Given 5/5/22 2034)   dicyclomine (BENTYL) tablet 20 mg (20 mg Oral Given 5/5/22 2213)   iohexol (OMNIPAQUE) 240 MG/ML solution 50 mL (50 mL Oral Given 5/5/22 2154)       Diagnostic Studies  Results Reviewed     Procedure Component Value Units Date/Time    Comprehensive metabolic panel [[id number]] Collected: 05/05/22 1901    Lab Status: Final result Specimen: Blood from Arm, Left Updated: 05/05/22 1937     Sodium 136 mmol/L      Potassium 3 8 mmol/L      Chloride 104 mmol/L      CO2 24 mmol/L      ANION GAP 8 mmol/L      BUN 6 mg/dL      Creatinine 0 92 mg/dL      Glucose 94 mg/dL      Calcium 8 4 mg/dL      AST 16 U/L      ALT 36 U/L      Alkaline Phosphatase 61 U/L      Total Protein 7 3 g/dL      Albumin 4 1 g/dL Total Bilirubin 0 20 mg/dL      eGFR 77 ml/min/1 73sq m     Narrative:      National Kidney Disease Foundation guidelines for Chronic Kidney Disease (CKD):     Stage 1 with normal or high GFR (GFR > 90 mL/min/1 73 square meters)    Stage 2 Mild CKD (GFR = 60-89 mL/min/1 73 square meters)    Stage 3A Moderate CKD (GFR = 45-59 mL/min/1 73 square meters)    Stage 3B Moderate CKD (GFR = 30-44 mL/min/1 73 square meters)    Stage 4 Severe CKD (GFR = 15-29 mL/min/1 73 square meters)    Stage 5 End Stage CKD (GFR <15 mL/min/1 73 square meters)  Note: GFR calculation is accurate only with a steady state creatinine    Lipase [[id number]]  (Normal) Collected: 05/05/22 1901    Lab Status: Final result Specimen: Blood from Arm, Left Updated: 05/05/22 1937     Lipase 84 u/L     UA w Reflex to Microscopic w Reflex to Culture [[id number]] Collected: 05/05/22 1901    Lab Status: Final result Specimen: Urine, Clean Catch Updated: 05/05/22 1916     Color, UA Yellow     Clarity, UA Clear     Specific Gravity, UA 1 010     pH, UA 7 0     Leukocytes, UA Negative     Nitrite, UA Negative     Protein, UA Negative mg/dl      Glucose, UA Negative mg/dl      Ketones, UA Negative mg/dl      Urobilinogen, UA 0 2 E U /dl      Bilirubin, UA Negative     Blood, UA Negative    CBC and differential [[id number]] Collected: 05/05/22 1901    Lab Status: Final result Specimen: Blood from Arm, Left Updated: 05/05/22 1915     WBC 7 74 Thousand/uL      RBC 4 41 Million/uL      Hemoglobin 12 6 g/dL      Hematocrit 39 3 %      MCV 89 fL      MCH 28 6 pg      MCHC 32 1 g/dL      RDW 13 0 %      MPV 9 6 fL      Platelets 413 Thousands/uL      nRBC 0 /100 WBCs      Neutrophils Relative 52 %      Immat GRANS % 0 %      Lymphocytes Relative 41 %      Monocytes Relative 4 %      Eosinophils Relative 2 %      Basophils Relative 1 %      Neutrophils Absolute 3 95 Thousands/µL      Immature Grans Absolute 0 03 Thousand/uL      Lymphocytes Absolute 3 18 Thousands/µL      Monocytes Absolute 0 30 Thousand/µL      Eosinophils Absolute 0 18 Thousand/µL      Basophils Absolute 0 10 Thousands/µL     POCT pregnancy, urine [[id number]]  (Normal) Resulted: 05/05/22 1901    Lab Status: Final result Specimen: Urine Updated: 05/05/22 1901     EXT PREG TEST UR (Ref: Negative) negative     Control valid                 CT abdomen pelvis wo contrast   Final Result by Ina Chapa MD (05/05 2300)      No acute inflammatory process identified within the abdomen or pelvis  Workstation performed: [id number]                    Procedures  Procedures         ED Course  ED Course as of 05/05/22 2339   Thu May 05, 2022   2231 Patient re-examined at bedside  Patient continues to have abdominal pain after Toradol was given  Patient just received her Bentyl  CT result is pending  Will re-evaluate patient in a little bit and reassess her abdominal pain  SBIRT 22yo+      Most Recent Value   SBIRT (22 yo +)    In order to provide better care to our patients, we are screening all of our patients for alcohol and drug use  Would it be okay to ask you these screening questions? No Filed at: 05/05/2022 1900                    MDM  Number of Diagnoses or Management Options  Abdominal pain: new and requires workup  Constipation: new and requires workup  Diagnosis management comments: Obtain blood work, UA, CT abdomen/pelvis  Give IV fluids, pain medication and reassess       Amount and/or Complexity of Data Reviewed  Clinical lab tests: ordered and reviewed  Tests in the radiology section of CPT®: ordered and reviewed  Tests in the medicine section of CPT®: ordered and reviewed  Review and summarize past medical records: yes  Independent visualization of images, tracings, or specimens: yes    Risk of Complications, Morbidity, and/or Mortality  General comments: Lab work was essentially unremarkable  CT abdomen/pelvis showed concern for stool filled colon  At this time patient's symptoms are most likely secondary to constipation  Patient states that she moves her bowels about every 3 days  I discussed high-fiber diet field with vegetables with patient  At this time patient given a bottle of magnesium citrate to go home with  Patient started on Colace  Patient told to  some MiraLax over-the-counter  Patient also given Bentyl p r n  abdominal pain  At this time patient is discharged home with follow-up to PCP as well as Gastroenterology  Close return instructions given to return to the ER for any worsening symptoms  Patient agrees with discharge plan  Patient well appearing at time of discharge  Please Note: Fluency Direct voice recognition software may have been used in the creation of this document  Wrong words or sound a like substitutions may have occurred due to the inherent limitations of the voice software  Patient Progress  Patient progress: stable      Disposition  Final diagnoses:   Abdominal pain   Constipation     Time reflects when diagnosis was documented in both MDM as applicable and the Disposition within this note     Time User Action Codes Description Comment    5/5/2022 11:27 PM Tony Garcia Add [R10 9] Abdominal pain     5/5/2022 11:27 PM Tony Garcia Add [K59 00] Constipation       ED Disposition     ED Disposition Condition Date/Time Comment    Discharge Stable Thu May 5, 2022 11:29 PM [facility] discharge to home/self care              Follow-up Information     Follow up With Specialties Details Why Contact Info Additional Information    Sharri Jon MD Family Medicine In 4 days  [address] [phone]  [facility] Gastroenterology [facility] EMERGENCY [facility] Gastroenterology Schedule an appointment as soon as possible for a visit   [address] [facility] [address] Gastroenterology [facility] [address] [place], [place] [address], [facility] [address] [place], [place], [address]          Patient's Medications   Discharge Prescriptions    DICYCLOMINE (BENTYL) 20 MG TABLET    Take 1 tablet (20 mg total) by mouth every 6 (six) hours as needed (abd pain)       Start Date: 5/5/2022  End Date: --       Order Dose: 20 mg       Quantity: 30 tablet    Refills: 0    DOCUSATE SODIUM (COLACE) 100 MG CAPSULE    Take 1 capsule (100 mg total) by mouth every 12 (twelve) hours       Start Date: 5/5/2022  End Date: --       Order Dose: 100 mg       Quantity: 60 capsule    Refills: 0       No discharge procedures on file      PDMP Review       Value Time User    PDMP Reviewed  Yes 1/27/2021  5:46 PM Eileen Magdaleno DO          ED Provider  Electronically Signed by           Peggy Ghosh DO  05/05/22 6315

## 2022-05-31 ENCOUNTER — OFFICE VISIT (OUTPATIENT)
Dept: GASTROENTEROLOGY | Facility: CLINIC | Age: 42
End: 2022-05-31
Payer: COMMERCIAL

## 2022-05-31 VITALS
HEART RATE: 114 BPM | DIASTOLIC BLOOD PRESSURE: 76 MMHG | HEIGHT: 64 IN | BODY MASS INDEX: 28.85 KG/M2 | WEIGHT: 169 LBS | SYSTOLIC BLOOD PRESSURE: 116 MMHG

## 2022-05-31 DIAGNOSIS — K59.00 CONSTIPATION: ICD-10-CM

## 2022-05-31 DIAGNOSIS — R10.11 RIGHT UPPER QUADRANT ABDOMINAL PAIN: ICD-10-CM

## 2022-05-31 DIAGNOSIS — K21.9 GASTROESOPHAGEAL REFLUX DISEASE, UNSPECIFIED WHETHER ESOPHAGITIS PRESENT: Primary | ICD-10-CM

## 2022-05-31 DIAGNOSIS — K59.00 CONSTIPATION, UNSPECIFIED CONSTIPATION TYPE: ICD-10-CM

## 2022-05-31 PROCEDURE — 99214 OFFICE O/P EST MOD 30 MIN: CPT | Performed by: NURSE PRACTITIONER

## 2022-05-31 RX ORDER — PANTOPRAZOLE SODIUM 40 MG/1
40 TABLET, DELAYED RELEASE ORAL
Qty: 60 TABLET | Refills: 6 | Status: SHIPPED | OUTPATIENT
Start: 2022-05-31

## 2022-05-31 RX ORDER — FAMOTIDINE 40 MG/1
40 TABLET, FILM COATED ORAL
Qty: 30 TABLET | Refills: 6 | Status: SHIPPED | OUTPATIENT
Start: 2022-05-31

## 2022-05-31 RX ORDER — DICYCLOMINE HCL 20 MG
20 TABLET ORAL
Qty: 90 TABLET | Refills: 3 | Status: SHIPPED | OUTPATIENT
Start: 2022-05-31

## 2022-05-31 NOTE — PATIENT INSTRUCTIONS
Food diary  Fiber 20-25 gms / day  Docusate sodium/Colace 100mg, 2 capsule twice daily for 3 days, 1 capsule twice daily     MiraLax, adjust for a normal bowel movement every day or every other day

## 2022-05-31 NOTE — PROGRESS NOTES
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  Discussed initial conservative care with adjusting PPI with Pepcid HS  Consider GERD, functional dyspepsia, gastritis  Will trial pantoprazole 40 mg twice daily and Pepcid 40 mg HS  If symptoms increase, will consider EGD     - food diary  - frequent small meals  - pantoprazole (PROTONIX) 40 mg tablet; Take 1 tablet (40 mg total) by mouth 2 (two) times a day before meals  Dispense: 60 tablet; Refill: 6  - famotidine (PEPCID) 40 MG tablet; Take 1 tablet (40 mg total) by mouth daily at bedtime  Dispense: 30 tablet; Refill: 6    2  Constipation, unspecified constipation type  3  Right upper quadrant abdominal pain  Patient states abdominal discomfort can be sharp or dull and comes and goes  States she is moving her bowels approximately 2-3 times per week  Pain may subside with bowel movement  Consider functional constipation, less likely obstruction or diverticular disease  If symptoms progress with current treatment plan will consider colonoscopy  Patient has not had a colonoscopy to date  Recent imaging and blood work negative  - food diary  - 20-25 g fiber per day  - Docusate sodium/Colace 100 mg p o  Daily  - MiraLax, adjust as directed  - dicyclomine (BENTYL) 20 mg tablet; Take 1 tablet (20 mg total) by mouth 3 (three) times a day with meals  Dispense: 90 tablet;  Refill: 3      Followup Appointment:  2-3 months  ______________________________________________________________________    Chief Complaint Patient presents with    ER follow up-Nausea, right side abd  pain     HPI:  80-year-old female with past history of abdominal pain, GERD, anxiety presents for follow-up [facility] ER visit  Patient was seen in the emergency room on  for abdominal pain, GERD, constipation, bloating, diarrhea, reflux and heartburn  Blood work at that time was normal and CT of the abdomen pelvis was negative  Patient states she is under a lot of stress 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  Patient states that she does have increased nausea after eating denies vomiting  States she does have abdominal pain mostly right side and can be sharp or dull  States acid reflux symptoms are pretty constant  Currently not taking a PPI due to prescription running out  Patient states she is moving her bowels 2-3 times per week states they are normal bowel movements denies hematochezia or melena  Nonsmoker, rare EtOH  Patient states she has not had a colonoscopy to date  Her last EGD was 2020 that noted mild antral gastritis, increased bile reflux otherwise normal and negative biopsies      Historical Information   Past Medical History:   Diagnosis Date    Anxiety     Asthma     Depression     GERD (gastroesophageal reflux disease)     Hypoglycemia     Moderate episode of recurrent major depressive disorder ([facility] [address] ) 10/03/2019    PTSD (post-traumatic stress disorder)      Past Surgical History:   Procedure Laterality Date    ABDOMINAL SURGERY      3 csections, gallbladder and umbilical hernia [id number]    ANKLE LIGAMENT RECONSTRUCTION       SECTION      CHOLECYSTECTOMY      CHOLECYSTECTOMY LAPAROSCOPIC N/A 2020    Procedure: CHOLECYSTECTOMY LAPAROSCOPIC;  Surgeon: Dada Ceballos MD;  Location:  MAIN OR;  Service: General    KNEE SURGERY      SINUS SURGERY      TONSILLECTOMY      TONSILLECTOMY      TUBAL LIGATION      UMBILICAL HERNIA REPAIR N/A 2020    Procedure: REPAIR HERNIA UMBILICAL, Primary;  Surgeon: Raquel Main MD;  Location:  MAIN OR;  Service: General    UPPER GASTROINTESTINAL ENDOSCOPY  2020     Social History     Substance and Sexual Activity   Alcohol Use Not Currently     Social History     Substance and Sexual Activity   Drug Use Never     Social History     Tobacco Use   Smoking Status Never Smoker   Smokeless Tobacco Never Used   Tobacco Comment    Never smoked     Family History   Problem Relation Age of Onset    Depression Mother     Asthma Mother     Mental illness Mother     Mental illness Maternal Grandfather          Current Outpatient Medications:     albuterol (VENTOLIN HFA) 90 mcg/act inhaler    dicyclomine (BENTYL) 20 mg tablet    famotidine (PEPCID) 40 MG tablet    mometasone (ASMANEX TWISTHALER) 220 MCG/INH inhaler    montelukast (SINGULAIR) 10 mg tablet    pantoprazole (PROTONIX) 40 mg tablet    sertraline (ZOLOFT) 100 mg tablet    ALPRAZolam (XANAX) 1 mg tablet    docusate sodium (COLACE) 100 mg capsule    indomethacin (INDOCIN) 50 mg capsule    ondansetron (ZOFRAN-ODT) 4 mg disintegrating tablet    traZODone (DESYREL) 50 mg tablet  Allergies   Allergen Reactions    Azithromycin GI Intolerance     Other reaction(s): GI Intolerance    Cephalosporins Hives    Sulfa Antibiotics Hives     Reviewed medications and allergies and updated as indicated    PHYSICAL EXAM:    Blood pressure 116/76, pulse (!) 114, height 5' 4" (1 626 m), weight 76 7 kg (169 lb), last menstrual period 05/01/2022  Body mass index is 29 01 kg/m²  General Appearance: NAD, cooperative, alert  Eyes: Anicteric, PERRLA, EOMI  ENT:  Normocephalic, atraumatic, normal mucosa  Neck:  Supple, symmetrical, trachea midline  Resp:  Clear to auscultation bilaterally; no rales, rhonchi or wheezing; respirations unlabored   CV:  S1 S2, Regular rate and rhythm; no murmur, rub, or gallop    GI:  Soft, epigastric and right-sided upper quadrant abdominal pain increased with palpation, non-distended; normal bowel sounds; no masses, no organomegaly   Rectal: Deferred  Musculoskeletal: No cyanosis, clubbing or edema  Normal ROM  Skin:  No jaundice, rashes, or lesions   Heme/Lymph: No palpable cervical lymphadenopathy  Psych: Normal affect, good eye contact  Neuro: No gross deficits, AAOx3    Lab Results:   Lab Results   Component Value Date    WBC 7 74 05/05/2022    HGB 12 6 05/05/2022    HCT 39 3 05/05/2022    MCV 89 05/05/2022     05/05/2022     Lab Results   Component Value Date    K 3 8 05/05/2022     05/05/2022    CO2 24 05/05/2022    BUN 6 05/05/2022    CREATININE 0 92 05/05/2022    GLUF 97 05/22/2020    CALCIUM 8 4 05/05/2022    AST 16 05/05/2022    ALT 36 05/05/2022    ALKPHOS 61 05/05/2022    EGFR 77 05/05/2022     Lab Results   Component Value Date    IRON 135 10/25/2019    TIBC 362 10/25/2019    FERRITIN 29 10/25/2019     Lab Results   Component Value Date    LIPASE 84 05/05/2022       Radiology Results:   CT abdomen pelvis wo contrast    Result Date: 5/5/2022  Narrative: CT ABDOMEN AND PELVIS WITHOUT IV CONTRAST INDICATION:   LLQ abdominal pain LLQ and lower abd pain  COMPARISON:  CT of abdomen pelvis on May 14, 2021  TECHNIQUE:  CT examination of the abdomen and pelvis was performed without intravenous contrast   Axial, sagittal, and coronal 2D reformatted images were created from the source data and submitted for interpretation  Radiation dose length product (DLP) for this visit:  534 86 mGy-cm   This examination, like all CT scans performed in the [facility], was performed utilizing techniques to minimize radiation dose exposure, including the use of iterative  reconstruction and automated exposure control  Enteric contrast was administered  FINDINGS: ABDOMEN LOWER CHEST:  Trace bilateral pleural effusions  The heart is normal in size  LIVER/BILIARY TREE:  Unremarkable  GALLBLADDER:  Gallbladder is surgically absent  SPLEEN:  Unremarkable   PANCREAS: Unremarkable  ADRENAL GLANDS:  Unremarkable  KIDNEYS/URETERS:  Unremarkable  No hydronephrosis  STOMACH AND BOWEL:  No bowel obstruction  Scattered colonic diverticulosis without evidence of diverticulitis  Stool-filled colon  APPENDIX:  A normal appendix was visualized  ABDOMINOPELVIC CAVITY:  No ascites  No pneumoperitoneum  No lymphadenopathy  VESSELS:  Unremarkable for patient's age  PELVIS REPRODUCTIVE ORGANS:  Unremarkable for patient's age  URINARY BLADDER:  Unremarkable  ABDOMINAL WALL/INGUINAL REGIONS:  Unremarkable  OSSEOUS STRUCTURES:  No acute fracture or destructive osseous lesion  Impression: No acute inflammatory process identified within the abdomen or pelvis   Workstation performed: [id number]

## 2022-06-13 ENCOUNTER — TELEPHONE (OUTPATIENT)
Dept: GASTROENTEROLOGY | Facility: CLINIC | Age: 42
End: 2022-06-13

## 2022-06-13 NOTE — TELEPHONE ENCOUNTER
----- Message from Naga Lino sent at 6/13/2022 10:29 AM EDT -----  Regarding: FW: Follow up    ----- Message -----  From: Han Lax: 6/13/2022  10:25 AM EDT  To: , #  Subject: Follow up                                        Good morning,    After the changes we discussed, there has been little change  And when I do have a bowel movement its either watery or thick and unformed and sometimes painful to push  Around my bellybutton Ive been have some sharp pain  Something I havent felt in a while       Thank you  An

## 2022-06-13 NOTE — TELEPHONE ENCOUNTER
Spoke to patient in regards to her phone message to the office and her diarrhea  Patient was seen on 05/31 for constipation and abdominal pain  She was prescribed Colace and MiraLax  Discussed stopping the MiraLax at this time and resuming diet of low-fiber until stools form  She can use Imodium liberally with watery stool bowel movements  Patient states she has a pain at her umbilicus when having a bowel movement  States she had a previous hernia  Encouraged patient to contact the office in the next 2-3 days if symptoms do not change with treatment plan

## 2022-12-06 ENCOUNTER — HOSPITAL ENCOUNTER (EMERGENCY)
Facility: HOSPITAL | Age: 42
Discharge: HOME | End: 2022-12-06
Attending: EMERGENCY MEDICINE

## 2022-12-06 VITALS
DIASTOLIC BLOOD PRESSURE: 71 MMHG | WEIGHT: 179 LBS | HEIGHT: 64 IN | SYSTOLIC BLOOD PRESSURE: 114 MMHG | OXYGEN SATURATION: 100 % | RESPIRATION RATE: 22 BRPM | BODY MASS INDEX: 30.56 KG/M2 | HEART RATE: 98 BPM | TEMPERATURE: 97.6 F

## 2022-12-06 DIAGNOSIS — R56.9 WITNESSED SEIZURE-LIKE ACTIVITY (HCC): Primary | ICD-10-CM

## 2022-12-06 LAB
ALBUMIN SERPL BCP-MCNC: 4.3 G/DL (ref 3.5–5)
ALP SERPL-CCNC: 67 U/L (ref 46–116)
ALT SERPL W P-5'-P-CCNC: 69 U/L (ref 12–78)
ANION GAP SERPL CALCULATED.3IONS-SCNC: 8 MMOL/L (ref 4–13)
AST SERPL W P-5'-P-CCNC: 38 U/L (ref 5–45)
ATRIAL RATE: 116 BPM
BASOPHILS # BLD AUTO: 0.05 THOUSANDS/ÂΜL (ref 0–0.1)
BASOPHILS NFR BLD AUTO: 1 % (ref 0–1)
BILIRUB SERPL-MCNC: 0.3 MG/DL (ref 0.2–1)
BUN SERPL-MCNC: 7 MG/DL (ref 5–25)
CALCIUM SERPL-MCNC: 9.3 MG/DL (ref 8.3–10.1)
CHLORIDE SERPL-SCNC: 101 MMOL/L (ref 96–108)
CO2 SERPL-SCNC: 28 MMOL/L (ref 21–32)
CREAT SERPL-MCNC: 1.07 MG/DL (ref 0.6–1.3)
EOSINOPHIL # BLD AUTO: 0.17 THOUSAND/ÂΜL (ref 0–0.61)
EOSINOPHIL NFR BLD AUTO: 3 % (ref 0–6)
ERYTHROCYTE [DISTWIDTH] IN BLOOD BY AUTOMATED COUNT: 12.7 % (ref 11.6–15.1)
GFR SERPL CREATININE-BSD FRML MDRD: 64 ML/MIN/1.73SQ M
GLUCOSE SERPL-MCNC: 100 MG/DL (ref 65–140)
HCT VFR BLD AUTO: 42.7 % (ref 34.8–46.1)
HGB BLD-MCNC: 13.5 G/DL (ref 11.5–15.4)
IMM GRANULOCYTES # BLD AUTO: 0.01 THOUSAND/UL (ref 0–0.2)
IMM GRANULOCYTES NFR BLD AUTO: 0 % (ref 0–2)
LYMPHOCYTES # BLD AUTO: 2.2 THOUSANDS/ÂΜL (ref 0.6–4.47)
LYMPHOCYTES NFR BLD AUTO: 43 % (ref 14–44)
MCH RBC QN AUTO: 28.5 PG (ref 26.8–34.3)
MCHC RBC AUTO-ENTMCNC: 31.6 G/DL (ref 31.4–37.4)
MCV RBC AUTO: 90 FL (ref 82–98)
MONOCYTES # BLD AUTO: 0.25 THOUSAND/ÂΜL (ref 0.17–1.22)
MONOCYTES NFR BLD AUTO: 5 % (ref 4–12)
NEUTROPHILS # BLD AUTO: 2.49 THOUSANDS/ÂΜL (ref 1.85–7.62)
NEUTS SEG NFR BLD AUTO: 48 % (ref 43–75)
NRBC BLD AUTO-RTO: 0 /100 WBCS
P AXIS: 47 DEGREES
PLATELET # BLD AUTO: 279 THOUSANDS/UL (ref 149–390)
PMV BLD AUTO: 9.2 FL (ref 8.9–12.7)
POTASSIUM SERPL-SCNC: 4.4 MMOL/L (ref 3.5–5.3)
PR INTERVAL: 186 MS
PROT SERPL-MCNC: 8 G/DL (ref 6.4–8.4)
QRS AXIS: 19 DEGREES
QRSD INTERVAL: 90 MS
QT INTERVAL: 314 MS
QTC INTERVAL: 436 MS
RBC # BLD AUTO: 4.73 MILLION/UL (ref 3.81–5.12)
SODIUM SERPL-SCNC: 137 MMOL/L (ref 135–147)
T WAVE AXIS: 13 DEGREES
VALPROATE SERPL-MCNC: <3 UG/ML (ref 50–100)
VENTRICULAR RATE: 116 BPM
WBC # BLD AUTO: 5.17 THOUSAND/UL (ref 4.31–10.16)

## 2022-12-06 RX ORDER — LORAZEPAM 2 MG/ML
1 INJECTION INTRAMUSCULAR ONCE
Status: COMPLETED | OUTPATIENT
Start: 2022-12-06 | End: 2022-12-06

## 2022-12-06 RX ORDER — KETOROLAC TROMETHAMINE 30 MG/ML
30 INJECTION, SOLUTION INTRAMUSCULAR; INTRAVENOUS ONCE
Status: COMPLETED | OUTPATIENT
Start: 2022-12-06 | End: 2022-12-06

## 2022-12-06 RX ADMIN — SODIUM CHLORIDE 1000 ML: 0.9 INJECTION, SOLUTION INTRAVENOUS at 15:01

## 2022-12-06 RX ADMIN — LORAZEPAM 1 MG: 2 INJECTION INTRAMUSCULAR; INTRAVENOUS at 15:05

## 2022-12-06 RX ADMIN — KETOROLAC TROMETHAMINE 30 MG: 30 INJECTION, SOLUTION INTRAMUSCULAR; INTRAVENOUS at 15:04

## 2022-12-06 NOTE — ED PROVIDER NOTES
History  Chief Complaint   Patient presents with   • Seizure - New Onset     Patient brought in by EMS , witnessed seizure , approx 2-3 min     History from patient, paramedics, medical records, history of nonepileptic seizures, syncope, posttraumatic stress anxiety migraines, brought in by ambulance for a witnessed seizure just prior to arrival lasted about 2 to 3 minutes while patient was shopping for [employer]  Patient does not remember the incident  She states she has a mild temporal headache now and feels anxious  Denies any loss of bowel or bladder control  Prior to Admission Medications   Prescriptions Last Dose Informant Patient Reported? Taking?    ALPRAZolam (XANAX) 1 mg tablet   Yes No   Sig: Take 1 mg by mouth daily at bedtime    Patient not taking: Reported on 5/31/2022   albuterol (VENTOLIN HFA) 90 mcg/act inhaler   No No   Sig: Inhale 2 puffs every 6 (six) hours as needed for wheezing   dicyclomine (BENTYL) 20 mg tablet   No No   Sig: Take 1 tablet (20 mg total) by mouth 3 (three) times a day with meals   docusate sodium (COLACE) 100 mg capsule   No No   Sig: Take 1 capsule (100 mg total) by mouth every 12 (twelve) hours   Patient not taking: No sig reported   famotidine (PEPCID) 40 MG tablet   No No   Sig: Take 1 tablet (40 mg total) by mouth daily at bedtime   indomethacin (INDOCIN) 50 mg capsule   No No   Sig: Take 1 capsule (50 mg total) by mouth 2 (two) times a day with meals   Patient not taking: No sig reported   mometasone (ASMANEX TWISTHALER) 220 MCG/INH inhaler   Yes No   Sig: Inhale 220 mcg daily   montelukast (SINGULAIR) 10 mg tablet   No No   Sig: Take 1 tablet (10 mg total) by mouth daily at bedtime   ondansetron (ZOFRAN-ODT) 4 mg disintegrating tablet   No No   Sig: Take 1 tablet (4 mg total) by mouth every 6 (six) hours as needed for nausea or vomiting for up to 3 days   Patient not taking: Reported on 5/31/2022   pantoprazole (PROTONIX) 40 mg tablet   No No   Sig: Take 1 tablet (40 mg total) by mouth 2 (two) times a day before meals   sertraline (ZOLOFT) 100 mg tablet   Yes No   Sig: Take 100 mg by mouth daily   traZODone (DESYREL) 50 mg tablet   Yes No   Sig: Take 50 mg by mouth daily at bedtime   Patient not taking: No sig reported      Facility-Administered Medications: None       Past Medical History:   Diagnosis Date   • Anxiety    • Asthma    • Depression    • GERD (gastroesophageal reflux disease)    • Hypoglycemia    • [facility] 10[facility]03[facility]   • PTSD (post-traumatic stress disorder)        Past Surgical History:   Procedure Laterality Date   • ABDOMINAL SURGERY      3 csections, gallbladder and umbilical hernia 3909   • ANKLE LIGAMENT RECONSTRUCTION     •  SECTION     • CHOLECYSTECTOMY     • CHOLECYSTECTOMY LAPAROSCOPIC N/A 2020    Procedure: CHOLECYSTECTOMY LAPAROSCOPIC;  Surgeon: Yulissa Snyder MD;  Location:  MAIN OR;  Service: General   • KNEE SURGERY     • SINUS SURGERY     • TONSILLECTOMY     • TONSILLECTOMY     • TUBAL LIGATION     • UMBILICAL HERNIA REPAIR N/A 2020    Procedure: REPAIR HERNIA UMBILICAL, Primary;  Surgeon: Yulissa Snyder MD;  Location:  MAIN OR;  Service: General   • UPPER GASTROINTESTINAL ENDOSCOPY         Family History   Problem Relation Age of Onset   • Depression Mother    • Asthma Mother    • Mental illness Mother    • Mental illness Maternal Grandfather      I have reviewed and agree with the history as documented      E-Cigarette/Vaping   • E-Cigarette Use Never User      E-Cigarette/Vaping Substances   • Nicotine No    • THC No    • CBD No    • Flavoring No    • Other No    • Unknown No      Social History     Tobacco Use   • Smoking status: Never   • Smokeless tobacco: Never   • Tobacco comments:     Never smoked   Vaping Use   • Vaping Use: Never used   Substance Use Topics   • Alcohol use: Not Currently   • Drug use: Never       Review of Systems Constitutional: Negative for chills and fever  HENT: Negative for rhinorrhea and sore throat  Respiratory: Negative for shortness of breath  Cardiovascular: Negative for chest pain  Gastrointestinal: Negative for constipation, diarrhea, nausea and vomiting  Genitourinary: Negative for dysuria and frequency  Skin: Negative for rash  Neurological: Positive for seizures, syncope and headaches  Psychiatric/Behavioral: Negative for suicidal ideas  The patient is nervous/anxious  All other systems reviewed and are negative  Physical Exam  Physical Exam  Vitals and nursing note reviewed  Constitutional:       Appearance: She is well-developed  HENT:      Head: Normocephalic and atraumatic  Right Ear: Tympanic membrane and external ear normal       Left Ear: Tympanic membrane and external ear normal       Nose: Nose normal       Right Sinus: No maxillary sinus tenderness or frontal sinus tenderness  Left Sinus: No maxillary sinus tenderness or frontal sinus tenderness  Eyes:      Conjunctiva/sclera: Conjunctivae normal       Pupils: Pupils are equal, round, and reactive to light  Cardiovascular:      Rate and Rhythm: Normal rate and regular rhythm  Heart sounds: Normal heart sounds  Pulmonary:      Effort: Pulmonary effort is normal  No respiratory distress  Breath sounds: Normal breath sounds  No wheezing  Abdominal:      General: Bowel sounds are normal  There is no distension  Palpations: Abdomen is soft  Tenderness: There is no abdominal tenderness  Musculoskeletal:         General: No deformity  Normal range of motion  Cervical back: Normal range of motion and neck supple  No spinous process tenderness  Skin:     General: Skin is warm and dry  Findings: No rash  Neurological:      General: No focal deficit present  Mental Status: She is alert and oriented to person, place, and time  GCS: GCS eye subscore is 4   GCS verbal subscore is 5  GCS motor subscore is 6  Cranial Nerves: No cranial nerve deficit  Sensory: No sensory deficit  Motor: No weakness        Coordination: Coordination normal    Psychiatric:         Mood and Affect: Mood normal          Vital Signs  ED Triage Vitals   Temperature Pulse Respirations Blood Pressure SpO2   12/06/22 1413 12/06/22 1413 12/06/22 1413 12/06/22 1413 12/06/22 1413   97 6 °F (36 4 °C) (!) 120 18 119/59 96 %      Temp src Heart Rate Source Patient Position - Orthostatic VS BP Location FiO2 (%)   -- 12/06/22 1414 -- -- --    Monitor         Pain Score       --                  Vitals:    12/06/22 1413 12/06/22 1415 12/06/22 1630   BP: 119/59 127/61 114/71   Pulse: (!) 120 (!) 116 98         Visual Acuity  Visual Acuity    Flowsheet Row Most Recent Value   L Pupil Size (mm) 3   R Pupil Size (mm) 3          ED Medications  Medications   LORazepam (ATIVAN) injection 1 mg (1 mg Intravenous Given 12/6/22 1505)   ketorolac (TORADOL) injection 30 mg (30 mg Intravenous Given 12/6/22 1504)   sodium chloride 0 9 % bolus 1,000 mL (0 mL Intravenous Stopped 12/6/22 1601)       Diagnostic Studies  Results Reviewed     Procedure Component Value Units Date/Time    Valproic acid level, total [[id number]]  (Abnormal) Collected: 12/06/22 1505    Lab Status: Final result Specimen: Blood from Arm, Right Updated: 12/06/22 1635     Valproic Acid, Total <3 ug/mL     Comprehensive metabolic panel [[id number]] Collected: 12/06/22 1505    Lab Status: Final result Specimen: Blood from Arm, Right Updated: 12/06/22 1548     Sodium 137 mmol/L      Potassium 4 4 mmol/L      Chloride 101 mmol/L      CO2 28 mmol/L      ANION GAP 8 mmol/L      BUN 7 mg/dL      Creatinine 1 07 mg/dL      Glucose 100 mg/dL      Calcium 9 3 mg/dL      AST 38 U/L      ALT 69 U/L      Alkaline Phosphatase 67 U/L      Total Protein 8 0 g/dL      Albumin 4 3 g/dL      Total Bilirubin 0 30 mg/dL      eGFR 64 ml/min/1 73sq m     Narrative: National Kidney Disease Foundation guidelines for Chronic Kidney Disease (CKD):   •  Stage 1 with normal or high GFR (GFR > 90 mL/min/1 73 square meters)  •  Stage 2 Mild CKD (GFR = 60-89 mL/min/1 73 square meters)  •  Stage 3A Moderate CKD (GFR = 45-59 mL/min/1 73 square meters)  •  Stage 3B Moderate CKD (GFR = 30-44 mL/min/1 73 square meters)  •  Stage 4 Severe CKD (GFR = 15-29 mL/min/1 73 square meters)  •  Stage 5 End Stage CKD (GFR <15 mL/min/1 73 square meters)  Note: GFR calculation is accurate only with a steady state creatinine    CBC and differential [[id number]] Collected: 12/06/22 1505    Lab Status: Final result Specimen: Blood from Arm, Right Updated: 12/06/22 1517     WBC 5 17 Thousand/uL      RBC 4 73 Million/uL      Hemoglobin 13 5 g/dL      Hematocrit 42 7 %      MCV 90 fL      MCH 28 5 pg      MCHC 31 6 g/dL      RDW 12 7 %      MPV 9 2 fL      Platelets 835 Thousands/uL      nRBC 0 /100 WBCs      Neutrophils Relative 48 %      Immat GRANS % 0 %      Lymphocytes Relative 43 %      Monocytes Relative 5 %      Eosinophils Relative 3 %      Basophils Relative 1 %      Neutrophils Absolute 2 49 Thousands/µL      Immature Grans Absolute 0 01 Thousand/uL      Lymphocytes Absolute 2 20 Thousands/µL      Monocytes Absolute 0 25 Thousand/µL      Eosinophils Absolute 0 17 Thousand/µL      Basophils Absolute 0 05 Thousands/µL                  No orders to display              Procedures  ECG 12 Lead Documentation Only    Date/Time: 12/6/2022 2:50 PM  Performed by: Tiffani French DO  Authorized by: Tiffani French DO     Indications / Diagnosis:  Seizure  ECG reviewed by me, the ED Provider: yes    Patient location:  ED  Previous ECG:     Previous ECG:  Compared to current    Comparison ECG info:  6-21    Similarity:  Changes noted  Interpretation:     Interpretation: non-specific    Rate:     ECG rate:  116    ECG rate assessment: tachycardic    Rhythm:     Rhythm: sinus tachycardia    Ectopy:     Ectopy: none QRS:     QRS axis:  Normal    QRS intervals:  Normal  Conduction:     Conduction: normal    ST segments:     ST segments:  Normal  T waves:     T waves: normal               ED Course  ED Course as of 12/08/22 0755   Tue Dec 06, 2022   1558 Still mild headache, lightheaded when standing - VSS, would like to get home, david picking her up  SBIRT 20yo+    Flowsheet Row Most Recent Value   SBIRT (25 yo +)    In order to provide better care to our patients, we are screening all of our patients for alcohol and drug use  Would it be okay to ask you these screening questions? Yes Filed at: 12/06/2022 1522   Initial Alcohol Screen: US AUDIT-C     1  How often do you have a drink containing alcohol? 0 Filed at: 12/06/2022 1522   2  How many drinks containing alcohol do you have on a typical day you are drinking? 0 Filed at: 12/06/2022 1522   3a  Male UNDER 65: How often do you have five or more drinks on one occasion? 0 Filed at: 12/06/2022 1522   3b  FEMALE Any Age, or MALE 65+: How often do you have 4 or more drinks on one occassion? 0 Filed at: 12/06/2022 1522   Audit-C Score 0 Filed at: 12/06/2022 1522   UMAIR: How many times in the past year have you    Used an illegal drug or used a prescription medication for non-medical reasons? Never Filed at: 12/06/2022 1522                    MDM  Number of Diagnoses or Management Options  Witnessed seizure-like activity ([facility] ): established and worsening     Amount and/or Complexity of Data Reviewed  Clinical lab tests: ordered and reviewed    Patient Progress  Patient progress: improved      Disposition  Final diagnoses:    Witnessed seizure-like activity ([address] )     Time reflects when diagnosis was documented in both MDM as applicable and the Disposition within this note     Time User Action Codes Description Comment    12/6/2022  4:00 PM Caleb Velarde Add [R56 9] Witnessed seizure-like activity [facility])       ED Disposition     ED Disposition Discharge    Condition   Stable    Date/Time   Tue Dec 6, 2022  4:01 PM    Comment   An Sites discharge to home/self care                 Follow-up Information     Follow up With Specialties Details Why Contact Info    your neurologist  Schedule an appointment as soon as possible for a visit             Discharge Medication List as of 12/6/2022  4:01 PM      CONTINUE these medications which have NOT CHANGED    Details   albuterol (VENTOLIN HFA) 90 mcg/act inhaler Inhale 2 puffs every 6 (six) hours as needed for wheezing, Starting Mon 10/14/2019, Normal      ALPRAZolam (XANAX) 1 mg tablet Take 1 mg by mouth daily at bedtime , Starting Fri 4/5/2019, Historical Med      dicyclomine (BENTYL) 20 mg tablet Take 1 tablet (20 mg total) by mouth 3 (three) times a day with meals, Starting Tue 5/31/2022, Normal      docusate sodium (COLACE) 100 mg capsule Take 1 capsule (100 mg total) by mouth every 12 (twelve) hours, Starting Thu 5/5/2022, Normal      famotidine (PEPCID) 40 MG tablet Take 1 tablet (40 mg total) by mouth daily at bedtime, Starting Tue 5/31/2022, Normal      indomethacin (INDOCIN) 50 mg capsule Take 1 capsule (50 mg total) by mouth 2 (two) times a day with meals, Starting Thu 3/18/2021, Normal      mometasone (ASMANEX TWISTHALER) 220 MCG/INH inhaler Inhale 220 mcg daily, Historical Med      montelukast (SINGULAIR) 10 mg tablet Take 1 tablet (10 mg total) by mouth daily at bedtime, Starting Thu 2/27/2020, Normal      ondansetron (ZOFRAN-ODT) 4 mg disintegrating tablet Take 1 tablet (4 mg total) by mouth every 6 (six) hours as needed for nausea or vomiting for up to 3 days, Starting Thu 6/18/2020, Until Sun 6/21/2020, Normal      pantoprazole (PROTONIX) 40 mg tablet Take 1 tablet (40 mg total) by mouth 2 (two) times a day before meals, Starting Tue 5/31/2022, Normal      sertraline (ZOLOFT) 100 mg tablet Take 100 mg by mouth daily, Historical Med      traZODone (DESYREL) 50 mg tablet Take 50 mg by mouth daily at bedtime, Historical Med             No discharge procedures on file      PDMP Review       Value Time User    PDMP Reviewed  Yes 1/27/2021  5:46 PM Adriel Aleman DO          ED Provider  Electronically Signed by           Nicki Brambila DO  12/08/22 1923

## 2023-01-12 ENCOUNTER — HOSPITAL ENCOUNTER (EMERGENCY)
Facility: HOSPITAL | Age: 43
Discharge: HOME | End: 2023-01-12
Attending: EMERGENCY MEDICINE

## 2023-01-12 ENCOUNTER — APPOINTMENT (EMERGENCY)
Dept: CT IMAGING | Facility: HOSPITAL | Age: 43
End: 2023-01-12

## 2023-01-12 VITALS
BODY MASS INDEX: 30.56 KG/M2 | RESPIRATION RATE: 18 BRPM | HEART RATE: 122 BPM | HEIGHT: 64 IN | SYSTOLIC BLOOD PRESSURE: 139 MMHG | OXYGEN SATURATION: 100 % | WEIGHT: 179 LBS | TEMPERATURE: 97.7 F | DIASTOLIC BLOOD PRESSURE: 62 MMHG

## 2023-01-12 DIAGNOSIS — K80.50 RECURRENT BILIARY COLIC: ICD-10-CM

## 2023-01-12 DIAGNOSIS — K52.9 GASTROENTERITIS: Primary | ICD-10-CM

## 2023-01-12 DIAGNOSIS — K59.00 CONSTIPATION: ICD-10-CM

## 2023-01-12 DIAGNOSIS — E87.6 HYPOKALEMIA: ICD-10-CM

## 2023-01-12 LAB
ALBUMIN SERPL BCP-MCNC: 4.1 G/DL (ref 3.5–5)
ALP SERPL-CCNC: 66 U/L (ref 46–116)
ALT SERPL W P-5'-P-CCNC: 37 U/L (ref 12–78)
ANION GAP SERPL CALCULATED.3IONS-SCNC: 6 MMOL/L (ref 4–13)
AST SERPL W P-5'-P-CCNC: 22 U/L (ref 5–45)
BASOPHILS # BLD AUTO: 0.07 THOUSANDS/ÂΜL (ref 0–0.1)
BASOPHILS NFR BLD AUTO: 1 % (ref 0–1)
BILIRUB SERPL-MCNC: 0.4 MG/DL (ref 0.2–1)
BUN SERPL-MCNC: 9 MG/DL (ref 5–25)
CALCIUM SERPL-MCNC: 8.5 MG/DL (ref 8.3–10.1)
CHLORIDE SERPL-SCNC: 107 MMOL/L (ref 96–108)
CO2 SERPL-SCNC: 22 MMOL/L (ref 21–32)
CREAT SERPL-MCNC: 1.07 MG/DL (ref 0.6–1.3)
EOSINOPHIL # BLD AUTO: 0.21 THOUSAND/ÂΜL (ref 0–0.61)
EOSINOPHIL NFR BLD AUTO: 3 % (ref 0–6)
ERYTHROCYTE [DISTWIDTH] IN BLOOD BY AUTOMATED COUNT: 12.9 % (ref 11.6–15.1)
EXT PREGNANCY TEST URINE: NEGATIVE
EXT. CONTROL: NORMAL
GFR SERPL CREATININE-BSD FRML MDRD: 64 ML/MIN/1.73SQ M
GLUCOSE SERPL-MCNC: 94 MG/DL (ref 65–140)
HCT VFR BLD AUTO: 40.4 % (ref 34.8–46.1)
HGB BLD-MCNC: 13.2 G/DL (ref 11.5–15.4)
IMM GRANULOCYTES # BLD AUTO: 0.01 THOUSAND/UL (ref 0–0.2)
IMM GRANULOCYTES NFR BLD AUTO: 0 % (ref 0–2)
LIPASE SERPL-CCNC: 107 U/L (ref 73–393)
LYMPHOCYTES # BLD AUTO: 3.63 THOUSANDS/ÂΜL (ref 0.6–4.47)
LYMPHOCYTES NFR BLD AUTO: 48 % (ref 14–44)
MCH RBC QN AUTO: 28.7 PG (ref 26.8–34.3)
MCHC RBC AUTO-ENTMCNC: 32.7 G/DL (ref 31.4–37.4)
MCV RBC AUTO: 88 FL (ref 82–98)
MONOCYTES # BLD AUTO: 0.42 THOUSAND/ÂΜL (ref 0.17–1.22)
MONOCYTES NFR BLD AUTO: 6 % (ref 4–12)
NEUTROPHILS # BLD AUTO: 3.07 THOUSANDS/ÂΜL (ref 1.85–7.62)
NEUTS SEG NFR BLD AUTO: 42 % (ref 43–75)
NRBC BLD AUTO-RTO: 0 /100 WBCS
PLATELET # BLD AUTO: 296 THOUSANDS/UL (ref 149–390)
PMV BLD AUTO: 9.1 FL (ref 8.9–12.7)
POTASSIUM SERPL-SCNC: 3 MMOL/L (ref 3.5–5.3)
PROT SERPL-MCNC: 7.1 G/DL (ref 6.4–8.4)
RBC # BLD AUTO: 4.6 MILLION/UL (ref 3.81–5.12)
SODIUM SERPL-SCNC: 135 MMOL/L (ref 135–147)
WBC # BLD AUTO: 7.41 THOUSAND/UL (ref 4.31–10.16)

## 2023-01-12 RX ORDER — ONDANSETRON 2 MG/ML
4 INJECTION INTRAMUSCULAR; INTRAVENOUS ONCE
Status: COMPLETED | OUTPATIENT
Start: 2023-01-12 | End: 2023-01-12

## 2023-01-12 RX ORDER — KETOROLAC TROMETHAMINE 30 MG/ML
15 INJECTION, SOLUTION INTRAMUSCULAR; INTRAVENOUS ONCE
Status: COMPLETED | OUTPATIENT
Start: 2023-01-12 | End: 2023-01-12

## 2023-01-12 RX ORDER — POTASSIUM CHLORIDE 20 MEQ/1
20 TABLET, EXTENDED RELEASE ORAL ONCE
Status: DISCONTINUED | OUTPATIENT
Start: 2023-01-12 | End: 2023-01-12 | Stop reason: HOSPADM

## 2023-01-12 RX ORDER — DICYCLOMINE HYDROCHLORIDE 10 MG/1
10 CAPSULE ORAL EVERY 6 HOURS PRN
Qty: 12 CAPSULE | Refills: 0 | Status: SHIPPED | OUTPATIENT
Start: 2023-01-12

## 2023-01-12 RX ORDER — ONDANSETRON 4 MG/1
4 TABLET, ORALLY DISINTEGRATING ORAL EVERY 6 HOURS PRN
Qty: 12 TABLET | Refills: 0 | Status: SHIPPED | OUTPATIENT
Start: 2023-01-12 | End: 2023-01-15

## 2023-01-12 RX ADMIN — ONDANSETRON 4 MG: 2 INJECTION INTRAMUSCULAR; INTRAVENOUS at 19:22

## 2023-01-12 RX ADMIN — IOHEXOL 100 ML: 350 INJECTION, SOLUTION INTRAVENOUS at 20:08

## 2023-01-12 RX ADMIN — KETOROLAC TROMETHAMINE 15 MG: 30 INJECTION, SOLUTION INTRAMUSCULAR; INTRAVENOUS at 20:01

## 2023-01-13 LAB
BILIRUB UR QL STRIP: NEGATIVE
CLARITY UR: CLEAR
COLOR UR: YELLOW
GLUCOSE UR STRIP-MCNC: NEGATIVE MG/DL
HGB UR QL STRIP.AUTO: NEGATIVE
KETONES UR STRIP-MCNC: NEGATIVE MG/DL
LEUKOCYTE ESTERASE UR QL STRIP: NEGATIVE
NITRITE UR QL STRIP: NEGATIVE
PH UR STRIP.AUTO: 6.5 [PH] (ref 4.5–8)
PROT UR STRIP-MCNC: NEGATIVE MG/DL
SP GR UR STRIP.AUTO: <=1.005 (ref 1–1.03)
UROBILINOGEN UR QL STRIP.AUTO: 0.2 E.U./DL

## 2023-01-13 NOTE — DISCHARGE INSTRUCTIONS
Keep well-hydrated  Consider buying Pedialyte or other electrolyte drink with a low sugar levels  Take dicyclomine and ondansetron as prescribed  May also take Tylenol or ibuprofen as needed for pain  Follow-up with the PCP as scheduled  Call PCP sooner if needed  Return if pain becomes too intense to bear at home or if you have high fever, severe vomiting, diarrhea or bloody stool

## 2023-01-14 NOTE — ED PROVIDER NOTES
History  Chief Complaint   Patient presents with   • Abdominal Pain     Patient presents to the ED with c/o right sided abdominal pain that began a few weeks ago off and on, states PCP thought it was constipation but she has had diarrhea x1 week and vomiting x1 week  States trying to eat a bland diet but has not improved      42 yo female with h/o seizure disorder, asthma, anxiety and depression has chronic nausea  C/o right-sided abdominal pain that started around 12/17/22  She was seen for this by her PCP on 12/19/22  Diagnosed as constipation, and started taking MiraLax since then  Patient endorses good stool output since then but still has RLQ pain that waxes and wanes  Described as usually dull, at times waxing and waning  Denies recent fever, bloody stool, vomiting,  changes  She had remote cholecystectomy  Denies recent pregnancy  Prior to Admission Medications   Prescriptions Last Dose Informant Patient Reported? Taking?    ALPRAZolam (XANAX) 1 mg tablet   Yes No   Sig: Take 1 mg by mouth daily at bedtime    Patient not taking: Reported on 5/31/2022   albuterol (VENTOLIN HFA) 90 mcg/act inhaler   No No   Sig: Inhale 2 puffs every 6 (six) hours as needed for wheezing   dicyclomine (BENTYL) 10 mg capsule   No Yes   Sig: Take 1 capsule (10 mg total) by mouth every 6 (six) hours as needed (Abdominal cramping) for up to 12 doses   dicyclomine (BENTYL) 20 mg tablet   No No   Sig: Take 1 tablet (20 mg total) by mouth 3 (three) times a day with meals   docusate sodium (COLACE) 100 mg capsule   No No   Sig: Take 1 capsule (100 mg total) by mouth every 12 (twelve) hours   Patient not taking: No sig reported   famotidine (PEPCID) 40 MG tablet   No No   Sig: Take 1 tablet (40 mg total) by mouth daily at bedtime   indomethacin (INDOCIN) 50 mg capsule   No No   Sig: Take 1 capsule (50 mg total) by mouth 2 (two) times a day with meals   Patient not taking: No sig reported   mometasone (ASMANEX TWISTHALER) 220 MCG/INH inhaler   Yes No   Sig: Inhale 220 mcg daily   montelukast (SINGULAIR) 10 mg tablet   No No   Sig: Take 1 tablet (10 mg total) by mouth daily at bedtime   ondansetron (ZOFRAN-ODT) 4 mg disintegrating tablet   No No   Sig: Take 1 tablet (4 mg total) by mouth every 6 (six) hours as needed for nausea or vomiting for up to 3 days   Patient not taking: Reported on 2022   ondansetron (ZOFRAN-ODT) 4 mg disintegrating tablet   No Yes   Sig: Take 1 tablet (4 mg total) by mouth every 6 (six) hours as needed for nausea or vomiting for up to 3 days   pantoprazole (PROTONIX) 40 mg tablet   No No   Sig: Take 1 tablet (40 mg total) by mouth 2 (two) times a day before meals   sertraline (ZOLOFT) 100 mg tablet   Yes No   Sig: Take 100 mg by mouth daily   traZODone (DESYREL) 50 mg tablet   Yes No   Sig: Take 50 mg by mouth daily at bedtime   Patient not taking: No sig reported      Facility-Administered Medications: None       Past Medical History:   Diagnosis Date   • Anxiety    • Asthma    • Depression    • GERD (gastroesophageal reflux disease)    • Hypoglycemia    • [facility]   • PTSD (post-traumatic stress disorder)        Past Surgical History:   Procedure Laterality Date   • ABDOMINAL SURGERY      3 csections, gallbladder and umbilical hernia 700   • ANKLE LIGAMENT RECONSTRUCTION     •  SECTION     • CHOLECYSTECTOMY     • CHOLECYSTECTOMY LAPAROSCOPIC N/A 2020    Procedure: CHOLECYSTECTOMY LAPAROSCOPIC;  Surgeon: Mary Bustamante MD;  Location:  MAIN OR;  Service: General   • KNEE SURGERY     • SINUS SURGERY     • TONSILLECTOMY     • TONSILLECTOMY     • TUBAL LIGATION     • UMBILICAL HERNIA REPAIR N/A 2020    Procedure: REPAIR HERNIA UMBILICAL, Primary;  Surgeon: Mary Bustamante MD;  Location:  MAIN OR;  Service: General   • UPPER GASTROINTESTINAL ENDOSCOPY         Family History   Problem Relation Age of Onset   • Depression Mother    • Asthma Mother    • Mental illness Mother    • Mental illness Maternal Grandfather      I have reviewed and agree with the history as documented  E-Cigarette/Vaping   • E-Cigarette Use Never User      E-Cigarette/Vaping Substances   • Nicotine No    • THC No    • CBD No    • Flavoring No    • Other No    • Unknown No      Social History     Tobacco Use   • Smoking status: Never   • Smokeless tobacco: Never   • Tobacco comments:     Never smoked   Vaping Use   • Vaping Use: Never used   Substance Use Topics   • Alcohol use: Not Currently   • Drug use: Never       Review of Systems   Constitutional: Negative for fever and unexpected weight change  Respiratory: Negative for shortness of breath  Gastrointestinal: Positive for abdominal pain, constipation and nausea  Negative for blood in stool and vomiting  Genitourinary: Negative for dysuria, flank pain, frequency, hematuria, urgency, vaginal bleeding and vaginal discharge  Musculoskeletal: Negative for back pain  Neurological: Negative for syncope  Psychiatric/Behavioral: Positive for dysphoric mood  The patient is nervous/anxious  All other systems reviewed and are negative  Physical Exam  Physical Exam  Vitals and nursing note reviewed  Constitutional:       General: She is not in acute distress  Appearance: She is well-developed  She is not ill-appearing or diaphoretic  HENT:      Head: Normocephalic and atraumatic  Mouth/Throat:      Mouth: Mucous membranes are moist       Pharynx: Oropharynx is clear  Eyes:      General: No scleral icterus  Conjunctiva/sclera: Conjunctivae normal       Pupils: Pupils are equal, round, and reactive to light  Cardiovascular:      Rate and Rhythm: Normal rate and regular rhythm  Heart sounds: Normal heart sounds  No murmur heard  Pulmonary:      Effort: Pulmonary effort is normal       Breath sounds: Normal breath sounds     Abdominal:      General: Abdomen is flat  Bowel sounds are normal  There is no distension or abdominal bruit  Palpations: Abdomen is soft  There is no fluid wave or mass  Tenderness: There is abdominal tenderness ( mild) in the right lower quadrant  There is no right CVA tenderness, left CVA tenderness, guarding or rebound  Negative signs include Pena's sign, Rovsing's sign, psoas sign and obturator sign  Hernia: No hernia is present  Musculoskeletal:         General: Normal range of motion  Cervical back: Normal range of motion and neck supple  Skin:     General: Skin is warm and dry  Capillary Refill: Capillary refill takes less than 2 seconds  Findings: No rash  Neurological:      General: No focal deficit present  Mental Status: She is alert and oriented to person, place, and time  Cranial Nerves: No cranial nerve deficit  Coordination: Coordination normal       Deep Tendon Reflexes: Reflexes are normal and symmetric     Psychiatric:         Mood and Affect: Mood normal          Behavior: Behavior normal          Vital Signs  ED Triage Vitals   Temperature Pulse Respirations Blood Pressure SpO2   01/12/23 1752 01/12/23 1745 01/12/23 1745 01/12/23 1745 01/12/23 1745   97 7 °F (36 5 °C) (!) 122 18 139/62 100 %      Temp Source Heart Rate Source Patient Position - Orthostatic VS BP Location FiO2 (%)   01/12/23 1752 01/12/23 1745 01/12/23 1745 01/12/23 1745 --   Temporal Monitor Sitting Right arm       Pain Score       01/12/23 1745       10 - Worst Possible Pain           Vitals:    01/12/23 1745   BP: 139/62   Pulse: (!) 122   Patient Position - Orthostatic VS: Sitting         Visual Acuity      ED Medications  Medications   ondansetron (ZOFRAN) injection 4 mg (4 mg Intravenous Given 1/12/23 1922)   ketorolac (TORADOL) injection 15 mg (15 mg Intravenous Given 1/12/23 2001)   iohexol (OMNIPAQUE) 350 MG/ML injection (SINGLE-DOSE) 100 mL (100 mL Intravenous Given 1/12/23 2008) Diagnostic Studies  Results Reviewed     Procedure Component Value Units Date/Time    Urine Macroscopic, POC [[id number]] Collected: 01/12/23 1838    Lab Status: Final result Specimen: Urine Updated: 01/13/23 0726     Color, UA Yellow     Clarity, UA Clear     pH, UA 6 5     Leukocytes, UA Negative     Nitrite, UA Negative     Protein, UA Negative mg/dl      Glucose, UA Negative mg/dl      Ketones, UA Negative mg/dl      Urobilinogen, UA 0 2 E U /dl      Bilirubin, UA Negative     Occult Blood, UA Negative     Specific Gravity, UA <=1 005    POCT pregnancy, urine [[id number]]  (Normal) Resulted: 01/12/23 1848    Lab Status: Final result Updated: 01/12/23 1848     EXT Preg Test, Ur Negative     Control Valid    Comprehensive metabolic panel [[id number]]  (Abnormal) Collected: 01/12/23 1753    Lab Status: Final result Specimen: Blood from Arm, Left Updated: 01/12/23 1823     Sodium 135 mmol/L      Potassium 3 0 mmol/L      Chloride 107 mmol/L      CO2 22 mmol/L      ANION GAP 6 mmol/L      BUN 9 mg/dL      Creatinine 1 07 mg/dL      Glucose 94 mg/dL      Calcium 8 5 mg/dL      AST 22 U/L      ALT 37 U/L      Alkaline Phosphatase 66 U/L      Total Protein 7 1 g/dL      Albumin 4 1 g/dL      Total Bilirubin 0 40 mg/dL      eGFR 64 ml/min/1 73sq m     Narrative:      Adri guidelines for Chronic Kidney Disease (CKD):   •  Stage 1 with normal or high GFR (GFR > 90 mL/min/1 73 square meters)  •  Stage 2 Mild CKD (GFR = 60-89 mL/min/1 73 square meters)  •  Stage 3A Moderate CKD (GFR = 45-59 mL/min/1 73 square meters)  •  Stage 3B Moderate CKD (GFR = 30-44 mL/min/1 73 square meters)  •  Stage 4 Severe CKD (GFR = 15-29 mL/min/1 73 square meters)  •  Stage 5 End Stage CKD (GFR <15 mL/min/1 73 square meters)  Note: GFR calculation is accurate only with a steady state creatinine    Lipase [[id number]]  (Normal) Collected: 01/12/23 1753    Lab Status: Final result Specimen: Blood from Arm, Left Updated: 01/12/23 1823     Lipase 107 u/L     CBC and differential [[id number]]  (Abnormal) Collected: 01/12/23 1753    Lab Status: Final result Specimen: Blood from Arm, Left Updated: 01/12/23 1758     WBC 7 41 Thousand/uL      RBC 4 60 Million/uL      Hemoglobin 13 2 g/dL      Hematocrit 40 4 %      MCV 88 fL      MCH 28 7 pg      MCHC 32 7 g/dL      RDW 12 9 %      MPV 9 1 fL      Platelets 299 Thousands/uL      nRBC 0 /100 WBCs      Neutrophils Relative 42 %      Immat GRANS % 0 %      Lymphocytes Relative 48 %      Monocytes Relative 6 %      Eosinophils Relative 3 %      Basophils Relative 1 %      Neutrophils Absolute 3 07 Thousands/µL      Immature Grans Absolute 0 01 Thousand/uL      Lymphocytes Absolute 3 63 Thousands/µL      Monocytes Absolute 0 42 Thousand/µL      Eosinophils Absolute 0 21 Thousand/µL      Basophils Absolute 0 07 Thousands/µL                  CT abdomen pelvis with contrast   Final Result by Aisha Curiel MD (01/12 2024)      Questionable mural edema within short segments of small bowel without significant thickening or adjacent inflammatory changes  Fluid content within the colon  Correlate with clinical findings for potential mild nonspecific enteritis  Workstation performed: [id number]                    Procedures  Procedures         ED Course                                             Medical Decision Making  44 y/o F with subacute onset RLQ abdominal pain, recent constipation improved with laxative  No peritoneal signs on exam, well hydrated, no obvious sign of infection nor injury  Labs, imaging reviewed and no evidence of appendicitis, colitis, pyelonephritis, pregnancy, abscess, lower lobe pneumonia, etc found      Constipation: self-limited or minor problem  Gastroenteritis: complicated acute illness or injury  Hypokalemia: complicated acute illness or injury  Recurrent biliary colic: complicated acute illness or injury  Amount and/or Complexity of Data Reviewed  Labs: ordered  Radiology: ordered  Risk  Prescription drug management  Disposition  Final diagnoses:   Gastroenteritis   Hypokalemia   Constipation   Recurrent biliary colic     Time reflects when diagnosis was documented in both MDM as applicable and the Disposition within this note     Time User Action Codes Description Comment    1/12/2023  9:18 PM Kerney Eileen Add [K52 9] Gastroenteritis     1/12/2023  9:39 PM Kerney Eileen Add [E87 6] Hypokalemia     1/12/2023  9:43 PM Kerney Eileen Add [K59 00] Constipation     1/12/2023  9:44 PM Kerney Eileen Add [K80 50] Recurrent biliary colic       ED Disposition     ED Disposition   Discharge    Condition   Stable    Date/Time   u Jan 12, 2023  9:40 PM    Comment   [facility] discharge to home/self care                 Follow-up Information     Follow up With Specialties Details Why Contact Info    Wyatt Burton DO Family Medicine Go to  As scheduled [facility] Blacka [address] [place] [id number] [phone]            Discharge Medication List as of 1/12/2023  9:46 PM      START taking these medications    Details   dicyclomine (BENTYL) 10 mg capsule Take 1 capsule (10 mg total) by mouth every 6 (six) hours as needed (Abdominal cramping) for up to 12 doses, Starting u 1/12/2023, Normal         CONTINUE these medications which have CHANGED    Details   ondansetron (ZOFRAN-ODT) 4 mg disintegrating tablet Take 1 tablet (4 mg total) by mouth every 6 (six) hours as needed for nausea or vomiting for up to 3 days, Starting u 1/12/2023, Until Sun 1/15/2023 at 2359, Normal         CONTINUE these medications which have NOT CHANGED    Details   albuterol (VENTOLIN HFA) 90 mcg/act inhaler Inhale 2 puffs every 6 (six) hours as needed for wheezing, Starting Mon 10/14/2019, Normal      ALPRAZolam (XANAX) 1 mg tablet Take 1 mg by mouth daily at bedtime , Starting Fri 4/5/2019, Historical Med      docusate sodium (COLACE) 100 mg capsule Take 1 capsule (100 mg total) by mouth every 12 (twelve) hours, Starting Thu 5/5/2022, Normal      famotidine (PEPCID) 40 MG tablet Take 1 tablet (40 mg total) by mouth daily at bedtime, Starting Tue 5/31/2022, Normal      indomethacin (INDOCIN) 50 mg capsule Take 1 capsule (50 mg total) by mouth 2 (two) times a day with meals, Starting u 3/18/2021, Normal      mometasone (ASMANEX TWISTHALER) 220 MCG/INH inhaler Inhale 220 mcg daily, Historical Med      montelukast (SINGULAIR) 10 mg tablet Take 1 tablet (10 mg total) by mouth daily at bedtime, Starting u 2/27/2020, Normal      pantoprazole (PROTONIX) 40 mg tablet Take 1 tablet (40 mg total) by mouth 2 (two) times a day before meals, Starting Tue 5/31/2022, Normal      sertraline (ZOLOFT) 100 mg tablet Take 100 mg by mouth daily, Historical Med      traZODone (DESYREL) 50 mg tablet Take 50 mg by mouth daily at bedtime, Historical Med         STOP taking these medications       dicyclomine (BENTYL) 20 mg tablet Comments:   Reason for Stopping:               No discharge procedures on file      PDMP Review       Value Time User    PDMP Reviewed  Yes 1/12/2023  9:38 PM Gerhard Hurtado DO          ED Provider  Electronically Signed by           Gerhard Hurtado DO  01/14/23 1144

## 2023-01-26 ENCOUNTER — HOSPITAL ENCOUNTER (EMERGENCY)
Facility: HOSPITAL | Age: 43
Discharge: HOME | End: 2023-01-26
Attending: INTERNAL MEDICINE

## 2023-01-26 VITALS
RESPIRATION RATE: 18 BRPM | BODY MASS INDEX: 28 KG/M2 | OXYGEN SATURATION: 99 % | WEIGHT: 164 LBS | SYSTOLIC BLOOD PRESSURE: 106 MMHG | TEMPERATURE: 97.7 F | HEART RATE: 137 BPM | DIASTOLIC BLOOD PRESSURE: 61 MMHG | HEIGHT: 64 IN

## 2023-01-26 DIAGNOSIS — R19.7 NAUSEA VOMITING AND DIARRHEA: Primary | ICD-10-CM

## 2023-01-26 DIAGNOSIS — R11.2 NAUSEA VOMITING AND DIARRHEA: Primary | ICD-10-CM

## 2023-01-26 DIAGNOSIS — R10.84 GENERALIZED ABDOMINAL DISCOMFORT: ICD-10-CM

## 2023-01-26 LAB
ALBUMIN SERPL BCP-MCNC: 4.1 G/DL (ref 3.5–5)
ALP SERPL-CCNC: 54 U/L (ref 46–116)
ALT SERPL W P-5'-P-CCNC: 49 U/L (ref 12–78)
ANION GAP SERPL CALCULATED.3IONS-SCNC: 10 MMOL/L (ref 4–13)
AST SERPL W P-5'-P-CCNC: 46 U/L (ref 5–45)
BASOPHILS # BLD AUTO: 0.06 THOUSANDS/ÂΜL (ref 0–0.1)
BASOPHILS NFR BLD AUTO: 1 % (ref 0–1)
BILIRUB SERPL-MCNC: 0.4 MG/DL (ref 0.2–1)
BUN SERPL-MCNC: 7 MG/DL (ref 5–25)
CALCIUM SERPL-MCNC: 8.4 MG/DL (ref 8.3–10.1)
CHLORIDE SERPL-SCNC: 105 MMOL/L (ref 96–108)
CO2 SERPL-SCNC: 22 MMOL/L (ref 21–32)
CREAT SERPL-MCNC: 0.92 MG/DL (ref 0.6–1.3)
EOSINOPHIL # BLD AUTO: 0.23 THOUSAND/ÂΜL (ref 0–0.61)
EOSINOPHIL NFR BLD AUTO: 4 % (ref 0–6)
ERYTHROCYTE [DISTWIDTH] IN BLOOD BY AUTOMATED COUNT: 13 % (ref 11.6–15.1)
GFR SERPL CREATININE-BSD FRML MDRD: 77 ML/MIN/1.73SQ M
GLUCOSE SERPL-MCNC: 107 MG/DL (ref 65–140)
HCT VFR BLD AUTO: 37.2 % (ref 34.8–46.1)
HGB BLD-MCNC: 12.4 G/DL (ref 11.5–15.4)
IMM GRANULOCYTES # BLD AUTO: 0.02 THOUSAND/UL (ref 0–0.2)
IMM GRANULOCYTES NFR BLD AUTO: 0 % (ref 0–2)
LIPASE SERPL-CCNC: 140 U/L (ref 73–393)
LYMPHOCYTES # BLD AUTO: 2.86 THOUSANDS/ÂΜL (ref 0.6–4.47)
LYMPHOCYTES NFR BLD AUTO: 48 % (ref 14–44)
MCH RBC QN AUTO: 29.2 PG (ref 26.8–34.3)
MCHC RBC AUTO-ENTMCNC: 33.3 G/DL (ref 31.4–37.4)
MCV RBC AUTO: 88 FL (ref 82–98)
MONOCYTES # BLD AUTO: 0.29 THOUSAND/ÂΜL (ref 0.17–1.22)
MONOCYTES NFR BLD AUTO: 5 % (ref 4–12)
NEUTROPHILS # BLD AUTO: 2.45 THOUSANDS/ÂΜL (ref 1.85–7.62)
NEUTS SEG NFR BLD AUTO: 42 % (ref 43–75)
NRBC BLD AUTO-RTO: 0 /100 WBCS
PLATELET # BLD AUTO: 277 THOUSANDS/UL (ref 149–390)
PMV BLD AUTO: 9.8 FL (ref 8.9–12.7)
POTASSIUM SERPL-SCNC: 5 MMOL/L (ref 3.5–5.3)
PROT SERPL-MCNC: 7.6 G/DL (ref 6.4–8.4)
RBC # BLD AUTO: 4.25 MILLION/UL (ref 3.81–5.12)
SODIUM SERPL-SCNC: 137 MMOL/L (ref 135–147)
WBC # BLD AUTO: 5.91 THOUSAND/UL (ref 4.31–10.16)

## 2023-01-26 RX ORDER — DICYCLOMINE HCL 20 MG
20 TABLET ORAL ONCE
Status: COMPLETED | OUTPATIENT
Start: 2023-01-26 | End: 2023-01-26

## 2023-01-26 RX ORDER — SIMETHICONE 80 MG
80 TABLET,CHEWABLE ORAL EVERY 6 HOURS PRN
Qty: 30 TABLET | Refills: 0 | Status: SHIPPED | OUTPATIENT
Start: 2023-01-26

## 2023-01-26 RX ORDER — DROPERIDOL 2.5 MG/ML
0.62 INJECTION, SOLUTION INTRAMUSCULAR; INTRAVENOUS ONCE
Status: COMPLETED | OUTPATIENT
Start: 2023-01-26 | End: 2023-01-26

## 2023-01-26 RX ADMIN — DROPERIDOL 0.62 MG: 2.5 INJECTION, SOLUTION INTRAMUSCULAR; INTRAVENOUS at 19:14

## 2023-01-26 RX ADMIN — DICYCLOMINE HYDROCHLORIDE 20 MG: 20 TABLET ORAL at 19:36

## 2023-01-26 RX ADMIN — SODIUM CHLORIDE 1000 ML: 0.9 INJECTION, SOLUTION INTRAVENOUS at 19:16

## 2023-01-26 NOTE — ED PROVIDER NOTES
History  Chief Complaint   Patient presents with   • Abdominal Pain     Patient presents to the ED with c/o continued abdominal pain, was seen here last week and was seen at PCP as well  States she has not gotten the ordered blood work  States pain has progressed and vomiting is still happening  42 y/o female recently seen in the  ER for enteritis, patient reports she has a history of anxiety depression and chronic nausea  Patient did undergo a full work-up including blood work and a CT scan for evaluation of abdominal pain nausea vomiting and diarrhea patient was diagnosed with enteritis and reports to me that she had stool sample tested and returned positive for viral cause for her diarrhea  Patient reports her pain continues and reports she still having nausea vomiting and diarrhea  Patient had a remote cholecystectomy  Denies recent pregnancy and states that she has had a tubal ligation  Patient denies any fever, blood in the stool or blood in the vomit  Prior to Admission Medications   Prescriptions Last Dose Informant Patient Reported? Taking?    ALPRAZolam (XANAX) 1 mg tablet   Yes No   Sig: Take 1 mg by mouth daily at bedtime    Patient not taking: Reported on 5/31/2022   albuterol (VENTOLIN HFA) 90 mcg/act inhaler   No No   Sig: Inhale 2 puffs every 6 (six) hours as needed for wheezing   dicyclomine (BENTYL) 10 mg capsule   No No   Sig: Take 1 capsule (10 mg total) by mouth every 6 (six) hours as needed (Abdominal cramping) for up to 12 doses   docusate sodium (COLACE) 100 mg capsule   No No   Sig: Take 1 capsule (100 mg total) by mouth every 12 (twelve) hours   Patient not taking: No sig reported   famotidine (PEPCID) 40 MG tablet   No No   Sig: Take 1 tablet (40 mg total) by mouth daily at bedtime   indomethacin (INDOCIN) 50 mg capsule   No No   Sig: Take 1 capsule (50 mg total) by mouth 2 (two) times a day with meals   Patient not taking: No sig reported   mometasone (ASMANEX TWISTHALER) 220 MCG/INH inhaler   Yes No   Sig: Inhale 220 mcg daily   montelukast (SINGULAIR) 10 mg tablet   No No   Sig: Take 1 tablet (10 mg total) by mouth daily at bedtime   ondansetron (ZOFRAN-ODT) 4 mg disintegrating tablet   No No   Sig: Take 1 tablet (4 mg total) by mouth every 6 (six) hours as needed for nausea or vomiting for up to 3 days   pantoprazole (PROTONIX) 40 mg tablet   No No   Sig: Take 1 tablet (40 mg total) by mouth 2 (two) times a day before meals   sertraline (ZOLOFT) 100 mg tablet   Yes No   Sig: Take 100 mg by mouth daily   traZODone (DESYREL) 50 mg tablet   Yes No   Sig: Take 50 mg by mouth daily at bedtime   Patient not taking: No sig reported      Facility-Administered Medications: None       Past Medical History:   Diagnosis Date   • Anxiety    • Asthma    • Depression    • GERD (gastroesophageal reflux disease)    • Hypoglycemia    • Moderate episode of recurrent major depressive disorder ([facility] [address] ) 10/03/2019   • PTSD (post-traumatic stress disorder)        Past Surgical History:   Procedure Laterality Date   • ABDOMINAL SURGERY      3 csections, gallbladder and umbilical hernia 4896   • ANKLE LIGAMENT RECONSTRUCTION     •  SECTION     • CHOLECYSTECTOMY     • CHOLECYSTECTOMY LAPAROSCOPIC N/A 2020    Procedure: CHOLECYSTECTOMY LAPAROSCOPIC;  Surgeon: David Perez MD;  Location:  MAIN OR;  Service: General   • KNEE SURGERY     • SINUS SURGERY     • TONSILLECTOMY     • TONSILLECTOMY     • TUBAL LIGATION     • UMBILICAL HERNIA REPAIR N/A 2020    Procedure: REPAIR HERNIA UMBILICAL, Primary;  Surgeon: David Perez MD;  Location:  MAIN OR;  Service: General   • UPPER GASTROINTESTINAL ENDOSCOPY         Family History   Problem Relation Age of Onset   • Depression Mother    • Asthma Mother    • Mental illness Mother    • Mental illness Maternal Grandfather      I have reviewed and agree with the history as documented      E-Cigarette/Vaping   • E-Cigarette Use Never User      E-Cigarette/Vaping Substances   • Nicotine No    • THC No    • CBD No    • Flavoring No    • Other No    • Unknown No      Social History     Tobacco Use   • Smoking status: Never   • Smokeless tobacco: Never   • Tobacco comments:     Never smoked   Vaping Use   • Vaping Use: Never used   Substance Use Topics   • Alcohol use: Not Currently   • Drug use: Never       Review of Systems   Constitutional: Negative for chills, fatigue and fever  HENT: Negative for congestion, ear pain, rhinorrhea and sore throat  Eyes: Negative for redness  Respiratory: Negative for chest tightness and shortness of breath  Cardiovascular: Negative for chest pain and palpitations  Gastrointestinal: Positive for abdominal pain, diarrhea, nausea and vomiting  Genitourinary: Negative for dysuria and hematuria  Musculoskeletal: Negative  Skin: Negative for rash  Neurological: Negative for dizziness, syncope, light-headedness and numbness  Physical Exam  Physical Exam  Vitals and nursing note reviewed  Constitutional:       Appearance: She is well-developed  HENT:      Head: Normocephalic  Eyes:      General: No scleral icterus  Cardiovascular:      Rate and Rhythm: Normal rate and regular rhythm  Pulmonary:      Effort: Pulmonary effort is normal       Breath sounds: Normal breath sounds  No stridor  Abdominal:      General: There is no distension  Palpations: Abdomen is soft  Tenderness: There is abdominal tenderness in the right lower quadrant, suprapubic area and left lower quadrant  Musculoskeletal:         General: Normal range of motion  Skin:     General: Skin is warm and dry  Capillary Refill: Capillary refill takes less than 2 seconds  Neurological:      Mental Status: She is alert and oriented to person, place, and time           Vital Signs  ED Triage Vitals [01/26/23 1740]   Temperature Pulse Respirations Blood Pressure SpO2   97 7 °F (36 5 °C) (!) 137 18 106/61 99 %      Temp Source Heart Rate Source Patient Position - Orthostatic VS BP Location FiO2 (%)   Temporal Monitor Sitting Left arm --      Pain Score       8           Vitals:    01/26/23 1740   BP: 106/61   Pulse: (!) 137   Patient Position - Orthostatic VS: Sitting         Visual Acuity      ED Medications  Medications   sodium chloride 0 9 % bolus 1,000 mL (1,000 mL Intravenous New Bag 1/26/23 1916)   droperidol (INAPSINE) injection 0 625 mg (0 625 mg Intravenous Given 1/26/23 1914)   dicyclomine (BENTYL) tablet 20 mg (20 mg Oral Given 1/26/23 1936)       Diagnostic Studies  Results Reviewed     Procedure Component Value Units Date/Time    Comprehensive metabolic panel [[id number]]  (Abnormal) Collected: 01/26/23 1916    Lab Status: Final result Specimen: Blood from Arm, Left Updated: 01/26/23 1937     Sodium 137 mmol/L      Potassium 5 0 mmol/L      Chloride 105 mmol/L      CO2 22 mmol/L      ANION GAP 10 mmol/L      BUN 7 mg/dL      Creatinine 0 92 mg/dL      Glucose 107 mg/dL      Calcium 8 4 mg/dL      AST 46 U/L      ALT 49 U/L      Alkaline Phosphatase 54 U/L      Total Protein 7 6 g/dL      Albumin 4 1 g/dL      Total Bilirubin 0 40 mg/dL      eGFR 77 ml/min/1 73sq m     Narrative:      [place] guidelines for Chronic Kidney Disease (CKD):   •  Stage 1 with normal or high GFR (GFR > 90 mL/min/1 73 square meters)  •  Stage 2 Mild CKD (GFR = 60-89 mL/min/1 73 square meters)  •  Stage 3A Moderate CKD (GFR = 45-59 mL/min/1 73 square meters)  •  Stage 3B Moderate CKD (GFR = 30-44 mL/min/1 73 square meters)  •  Stage 4 Severe CKD (GFR = 15-29 mL/min/1 73 square meters)  •  Stage 5 End Stage CKD (GFR <15 mL/min/1 73 square meters)  Note: GFR calculation is accurate only with a steady state creatinine    Lipase [[id number]]  (Normal) Collected: 01/26/23 1916    Lab Status: Final result Specimen: Blood from Arm, Left Updated: 01/26/23 1937     Lipase 140 u/L     CBC and differential [[id number]]  (Abnormal) Collected: 01/26/23 1916    Lab Status: Final result Specimen: Blood from Arm, Left Updated: 01/26/23 1922     WBC 5 91 Thousand/uL      RBC 4 25 Million/uL      Hemoglobin 12 4 g/dL      Hematocrit 37 2 %      MCV 88 fL      MCH 29 2 pg      MCHC 33 3 g/dL      RDW 13 0 %      MPV 9 8 fL      Platelets 143 Thousands/uL      nRBC 0 /100 WBCs      Neutrophils Relative 42 %      Immat GRANS % 0 %      Lymphocytes Relative 48 %      Monocytes Relative 5 %      Eosinophils Relative 4 %      Basophils Relative 1 %      Neutrophils Absolute 2 45 Thousands/µL      Immature Grans Absolute 0 02 Thousand/uL      Lymphocytes Absolute 2 86 Thousands/µL      Monocytes Absolute 0 29 Thousand/µL      Eosinophils Absolute 0 23 Thousand/µL      Basophils Absolute 0 06 Thousands/µL                  No orders to display              Procedures  Procedures         ED Course  ED Course as of 01/26/23 1946   Thu Jan 26, 2023 1941 Patient reported abdominal cramping  Bentyl given for relief of symptoms  Patient is drinking Gatorade at this time and discussion regarding further follow-up with GI specialty was had patient is agreeable to this and is agreeable to discharge  1942 Patient was reexamined at this time and informed of laboratory and/or imaging results and was found to be stable for discharge  Return to emergency department criteria was reviewed with the patient who verbalized understanding and was agreeable to discharge and the treatment plan at this time  SBIRT 22yo+    Flowsheet Row Most Recent Value   SBIRT (23 yo +)    In order to provide better care to our patients, we are screening all of our patients for alcohol and drug use  Would it be okay to ask you these screening questions?  No Filed at: 01/26/2023 1942                    Medical Decision Making  79-year-old female recently seen and evaluated for the same complaints presenting today again for nausea vomiting and diarrhea as well as generalized abdominal pain  No dysuria no fevers or blood in the stool  Patient recently diagnosed with gastroenteritis and had viral cultures returned positive on stool sample  Low suspicion for appendicitis  Patient ports Zofran is ineffective at home  Will trial droperidol for nausea and vomiting control in the ER  Work-up to include blood work, CBC as marker of infectivity, hemoconcentration for hydration status, CMP to check for electrolytes, renal function, liver function, Lipase to check for pancreatitis  In the setting of recent imaging will hold off on additional CT scan due to the risk of radiation, low suspicion for acute appendicitis in the setting of 2 diagnostic identifications of gastroenteritis  Bloodwork is normal, minimal relief with droperidol however patient able to PO liquids after administration  Advised to follow with GI  All imaging and/or lab testing discussed with patient, strict return to ED precautions discussed  Patient recommended to follow up promptly with appropriate outpatient provider  Patient and/or family members verbalizes understanding and agrees with plan  Patient is stable for discharge      Portions of the record may have been created with voice recognition software  Occasional wrong word or "sound a like" substitutions may have occurred due to the inherent limitations of voice recognition software  Read the chart carefully and recognize, using context, where substitutions have occurred  Generalized abdominal discomfort: acute illness or injury  Nausea vomiting and diarrhea: acute illness or injury  Amount and/or Complexity of Data Reviewed  Labs: ordered  Risk  OTC drugs  Prescription drug management            Disposition  Final diagnoses:   Nausea vomiting and diarrhea   Generalized abdominal discomfort     Time reflects when diagnosis was documented in both MDM as applicable and the Disposition within this note Time User Action Codes Description Comment    1/26/2023  7:43 PM Samy Ramos Add [R11 2,  R19 7] Nausea vomiting and diarrhea     1/26/2023  7:43 PM Samy Raoms Add [R10 84] Generalized abdominal discomfort       ED Disposition     ED Disposition   Discharge    Condition   Good    Date/Time   Thu Jan 26, 2023  7:42 PM    Comment   An Sites discharge to home/self care  Follow-up Information     Follow up With Specialties Details Why Contact Info Additional [address] [facility],  Family Medicine   [address] [address]  [address]  [facility] [phone]       [facility]   [facility] [address] [facility] [id number] [address] [facility] [address], [facility]  [id number] [phone]          Patient's Medications   Discharge Prescriptions    SIMETHICONE (MYLICON) 80 MG CHEWABLE TABLET    Chew 1 tablet (80 mg total) every 6 (six) hours as needed for flatulence       Start Date: 1/26/2023 End Date: --       Order Dose: 80 mg       Quantity: 30 tablet    Refills: 0       No discharge procedures on file      PDMP Review       Value Time User    PDMP Reviewed  Yes 1/12/2023  9:38 PM Alexandro Thurston DO          ED Provider  Electronically Signed by           Oscar Kohler PA-C  01/26/23 1946

## 2023-01-26 NOTE — Clinical Note
An Eason was seen and treated in our emergency department on 1/26/2023  Diagnosis:     Tamara Chu  may return to work on return date  She may return on this date: 01/30/2023         If you have any questions or concerns, please don't hesitate to call        Cheyanne Thrasher PA-C    ______________________________           _______________          _______________  Hospital Representative                              Date                                Time

## 2023-10-16 ENCOUNTER — APPOINTMENT (EMERGENCY)
Dept: CT IMAGING | Facility: HOSPITAL | Age: 43
End: 2023-10-16
Payer: COMMERCIAL

## 2023-10-16 ENCOUNTER — APPOINTMENT (EMERGENCY)
Dept: RADIOLOGY | Facility: HOSPITAL | Age: 43
End: 2023-10-16
Payer: COMMERCIAL

## 2023-10-16 ENCOUNTER — APPOINTMENT (OUTPATIENT)
Dept: CT IMAGING | Facility: HOSPITAL | Age: 43
End: 2023-10-16
Payer: COMMERCIAL

## 2023-10-16 ENCOUNTER — HOSPITAL ENCOUNTER (OUTPATIENT)
Facility: HOSPITAL | Age: 43
Setting detail: OBSERVATION
Discharge: HOME | End: 2023-10-18
Attending: EMERGENCY MEDICINE | Admitting: STUDENT IN AN ORGANIZED HEALTH CARE EDUCATION/TRAINING PROGRAM
Payer: COMMERCIAL

## 2023-10-16 DIAGNOSIS — R10.31 RIGHT LOWER QUADRANT ABDOMINAL PAIN: ICD-10-CM

## 2023-10-16 DIAGNOSIS — R55 SYNCOPE: Primary | ICD-10-CM

## 2023-10-16 DIAGNOSIS — K59.00 CONSTIPATION, UNSPECIFIED CONSTIPATION TYPE: ICD-10-CM

## 2023-10-16 DIAGNOSIS — R79.89 LOW TSH LEVEL: ICD-10-CM

## 2023-10-16 PROBLEM — N85.8 UTERINE MASS: Status: ACTIVE | Noted: 2023-10-16

## 2023-10-16 LAB
ALBUMIN SERPL BCP-MCNC: 3.7 G/DL (ref 3.5–5)
ALP SERPL-CCNC: 38 U/L (ref 34–104)
ALT SERPL W P-5'-P-CCNC: 37 U/L (ref 7–52)
ANION GAP SERPL CALCULATED.3IONS-SCNC: 6 MMOL/L
AST SERPL W P-5'-P-CCNC: 26 U/L (ref 13–39)
BACTERIA UR QL AUTO: ABNORMAL /HPF
BASOPHILS # BLD AUTO: 0.02 THOUSANDS/ÂΜL (ref 0–0.1)
BASOPHILS NFR BLD AUTO: 0 % (ref 0–1)
BILIRUB SERPL-MCNC: 0.43 MG/DL (ref 0.2–1)
BILIRUB UR QL STRIP: NEGATIVE
BUN SERPL-MCNC: 11 MG/DL (ref 5–25)
CALCIUM SERPL-MCNC: 8.6 MG/DL (ref 8.4–10.2)
CARDIAC TROPONIN I PNL SERPL HS: <2 NG/L
CHLORIDE SERPL-SCNC: 110 MMOL/L (ref 96–108)
CLARITY UR: CLEAR
CO2 SERPL-SCNC: 23 MMOL/L (ref 21–32)
COLOR UR: YELLOW
CREAT SERPL-MCNC: 1.02 MG/DL (ref 0.6–1.3)
D DIMER PPP FEU-MCNC: 0.65 UG/ML FEU
EOSINOPHIL # BLD AUTO: 0.01 THOUSAND/ÂΜL (ref 0–0.61)
EOSINOPHIL NFR BLD AUTO: 0 % (ref 0–6)
ERYTHROCYTE [DISTWIDTH] IN BLOOD BY AUTOMATED COUNT: 13.2 % (ref 11.6–15.1)
EXT PREGNANCY TEST URINE: NEGATIVE
EXT. CONTROL: NORMAL
GFR SERPL CREATININE-BSD FRML MDRD: 67 ML/MIN/1.73SQ M
GLUCOSE SERPL-MCNC: 101 MG/DL (ref 65–140)
GLUCOSE UR STRIP-MCNC: NEGATIVE MG/DL
HCG SERPL QL: NEGATIVE
HCT VFR BLD AUTO: 35.3 % (ref 34.8–46.1)
HGB BLD-MCNC: 11.3 G/DL (ref 11.5–15.4)
HGB UR QL STRIP.AUTO: NEGATIVE
HYALINE CASTS #/AREA URNS LPF: ABNORMAL /LPF
IMM GRANULOCYTES # BLD AUTO: 0.03 THOUSAND/UL (ref 0–0.2)
IMM GRANULOCYTES NFR BLD AUTO: 0 % (ref 0–2)
KETONES UR STRIP-MCNC: NEGATIVE MG/DL
LEUKOCYTE ESTERASE UR QL STRIP: NEGATIVE
LIPASE SERPL-CCNC: 21 U/L (ref 11–82)
LYMPHOCYTES # BLD AUTO: 1.65 THOUSANDS/ÂΜL (ref 0.6–4.47)
LYMPHOCYTES NFR BLD AUTO: 24 % (ref 14–44)
MCH RBC QN AUTO: 29.4 PG (ref 26.8–34.3)
MCHC RBC AUTO-ENTMCNC: 32 G/DL (ref 31.4–37.4)
MCV RBC AUTO: 92 FL (ref 82–98)
MONOCYTES # BLD AUTO: 0.49 THOUSAND/ÂΜL (ref 0.17–1.22)
MONOCYTES NFR BLD AUTO: 7 % (ref 4–12)
MUCOUS THREADS UR QL AUTO: ABNORMAL
NEUTROPHILS # BLD AUTO: 4.76 THOUSANDS/ÂΜL (ref 1.85–7.62)
NEUTS SEG NFR BLD AUTO: 69 % (ref 43–75)
NITRITE UR QL STRIP: NEGATIVE
NON-SQ EPI CELLS URNS QL MICRO: ABNORMAL /HPF
NRBC BLD AUTO-RTO: 0 /100 WBCS
PH UR STRIP.AUTO: 6.5 [PH]
PLATELET # BLD AUTO: 249 THOUSANDS/UL (ref 149–390)
PMV BLD AUTO: 9.9 FL (ref 8.9–12.7)
POTASSIUM SERPL-SCNC: 3.6 MMOL/L (ref 3.5–5.3)
PROT SERPL-MCNC: 5.9 G/DL (ref 6.4–8.4)
PROT UR STRIP-MCNC: ABNORMAL MG/DL
RBC # BLD AUTO: 3.84 MILLION/UL (ref 3.81–5.12)
RBC #/AREA URNS AUTO: ABNORMAL /HPF
SODIUM SERPL-SCNC: 139 MMOL/L (ref 135–147)
SP GR UR STRIP.AUTO: 1.02 (ref 1–1.03)
T4 FREE SERPL-MCNC: 0.48 NG/DL (ref 0.61–1.12)
TSH SERPL DL<=0.05 MIU/L-ACNC: 6.22 UIU/ML (ref 0.45–4.5)
UROBILINOGEN UR STRIP-ACNC: <2 MG/DL
WBC # BLD AUTO: 6.96 THOUSAND/UL (ref 4.31–10.16)
WBC #/AREA URNS AUTO: ABNORMAL /HPF

## 2023-10-16 PROCEDURE — 36415 COLL VENOUS BLD VENIPUNCTURE: CPT | Performed by: EMERGENCY MEDICINE

## 2023-10-16 PROCEDURE — 83690 ASSAY OF LIPASE: CPT | Performed by: EMERGENCY MEDICINE

## 2023-10-16 PROCEDURE — G1004 CDSM NDSC: HCPCS

## 2023-10-16 PROCEDURE — 84703 CHORIONIC GONADOTROPIN ASSAY: CPT | Performed by: EMERGENCY MEDICINE

## 2023-10-16 PROCEDURE — 84443 ASSAY THYROID STIM HORMONE: CPT | Performed by: EMERGENCY MEDICINE

## 2023-10-16 PROCEDURE — 81025 URINE PREGNANCY TEST: CPT | Performed by: EMERGENCY MEDICINE

## 2023-10-16 PROCEDURE — 84484 ASSAY OF TROPONIN QUANT: CPT | Performed by: STUDENT IN AN ORGANIZED HEALTH CARE EDUCATION/TRAINING PROGRAM

## 2023-10-16 PROCEDURE — 85379 FIBRIN DEGRADATION QUANT: CPT | Performed by: EMERGENCY MEDICINE

## 2023-10-16 PROCEDURE — 84484 ASSAY OF TROPONIN QUANT: CPT | Performed by: EMERGENCY MEDICINE

## 2023-10-16 PROCEDURE — 96361 HYDRATE IV INFUSION ADD-ON: CPT

## 2023-10-16 PROCEDURE — 71045 X-RAY EXAM CHEST 1 VIEW: CPT

## 2023-10-16 PROCEDURE — 96375 TX/PRO/DX INJ NEW DRUG ADDON: CPT

## 2023-10-16 PROCEDURE — 70450 CT HEAD/BRAIN W/O DYE: CPT

## 2023-10-16 PROCEDURE — 99223 1ST HOSP IP/OBS HIGH 75: CPT | Performed by: STUDENT IN AN ORGANIZED HEALTH CARE EDUCATION/TRAINING PROGRAM

## 2023-10-16 PROCEDURE — 93005 ELECTROCARDIOGRAM TRACING: CPT

## 2023-10-16 PROCEDURE — 85025 COMPLETE CBC W/AUTO DIFF WBC: CPT | Performed by: EMERGENCY MEDICINE

## 2023-10-16 PROCEDURE — 96374 THER/PROPH/DIAG INJ IV PUSH: CPT

## 2023-10-16 PROCEDURE — 99285 EMERGENCY DEPT VISIT HI MDM: CPT | Performed by: EMERGENCY MEDICINE

## 2023-10-16 PROCEDURE — 74177 CT ABD & PELVIS W/CONTRAST: CPT

## 2023-10-16 PROCEDURE — 99283 EMERGENCY DEPT VISIT LOW MDM: CPT

## 2023-10-16 PROCEDURE — 71275 CT ANGIOGRAPHY CHEST: CPT

## 2023-10-16 PROCEDURE — 84439 ASSAY OF FREE THYROXINE: CPT | Performed by: EMERGENCY MEDICINE

## 2023-10-16 PROCEDURE — 80053 COMPREHEN METABOLIC PANEL: CPT | Performed by: EMERGENCY MEDICINE

## 2023-10-16 PROCEDURE — 81001 URINALYSIS AUTO W/SCOPE: CPT | Performed by: EMERGENCY MEDICINE

## 2023-10-16 RX ORDER — FLUTICASONE PROPIONATE 220 UG/1
2 AEROSOL, METERED RESPIRATORY (INHALATION)
Status: DISCONTINUED | OUTPATIENT
Start: 2023-10-16 | End: 2023-10-16

## 2023-10-16 RX ORDER — ONDANSETRON 2 MG/ML
4 INJECTION INTRAMUSCULAR; INTRAVENOUS ONCE
Status: COMPLETED | OUTPATIENT
Start: 2023-10-16 | End: 2023-10-16

## 2023-10-16 RX ORDER — HYDROXYZINE HYDROCHLORIDE 25 MG/1
25 TABLET, FILM COATED ORAL EVERY 6 HOURS PRN
Status: DISCONTINUED | OUTPATIENT
Start: 2023-10-16 | End: 2023-10-18 | Stop reason: HOSPADM

## 2023-10-16 RX ORDER — ONDANSETRON 2 MG/ML
4 INJECTION INTRAMUSCULAR; INTRAVENOUS EVERY 4 HOURS PRN
Status: DISCONTINUED | OUTPATIENT
Start: 2023-10-16 | End: 2023-10-18 | Stop reason: HOSPADM

## 2023-10-16 RX ORDER — SERTRALINE HYDROCHLORIDE 100 MG/1
100 TABLET, FILM COATED ORAL DAILY
Status: DISCONTINUED | OUTPATIENT
Start: 2023-10-17 | End: 2023-10-18 | Stop reason: HOSPADM

## 2023-10-16 RX ORDER — SIMETHICONE 80 MG
80 TABLET,CHEWABLE ORAL EVERY 6 HOURS PRN
Status: DISCONTINUED | OUTPATIENT
Start: 2023-10-16 | End: 2023-10-18 | Stop reason: HOSPADM

## 2023-10-16 RX ORDER — TRAMADOL HYDROCHLORIDE 50 MG/1
50 TABLET ORAL EVERY 6 HOURS PRN
Status: DISCONTINUED | OUTPATIENT
Start: 2023-10-16 | End: 2023-10-18 | Stop reason: HOSPADM

## 2023-10-16 RX ORDER — METOCLOPRAMIDE HYDROCHLORIDE 5 MG/ML
10 INJECTION INTRAMUSCULAR; INTRAVENOUS EVERY 8 HOURS
Status: DISCONTINUED | OUTPATIENT
Start: 2023-10-16 | End: 2023-10-18 | Stop reason: HOSPADM

## 2023-10-16 RX ORDER — MAGNESIUM HYDROXIDE/ALUMINUM HYDROXICE/SIMETHICONE 120; 1200; 1200 MG/30ML; MG/30ML; MG/30ML
30 SUSPENSION ORAL EVERY 6 HOURS PRN
Status: DISCONTINUED | OUTPATIENT
Start: 2023-10-16 | End: 2023-10-18 | Stop reason: HOSPADM

## 2023-10-16 RX ORDER — KETOROLAC TROMETHAMINE 30 MG/ML
15 INJECTION, SOLUTION INTRAMUSCULAR; INTRAVENOUS ONCE
Status: COMPLETED | OUTPATIENT
Start: 2023-10-16 | End: 2023-10-16

## 2023-10-16 RX ORDER — POLYETHYLENE GLYCOL 3350 17 G/17G
17 POWDER, FOR SOLUTION ORAL DAILY
Status: DISCONTINUED | OUTPATIENT
Start: 2023-10-17 | End: 2023-10-17

## 2023-10-16 RX ORDER — ACETAMINOPHEN 325 MG/1
650 TABLET ORAL EVERY 6 HOURS PRN
Status: DISCONTINUED | OUTPATIENT
Start: 2023-10-16 | End: 2023-10-18 | Stop reason: HOSPADM

## 2023-10-16 RX ORDER — PANTOPRAZOLE SODIUM 40 MG/1
40 TABLET, DELAYED RELEASE ORAL
Status: DISCONTINUED | OUTPATIENT
Start: 2023-10-17 | End: 2023-10-18 | Stop reason: HOSPADM

## 2023-10-16 RX ORDER — OXYCODONE HYDROCHLORIDE 5 MG/1
5 TABLET ORAL EVERY 6 HOURS PRN
Status: DISCONTINUED | OUTPATIENT
Start: 2023-10-16 | End: 2023-10-18 | Stop reason: HOSPADM

## 2023-10-16 RX ORDER — FAMOTIDINE 20 MG/1
40 TABLET, FILM COATED ORAL
Status: DISCONTINUED | OUTPATIENT
Start: 2023-10-16 | End: 2023-10-18 | Stop reason: HOSPADM

## 2023-10-16 RX ORDER — HYDROMORPHONE HCL IN WATER/PF 6 MG/30 ML
0.2 PATIENT CONTROLLED ANALGESIA SYRINGE INTRAVENOUS EVERY 6 HOURS PRN
Status: DISCONTINUED | OUTPATIENT
Start: 2023-10-16 | End: 2023-10-17

## 2023-10-16 RX ORDER — MONTELUKAST SODIUM 10 MG/1
10 TABLET ORAL
Status: DISCONTINUED | OUTPATIENT
Start: 2023-10-16 | End: 2023-10-18 | Stop reason: HOSPADM

## 2023-10-16 RX ORDER — ALBUTEROL SULFATE 90 UG/1
2 AEROSOL, METERED RESPIRATORY (INHALATION) EVERY 6 HOURS PRN
Status: DISCONTINUED | OUTPATIENT
Start: 2023-10-16 | End: 2023-10-18 | Stop reason: HOSPADM

## 2023-10-16 RX ORDER — DICYCLOMINE HYDROCHLORIDE 10 MG/1
10 CAPSULE ORAL EVERY 6 HOURS PRN
Status: DISCONTINUED | OUTPATIENT
Start: 2023-10-16 | End: 2023-10-18 | Stop reason: HOSPADM

## 2023-10-16 RX ADMIN — ONDANSETRON 4 MG: 2 INJECTION INTRAMUSCULAR; INTRAVENOUS at 15:14

## 2023-10-16 RX ADMIN — MONTELUKAST 10 MG: 10 TABLET, FILM COATED ORAL at 21:25

## 2023-10-16 RX ADMIN — FAMOTIDINE 40 MG: 20 TABLET ORAL at 21:25

## 2023-10-16 RX ADMIN — IOHEXOL 100 ML: 350 INJECTION, SOLUTION INTRAVENOUS at 16:12

## 2023-10-16 RX ADMIN — ACETAMINOPHEN 650 MG: 325 TABLET, FILM COATED ORAL at 21:25

## 2023-10-16 RX ADMIN — DICYCLOMINE HYDROCHLORIDE 10 MG: 10 CAPSULE ORAL at 21:25

## 2023-10-16 RX ADMIN — ONDANSETRON 4 MG: 2 INJECTION INTRAMUSCULAR; INTRAVENOUS at 21:24

## 2023-10-16 RX ADMIN — SODIUM CHLORIDE 1000 ML: 0.9 INJECTION, SOLUTION INTRAVENOUS at 15:14

## 2023-10-16 RX ADMIN — METOCLOPRAMIDE HYDROCHLORIDE 10 MG: 5 INJECTION INTRAMUSCULAR; INTRAVENOUS at 19:00

## 2023-10-16 RX ADMIN — HYDROMORPHONE HYDROCHLORIDE 0.2 MG: 0.2 INJECTION, SOLUTION INTRAMUSCULAR; INTRAVENOUS; SUBCUTANEOUS at 18:54

## 2023-10-16 RX ADMIN — SODIUM CHLORIDE 1000 ML: 0.9 INJECTION, SOLUTION INTRAVENOUS at 14:02

## 2023-10-16 RX ADMIN — KETOROLAC TROMETHAMINE 15 MG: 30 INJECTION, SOLUTION INTRAMUSCULAR; INTRAVENOUS at 15:33

## 2023-10-16 NOTE — ED NOTES
Patient difficult vascular access, reports needing ultrasound for IV access in previous visits. Provider aware.      Gerry Garcia RN  10/16/23 2106

## 2023-10-16 NOTE — ED PROVIDER NOTES
History  Chief Complaint   Patient presents with    Syncope     Pt  reports pt syncopized x3 this morning. He states he held her and she did not fall/hit her head. Pt reports abd pain and hematuria after being diagnosed with a cyst on her L ovary. Patient is a 37year old female who presents with abdominal pain and syncope. Patient reports that she has chronic right sided abdominal pain. She had recent imaging that showed an incidental left ovarian cyst, but no known cause of the RLQ abdominal pain. Patient reports that this morning, she was nauseous and had worsening right-sided abdominal pain then felt lightheaded and  states that he caught her as she passed out. She came to and they were trying to walk to a chair, but then she passed out again when getting up. Upon getting to the chair,  reports that she passed out again. At no point did she hit her head. States that she does not recall passing out.  denies any seizure-like shaking/activity. No loss of bowel or bladder control. Prior to Admission Medications   Prescriptions Last Dose Informant Patient Reported? Taking?    ALPRAZolam (XANAX) 1 mg tablet  Self Yes No   Sig: Take 1 mg by mouth daily at bedtime    Patient not taking: Reported on 5/31/2022   albuterol (VENTOLIN HFA) 90 mcg/act inhaler  Self No No   Sig: Inhale 2 puffs every 6 (six) hours as needed for wheezing   dicyclomine (BENTYL) 10 mg capsule   No No   Sig: Take 1 capsule (10 mg total) by mouth every 6 (six) hours as needed (Abdominal cramping) for up to 12 doses   docusate sodium (COLACE) 100 mg capsule  Self No No   Sig: Take 1 capsule (100 mg total) by mouth every 12 (twelve) hours   Patient not taking: No sig reported   famotidine (PEPCID) 40 MG tablet   No No   Sig: Take 1 tablet (40 mg total) by mouth daily at bedtime   indomethacin (INDOCIN) 50 mg capsule  Self No No   Sig: Take 1 capsule (50 mg total) by mouth 2 (two) times a day with meals Patient not taking: No sig reported   mometasone (ASMANEX TWISTHALER) 220 MCG/INH inhaler  Self Yes No   Sig: Inhale 220 mcg daily   montelukast (SINGULAIR) 10 mg tablet  Self No No   Sig: Take 1 tablet (10 mg total) by mouth daily at bedtime   ondansetron (ZOFRAN-ODT) 4 mg disintegrating tablet   No No   Sig: Take 1 tablet (4 mg total) by mouth every 6 (six) hours as needed for nausea or vomiting for up to 3 days   pantoprazole (PROTONIX) 40 mg tablet   No No   Sig: Take 1 tablet (40 mg total) by mouth 2 (two) times a day before meals   sertraline (ZOLOFT) 100 mg tablet  Self Yes No   Sig: Take 100 mg by mouth daily   simethicone (MYLICON) 80 mg chewable tablet   No No   Sig: Chew 1 tablet (80 mg total) every 6 (six) hours as needed for flatulence   traZODone (DESYREL) 50 mg tablet  Self Yes No   Sig: Take 50 mg by mouth daily at bedtime   Patient not taking: No sig reported      Facility-Administered Medications: None       Past Medical History:   Diagnosis Date    Anxiety     Asthma     Depression     GERD (gastroesophageal reflux disease)     Hypoglycemia     Moderate episode of recurrent major depressive disorder (HCC) 10/03/2019    PTSD (post-traumatic stress disorder)        Past Surgical History:   Procedure Laterality Date    ABDOMINAL SURGERY      3 csections, gallbladder and umbilical hernia 0772    ANKLE LIGAMENT RECONSTRUCTION       SECTION      CHOLECYSTECTOMY      CHOLECYSTECTOMY LAPAROSCOPIC N/A 2020    Procedure: CHOLECYSTECTOMY LAPAROSCOPIC;  Surgeon: Linda Youngblood MD;  Location: [facility];  Service: General    KNEE SURGERY      SINUS SURGERY      TONSILLECTOMY      TONSILLECTOMY      TUBAL LIGATION      UMBILICAL HERNIA REPAIR N/A 2020    Procedure: REPAIR HERNIA UMBILICAL, Primary;  Surgeon: Linda Youngblood MD;  Location:  MAIN OR;  Service: General    UPPER GASTROINTESTINAL ENDOSCOPY         Family History   Problem Relation Age of Onset    Depression Mother     Asthma Mother     Mental illness Mother     Mental illness Maternal Grandfather      I have reviewed and agree with the history as documented. E-Cigarette/Vaping    E-Cigarette Use Never User      E-Cigarette/Vaping Substances    Nicotine No     THC No     CBD No     Flavoring No     Other No     Unknown No      Social History     Tobacco Use    Smoking status: Never    Smokeless tobacco: Never    Tobacco comments:     Never smoked   Vaping Use    Vaping Use: Never used   Substance Use Topics    Alcohol use: Not Currently    Drug use: Never       Review of Systems   Constitutional:  Negative for chills and fever. HENT:  Negative for congestion and rhinorrhea. Respiratory:  Negative for cough and shortness of breath. Cardiovascular:  Negative for chest pain and palpitations. Gastrointestinal:  Positive for abdominal pain. Negative for constipation, diarrhea, nausea and vomiting. Genitourinary:  Positive for hematuria. Negative for dysuria and flank pain. Musculoskeletal:  Negative for back pain and neck pain. Skin:  Negative for color change and pallor. Neurological:  Positive for syncope and light-headedness. Negative for dizziness, weakness, numbness and headaches. Physical Exam  Physical Exam  Vitals and nursing note reviewed. Constitutional:       General: She is not in acute distress. Appearance: Normal appearance. She is not ill-appearing, toxic-appearing or diaphoretic. HENT:      Head: Normocephalic and atraumatic. Mouth/Throat:      Mouth: Mucous membranes are moist.   Eyes:      Conjunctiva/sclera: Conjunctivae normal.      Pupils: Pupils are equal, round, and reactive to light. Cardiovascular:      Rate and Rhythm: Normal rate and regular rhythm. Pulses: Normal pulses. Heart sounds: Normal heart sounds. No murmur heard. Pulmonary:      Effort: Pulmonary effort is normal. No respiratory distress. Breath sounds: Normal breath sounds.  No stridor. No wheezing, rhonchi or rales. Chest:      Chest wall: No tenderness. Abdominal:      General: Bowel sounds are normal. There is no distension. Palpations: Abdomen is soft. Tenderness: There is abdominal tenderness in the right lower quadrant. There is no right CVA tenderness, left CVA tenderness, guarding or rebound. Negative signs include Pena's sign, Rovsing's sign, McBurney's sign and psoas sign. Musculoskeletal:      Cervical back: Neck supple. Right lower leg: No edema. Left lower leg: No edema. Skin:     General: Skin is warm and dry. Neurological:      General: No focal deficit present. Mental Status: She is alert and oriented to person, place, and time. Mental status is at baseline.    Psychiatric:         Mood and Affect: Mood normal.         Behavior: Behavior normal.         Vital Signs  ED Triage Vitals [10/16/23 1205]   Temperature Pulse Respirations Blood Pressure SpO2   97.6 °F (36.4 °C) (!) 113 18 111/74 100 %      Temp Source Heart Rate Source Patient Position - Orthostatic VS BP Location FiO2 (%)   Temporal Monitor Sitting Left arm --      Pain Score       10 - Worst Possible Pain           Vitals:    10/16/23 2030 10/16/23 2100 10/17/23 0000 10/17/23 0038   BP: 114/67 126/82 124/74    Pulse: 70 71 67 62   Patient Position - Orthostatic VS: Lying Lying           Visual Acuity      ED Medications  Medications   ondansetron (ZOFRAN) injection 4 mg (4 mg Intravenous Given 10/16/23 2124)   metoclopramide (REGLAN) injection 10 mg (10 mg Intravenous Given 10/17/23 0228)   acetaminophen (TYLENOL) tablet 650 mg (650 mg Oral Given 10/16/23 2125)   traMADol (ULTRAM) tablet 50 mg ( Oral See Alternative 10/17/23 7145)     Or   oxyCODONE (ROXICODONE) IR tablet 5 mg (5 mg Oral Given 10/17/23 6318)   polyethylene glycol (MIRALAX) packet 17 g (has no administration in time range)   pantoprazole (PROTONIX) EC tablet 40 mg (40 mg Oral Given 10/17/23 1306)   sertraline (ZOLOFT) tablet 100 mg (has no administration in time range)   albuterol (PROVENTIL HFA,VENTOLIN HFA) inhaler 2 puff (has no administration in time range)   dicyclomine (BENTYL) capsule 10 mg (10 mg Oral Given 10/16/23 2125)   famotidine (PEPCID) tablet 40 mg (40 mg Oral Given 10/16/23 2125)   montelukast (SINGULAIR) tablet 10 mg (10 mg Oral Given 10/16/23 2125)   simethicone (MYLICON) chewable tablet 80 mg (has no administration in time range)   aluminum-magnesium hydroxide-simethicone (MAALOX) oral suspension 30 mL (has no administration in time range)   fluticasone (ARNUITY ELLIPTA) 100 MCG/ACT inhaler 1 puff (has no administration in time range)   hydrOXYzine HCL (ATARAX) tablet 25 mg (25 mg Oral Given 10/17/23 0039)   sodium chloride 0.9 % bolus 1,000 mL (0 mL Intravenous Stopped 10/16/23 1513)   ondansetron (ZOFRAN) injection 4 mg (4 mg Intravenous Given 10/16/23 1514)   sodium chloride 0.9 % bolus 1,000 mL (0 mL Intravenous Stopped 10/16/23 1807)   ketorolac (TORADOL) injection 15 mg (15 mg Intravenous Given 10/16/23 1533)   iohexol (OMNIPAQUE) 350 MG/ML injection (SINGLE-DOSE) 100 mL (100 mL Intravenous Given 10/16/23 1612)       Diagnostic Studies  Results Reviewed       Procedure Component Value Units Date/Time    CBC (With Platelets) [[id number]]  (Abnormal) Collected: 10/17/23 0814    Lab Status: Final result Specimen: Blood from Arm, Left Updated: 10/17/23 0821     WBC 5.16 Thousand/uL      RBC 3.22 Million/uL      Hemoglobin 9.7 g/dL      Hematocrit 29.9 %      MCV 93 fL      MCH 30.1 pg      MCHC 32.4 g/dL      RDW 13.3 %      Platelets 581 Thousands/uL      MPV 9.8 fL     Comprehensive metabolic panel [[id number]]  (Abnormal) Collected: 10/17/23 0644    Lab Status: Final result Specimen: Blood from Hand, Right Updated: 10/17/23 0706     Sodium 137 mmol/L      Potassium 3.6 mmol/L      Chloride 113 mmol/L      CO2 19 mmol/L      ANION GAP 5 mmol/L      BUN 9 mg/dL      Creatinine 0.70 mg/dL      Glucose 97 mg/dL      Calcium 8.2 mg/dL      Corrected Calcium 8.7 mg/dL      AST 38 U/L      ALT 50 U/L      Alkaline Phosphatase 40 U/L      Total Protein 5.2 g/dL      Albumin 3.4 g/dL      Total Bilirubin 0.42 mg/dL      eGFR 106 ml/min/1.73sq m     Narrative:      [facility] guidelines for Chronic Kidney Disease (CKD):     Stage 1 with normal or high GFR (GFR > 90 mL/min/1.73 square meters)    Stage 2 Mild CKD (GFR = 60-89 mL/min/1.73 square meters)    Stage 3A Moderate CKD (GFR = 45-59 mL/min/1.73 square meters)    Stage 3B Moderate CKD (GFR = 30-44 mL/min/1.73 square meters)    Stage 4 Severe CKD (GFR = 15-29 mL/min/1.73 square meters)    Stage 5 End Stage CKD (GFR <15 mL/min/1.73 square meters)  Note: GFR calculation is accurate only with a steady state creatinine    Magnesium [[id number]]  (Normal) Collected: 10/17/23 0644    Lab Status: Final result Specimen: Blood from Hand, Right Updated: 10/17/23 0706     Magnesium 1.9 mg/dL     HS Troponin I 4hr [[id number]] Collected: 10/16/23 2032    Lab Status: Final result Specimen: Blood from Arm, Left Updated: 10/16/23 2104     hs TnI 4hr <2 ng/L      Delta 4hr hsTnI --    T4, free [[id number]]  (Abnormal) Collected: 10/16/23 1352    Lab Status: Final result Specimen: Blood from Arm, Right Updated: 10/16/23 1958     Free T4 0.48 ng/dL     Narrative:        "Therapeutic range for patients medicated with thyroid disorders: 0.61-1.24 ng/dL."    HS Troponin I 2hr [[id number]] Collected: 10/16/23 1816    Lab Status: Final result Specimen: Blood from Arm, Left Updated: 10/16/23 1900     hs TnI 2hr <2 ng/L      Delta 2hr hsTnI --    hCG, qualitative pregnancy [[id number]]  (Normal) Collected: 10/16/23 1352    Lab Status: Final result Specimen: Blood from Arm, Right Updated: 10/16/23 1558     Preg, Serum Negative    POCT pregnancy, urine [[id number]]  (Normal) Resulted: 10/16/23 1531    Lab Status: Final result Updated: 10/16/23 1531     EXT Preg Test, Ur Negative     Control Valid    D-Dimer [[id number]]  (Abnormal) Collected: 10/16/23 1401    Lab Status: Final result Specimen: Blood from Arm, Left Updated: 10/16/23 1519     D-Dimer, Quant 0.65 ug/ml FEU     TSH, 3rd generation with Free T4 reflex [[id number]]  (Abnormal) Collected: 10/16/23 1352    Lab Status: Final result Specimen: Blood from Arm, Right Updated: 10/16/23 1508     TSH 3RD GENERATON 6.223 uIU/mL     Comprehensive metabolic panel [[id number]]  (Abnormal) Collected: 10/16/23 1352    Lab Status: Final result Specimen: Blood from Arm, Right Updated: 10/16/23 1440     Sodium 139 mmol/L      Potassium 3.6 mmol/L      Chloride 110 mmol/L      CO2 23 mmol/L      ANION GAP 6 mmol/L      BUN 11 mg/dL      Creatinine 1.02 mg/dL      Glucose 101 mg/dL      Calcium 8.6 mg/dL      AST 26 U/L      ALT 37 U/L      Alkaline Phosphatase 38 U/L      Total Protein 5.9 g/dL      Albumin 3.7 g/dL      Total Bilirubin 0.43 mg/dL      eGFR 67 ml/min/1.73sq m     Narrative:      [place] guidelines for Chronic Kidney Disease (CKD):     Stage 1 with normal or high GFR (GFR > 90 mL/min/1.73 square meters)    Stage 2 Mild CKD (GFR = 60-89 mL/min/1.73 square meters)    Stage 3A Moderate CKD (GFR = 45-59 mL/min/1.73 square meters)    Stage 3B Moderate CKD (GFR = 30-44 mL/min/1.73 square meters)    Stage 4 Severe CKD (GFR = 15-29 mL/min/1.73 square meters)    Stage 5 End Stage CKD (GFR <15 mL/min/1.73 square meters)  Note: GFR calculation is accurate only with a steady state creatinine    Lipase [[id number]]  (Normal) Collected: 10/16/23 1352    Lab Status: Final result Specimen: Blood from Arm, Right Updated: 10/16/23 1440     Lipase 21 u/L     HS Troponin 0hr (reflex protocol) [[id number]]  (Normal) Collected: 10/16/23 1353    Lab Status: Final result Specimen: Blood from Arm, Right Updated: 10/16/23 1433     hs TnI 0hr <2 ng/L     CBC and differential [[id number]]  (Abnormal) Collected: 10/16/23 1352    Lab Status: Final result Specimen: Blood from Arm, Right Updated: 10/16/23 1356     WBC 6.96 Thousand/uL      RBC 3.84 Million/uL      Hemoglobin 11.3 g/dL      Hematocrit 35.3 %      MCV 92 fL      MCH 29.4 pg      MCHC 32.0 g/dL      RDW 13.2 %      MPV 9.9 fL      Platelets 760 Thousands/uL      nRBC 0 /100 WBCs      Neutrophils Relative 69 %      Immat GRANS % 0 %      Lymphocytes Relative 24 %      Monocytes Relative 7 %      Eosinophils Relative 0 %      Basophils Relative 0 %      Neutrophils Absolute 4.76 Thousands/µL      Immature Grans Absolute 0.03 Thousand/uL      Lymphocytes Absolute 1.65 Thousands/µL      Monocytes Absolute 0.49 Thousand/µL      Eosinophils Absolute 0.01 Thousand/µL      Basophils Absolute 0.02 Thousands/µL     Urine Microscopic [[id number]]  (Abnormal) Collected: 10/16/23 1313    Lab Status: Final result Specimen: Urine, Clean Catch Updated: 10/16/23 1335     RBC, UA 0-1 /hpf      WBC, UA 1-2 /hpf      Epithelial Cells Occasional /hpf      Bacteria, UA Occasional /hpf      MUCUS THREADS Occasional     Hyaline Casts, UA 1-2 /lpf     UA w Reflex to Microscopic w Reflex to Culture [[id number]]  (Abnormal) Collected: 10/16/23 1313    Lab Status: Final result Specimen: Urine, Clean Catch Updated: 10/16/23 1334     Color, UA Yellow     Clarity, UA Clear     Specific Gravity, UA 1.020     pH, UA 6.5     Leukocytes, UA Negative     Nitrite, UA Negative     Protein, UA Trace mg/dl      Glucose, UA Negative mg/dl      Ketones, UA Negative mg/dl      Urobilinogen, UA <2.0 mg/dl      Bilirubin, UA Negative     Occult Blood, UA Negative                   CT head wo contrast   Final Result by Jesu Rubio MD (10/17 0158)      No acute intracranial abnormality. Workstation performed: [id number]         PE Study with CT abdomen & pelvis with contrast   Final Result by Daniel Zavala MD (10/16 1706)      Trace bilateral pleural effusions.       No lung consolidation or pulmonary embolism. Large lower uterine segment uterine mass extending into the cervix. Possibly a fibroid. Correlate with pelvic ultrasound is recommended on a nonemergent outpatient basis for further evaluation. Workstation performed: [id number]         XR chest 1 view portable   Final Result by Farnaz Cortés DO (10/16 1533)      No acute cardiopulmonary disease. Resident: Dontae Villafuerte, the attending radiologist, have reviewed the images and agree with the final report above. Workstation performed: [id number]         VAS lower limb venous duplex study, complete bilateral    (Results Pending)   US abdomen and pelvis with transvaginal    (Results Pending)              Procedures  ECG 12 Lead Documentation Only    Date/Time: 10/16/2023 3:01 PM    Performed by: Reggie Durán DO  Authorized by: Reggie Durán DO    Indications / Diagnosis:  Syncope  ECG reviewed by me, the ED Provider: yes    Patient location:  ED  Previous ECG:     Previous ECG:  Compared to current    Comparison ECG info:  12/6/2022  Interpretation:     Interpretation: non-specific    Rate:     ECG rate:  67    ECG rate assessment: normal    Rhythm:     Rhythm: sinus rhythm    Ectopy:     Ectopy: none    QRS:     QRS axis:  Normal    QRS intervals:  Normal  Conduction:     Conduction: normal    ST segments:     ST segments:  Normal  T waves:     T waves: non-specific    Comments:                 ED Course  ED Course as of 10/17/23 0858   Mon Oct 16, 2023   1513 TSH 3RD GENERATON(!): 5.445   1514 Patient had a syncopal episode on the way from the bathroom. Patient urinated, then got up, and  states that she started to look shaky, felt cold and clammy, and then started passing out into him. He caught her and she did not fall. LOC was approx 30 seconds in duration. 1522 D-Dimer, Quant(!): 0.65   1529 Patient care signed out to Dr. Tiki Mckinley.  Patient is a 36 yo F who p/w syncope + RLQ abdominal pain. Will require admission once CTA PE + AP results due to 4th syncopal episode in the ER. HEART Risk Score      Flowsheet Row Most Recent Value   Heart Score Risk Calculator    History 0 Filed at: 10/16/2023 1546   ECG 1 Filed at: 10/16/2023 1546   Age 0 Filed at: 10/16/2023 1546   Risk Factors 1 Filed at: 10/16/2023 1546   Troponin 0 Filed at: 10/16/2023 1546   HEART Score 2 Filed at: 10/16/2023 1546                          SBIRT 20yo+      Flowsheet Row Most Recent Value   Initial Alcohol Screen: US AUDIT-C     1. How often do you have a drink containing alcohol? 0 Filed at: 10/16/2023 1308   2. How many drinks containing alcohol do you have on a typical day you are drinking? 0 Filed at: 10/16/2023 1308   3b. FEMALE Any Age, or MALE 65+: How often do you have 4 or more drinks on one occassion? 0 Filed at: 10/16/2023 1308   Audit-C Score 0 Filed at: 10/16/2023 1308   UMAIR: How many times in the past year have you. .. Used an illegal drug or used a prescription medication for non-medical reasons? Never Filed at: 10/16/2023 1308            Wells' Criteria for PE      Flowsheet Row Most Recent Value   Wells' Criteria for PE    Clinical signs and symptoms of DVT 0 Filed at: 10/16/2023 1546   PE is primary diagnosis or equally likely 0 Filed at: 10/16/2023 1546   HR >100 1.5 Filed at: 10/16/2023 1546   Immobilization at least 3 days or Surgery in the previous 4 weeks 0 Filed at: 10/16/2023 1546   Previous, objectively diagnosed PE or DVT 0 Filed at: 10/16/2023 1546   Hemoptysis 0 Filed at: 10/16/2023 1546   Malignancy with treatment within 6 months or palliative 0 Filed at: 10/16/2023 1546   Wells' Criteria Total 1.5 Filed at: 10/16/2023 1546                  Medical Decision Making  Assessment and plan:  Differential includes vasovagal or pain related syncope versus arrhythmia versus PE.   Labs to evaluate for electrolyte abnormalities, leukocytosis, anemia, kidney and liver function; D-dimer to rule out pulmonary embolism; EKG/troponin to evaluate for arrhythmia/ischemia; chest x-ray to assess for widened mediastinum; if dimer is negative, will get CT abdomen/pelvis for further evaluation of abdominal pain including appendicitis versus cholecystitis versus colitis; if dimer is positive, will need CTA PE study with abdomen/pelvis. Do not believe that patient's syncope is related to pain from a potential ovarian torsion as patient has no pain in the left side of her abdomen which is where the incidental left ovarian cyst was located. Amount and/or Complexity of Data Reviewed  Labs: ordered. Decision-making details documented in ED Course. Radiology: ordered. Risk  Prescription drug management. Decision regarding hospitalization. Disposition  Final diagnoses:   Syncope   Right lower quadrant abdominal pain   Low TSH level     Time reflects when diagnosis was documented in both MDM as applicable and the Disposition within this note       Time User Action Codes Description Comment    10/16/2023  3:12 PM Rosezetta Dress [R55] Syncope     10/16/2023  3:12 PM Angle Pruett Add [R10.31] Right lower quadrant abdominal pain     10/16/2023  3:13 PM Angle Pruett Add [R79.89] Low TSH level           ED Disposition       ED Disposition   Admit    Condition   Stable    Date/Time   Mon Oct 16, 2023 1512    Comment   Case was discussed with hospitalist and the patient's admission status was agreed to be Admission Status: inpatient status to the service of Dr. Vicenta Lassiter . Follow-up Information    None         Patient's Medications   Discharge Prescriptions    No medications on file       No discharge procedures on file.     PDMP Review         Value Time User    PDMP Reviewed  Yes 1/12/2023  9:38 PM Carmen White DO            ED Provider  Electronically Signed by             Angle Pruett DO  10/17/23 0141

## 2023-10-16 NOTE — ASSESSMENT & PLAN NOTE
Pt presenting with 3 episodes of syncope ass/w lightheadedness and dizziness.      Received Toradol IVF and zofran in the ED  EKG showing non specific T wave abnormality  Troponin neg-> continue to trend  BS and electrolytes WNL-> continue to monitor  Obtain Echo  Obtain orthostatics  Obtain Ct head    Consult cardio  Telemetry

## 2023-10-16 NOTE — ASSESSMENT & PLAN NOTE
Pt describe abd pain and hematuria  CTA a/p negative for any acute pathology  UA negative for UTI and hematuria  Has been followed by [facility] with Dr. Shlomo Pickering and WILLIAMS Rai  Continue to have 10/10 pain ass/w nausea    Reglan joe, zofran prn  Tylenol, tramadol, oxy dilaudid   Continue home regimen of bentyl, colace, pepcid indomethacin and protonix  Consult GI

## 2023-10-16 NOTE — ASSESSMENT & PLAN NOTE
CTA A/P showing Large lower uterine segment uterine mass extending into the cervix. Possibly a fibroid. Correlate with pelvic ultrasound is recommended on a nonemergent outpatient basis for further evaluation. Possible cause of pain?   Abd and transvaginal ultrasound  Defer gyn consult at this time

## 2023-10-16 NOTE — H&P
[address] [facility]  H&P  Name: Maryann Eason 37 y.o. female I MRN: [id number]  Unit/Bed#: ED 15 I Date of Admission: 10/16/2023   Date of Service: 10/16/2023 I Hospital Day: 0      Assessment/Plan   * Syncope  Assessment & Plan  Pt presenting with 3 episodes of syncope ass/w lightheadedness and dizziness. Received Toradol IVF and zofran in the ED  EKG showing non specific T wave abnormality  Troponin neg-> continue to trend  BS and electrolytes WNL-> continue to monitor  Obtain Echo  Obtain orthostatics  Obtain Ct head    Consult cardio  Telemetry       Right lower quadrant abdominal pain  Assessment & Plan  Pt describe abd pain and hematuria  CTA a/p negative for any acute pathology  UA negative for UTI and hematuria  Has been followed by [facility] with Dr. Aldana Pi and PA [place]  Continue to have 10/10 pain ass/w nausea    Reglan joe, zofran prn  Tylenol, tramadol, oxy dilaudid   Continue home regimen of bentyl, colace, pepcid indomethacin and protonix  Consult GI          Uterine mass  Assessment & Plan  CTA A/P showing Large lower uterine segment uterine mass extending into the cervix. Possibly a fibroid. Correlate with pelvic ultrasound is recommended on a nonemergent outpatient basis for further evaluation. Possible cause of pain? Abd and transvaginal ultrasound  Defer gyn consult at this time    Elevated d-dimer  Assessment & Plan  D-Dimer on admission 0.65  CTA PE negative for PE  Venous dopplers ordered    Abnormal TSH  Assessment & Plan  TSH elevated 6.223  T4 pending  Repeat TSH in 4-6 weeks  F/u with PCP    Moderate persistent asthma without complication  Assessment & Plan  Continue home regimen  IS  Not in resp distress    Moderate episode of recurrent major depressive disorder (HCC)  Assessment & Plan  Continue home regimen of xanax, Zoloft, and trazodone        VTE Pharmacologic Prophylaxis: VTE Score: 2 Low Risk (Score 0-2) - Encourage Ambulation.   Code Status: Prior full code  Discussion with family: Updated  () at bedside. Anticipated Length of Stay: Patient will be admitted on an observation basis with an anticipated length of stay of less than 2 midnights secondary to syncope. Total Time Spent on Date of Encounter in care of patient: 40 mins. This time was spent on one or more of the following: performing physical exam; counseling and coordination of care; obtaining or reviewing history; documenting in the medical record; reviewing/ordering tests, medications or procedures; communicating with other healthcare professionals and discussing with patient's family/caregivers. Chief Complaint: fainting    History of Present Illness:  Maryann Eason is a 37 y.o. female with a PMH of GERD, RUQ abd pain, MDD, Asthma, who presents after fainting 3 times this morning. Patient has had significant abdominal pain and describes hematuria. Has been following with [facility] for GERD constipation and right upper quadrant abdominal pain. Patient has a history of cholecystectomy. Upon evaluation of right lower quadrant pain patient was noted to have an incidental finding of left ovarian cyst.  Prior to syncopal events patient had significant right lower quadrant pain associated with nausea however no emesis. Describes lightheadedness and then syncopal episode. Review of Systems:  Review of Systems   All other systems reviewed and are negative.       Past Medical and Surgical History:   Past Medical History:   Diagnosis Date    Anxiety     Asthma     Depression     GERD (gastroesophageal reflux disease) 2020    Hypoglycemia     Moderate episode of recurrent major depressive disorder ([address]) 10/03/2019    PTSD (post-traumatic stress disorder)        Past Surgical History:   Procedure Laterality Date    ABDOMINAL SURGERY      3 csections, gallbladder and umbilical hernia 3322    [address]      CHOLECYSTECTOMY  2020 CHOLECYSTECTOMY LAPAROSCOPIC N/A 06/18/2020    Procedure: CHOLECYSTECTOMY LAPAROSCOPIC;  Surgeon: Neptali Lyons MD;  Location: [facility] MAIN OR;  Service: General    KNEE SURGERY      SINUS SURGERY      TONSILLECTOMY      TONSILLECTOMY      TUBAL LIGATION      UMBILICAL HERNIA REPAIR N/A 06/18/2020    Procedure: REPAIR HERNIA UMBILICAL, Primary;  Surgeon: Neptali Lyons MD;  Location: [facility] MAIN OR;  Service: General    UPPER GASTROINTESTINAL ENDOSCOPY  2020       Meds/Allergies:  Prior to Admission medications    Medication Sig Start Date End Date Taking?  Authorizing Provider   albuterol (VENTOLIN HFA) 90 mcg/act inhaler Inhale 2 puffs every 6 (six) hours as needed for wheezing 10/14/19   Nimisha Rich MD   ALPRAZolam [place]) 1 mg tablet Take 1 mg by mouth daily at bedtime   Patient not taking: Reported on 5/31/2022 4/5/19   Historical Provider, MD   dicyclomine (BENTYL) 10 mg capsule Take 1 capsule (10 mg total) by mouth every 6 (six) hours as needed (Abdominal cramping) for up to 12 doses 1/12/23   Nikolai Urias DO   docusate sodium (COLACE) 100 mg capsule Take 1 capsule (100 mg total) by mouth every 12 (twelve) hours  Patient not taking: No sig reported 5/5/22   Forest Layton DO   famotidine (PEPCID) 40 MG tablet Take 1 tablet (40 mg total) by mouth daily at bedtime 5/31/22   ANT Small   indomethacin (INDOCIN) 50 mg capsule Take 1 capsule (50 mg total) by mouth 2 (two) times a day with meals  Patient not taking: No sig reported 3/18/21   ANCA [facility]) 220 MCG/INH inhaler Inhale 220 mcg daily    Historical Provider, MD   montelukast (SINGULAIR) 10 mg tablet Take 1 tablet (10 mg total) by mouth daily at bedtime 2/27/20   ANT Gonzáles   ondansetron (ZOFRAN-ODT) 4 mg disintegrating tablet Take 1 tablet (4 mg total) by mouth every 6 (six) hours as needed for nausea or vomiting for up to 3 days 1/12/23 1/15/23  Nikolai Urias DO   pantoprazole (PROTONIX) 40 mg tablet Take 1 tablet (40 mg total) by mouth 2 (two) times a day before meals 5/31/22   ANT Gordon   sertraline (ZOLOFT) 100 mg tablet Take 100 mg by mouth daily    Historical Provider, MD   simethicone (MYLICON) 80 mg chewable tablet Chew 1 tablet (80 mg total) every 6 (six) hours as needed for flatulence 1/26/23   Radha Martienz PA-C   traZODone (DESYREL) 50 mg tablet Take 50 mg by mouth daily at bedtime  Patient not taking: No sig reported    Historical Provider, MD   gabapentin (NEURONTIN) 300 mg capsule Take 1 capsule (300 mg total) by mouth 2 (two) times a day  Patient not taking: Reported on 3/18/2021 2/27/20 5/5/22  ANT Roberts     I have reviewed home medications using recent Epic encounter. Allergies: Allergies   Allergen Reactions    Azithromycin GI Intolerance     Other reaction(s): GI Intolerance    Cephalosporins Hives    Fluoxetine Other (See Comments)    Sulfa Antibiotics Hives       Social History:  Marital Status: Single   Patient Pre-hospital Living Situation: Home  Patient Pre-hospital Level of Mobility: walks  Patient Pre-hospital Diet Restrictions: none  Substance Use History:   Social History     Substance and Sexual Activity   Alcohol Use Not Currently     Social History     Tobacco Use   Smoking Status Never   Smokeless Tobacco Never   Tobacco Comments    Never smoked     Social History     Substance and Sexual Activity   Drug Use Never       Family History:  Family History   Problem Relation Age of Onset    Depression Mother     Asthma Mother     Mental illness Mother     Mental illness Maternal Grandfather        Physical Exam:     Vitals:   Blood Pressure: 133/81 (10/16/23 1530)  Pulse: 66 (10/16/23 1530)  Temperature: 97.6 °F (36.4 °C) (10/16/23 1205)  Temp Source: Temporal (10/16/23 1205)  Respirations: 16 (10/16/23 1530)  SpO2: 99 % (10/16/23 1530)    Physical Exam  Vitals and nursing note reviewed.    Constitutional:       General: She is in acute distress. Appearance: She is ill-appearing. HENT:      Head: Normocephalic and atraumatic. Cardiovascular:      Rate and Rhythm: Normal rate and regular rhythm. Pulses: Normal pulses. Heart sounds: Normal heart sounds. Pulmonary:      Effort: Pulmonary effort is normal.      Breath sounds: Normal breath sounds. Abdominal:      General: Abdomen is flat. Bowel sounds are normal.      Palpations: Abdomen is soft. Tenderness: There is abdominal tenderness. Musculoskeletal:      Right lower leg: No edema. Left lower leg: No edema. Skin:     General: Skin is warm. Neurological:      General: No focal deficit present. Mental Status: She is alert and oriented to person, place, and time. Additional Data:     Lab Results:  Results from last 7 days   Lab Units 10/16/23  1352   WBC Thousand/uL 6.96   HEMOGLOBIN g/dL 11.3*   HEMATOCRIT % 35.3   PLATELETS Thousands/uL 249   NEUTROS PCT % 69   LYMPHS PCT % 24   MONOS PCT % 7   EOS PCT % 0     Results from last 7 days   Lab Units 10/16/23  1352   SODIUM mmol/L 139   POTASSIUM mmol/L 3.6   CHLORIDE mmol/L 110*   CO2 mmol/L 23   BUN mg/dL 11   CREATININE mg/dL 1.02   ANION GAP mmol/L 6   CALCIUM mg/dL 8.6   ALBUMIN g/dL 3.7   TOTAL BILIRUBIN mg/dL 0.43   ALK PHOS U/L 38   ALT U/L 37   AST U/L 26   GLUCOSE RANDOM mg/dL 101                       Lines/Drains:  Invasive Devices       Peripheral Intravenous Line  Duration             Peripheral IV 10/16/23 Distal;Left;Upper;Ventral (anterior) Arm <1 day                        Imaging:   PE Study with CT abdomen & pelvis with contrast   Final Result by Lorie Rice MD (10/16 8186)      Trace bilateral pleural effusions. No lung consolidation or pulmonary embolism. Large lower uterine segment uterine mass extending into the cervix. Possibly a fibroid. Correlate with pelvic ultrasound is recommended on a nonemergent outpatient basis for further evaluation. Workstation performed: [id number]         XR chest 1 view portable   Final Result by Shelia Mckeon DO (10/16 1533)      No acute cardiopulmonary disease. Resident: Lolis Oconnor, the attending radiologist, have reviewed the images and agree with the final report above. Workstation performed: [id number]         CT head wo contrast    (Results Pending)   VAS lower limb venous duplex study, complete bilateral    (Results Pending)   US abdomen and pelvis with transvaginal    (Results Pending)       EKG and Other Studies Reviewed on Admission:   EKG:   Normal sinus rhythm   Normal ECG   When compared with ECG of 06-DEC-2022 14:15,   Vent. rate has decreased BY  49 BPM   Nonspecific T wave abnormality now evident in Anterior leads       ** Please Note: This note has been constructed using a voice recognition system.  **

## 2023-10-17 ENCOUNTER — APPOINTMENT (OUTPATIENT)
Dept: NON INVASIVE DIAGNOSTICS | Facility: HOSPITAL | Age: 43
End: 2023-10-17
Payer: COMMERCIAL

## 2023-10-17 ENCOUNTER — APPOINTMENT (OUTPATIENT)
Dept: ULTRASOUND IMAGING | Facility: HOSPITAL | Age: 43
End: 2023-10-17
Payer: COMMERCIAL

## 2023-10-17 LAB
ALBUMIN SERPL BCP-MCNC: 3.4 G/DL (ref 3.5–5)
ALP SERPL-CCNC: 40 U/L (ref 34–104)
ALT SERPL W P-5'-P-CCNC: 50 U/L (ref 7–52)
ANION GAP SERPL CALCULATED.3IONS-SCNC: 5 MMOL/L
AORTIC ROOT: 3.5 CM
AORTIC VALVE MEAN VELOCITY: 6.8 M/S
APICAL FOUR CHAMBER EJECTION FRACTION: 69 %
AST SERPL W P-5'-P-CCNC: 38 U/L (ref 13–39)
AV LVOT MEAN GRADIENT: 1 MMHG
AV LVOT PEAK GRADIENT: 3 MMHG
AV MEAN GRADIENT: 2 MMHG
AV PEAK GRADIENT: 4 MMHG
AV VELOCITY RATIO: 0.8
BILIRUB SERPL-MCNC: 0.42 MG/DL (ref 0.2–1)
BUN SERPL-MCNC: 9 MG/DL (ref 5–25)
CALCIUM ALBUM COR SERPL-MCNC: 8.7 MG/DL (ref 8.3–10.1)
CALCIUM SERPL-MCNC: 8.2 MG/DL (ref 8.4–10.2)
CHLORIDE SERPL-SCNC: 113 MMOL/L (ref 96–108)
CO2 SERPL-SCNC: 19 MMOL/L (ref 21–32)
CREAT SERPL-MCNC: 0.7 MG/DL (ref 0.6–1.3)
DOP CALC AO PEAK VEL: 1.02 M/S
DOP CALC AO VTI: 17.7 CM
DOP CALC LVOT PEAK VEL VTI: 15.33 CM
DOP CALC LVOT PEAK VEL: 0.82 M/S
DOP CALC MV VTI: 20.17 CM
E WAVE DECELERATION TIME: 192 MS
E/A RATIO: 1.15
ERYTHROCYTE [DISTWIDTH] IN BLOOD BY AUTOMATED COUNT: 13.3 % (ref 11.6–15.1)
FRACTIONAL SHORTENING: 30 (ref 28–44)
GFR SERPL CREATININE-BSD FRML MDRD: 106 ML/MIN/1.73SQ M
GLUCOSE SERPL-MCNC: 117 MG/DL (ref 65–140)
GLUCOSE SERPL-MCNC: 89 MG/DL (ref 65–140)
GLUCOSE SERPL-MCNC: 93 MG/DL (ref 65–140)
GLUCOSE SERPL-MCNC: 97 MG/DL (ref 65–140)
HCT VFR BLD AUTO: 29.9 % (ref 34.8–46.1)
HGB BLD-MCNC: 9.7 G/DL (ref 11.5–15.4)
INTERVENTRICULAR SEPTUM IN DIASTOLE (PARASTERNAL SHORT AXIS VIEW): 0.7 CM
INTERVENTRICULAR SEPTUM: 0.7 CM (ref 0.6–1.1)
LAAS-AP2: 11.3 CM2
LAAS-AP4: 10.4 CM2
LEFT ATRIUM SIZE: 3.3 CM
LEFT ATRIUM VOLUME (MOD BIPLANE): 26 ML
LEFT ATRIUM VOLUME INDEX (MOD BIPLANE): 14.4 ML/M2
LEFT INTERNAL DIMENSION IN SYSTOLE: 2.8 CM (ref 2.1–4)
LEFT VENTRICLE DIASTOLIC VOLUME (MOD BIPLANE): 62 ML
LEFT VENTRICLE SYSTOLIC VOLUME (MOD BIPLANE): 18 ML
LEFT VENTRICULAR INTERNAL DIMENSION IN DIASTOLE: 4 CM (ref 3.5–6)
LEFT VENTRICULAR POSTERIOR WALL IN END DIASTOLE: 0.6 CM
LEFT VENTRICULAR STROKE VOLUME: 40 ML
LV EF: 70 %
LVSV (TEICH): 40 ML
MAGNESIUM SERPL-MCNC: 1.9 MG/DL (ref 1.9–2.7)
MCH RBC QN AUTO: 30.1 PG (ref 26.8–34.3)
MCHC RBC AUTO-ENTMCNC: 32.4 G/DL (ref 31.4–37.4)
MCV RBC AUTO: 93 FL (ref 82–98)
MV E'TISSUE VEL-LAT: 14 CM/S
MV E'TISSUE VEL-SEP: 8 CM/S
MV MEAN GRADIENT: 2 MMHG
MV PEAK A VEL: 0.68 M/S
MV PEAK E VEL: 78 CM/S
MV PEAK GRADIENT: 3 MMHG
MV STENOSIS PRESSURE HALF TIME: 56 MS
MV VALVE AREA P 1/2 METHOD: 3.93
PLATELET # BLD AUTO: 215 THOUSANDS/UL (ref 149–390)
PMV BLD AUTO: 9.8 FL (ref 8.9–12.7)
POTASSIUM SERPL-SCNC: 3.6 MMOL/L (ref 3.5–5.3)
PROT SERPL-MCNC: 5.2 G/DL (ref 6.4–8.4)
RA PRESSURE ESTIMATED: 8 MMHG
RBC # BLD AUTO: 3.22 MILLION/UL (ref 3.81–5.12)
RV PSP: 22 MMHG
SL CV LEFT ATRIUM LENGTH A2C: 3.7 CM
SL CV LV EF: 60
SL CV PED ECHO LEFT VENTRICLE DIASTOLIC VOLUME (MOD BIPLANE) 2D: 71 ML
SL CV PED ECHO LEFT VENTRICLE SYSTOLIC VOLUME (MOD BIPLANE) 2D: 30 ML
SODIUM SERPL-SCNC: 137 MMOL/L (ref 135–147)
TR MAX PG: 14 MMHG
TR PEAK VELOCITY: 1.9 M/S
TRICUSPID ANNULAR PLANE SYSTOLIC EXCURSION: 1.8 CM
TRICUSPID VALVE PEAK REGURGITATION VELOCITY: 1.88 M/S
WBC # BLD AUTO: 5.16 THOUSAND/UL (ref 4.31–10.16)

## 2023-10-17 PROCEDURE — 82948 REAGENT STRIP/BLOOD GLUCOSE: CPT

## 2023-10-17 PROCEDURE — 76856 US EXAM PELVIC COMPLETE: CPT

## 2023-10-17 PROCEDURE — 93306 TTE W/DOPPLER COMPLETE: CPT | Performed by: INTERNAL MEDICINE

## 2023-10-17 PROCEDURE — 85027 COMPLETE CBC AUTOMATED: CPT | Performed by: STUDENT IN AN ORGANIZED HEALTH CARE EDUCATION/TRAINING PROGRAM

## 2023-10-17 PROCEDURE — 83735 ASSAY OF MAGNESIUM: CPT | Performed by: STUDENT IN AN ORGANIZED HEALTH CARE EDUCATION/TRAINING PROGRAM

## 2023-10-17 PROCEDURE — 99245 OFF/OP CONSLTJ NEW/EST HI 55: CPT | Performed by: INTERNAL MEDICINE

## 2023-10-17 PROCEDURE — 76830 TRANSVAGINAL US NON-OB: CPT

## 2023-10-17 PROCEDURE — 36415 COLL VENOUS BLD VENIPUNCTURE: CPT | Performed by: STUDENT IN AN ORGANIZED HEALTH CARE EDUCATION/TRAINING PROGRAM

## 2023-10-17 PROCEDURE — 99245 OFF/OP CONSLTJ NEW/EST HI 55: CPT | Performed by: STUDENT IN AN ORGANIZED HEALTH CARE EDUCATION/TRAINING PROGRAM

## 2023-10-17 PROCEDURE — 99232 SBSQ HOSP IP/OBS MODERATE 35: CPT | Performed by: PHYSICIAN ASSISTANT

## 2023-10-17 PROCEDURE — 93306 TTE W/DOPPLER COMPLETE: CPT

## 2023-10-17 PROCEDURE — 80053 COMPREHEN METABOLIC PANEL: CPT | Performed by: STUDENT IN AN ORGANIZED HEALTH CARE EDUCATION/TRAINING PROGRAM

## 2023-10-17 RX ORDER — LEVOTHYROXINE SODIUM 0.07 MG/1
75 TABLET ORAL
Status: CANCELLED | OUTPATIENT
Start: 2023-10-17

## 2023-10-17 RX ORDER — POLYETHYLENE GLYCOL 3350 17 G/17G
17 POWDER, FOR SOLUTION ORAL 2 TIMES DAILY
Status: DISCONTINUED | OUTPATIENT
Start: 2023-10-17 | End: 2023-10-18 | Stop reason: HOSPADM

## 2023-10-17 RX ADMIN — METOCLOPRAMIDE HYDROCHLORIDE 10 MG: 5 INJECTION INTRAMUSCULAR; INTRAVENOUS at 02:28

## 2023-10-17 RX ADMIN — METOCLOPRAMIDE HYDROCHLORIDE 10 MG: 5 INJECTION INTRAMUSCULAR; INTRAVENOUS at 09:18

## 2023-10-17 RX ADMIN — PANTOPRAZOLE SODIUM 40 MG: 40 TABLET, DELAYED RELEASE ORAL at 06:33

## 2023-10-17 RX ADMIN — OXYCODONE HYDROCHLORIDE 5 MG: 5 TABLET ORAL at 17:02

## 2023-10-17 RX ADMIN — TRAMADOL HYDROCHLORIDE 50 MG: 50 TABLET, COATED ORAL at 18:20

## 2023-10-17 RX ADMIN — HYDROXYZINE HYDROCHLORIDE 25 MG: 25 TABLET, FILM COATED ORAL at 00:39

## 2023-10-17 RX ADMIN — MONTELUKAST 10 MG: 10 TABLET, FILM COATED ORAL at 20:51

## 2023-10-17 RX ADMIN — FAMOTIDINE 40 MG: 20 TABLET ORAL at 20:51

## 2023-10-17 RX ADMIN — FLUTICASONE FUROATE 1 PUFF: 100 POWDER RESPIRATORY (INHALATION) at 09:09

## 2023-10-17 RX ADMIN — OXYCODONE HYDROCHLORIDE 5 MG: 5 TABLET ORAL at 06:33

## 2023-10-17 RX ADMIN — TRAMADOL HYDROCHLORIDE 50 MG: 50 TABLET, COATED ORAL at 00:38

## 2023-10-17 RX ADMIN — PANTOPRAZOLE SODIUM 40 MG: 40 TABLET, DELAYED RELEASE ORAL at 17:02

## 2023-10-17 RX ADMIN — METOCLOPRAMIDE HYDROCHLORIDE 10 MG: 5 INJECTION INTRAMUSCULAR; INTRAVENOUS at 17:04

## 2023-10-17 RX ADMIN — POLYETHYLENE GLYCOL 3350 17 G: 17 POWDER, FOR SOLUTION ORAL at 09:02

## 2023-10-17 RX ADMIN — SERTRALINE HYDROCHLORIDE 100 MG: 50 TABLET ORAL at 09:02

## 2023-10-17 NOTE — ASSESSMENT & PLAN NOTE
CTA A/P showing large lower uterine segment uterine mass extending into the cervix. Possibly a fibroid. Correlate with pelvic ultrasound is recommended on a nonemergent outpatient basis for further evaluation. Possible cause of pain?   Abd and transvaginal US pending

## 2023-10-17 NOTE — PROGRESS NOTES
[address] [facility]  Progress Note  Name: Bernadette Aceves  MRN: [id number]  Unit/Bed#: ED 12 I Date of Admission: 10/16/2023   Date of Service: 10/17/2023 I Hospital Day: 0    Assessment/Plan   * Syncope  Assessment & Plan  Pt presenting with 3 episodes of syncope ass/w lightheadedness and dizziness. Also having abdominal pain. Per review of records, has seen PCP and neurology end of September for same complaints. Attributed to possible medication side effect and orthostatic hypotension -- orthostatics were positive in office visit    EKG showing non specific T wave abnormality  Troponin neg  CT head negative   BS and electrolytes WNL-> continue to monitor  Obtain Echo  Obtain orthostatics  Consult cardio  Telemetry -- no events thus far     Right lower quadrant abdominal pain  Assessment & Plan  Pt describe abd pain and hematuria. Had CT a/p at [facility] early October for RLQ pain as well, which was unremarkable aside from L ovarian cyst.  She reports she has had RLQ pain off and on for years but for the last several weeks at least it has been persistent   CTA a/p negative for any acute pathology. Does have large uterine mas ?fibroid, unclear if related to current sx   UA negative for UTI and hematuria. She does c/o difficulty urinating and dysuria. Consideration for interstitial cystitis -- would recommend outpt urology f/u   Has been followed by [facility] with Dr. Shlomo Pickering and WILLIAMS Rai  Continue to have 10/10 pain ass/w nausea  Reglan joe, zofran prn  Tylenol, tramadol, oxy. There is no indication for IV dilaudid -- discontinued. She reports poor PO intake and no BM x weeks -- no stool impaction is mentioned on CT   Continue home regimen of bentyl, colace, pepcid indomethacin and protonix  Consult GI    Uterine mass  Assessment & Plan  CTA A/P showing large lower uterine segment uterine mass extending into the cervix. Possibly a fibroid.  Correlate with pelvic ultrasound is recommended on a nonemergent outpatient basis for further evaluation. Possible cause of pain? Abd and transvaginal US pending     Elevated d-dimer  Assessment & Plan  D-Dimer on admission 0.65  CTA PE negative for PE  B/L LE venous duplex preliminary report negative for DVT     Abnormal TSH  Assessment & Plan  Noted abnormal TFTs though they were normal about two weeks ago  ?if related to acute illness vs true hypothyroidism, would recommend repeating TFTs in 4-6 weeks and f/u with PCP     Moderate persistent asthma without complication  Assessment & Plan  Continue home regimen  IS  Not in resp distress    Moderate episode of recurrent major depressive disorder (HCC)  Assessment & Plan  Continue home regimen of xanax, Zoloft, and trazodone                VTE Pharmacologic Prophylaxis: VTE Score: 2 Low Risk (Score 0-2) - Encourage Ambulation. Patient Centered Rounds: I evaluated the patient without nursing staff present due to w/ other pt  Discussions with Specialists or Other Care Team Provider:     Education and Discussions with Family / Patient: Patient declined call to . Total Time Spent on Date of Encounter in care of patient: 30 mins. This time was spent on one or more of the following: performing physical exam; counseling and coordination of care; obtaining or reviewing history; documenting in the medical record; reviewing/ordering tests, medications or procedures; communicating with other healthcare professionals and discussing with patient's family/caregivers. Current Length of Stay: 0 day(s)  Current Patient Status: Observation   Certification Statement: The patient, admitted on an observation basis, will now require > 2 midnight hospital stay due to work up for syncope, abd pain as above, pending consults  Discharge Plan: Anticipate discharge in 24-48 hrs to home.     Code Status: Level 1 - Full Code    Subjective:   Pt reports she is having a lot of R lower abd pain that is constant. A/w nausea. She reports poor PO intake and states her last bowel movement was "weeks ago."  She also reports when she ambulated with staff to the bathroom yesterday she passed out. Has had RLQ pain off and on for years but last several weeks/months it is persistent. She has had issues with passing out for several weeks as well, she was told at a recent outpt office visit that her blood pressure was low. She also notes difficulty voiding with dysuria and R flank pain. Objective:     Vitals:   Temp (24hrs), Av.6 °F (36.4 °C), Min:97.6 °F (36.4 °C), Max:97.6 °F (36.4 °C)    Temp:  [97.6 °F (36.4 °C)] 97.6 °F (36.4 °C)  HR:  [] 62  Resp:  [14-18] 15  BP: (111-143)/(67-95) 124/74  SpO2:  [96 %-100 %] 97 %  Body mass index is 28.15 kg/m². Input and Output Summary (last 24 hours): Intake/Output Summary (Last 24 hours) at 10/17/2023 0858  Last data filed at 10/17/2023 0101  Gross per 24 hour   Intake 2060 ml   Output --   Net 2060 ml       Physical Exam:   Physical Exam  Vitals reviewed. Constitutional:       General: She is not in acute distress. Appearance: She is not toxic-appearing. HENT:      Head: Normocephalic and atraumatic. Eyes:      Extraocular Movements: Extraocular movements intact. Cardiovascular:      Rate and Rhythm: Normal rate and regular rhythm. Pulmonary:      Effort: Pulmonary effort is normal. No respiratory distress. Breath sounds: Normal breath sounds. Abdominal:      General: Bowel sounds are normal. There is no distension. Palpations: Abdomen is soft. Tenderness: There is no abdominal tenderness. Musculoskeletal:         General: Normal range of motion. Neurological:      General: No focal deficit present. Mental Status: She is alert and oriented to person, place, and time.    Psychiatric:      Comments: Flat          Additional Data:     Labs:  Results from last 7 days   Lab Units 10/17/23  0814 10/16/23  1352   WBC Thousand/uL 5.16 6.96   HEMOGLOBIN g/dL 9.7* 11.3*   HEMATOCRIT % 29.9* 35.3   PLATELETS Thousands/uL 215 249   NEUTROS PCT %  --  69   LYMPHS PCT %  --  24   MONOS PCT %  --  7   EOS PCT %  --  0     Results from last 7 days   Lab Units 10/17/23  0644   SODIUM mmol/L 137   POTASSIUM mmol/L 3.6   CHLORIDE mmol/L 113*   CO2 mmol/L 19*   BUN mg/dL 9   CREATININE mg/dL 0.70   ANION GAP mmol/L 5   CALCIUM mg/dL 8.2*   ALBUMIN g/dL 3.4*   TOTAL BILIRUBIN mg/dL 0.42   ALK PHOS U/L 40   ALT U/L 50   AST U/L 38   GLUCOSE RANDOM mg/dL 97                       Lines/Drains:  Invasive Devices       Peripheral Intravenous Line  Duration             Peripheral IV 10/16/23 Distal;Left;Upper;Ventral (anterior) Arm <1 day                      Telemetry:  Telemetry Orders (From admission, onward)               24 Hour Telemetry Monitoring  Continuous x 24 Hours (Telem)        Question:  Reason for 24 Hour Telemetry  Answer:  Syncope suspected to be cardiac in origin                     Telemetry Reviewed: Normal Sinus Rhythm  Indication for Continued Telemetry Use: Syncope             Imaging: Reviewed radiology reports from this admission including: chest CT scan, abdominal/pelvic CT, and ultrasound(s)    Recent Cultures (last 7 days):         Last 24 Hours Medication List:   Current Facility-Administered Medications   Medication Dose Route Frequency Provider Last Rate    acetaminophen  650 mg Oral Q6H PRN Dena Victoria MD      albuterol  2 puff Inhalation Q6H PRN Dena Victoria MD      aluminum-magnesium hydroxide-simethicone  30 mL Oral Q6H PRN Dena Victoria MD      dicyclomine  10 mg Oral Q6H PRN Dena Victoria MD      famotidine  40 mg Oral HS Dena Victoria MD      fluticasone  1 puff Inhalation Daily Dena Victoria MD      hydrOXYzine HCL  25 mg Oral Q6H PRN Kiah Gtz PA-C      metoclopramide  10 mg Intravenous Q8H Dena Victoria MD      montelukast  10 mg Oral HS Dena Victoria MD      ondansetron  4 mg Intravenous Q4H PRN [place] MD      traMADol  50 mg Oral Q6H PRN [place] MD      Or    oxyCODONE  5 mg Oral Q6H PRN [place] MD      pantoprazole  40 mg Oral BID AC [place] MD      polyethylene glycol  17 g Oral Daily [place] MD      sertraline  100 mg Oral Daily [place] Ma[place] MD      simethicone  80 mg Oral Q6H PRN [place] MD          Today, Patient Was Seen By: Katelynn Gr PA-C    **Please Note: This note may have been constructed using a voice recognition system. **

## 2023-10-17 NOTE — ASSESSMENT & PLAN NOTE
Pt presenting with 3 episodes of syncope ass/w lightheadedness and dizziness. Also having abdominal pain. Per review of records, has seen PCP and neurology end of September for same complaints.   Attributed to possible medication side effect and orthostatic hypotension -- orthostatics were positive in office visit    EKG showing non specific T wave abnormality  Troponin neg  CT head negative   BS and electrolytes WNL-> continue to monitor  Obtain Echo  Obtain orthostatics  Consult cardio  Telemetry -- no events thus far

## 2023-10-17 NOTE — ASSESSMENT & PLAN NOTE
Pt describe abd pain and hematuria. Had CT a/p at [facility] early October for RLQ pain as well, which was unremarkable aside from L ovarian cyst.  She reports she has had RLQ pain off and on for years but for the last several weeks at least it has been persistent   CTA a/p negative for any acute pathology. Does have large uterine mas ?fibroid, unclear if related to current sx   UA negative for UTI and hematuria. She does c/o difficulty urinating and dysuria. Consideration for interstitial cystitis -- would recommend outpt urology f/u   Has been followed by [facility] with Dr. Pao Marquez and WILLIAMS Varghese  Continue to have 10/10 pain ass/w nausea  Reglan joe, zofran prn  Tylenol, tramadol, oxy. There is no indication for IV dilaudid -- discontinued.    She reports poor PO intake and no BM x weeks -- no stool impaction is mentioned on CT   Continue home regimen of bentyl, colace, pepcid indomethacin and protonix  Consult GI

## 2023-10-17 NOTE — UTILIZATION REVIEW
Initial Clinical Review    Admission: Date/Time/Statement: 10/16/23 1731 observation   Admission Orders (From admission, onward)       Ordered        10/16/23 1731  Place in Observation  Once                          Orders Placed This Encounter   Procedures    Place in Observation     Standing Status:   Standing     Number of Occurrences:   1     Order Specific Question:   Level of Care     Answer:   Med Surg [16]     ED Arrival Information       Expected   -    Arrival   10/16/2023 11:56    Acuity   Emergent              Means of arrival   Wheelchair    Escorted by   Friend    Service   Hospitalist    Admission type   Emergency              Arrival complaint   unresponsive to registration             Chief Complaint   Patient presents with    Syncope     Pt  reports pt syncopized x3 this morning. He states he held her and she did not fall/hit her head. Pt reports abd pain and hematuria after being diagnosed with a cyst on her L ovary. Initial Presentation: 37 y.o. female from home to ED admitted to observation due to Syncope/RLQ abdominal pain/uterine mass/Elevated D dimer. Presented due to abdominal pain worsening the morning of arrival with nausea, lightheaded and passed out while standing, then when trying to walk to chair then again when sat in chair. Per spouse, he was able to catch her, no head strike. Patient does not remember passing out. On exam abdominal tenderness in RLQ. H&H  11.3/35.3 to 9.7/29.9.  K 3.6. D dimer 0.65.   ct showed trace bilateral pleural effusions, possible fibroid with large lower uterine segment uterine mass extending into cervix. Ecg sinus. In the ED episode of passing out about 30 seconds after urinating. Given  1 liter IVF x 2 , Zofran x 2, Toradol, Reglan, Dilaudid. Plan is trend troponin, check echo, orthostatics, ct head. Consult cardiology. Telemetry. Start scheduled Reglan, Zofran as needed.   Continue pain control with tylenol, Tramadol, oxy and Dilaudid. Continue home regimen of bentyl, colace, pepcid indomethacin and Protonix. Consult GI. Check abdominal and transvaginal US. Check venous duplex LE.      10/17/23 observation: continued abdominal discomfort. On exam:  abdomen with surgical scar. Tenderness in RLQ and periumbilical area. Continue IV Reglan, as per GI. Per Cardiology 10/17/23:  patient with syncope that is provoked by abdominal pain and suspect is vasovagal.  Plan is rule out cardiac etiology and continue telemetry, check echo. 10/17/23 per GI - patient with RLQ abdominal pain, chronic constipation. Ct shows stool burden where patient is tender. Uterine mass could contribute to pain. Differential is functional GI disorder, as has complete OP work up for pkain. Plan is clears, IV reglan, Protonix and Pepcid. Start Miralax and increase if no BM in 48 hours. US of pelvis. Serial abdominal exams.       10/18/23 Observation       ED Triage Vitals [10/16/23 1205]   Temperature Pulse Respirations Blood Pressure SpO2   97.6 °F (36.4 °C) (!) 113 18 111/74 100 %      Temp Source Heart Rate Source Patient Position - Orthostatic VS BP Location FiO2 (%)   Temporal Monitor Sitting Left arm --      Pain Score       10 - Worst Possible Pain          Wt Readings from Last 1 Encounters:   10/17/23 74.4 kg (164 lb)     Additional Vital Signs:   10/17/23 19:54:06 97.9 °F (36.6 °C) 84 15 132/91 105 96 % -- --   10/17/23 16:08:48 97.6 °F (36.4 °C) 67 14 135/92 106 98 %       10/17/23 0000 -- 67 15 124/74 95 96 % None (Room air) --   10/16/23 2100 -- 71 16 126/82 100 96 % None (Room air) Lying   10/16/23 2030 -- 70 17 114/67 86 97 % None (Room air) Lying   10/16/23 2015 -- 71 14 -- -- 97 % -- --   10/16/23 2000 -- 71 18 116/72 88 97 % None (Room air) Lying   10/16/23 1945 -- 74 15 143/95 114 96 % None (Room air) Lying   10/16/23 1530 -- 66 16 133/81 100 99 % None (Room air) Lying     Pertinent Labs/Diagnostic Test Results:   US pelvis complete w transvaginal   Final Result by Leatha Flores MD (10/17 1631)      No uterine mass identified. The finding on CT scan likely reflects slight prolapse of the cervix into the vagina. Simple left ovarian cyst.                        Workstation performed: [id number]         CT head wo contrast   Final Result by Rosalina Marques MD (10/17 0159)      No acute intracranial abnormality. Workstation performed: [id number]         VAS lower limb venous duplex study, complete bilateral   Final Result by Yasmani Matamoros DO (10/17 0518)      PE Study with CT abdomen & pelvis with contrast   Final Result by Ander Pelayo MD (10/16 9846)      Trace bilateral pleural effusions. No lung consolidation or pulmonary embolism. Large lower uterine segment uterine mass extending into the cervix. Possibly a fibroid. Correlate with pelvic ultrasound is recommended on a nonemergent outpatient basis for further evaluation. Workstation performed: [id number]         XR chest 1 view portable   Final Result by Anand Jaimes DO (10/16 2639)      No acute cardiopulmonary disease. Resident: Aurelia Solitario, the attending radiologist, have reviewed the images and agree with the final report above. Workstation performed: [id number]           10/16/23 ecg Interpretation: non-specific    Rate:     ECG rate:  67     ECG rate assessment: normal    Rhythm:     Rhythm: sinus rhythm    Ectopy:     Ectopy: none    QRS:     QRS axis:  Normal     QRS intervals:  Normal   Conduction:     Conduction: normal    ST segments:     ST segments:  Normal   T waves:     T waves: non-specific    Comments:      Qtc 416     10/17/23 echo - Left Ventricle: Left ventricular cavity size is normal. Wall thickness is normal. The left ventricular ejection fraction is 60-65%.  Systolic function is normal. Although no diagnostic regional wall motion abnormality was identified, this possibility cannot be completely excluded on the basis of this study.  Diastolic function is normal.    Right Ventricle: Right ventricular cavity size is normal. Systolic function is normal.    Pericardium: There is a trivial pericardial effusion anterior to the heart    Results from last 7 days   Lab Units 10/18/23  0308 10/17/23  0814 10/16/23  1352   WBC Thousand/uL 5.70 5.16 6.96   HEMOGLOBIN g/dL 11.6 9.7* 11.3*   HEMATOCRIT % 36.0 29.9* 35.3   PLATELETS Thousands/uL 253 215 249   NEUTROS ABS Thousands/µL 3.43  --  4.76     Results from last 7 days   Lab Units 10/18/23  0308 10/17/23  0644 10/16/23  1352   SODIUM mmol/L 139 137 139   POTASSIUM mmol/L 3.4* 3.6 3.6   CHLORIDE mmol/L 110* 113* 110*   CO2 mmol/L 24 19* 23   ANION GAP mmol/L 5 5 6   BUN mg/dL 7 9 11   CREATININE mg/dL 0.74 0.70 1.02   EGFR ml/min/1.73sq m 99 106 67   CALCIUM mg/dL 8.6 8.2* 8.6   MAGNESIUM mg/dL  --  1.9  --      Results from last 7 days   Lab Units 10/17/23  0644 10/16/23  1352   AST U/L 38 26   ALT U/L 50 37   ALK PHOS U/L 40 38   TOTAL PROTEIN g/dL 5.2* 5.9*   ALBUMIN g/dL 3.4* 3.7   TOTAL BILIRUBIN mg/dL 0.42 0.43     Results from last 7 days   Lab Units 10/18/23  0308 10/17/23  0644 10/16/23  1352   GLUCOSE RANDOM mg/dL 101 97 101     Results from last 7 days   Lab Units 10/16/23  2032 10/16/23  1816 10/16/23  1353   HS TNI 0HR ng/L  --   --  <2   HS TNI 2HR ng/L  --  <2  --    HS TNI 4HR ng/L <2  --   --      Results from last 7 days   Lab Units 10/16/23  1401   D-DIMER QUANTITATIVE ug/ml FEU 0.65*     Results from last 7 days   Lab Units 10/16/23  1352   TSH 3RD GENERATON uIU/mL 6.223*     Results from last 7 days   Lab Units 10/16/23  1352   LIPASE u/L 21     Results from last 7 days   Lab Units 10/16/23  1313   CLARITY UA  Clear   COLOR UA  Yellow   SPEC GRAV UA  1.020   PH UA  6.5   GLUCOSE UA mg/dl Negative   KETONES UA mg/dl Negative   BLOOD UA  Negative   PROTEIN UA mg/dl Trace*   NITRITE UA  Negative   BILIRUBIN UA Negative   UROBILINOGEN UA (BE) mg/dl <2.0   LEUKOCYTES UA  Negative   WBC UA /hpf 1-2   RBC UA /hpf 0-1   BACTERIA UA /hpf Occasional   EPITHELIAL CELLS WET PREP /hpf Occasional   MUCUS THREADS  Occasional*     ED Treatment:   Medication Administration from 10/16/2023 1156 to 10/17/2023 0854         Date/Time Order Dose Route Action Comments     10/16/2023 1402 EDT sodium chloride 0.9 % bolus 1,000 mL 1,000 mL Intravenous New Bag --     10/16/2023 1514 EDT ondansetron (ZOFRAN) injection 4 mg 4 mg Intravenous Given --     10/16/2023 1514 EDT sodium chloride 0.9 % bolus 1,000 mL 1,000 mL Intravenous New Bag --     10/16/2023 1533 EDT ketorolac (TORADOL) injection 15 mg 15 mg Intravenous Given --     10/16/2023 2124 EDT ondansetron (ZOFRAN) injection 4 mg 4 mg Intravenous Given --     10/17/2023 0228 EDT metoclopramide (REGLAN) injection 10 mg 10 mg Intravenous Given --     10/16/2023 1900 EDT metoclopramide (REGLAN) injection 10 mg 10 mg Intravenous Given --     10/16/2023 2125 EDT acetaminophen (TYLENOL) tablet 650 mg 650 mg Oral Given --     10/17/2023 0038 EDT traMADol (ULTRAM) tablet 50 mg 50 mg Oral Given --     10/17/2023 9423 EDT oxyCODONE (ROXICODONE) IR tablet 5 mg 5 mg Oral Given --     10/16/2023 1854 EDT HYDROmorphone HCl (DILAUDID) injection 0.2 mg 0.2 mg Intravenous Given --     10/17/2023 7772 EDT pantoprazole (PROTONIX) EC tablet 40 mg 40 mg Oral Given --     10/16/2023 2125 EDT dicyclomine (BENTYL) capsule 10 mg 10 mg Oral Given --     10/16/2023 2125 EDT famotidine (PEPCID) tablet 40 mg 40 mg Oral Given --     10/16/2023 2125 EDT montelukast (SINGULAIR) tablet 10 mg 10 mg Oral Given --     10/17/2023 0039 EDT hydrOXYzine HCL (ATARAX) tablet 25 mg 25 mg Oral Given --          Past Medical History:   Diagnosis Date    Anxiety     Asthma     Depression     GERD (gastroesophageal reflux disease) 2020    Hypoglycemia     Moderate episode of recurrent major depressive disorder ([facility]) 10/03/2019    PTSD (post-traumatic stress disorder)      Present on Admission:   Moderate persistent asthma without complication   Moderate episode of recurrent major depressive disorder (HCC)      Admitting Diagnosis: Syncope [R55]  Low TSH level [R79.89]  Right lower quadrant abdominal pain [R10.31]  Age/Sex: 37 y.o. female  Admission Orders:  Scheduled Medications:  famotidine, 40 mg, Oral, HS  fluticasone, 1 puff, Inhalation, Daily  metoclopramide, 10 mg, Intravenous, Q8H  montelukast, 10 mg, Oral, HS  pantoprazole, 40 mg, Oral, BID AC  polyethylene glycol, 17 g, Oral, Daily and increased to twice daily  on 10/18/23   sertraline, 100 mg, Oral, Daily    Continuous IV Infusions: none      PRN Meds:  acetaminophen, 650 mg, Oral, Q6H PRN x 1 10/16/23   albuterol, 2 puff, Inhalation, Q6H PRN  aluminum-magnesium hydroxide-simethicone, 30 mL, Oral, Q6H PRN  dicyclomine, 10 mg, Oral, Q6H PRN x 1 10/16/23   hydrOXYzine HCL, 25 mg, Oral, Q6H PRN x 1 10/17/23   ondansetron, 4 mg, Intravenous, Q4H PRN  traMADol, 50 mg, Oral, Q6H PRN x 2 10/17   Or  oxyCODONE, 5 mg, Oral, Q6H PRN x 1210/17/23   simethicone, 80 mg, Oral, Q6H PRN    HYDROmorphone HCl (DILAUDID) injection 0.2 mg x 1 10/16/23   Dose: 0.2 mg  Freq: Every 6 hours PRN Route: IV  PRN Reason: breakthrough pain  Start: 10/16/23 1812 End: 10/17/23 0743     Orthostatic BP  Telemetry   10/16/23 clears     IP CONSULT TO CARDIOLOGY  IP CONSULT TO GASTROENTEROLOGY    Network Utilization Review Department  ATTENTION: Please call with any questions or concerns to [phone] and carefully listen to the prompts so that you are directed to the right person. All voicemails are confidential.   For Discharge needs, contact Care Management DC Support Team at [phone] opt. [phone]  Send all requests for admission clinical reviews, approved or denied determinations and any other requests to dedicated fax number below belonging to the campus where the patient is receiving treatment.  List of dedicated fax numbers for the Facilities:  [place] DENIALS (Administrative/Medical Necessity) [phone]   DISCHARGE SUPPORT TEAM (NETWORK) [id number]z (Maternity/NICU/Pediatrics) [phone]   [address] [place] [address] Drive 1521 [facility] 1000 [facility] [phone] [address] [place] [address] [address] [address] [facility] [address] [phone]

## 2023-10-17 NOTE — NURSING NOTE
Assumed care of pt at 2330 until present. PRN pain and anxiety meds as req; pt sleeping during hrly rounds since adm of same. NSR/SB on the monitor.

## 2023-10-17 NOTE — CONSULTS
Consult - Cardiology   [facility] 37 y.o. female MRN: [id number]  Unit/Bed#: ED 15 Encounter: [id number]        Reason For Consult:  Syncope  Outpatient Cardiologist:               ASSESSMENT:  Syncope, etiology unclear but by history sounds orthostatic in nature. Perhaps some element of vagal as well as it has occurred in the setting of urinating. We will arrange outpatient ambulatory monitor and review her echo. Otherwise cardiac workup thus far in the hospital is unremarkable with negative troponin levels, normal EKG, and normal telemetry. CTA of chest did not show any PE. Abdominal pain  Large uterine mass extending into cervix seen on CT scan  Hypothyroidism, TSH 6.2 with free T4 of 0.48  Anxiety    PLAN/ DISCUSSION:     Echo to rule out structural heart disease  Recommend extended ambulatory monitor as an outpatient. She is established with [facility] cardiology and primary care and actually has a Holter monitor pending to be placed. She is going to consider options for follow-up but I did tell her that if she is already established with their group and has a heart monitor pending it may be an easier transition to return to them for her care. No further inpatient cardiac work-up    History Of Present Illness:  Mitzi Loja is a 29-year-old presenting to the emergency department with syncope. We are asked see in consultation for syncopal episodes. Her cardiac work-up has been unremarkable.     Past Medical History:        Past Medical History:   Diagnosis Date    Anxiety     Asthma     Depression     GERD (gastroesophageal reflux disease)     Hypoglycemia     Moderate episode of recurrent major depressive disorder ([address] ) 10/03/2019    PTSD (post-traumatic stress disorder)       Past Surgical History:   Procedure Laterality Date    ABDOMINAL SURGERY      3 csections, gallbladder and umbilical hernia 0409    ANKLE LIGAMENT RECONSTRUCTION       SECTION      CHOLECYSTECTOMY      CHOLECYSTECTOMY LAPAROSCOPIC N/A 06/18/2020    Procedure: CHOLECYSTECTOMY LAPAROSCOPIC;  Surgeon: Sasha Hernandez MD;  Location: [facility] MAIN OR;  Service: General    KNEE SURGERY      SINUS SURGERY      TONSILLECTOMY      TONSILLECTOMY      TUBAL LIGATION      UMBILICAL HERNIA REPAIR N/A 06/18/2020    Procedure: REPAIR HERNIA UMBILICAL, Primary;  Surgeon: Sasha Hernandez MD;  Location: [facility] MAIN OR;  Service: General    UPPER GASTROINTESTINAL ENDOSCOPY  2020        Allergy:        Allergies   Allergen Reactions    Azithromycin GI Intolerance     Other reaction(s): GI Intolerance    Cephalosporins Hives    Fluoxetine Other (See Comments)    Sulfa Antibiotics Hives       Medications:       Prior to Admission medications    Medication Sig Start Date End Date Taking?  Authorizing Provider   albuterol (VENTOLIN HFA) 90 mcg/act inhaler Inhale 2 puffs every 6 (six) hours as needed for wheezing 10/14/19   Georges Louise MD   ALPRAZolam Dwayne Sesay) 1 mg tablet Take 1 mg by mouth daily at bedtime   Patient not taking: Reported on 5/31/2022 4/5/19   Historical Provider, MD   dicyclomine (BENTYL) 10 mg capsule Take 1 capsule (10 mg total) by mouth every 6 (six) hours as needed (Abdominal cramping) for up to 12 doses 1/12/23   Ellachris Urias,    docusate sodium (COLACE) 100 mg capsule Take 1 capsule (100 mg total) by mouth every 12 (twelve) hours  Patient not taking: No sig reported 5/5/22   Mónica Solid Ferdous, DO   famotidine (PEPCID) 40 MG tablet Take 1 tablet (40 mg total) by mouth daily at bedtime 5/31/22   ANT Pleitez   indomethacin (INDOCIN) 50 mg capsule Take 1 capsule (50 mg total) by mouth 2 (two) times a day with meals  Patient not taking: No sig reported 3/18/21   Tayler Cavazos PA-C   mometasone [facility] [place] [place]) 220 MCG/INH inhaler Inhale 220 mcg daily    Historical Provider, MD   montelukast (SINGULAIR) 10 mg tablet Take 1 tablet (10 mg total) by mouth daily at bedtime 2/27/20   ANT Stovall   ondansetron (ZOFRAN-ODT) 4 mg disintegrating tablet Take 1 tablet (4 mg total) by mouth every 6 (six) hours as needed for nausea or vomiting for up to 3 days 1/12/23 1/15/23  Ella Urias DO   pantoprazole (PROTONIX) 40 mg tablet Take 1 tablet (40 mg total) by mouth 2 (two) times a day before meals 5/31/22   ANT Pleitez   sertraline (ZOLOFT) 100 mg tablet Take 100 mg by mouth daily    Historical Provider, MD   simethicone (MYLICON) 80 mg chewable tablet Chew 1 tablet (80 mg total) every 6 (six) hours as needed for flatulence 1/26/23   Radha Hooper PA-C   traZODone (DESYREL) 50 mg tablet Take 50 mg by mouth daily at bedtime  Patient not taking: No sig reported    Historical Provider, MD   gabapentin (NEURONTIN) 300 mg capsule Take 1 capsule (300 mg total) by mouth 2 (two) times a day  Patient not taking: Reported on 3/18/2021 2/27/20 5/5/22  ANT Stovall       Family History:     Family History   Problem Relation Age of Onset    Depression Mother     Asthma Mother     Mental illness Mother     Mental illness Maternal Grandfather         Social History:       Social History     Socioeconomic History    Marital status: Single     Spouse name: None    Number of children: None    Years of education: None    Highest education level: None   Occupational History    None   Tobacco Use    Smoking status: Never    Smokeless tobacco: Never    Tobacco comments:     Never smoked   Vaping Use    Vaping Use: Never used   Substance and Sexual Activity    Alcohol use: Not Currently    Drug use: Never    Sexual activity: Yes     Partners: Male     Birth control/protection: Surgical   Other Topics Concern    None   Social History Narrative    None     Social Determinants of Health     Financial Resource Strain: Medium Risk (5/20/2021)    Overall Financial Resource Strain (CARDIA)     Difficulty of Paying Living Expenses: Somewhat hard   Food Insecurity: Food Insecurity Present (5/20/2021)    Hunger Vital Sign Worried About [place] in the Last Year: Sometimes true     Ran Out of Food in the Last Year: Sometimes true   Transportation Needs: Unmet Transportation Needs (5/20/2021)    PRAPARE - Transportation     Lack of Transportation (Medical): Yes     Lack of Transportation (Non-Medical): Yes   Physical Activity: Insufficiently Active (11/4/2019)    Exercise Vital Sign     Days of Exercise per Week: 1 day     Minutes of Exercise per Session: 60 min   Stress: Stress Concern Present (11/4/2019)    [facility]     Feeling of Stress : Very much   Social Connections: Socially Isolated (11/4/2019)    Social Connection and Isolation Panel [NHANES]     Frequency of Communication with Friends and Family: Twice a week     Frequency of Social Gatherings with Friends and Family: Never     Attends Jain Services: Never     Active Member of [address] or Organizations: No     Attends Club or Organization Meetings: Never     Marital Status: Living with partner   Intimate Partner Violence: Not At Risk (11/4/2019)    Humiliation, Afraid, Rape, and Kick questionnaire     Fear of Current or Ex-Partner: No     Emotionally Abused: No     Physically Abused: No     Sexually Abused: No   Housing Stability: Not on file       ROS:  14 point ROS negative except as outlined above  Remainder review of systems is negative    Exam:  General:  alert, oriented and in no distress, cooperative  Head: Normocephalic, atraumatic. Eyes:  EOMI. Pupils - equal, round, reactive to accomodation. No icterus. Normal Conjunctiva.    Oropharynx: moist and normal-appearing mucosa  Neck: supple, symmetrical, trachea midline and no JVD  Heart:  RRR, No: murmer, rub or gallop, S1 & S2 normal   Respiratory effort / Chest Inspection: unlabored  Lungs:  normal air entry, lungs clear to auscultation and no rales, rhonchi or wheezing   Abdomen: flat, normal findings: bowel sounds normal and soft, non-tender  Lower Limbs:  no pitting edema  Pulses[de-identified]  RLE - DP: present 2+                 LLE - DP: present 2+  Musculoskeletal: ROM grossly normal        DATA:      ECG:                     Telemetry: Normal sinus rhythm, . HR 70s          Echocardiogram:           Ischemic Testing:         Weights: Wt Readings from Last 3 Encounters:   10/16/23 74.4 kg (164 lb)   01/26/23 74.4 kg (164 lb)   01/12/23 81.2 kg (179 lb)   , Body mass index is 28.15 kg/m².          Lab Studies:             Results from last 7 days   Lab Units 10/17/23  0814 10/16/23  1352   WBC Thousand/uL 5.16 6.96   HEMOGLOBIN g/dL 9.7* 11.3*   HEMATOCRIT % 29.9* 35.3   PLATELETS Thousands/uL 215 249   ,   Results from last 7 days   Lab Units 10/17/23  0644 10/16/23  1352   POTASSIUM mmol/L 3.6 3.6   CHLORIDE mmol/L 113* 110*   CO2 mmol/L 19* 23   BUN mg/dL 9 11   CREATININE mg/dL 0.70 1.02   CALCIUM mg/dL 8.2* 8.6   ALK PHOS U/L 40 38   ALT U/L 50 37   AST U/L 38 26

## 2023-10-17 NOTE — PLAN OF CARE
Problem: Potential for Falls  Goal: Patient will remain free of falls  Description: INTERVENTIONS:  - Educate patient/family on patient safety including physical limitations  - Instruct patient to call for assistance with activity   - Consult OT/PT to assist with strengthening/mobility   - Keep Call bell within reach  - Keep bed low and locked with side rails adjusted as appropriate  - Keep care items and personal belongings within reach  - Initiate and maintain comfort rounds  - Make Fall Risk Sign visible to staff  - Offer Toileting every  Hours, in advance of need  - Initiate/Maintain alarm  - Obtain necessary fall risk management equipment:   - Apply yellow socks and bracelet for high fall risk patients  - Consider moving patient to room near nurses station  Outcome: Progressing     Problem: MOBILITY - ADULT  Goal: Maintain or return to baseline ADL function  Description: INTERVENTIONS:  -  Assess patient's ability to carry out ADLs; assess patient's baseline for ADL function and identify physical deficits which impact ability to perform ADLs (bathing, care of mouth/teeth, toileting, grooming, dressing, etc.)  - Assess/evaluate cause of self-care deficits   - Assess range of motion  - Assess patient's mobility; develop plan if impaired  - Assess patient's need for assistive devices and provide as appropriate  - Encourage maximum independence but intervene and supervise when necessary  - Involve family in performance of ADLs  - Assess for home care needs following discharge   - Consider OT consult to assist with ADL evaluation and planning for discharge  - Provide patient education as appropriate  Outcome: Progressing  Goal: Maintains/Returns to pre admission functional level  Description: INTERVENTIONS:  - Perform BMAT or MOVE assessment daily.   - Set and communicate daily mobility goal to care team and patient/family/caregiver.    - Collaborate with rehabilitation services on mobility goals if consulted  - Perform Range of Motion  times a day. - Reposition patient every  hours.   - Dangle patient  times a day  - Stand patient  times a day  - Ambulate patient  times a day  - Out of bed to chair  times a day   - Out of bed for meals  times a day  - Out of bed for toileting  - Record patient progress and toleration of activity level   Outcome: Progressing     Problem: PAIN - ADULT  Goal: Verbalizes/displays adequate comfort level or baseline comfort level  Description: Interventions:  - Encourage patient to monitor pain and request assistance  - Assess pain using appropriate pain scale  - Administer analgesics based on type and severity of pain and evaluate response  - Implement non-pharmacological measures as appropriate and evaluate response  - Consider cultural and social influences on pain and pain management  - Notify physician/advanced practitioner if interventions unsuccessful or patient reports new pain  Outcome: Progressing     Problem: INFECTION - ADULT  Goal: Absence or prevention of progression during hospitalization  Description: INTERVENTIONS:  - Assess and monitor for signs and symptoms of infection  - Monitor lab/diagnostic results  - Monitor all insertion sites, i.e. indwelling lines, tubes, and drains  - Monitor endotracheal if appropriate and nasal secretions for changes in amount and color  - [place] appropriate cooling/warming therapies per order  - Administer medications as ordered  - Instruct and encourage patient and family to use good hand hygiene technique  - Identify and instruct in appropriate isolation precautions for identified infection/condition  Outcome: Progressing  Goal: Absence of fever/infection during neutropenic period  Description: INTERVENTIONS:  - Monitor WBC    Outcome: Progressing     Problem: SAFETY ADULT  Goal: Patient will remain free of falls  Description: INTERVENTIONS:  - Educate patient/family on patient safety including physical limitations  - Instruct patient to call for assistance with activity   - Consult OT/PT to assist with strengthening/mobility   - Keep Call bell within reach  - Keep bed low and locked with side rails adjusted as appropriate  - Keep care items and personal belongings within reach  - Initiate and maintain comfort rounds  - Make Fall Risk Sign visible to staff  - Offer Toileting every  Hours, in advance of need  - Initiate/Maintain alarm  - Obtain necessary fall risk management equipment:   - Apply yellow socks and bracelet for high fall risk patients  - Consider moving patient to room near nurses station  Outcome: Progressing  Goal: Maintain or return to baseline ADL function  Description: INTERVENTIONS:  -  Assess patient's ability to carry out ADLs; assess patient's baseline for ADL function and identify physical deficits which impact ability to perform ADLs (bathing, care of mouth/teeth, toileting, grooming, dressing, etc.)  - Assess/evaluate cause of self-care deficits   - Assess range of motion  - Assess patient's mobility; develop plan if impaired  - Assess patient's need for assistive devices and provide as appropriate  - Encourage maximum independence but intervene and supervise when necessary  - Involve family in performance of ADLs  - Assess for home care needs following discharge   - Consider OT consult to assist with ADL evaluation and planning for discharge  - Provide patient education as appropriate  Outcome: Progressing  Goal: Maintains/Returns to pre admission functional level  Description: INTERVENTIONS:  - Perform BMAT or MOVE assessment daily.   - Set and communicate daily mobility goal to care team and patient/family/caregiver. - Collaborate with rehabilitation services on mobility goals if consulted  - Perform Range of Motion  times a day. - Reposition patient every  hours.   - Dangle patient  times a day  - Stand patient  times a day  - Ambulate patient  times a day  - Out of bed to chair  times a day   - Out of bed for meals  times a day  - Out of bed for toileting  - Record patient progress and toleration of activity level   Outcome: Progressing     Problem: DISCHARGE PLANNING  Goal: Discharge to home or other facility with appropriate resources  Description: INTERVENTIONS:  - Identify barriers to discharge w/patient and caregiver  - Arrange for needed discharge resources and transportation as appropriate  - Identify discharge learning needs (meds, wound care, etc.)  - Arrange for interpretive services to assist at discharge as needed  - Refer to Case Management Department for coordinating discharge planning if the patient needs post-hospital services based on physician/advanced practitioner order or complex needs related to functional status, cognitive ability, or social support system  Outcome: Progressing     Problem: Knowledge Deficit  Goal: Patient/family/caregiver demonstrates understanding of disease process, treatment plan, medications, and discharge instructions  Description: Complete learning assessment and assess knowledge base.   Interventions:  - Provide teaching at level of understanding  - Provide teaching via preferred learning methods  Outcome: Progressing

## 2023-10-17 NOTE — ASSESSMENT & PLAN NOTE
Noted abnormal TFTs though they were normal about two weeks ago  ?if related to acute illness vs true hypothyroidism, would recommend repeating TFTs in 4-6 weeks and f/u with PCP

## 2023-10-17 NOTE — CONSULTS
Consultation - [facility] Gastroenterology     [facility] 37 y.o. female MRN: [id number]  Unit/Bed#: ED 12 Encounter: [id number]    Inpatient consult to gastroenterology  Consult performed by: Ezekiel Nation PA-C  Consult ordered by: Gay Mir MD          ASSESSMENT and PLAN  1.  RLQ abdominal pain  43F with hx/o GERD, abdominal pain, chronic constipation, MDD who presented to the ED from home on 10/16/2023 after syncope x3. GI consulted for abdominal pain, nausea. CT A/P unremarkable for acute GI pathology. - I personally reviewed CT images and note moderate stool burden in the R-side colon where patient is tender on exam. Possible that uterine mass could also be contributory to RLQ pain. Cannot exclude functional GI disorder, given chronicity of pain and extensive outpatient workup already completed to date. - Clear liquid diet, advance as tolerated  - Continue IVF, Reglan, pantoprazole 40 mg BID, famotidine 40 mg QHS  - START Miralax 17 g BID - can increase to TID if no BM in 48 hrs  - Awaiting US pelvis results to further evaluate RLQ  - Serial abdominal exams  - Outpatient follow-up with GI for further workup of chronic abdominal pain    2. Constipation  - START Miralax 17 g BID, titrate for 1-2 soft BMs per day    3. Nausea  - Antiemetics prn - currently ordered Reglan 10 mg Q8 prn    4. GERD  - Continue home regimen of pantoprazole 40 mg BID, famotidine 40 mg QHS    5. Syncope  - Workup per primary team; cardiology considering orthostatic vs vagal    Chief Complaint   Patient presents with    Syncope     Pt  reports pt syncopized x3 this morning. He states he held her and she did not fall/hit her head. Pt reports abd pain and hematuria after being diagnosed with a cyst on her L ovary. Physician Requesting Consult: Gay Mir MD    HPI  Accompanied by self and history is obtained from self.     An Yumi is a 37y.o. year old female with a past medical history of GERD, RUQ abdominal pain, chronic constipation, MDD who presented to the ED from home on 10/16/2023 after syncope x3. She states that she has been following with GI for ongoing abdominal pain for several months and has had an extensive outpatient workup to date including EGD, colonoscopy, and plan for gastric emptying study in the future. She reports that the current pain is located in the RLQ, sharp pain, constant, non-radiating, and associated with significant nausea but no vomiting. Chronic constipation at baseline is managed with dulcolax, stool softeners prn. She is uncertain when her last BM was. Her GERD is well controlled at home on pantoprazole 40 mg BID and famotidine 40 mg QHS. CT A/P on admission 10/16 unremarkable for acute GI pathology. Noted "Trace bilateral pleural effusions. No lung consolidation or pulmonary embolism. Large lower uterine segment uterine mass extending into the cervix. Possibly a fibroid. Correlate with pelvic ultrasound is recommended on a nonemergent outpatient basis for further evaluation."    GI History:  Previous issues: as above  Blood thinners: ASA: no antiplatelet: no anticoagulation: no  NSAID use: daily x1.5-2 weeks for irregular menses  Caffeine use: Tobacco use: never  EtOH use: socially: 1-2 drinks 2x/month  Cannabis use: Insulin use: no    Abdominal Surgical Hx: cholecystectomy (),  x3, tubal ligation  Family Hx: Denies first degree relatives with GI malignancies. Maternal grandmother had ulcerative colitis. GI procedure Hx:  EGD: 6/3/2020: "Essentially normal EGD except for very mild antral gastritis. Some signs of bile reflux."  Colonoscopy: 10/2023 @ [facility]: unremarkable, 10 yr recall per patient  GI imaging Hx: as noted above    Review of Systems   Constitutional:  Negative for appetite change, chills, fever and unexpected weight change.    HENT:  Negative for dental problem, mouth sores, sore throat, trouble swallowing and voice change. Respiratory:  Negative for cough and choking. Cardiovascular:  Negative for chest pain and leg swelling. Gastrointestinal:  Positive for abdominal pain, constipation, nausea and vomiting. Negative for abdominal distention, anal bleeding, blood in stool, diarrhea and rectal pain. Skin:  Negative for pallor and rash. Neurological:  Positive for syncope and weakness. Negative for light-headedness and headaches. Psychiatric/Behavioral:  Negative for confusion and sleep disturbance. The patient is not nervous/anxious.         Historical Information   Past Medical History:   Diagnosis Date    Anxiety     Asthma     Depression     GERD (gastroesophageal reflux disease)     Hypoglycemia     Moderate episode of recurrent major depressive disorder ([address] [facility]) 10/03/2019    PTSD (post-traumatic stress disorder)      Past Surgical History:   Procedure Laterality Date    ABDOMINAL SURGERY      3 csections, gallbladder and umbilical hernia 3793    ANKLE LIGAMENT RECONSTRUCTION       SECTION      CHOLECYSTECTOMY      CHOLECYSTECTOMY LAPAROSCOPIC N/A 2020    Procedure: CHOLECYSTECTOMY LAPAROSCOPIC;  Surgeon: Neptali Lyons MD;  Location:  MAIN OR;  Service: General    KNEE SURGERY      SINUS SURGERY      TONSILLECTOMY      TONSILLECTOMY      TUBAL LIGATION      UMBILICAL HERNIA REPAIR N/A 2020    Procedure: REPAIR HERNIA UMBILICAL, Primary;  Surgeon: Neptali Lyons MD;  Location:  MAIN OR;  Service: General    UPPER GASTROINTESTINAL ENDOSCOPY       Social History   Social History     Substance and Sexual Activity   Alcohol Use Not Currently     Social History     Substance and Sexual Activity   Drug Use Never     Social History     Tobacco Use   Smoking Status Never   Smokeless Tobacco Never   Tobacco Comments    Never smoked     Family History   Problem Relation Age of Onset    Depression Mother     Asthma Mother     Mental illness Mother     Mental illness Maternal Grandfather        Meds/Allergies     Current Facility-Administered Medications   Medication Dose Route Frequency    acetaminophen (TYLENOL) tablet 650 mg  650 mg Oral Q6H PRN    albuterol (PROVENTIL HFA,VENTOLIN HFA) inhaler 2 puff  2 puff Inhalation Q6H PRN    aluminum-magnesium hydroxide-simethicone (MAALOX) oral suspension 30 mL  30 mL Oral Q6H PRN    dicyclomine (BENTYL) capsule 10 mg  10 mg Oral Q6H PRN    famotidine (PEPCID) tablet 40 mg  40 mg Oral HS    fluticasone (ARNUITY ELLIPTA) 100 MCG/ACT inhaler 1 puff  1 puff Inhalation Daily    hydrOXYzine HCL (ATARAX) tablet 25 mg  25 mg Oral Q6H PRN    metoclopramide (REGLAN) injection 10 mg  10 mg Intravenous Q8H    montelukast (SINGULAIR) tablet 10 mg  10 mg Oral HS    ondansetron (ZOFRAN) injection 4 mg  4 mg Intravenous Q4H PRN    traMADol (ULTRAM) tablet 50 mg  50 mg Oral Q6H PRN    Or    oxyCODONE (ROXICODONE) IR tablet 5 mg  5 mg Oral Q6H PRN    pantoprazole (PROTONIX) EC tablet 40 mg  40 mg Oral BID AC    polyethylene glycol (MIRALAX) packet 17 g  17 g Oral Daily    sertraline (ZOLOFT) tablet 100 mg  100 mg Oral Daily    simethicone (MYLICON) chewable tablet 80 mg  80 mg Oral Q6H PRN     (Not in a hospital admission)      Allergies   Allergen Reactions    Azithromycin GI Intolerance     Other reaction(s): GI Intolerance    Cephalosporins Hives    Fluoxetine Other (See Comments)    Sulfa Antibiotics Hives       PHYSICAL EXAM    Physical Exam  Vitals and nursing note reviewed. Constitutional:       General: She is not in acute distress. Appearance: She is not toxic-appearing. Comments: Resting comfortably in bed. HENT:      Head: Normocephalic. Mouth/Throat:      Mouth: Mucous membranes are moist.      Pharynx: Oropharynx is clear. Eyes:      General: No scleral icterus. Extraocular Movements: Extraocular movements intact. Neck:      Trachea: Phonation normal.   Cardiovascular:      Rate and Rhythm: Normal rate and regular rhythm. Heart sounds: No murmur heard. No friction rub. No gallop. Pulmonary:      Effort: Pulmonary effort is normal. No respiratory distress. Breath sounds: No wheezing, rhonchi or rales. Abdominal:      General: A surgical scar is present. Bowel sounds are normal. There is no distension or abdominal bruit. Palpations: Abdomen is soft. There is no hepatomegaly or splenomegaly. Tenderness: There is abdominal tenderness in the right lower quadrant and periumbilical area. There is no guarding or rebound. Musculoskeletal:      Cervical back: Neck supple. Comments: Moving all 4 extremities spontaneously   Skin:     General: Skin is warm and dry. Capillary Refill: Capillary refill takes less than 2 seconds. Coloration: Skin is not jaundiced or pale. Neurological:      General: No focal deficit present. Mental Status: She is alert and oriented to person, place, and time. Psychiatric:         Behavior: Behavior normal. Behavior is cooperative. Thought Content:  Thought content normal.           Lab Results   Component Value Date    CALCIUM 8.2 (L) 10/17/2023    K 3.6 10/17/2023    CO2 19 (L) 10/17/2023     (H) 10/17/2023    BUN 9 10/17/2023    CREATININE 0.70 10/17/2023    CREATININE 1.02 10/16/2023    CREATININE 0.92 01/26/2023     Lab Results   Component Value Date    WBC 5.16 10/17/2023    WBC 6.96 10/16/2023    WBC 5.91 01/26/2023    HGB 9.7 (L) 10/17/2023    HGB 11.3 (L) 10/16/2023    HGB 12.4 01/26/2023    MCV 93 10/17/2023     10/17/2023     10/16/2023     01/26/2023     Lab Results   Component Value Date    ALT 50 10/17/2023    ALT 37 10/16/2023    ALT 49 01/26/2023    AST 38 10/17/2023    AST 26 10/16/2023    AST 46 (H) 01/26/2023    ALKPHOS 40 10/17/2023    ALKPHOS 38 10/16/2023    ALKPHOS 54 01/26/2023    TBILI 0.42 10/17/2023    TBILI 0.43 10/16/2023    TBILI 0.40 01/26/2023     Lab Results   Component Value Date    LIPASE 21 10/16/2023     Lab Results   Component Value Date    IRON 135 10/25/2019    TIBC 362 10/25/2019    FERRITIN 29 10/25/2019     Lab Results   Component Value Date    INR 0.95 01/18/2021       VAS lower limb venous duplex study, complete bilateral    Result Date: 10/17/2023  Narrative:  [facility] REPORT CLINICAL: Indications: Patient presents to ED with syncope. Physician requests rule out lower extremity DVT due to elevated D-dimer. Patient has history of DVT and is not on a blood thinner. Operative History: Patient denies previous cardiovascular surgery Risk Factors The patient has history of DVT. CONCLUSION:  Impression: RIGHT LOWER LIMB: No evidence of acute or chronic deep vein thrombosis. No evidence of superficial thrombophlebitis noted. Doppler evaluation shows a normal response to augmentation maneuvers. . Popliteal, posterior tibial and anterior tibial arterial Doppler waveforms are triphasic. LEFT LOWER LIMB: No evidence of acute or chronic deep vein thrombosis. No evidence of superficial thrombophlebitis noted. Doppler evaluation shows a normal response to augmentation maneuvers. Popliteal, posterior tibial and anterior tibial arterial Doppler waveforms are triphasic. Technical findings were given to Dena Victoria 10/16/2023 @ 1904. SIGNATURE: Electronically Signed by: Liam Garcia on 2023-10-17 09:57:07 AM    CT head wo contrast    Result Date: 10/17/2023  Narrative: CT BRAIN - WITHOUT CONTRAST INDICATION:   Syncope, recurrent syncope. COMPARISON: CT brain dated September 27, 2021. TECHNIQUE:  CT examination of the brain was performed. Multiplanar 2D reformatted images were created from the source data. Radiation dose length product (DLP) for this visit:  832 mGy-cm .   This examination, like all CT scans performed in the [facility], was performed utilizing techniques to minimize radiation dose exposure, including the use of iterative reconstruction and automated exposure control. IMAGE QUALITY:  Diagnostic. FINDINGS: PARENCHYMA:  No intracranial mass, mass effect or midline shift. No CT signs of acute infarction. No acute parenchymal hemorrhage. VENTRICLES AND EXTRA-AXIAL SPACES:  Normal for the patient's age. VISUALIZED ORBITS: Normal visualized orbits. PARANASAL SINUSES: Normal visualized paranasal sinuses. CALVARIUM AND EXTRACRANIAL SOFT TISSUES:  Normal.     Impression: No acute intracranial abnormality. Workstation performed: [id number]     PE Study with CT abdomen & pelvis with contrast    Result Date: 10/16/2023  Narrative: CT PULMONARY ANGIOGRAM OF THE CHEST AND CT ABDOMEN AND PELVIS WITH INTRAVENOUS CONTRAST INDICATION:   syncope + abdominal pain. COMPARISON:  None. TECHNIQUE:  CT examination of the chest, abdomen and pelvis was performed. Thin section CT angiographic technique was used in the chest in order to evaluate for pulmonary embolus and coronal 3D MIP postprocessing was performed on the acquisition scanner. Multiplanar 2D reformatted images were created from the source data. This examination, like all CT scans performed in the [facility], was performed utilizing techniques to minimize radiation dose exposure, including the use of iterative reconstruction and automated exposure control. Radiation dose length product (DLP) for this visit:  720.4 mGy-cm IV Contrast:  100 mL of iohexol (OMNIPAQUE) Enteric Contrast:  Enteric contrast was not administered. FINDINGS: CHEST PULMONARY ARTERIAL TREE:  No pulmonary embolus is seen. LUNGS: Right middle lobe atelectatic changes. There is no tracheal or endobronchial lesion. PLEURA: Trace bilateral pleural effusions HEART/AORTA:  Heart is unremarkable for patient's age. No thoracic aortic aneurysm. MEDIASTINUM AND RASHAWN:  Unremarkable. CHEST WALL AND LOWER NECK:  Unremarkable. ABDOMEN LIVER/BILIARY TREE:  Unremarkable. GALLBLADDER: Gallbladder is surgically absent. SPLEEN:  Unremarkable. PANCREAS:  Unremarkable. ADRENAL GLANDS:  Unremarkable. KIDNEYS/URETERS:  Unremarkable. No hydronephrosis. STOMACH AND BOWEL:  Unremarkable. APPENDIX:  No findings to suggest appendicitis. ABDOMINOPELVIC CAVITY:  No ascites. No pneumoperitoneum. No lymphadenopathy. VESSELS:  Unremarkable for patient's age. PELVIS REPRODUCTIVE ORGANS: Large lower uterine segment uterine mass extending into the cervix. Left ovarian 3 cm simple cyst. Possibly a fibroid. Correlate with pelvic ultrasound is recommended on a nonemergent outpatient basis for further evaluation. URINARY BLADDER:  Unremarkable. ABDOMINAL WALL/INGUINAL REGIONS:  Unremarkable. OSSEOUS STRUCTURES:  No acute fracture or destructive osseous lesion. Impression: Trace bilateral pleural effusions. No lung consolidation or pulmonary embolism. Large lower uterine segment uterine mass extending into the cervix. Possibly a fibroid. Correlate with pelvic ultrasound is recommended on a nonemergent outpatient basis for further evaluation. Workstation performed: [id number]     XR chest 1 view portable    Result Date: 10/16/2023  Narrative: CHEST INDICATION:  Syncope. COMPARISON: Chest radiograph and CTA chest 6/12/2021 EXAM PERFORMED/VIEWS:  XR CHEST PORTABLE  AP semierect Images:  1 FINDINGS: Cardiomediastinal silhouette appears unremarkable. The lungs are clear. No pneumothorax or pleural effusion. Osseous structures appear within normal limits for patient age. Cholecystectomy clips. Impression: No acute cardiopulmonary disease. Resident: Emilee Baird, the attending radiologist, have reviewed the images and agree with the final report above. Workstation performed: [id number]     CTA abdomen pelvis w wo contrast    Result Date: 10/4/2023  Narrative: History: Right lower quadrant pain.    Exam: CT of the abdomen and pelvis with IV contrast.   Technique: Using helical technique, axial images were obtained through the abdomen and pelvis following administration of 75 cc of Omnipaque 350 intravenous contrast. Coronal and sagittal reformations were performed. Comparison: 1/15/2021. Abdomen: Lung Bases: Normal. Liver: Normal. Gallbladder/Bile ducts: Cholecystectomy clips are seen. Spleen: Normal. Pancreas: Normal. Adrenal glands: Normal. Kidneys/Ureters: Unremarkable. Bowel/Mesentery: Large and small bowel are within normal limits. Although the appendix is not clearly seen, the expected region of the appendix is within normal limits. . Lymph nodes: Normal. Vessels: Normal. Abdominal Wall: Normal.   Pelvis: The uterus is retroverted. There is a 3.2 cm left ovarian cyst or cystic lesion. Urinary Bladder: Normal. Lymph nodes:  Normal.   Bones: Normal.      Impression: Impression: 1. 3.2 cm left ovarian cyst or cystic lesion. Ultrasound is recommended for further characterization. Workstation:[id number]    XR chest pa & lateral    Result Date: 9/22/2023  Narrative: Clinical indication-chest pain. Pain is in lower anterior ribs and top of right chest. Procedure-chest and right RIBS, including 2 views of chest and 3 views of right ribs Findings- No evidence of an acute right rib fracture, lytic lesion, pneumothorax or a pleural effusion. No lung consolidation or cardiomegaly. Impression: IMPRESSION: 1. No acute right rib fracture or a pleural effusion Workstation:[id number]    XR ribs right w pa chest min 3 views    Result Date: 9/22/2023  Narrative: Clinical indication-chest pain. Pain is in lower anterior ribs and top of right chest. Procedure-chest and right RIBS, including 2 views of chest and 3 views of right ribs Findings- No evidence of an acute right rib fracture, lytic lesion, pneumothorax or a pleural effusion. No lung consolidation or cardiomegaly. Impression: IMPRESSION: 1. No acute right rib fracture or a pleural effusion Workstation:[id number]      Imaging Studies: I have personally reviewed pertinent reports.       Pathology, and Other Studies: I have personally reviewed pertinent reports. Patient expressed understanding and had all questions and concerns addressed. Anabel Montano PA-C  10/17/23   12:05 PM     This chart was completed in part utilizing [employer] speech voice recognition software. Random word insertions, pronoun errors, and incomplete sentences are an occasional consequence of this system due to software limitations, and ambient noise. Any questions or concerns about the content, text, or information contained within the body of this dictation should be directly addressed to the provider for clarification.

## 2023-10-17 NOTE — ASSESSMENT & PLAN NOTE
D-Dimer on admission 0.65  CTA PE negative for PE  B/L LE venous duplex preliminary report negative for DVT

## 2023-10-18 VITALS
OXYGEN SATURATION: 96 % | SYSTOLIC BLOOD PRESSURE: 128 MMHG | TEMPERATURE: 97.9 F | RESPIRATION RATE: 14 BRPM | WEIGHT: 164 LBS | BODY MASS INDEX: 28 KG/M2 | HEIGHT: 64 IN | DIASTOLIC BLOOD PRESSURE: 90 MMHG | HEART RATE: 68 BPM

## 2023-10-18 LAB
ANION GAP SERPL CALCULATED.3IONS-SCNC: 5 MMOL/L
BASOPHILS # BLD AUTO: 0.03 THOUSANDS/ÂΜL (ref 0–0.1)
BASOPHILS NFR BLD AUTO: 1 % (ref 0–1)
BUN SERPL-MCNC: 7 MG/DL (ref 5–25)
CALCIUM SERPL-MCNC: 8.6 MG/DL (ref 8.4–10.2)
CHLORIDE SERPL-SCNC: 110 MMOL/L (ref 96–108)
CO2 SERPL-SCNC: 24 MMOL/L (ref 21–32)
CREAT SERPL-MCNC: 0.74 MG/DL (ref 0.6–1.3)
CRP SERPL QL: <1 MG/L
EOSINOPHIL # BLD AUTO: 0.01 THOUSAND/ÂΜL (ref 0–0.61)
EOSINOPHIL NFR BLD AUTO: 0 % (ref 0–6)
ERYTHROCYTE [DISTWIDTH] IN BLOOD BY AUTOMATED COUNT: 13.2 % (ref 11.6–15.1)
FERRITIN SERPL-MCNC: 29 NG/ML (ref 11–307)
GFR SERPL CREATININE-BSD FRML MDRD: 99 ML/MIN/1.73SQ M
GLUCOSE P FAST SERPL-MCNC: 101 MG/DL (ref 65–99)
GLUCOSE SERPL-MCNC: 101 MG/DL (ref 65–140)
GLUCOSE SERPL-MCNC: 99 MG/DL (ref 65–140)
HCT VFR BLD AUTO: 36 % (ref 34.8–46.1)
HGB BLD-MCNC: 11.6 G/DL (ref 11.5–15.4)
IMM GRANULOCYTES # BLD AUTO: 0.02 THOUSAND/UL (ref 0–0.2)
IMM GRANULOCYTES NFR BLD AUTO: 0 % (ref 0–2)
IRON SATN MFR SERPL: 38 % (ref 15–50)
IRON SERPL-MCNC: 84 UG/DL (ref 50–212)
LYMPHOCYTES # BLD AUTO: 1.8 THOUSANDS/ÂΜL (ref 0.6–4.47)
LYMPHOCYTES NFR BLD AUTO: 32 % (ref 14–44)
MCH RBC QN AUTO: 29.4 PG (ref 26.8–34.3)
MCHC RBC AUTO-ENTMCNC: 32.2 G/DL (ref 31.4–37.4)
MCV RBC AUTO: 91 FL (ref 82–98)
MONOCYTES # BLD AUTO: 0.41 THOUSAND/ÂΜL (ref 0.17–1.22)
MONOCYTES NFR BLD AUTO: 7 % (ref 4–12)
NEUTROPHILS # BLD AUTO: 3.43 THOUSANDS/ÂΜL (ref 1.85–7.62)
NEUTS SEG NFR BLD AUTO: 60 % (ref 43–75)
NRBC BLD AUTO-RTO: 0 /100 WBCS
PLATELET # BLD AUTO: 253 THOUSANDS/UL (ref 149–390)
PMV BLD AUTO: 10 FL (ref 8.9–12.7)
POTASSIUM SERPL-SCNC: 3.4 MMOL/L (ref 3.5–5.3)
RBC # BLD AUTO: 3.94 MILLION/UL (ref 3.81–5.12)
SODIUM SERPL-SCNC: 139 MMOL/L (ref 135–147)
TIBC SERPL-MCNC: 224 UG/DL (ref 250–450)
UIBC SERPL-MCNC: 140 UG/DL (ref 155–355)
WBC # BLD AUTO: 5.7 THOUSAND/UL (ref 4.31–10.16)

## 2023-10-18 PROCEDURE — 85025 COMPLETE CBC W/AUTO DIFF WBC: CPT | Performed by: STUDENT IN AN ORGANIZED HEALTH CARE EDUCATION/TRAINING PROGRAM

## 2023-10-18 PROCEDURE — 82728 ASSAY OF FERRITIN: CPT | Performed by: STUDENT IN AN ORGANIZED HEALTH CARE EDUCATION/TRAINING PROGRAM

## 2023-10-18 PROCEDURE — 83550 IRON BINDING TEST: CPT | Performed by: STUDENT IN AN ORGANIZED HEALTH CARE EDUCATION/TRAINING PROGRAM

## 2023-10-18 PROCEDURE — 99239 HOSP IP/OBS DSCHRG MGMT >30: CPT | Performed by: PHYSICIAN ASSISTANT

## 2023-10-18 PROCEDURE — 82948 REAGENT STRIP/BLOOD GLUCOSE: CPT

## 2023-10-18 PROCEDURE — 83540 ASSAY OF IRON: CPT | Performed by: STUDENT IN AN ORGANIZED HEALTH CARE EDUCATION/TRAINING PROGRAM

## 2023-10-18 PROCEDURE — 80048 BASIC METABOLIC PNL TOTAL CA: CPT | Performed by: STUDENT IN AN ORGANIZED HEALTH CARE EDUCATION/TRAINING PROGRAM

## 2023-10-18 PROCEDURE — 86140 C-REACTIVE PROTEIN: CPT | Performed by: STUDENT IN AN ORGANIZED HEALTH CARE EDUCATION/TRAINING PROGRAM

## 2023-10-18 RX ORDER — POTASSIUM CHLORIDE 20 MEQ/1
40 TABLET, EXTENDED RELEASE ORAL ONCE
Status: COMPLETED | OUTPATIENT
Start: 2023-10-18 | End: 2023-10-18

## 2023-10-18 RX ORDER — POLYETHYLENE GLYCOL 3350 17 G/17G
17 POWDER, FOR SOLUTION ORAL 2 TIMES DAILY
Qty: 100 EACH | Refills: 0 | Status: SHIPPED | OUTPATIENT
Start: 2023-10-18

## 2023-10-18 RX ADMIN — FLUTICASONE FUROATE 1 PUFF: 100 POWDER RESPIRATORY (INHALATION) at 09:38

## 2023-10-18 RX ADMIN — METOCLOPRAMIDE HYDROCHLORIDE 10 MG: 5 INJECTION INTRAMUSCULAR; INTRAVENOUS at 02:24

## 2023-10-18 RX ADMIN — METOCLOPRAMIDE HYDROCHLORIDE 10 MG: 5 INJECTION INTRAMUSCULAR; INTRAVENOUS at 09:33

## 2023-10-18 RX ADMIN — PANTOPRAZOLE SODIUM 40 MG: 40 TABLET, DELAYED RELEASE ORAL at 05:54

## 2023-10-18 RX ADMIN — POTASSIUM CHLORIDE 40 MEQ: 1500 TABLET, EXTENDED RELEASE ORAL at 09:32

## 2023-10-18 RX ADMIN — SERTRALINE HYDROCHLORIDE 100 MG: 50 TABLET ORAL at 09:32

## 2023-10-18 NOTE — PLAN OF CARE
Problem: Potential for Falls  Goal: Patient will remain free of falls  Description: INTERVENTIONS:  - Educate patient/family on patient safety including physical limitations  - Instruct patient to call for assistance with activity   - Consult OT/PT to assist with strengthening/mobility   - Keep Call bell within reach  - Keep bed low and locked with side rails adjusted as appropriate  - Keep care items and personal belongings within reach  - Initiate and maintain comfort rounds  - Make Fall Risk Sign visible to staff  - Offer Toileting every x Hours, in advance of need  - Initiate/Maintain xalarm  - Obtain necessary fall risk management equipment: x  - Apply yellow socks and bracelet for high fall risk patients  - Consider moving patient to room near nurses station  Outcome: Progressing     Problem: MOBILITY - ADULT  Goal: Maintain or return to baseline ADL function  Description: INTERVENTIONS:  -  Assess patient's ability to carry out ADLs; assess patient's baseline for ADL function and identify physical deficits which impact ability to perform ADLs (bathing, care of mouth/teeth, toileting, grooming, dressing, etc.)  - Assess/evaluate cause of self-care deficits   - Assess range of motion  - Assess patient's mobility; develop plan if impaired  - Assess patient's need for assistive devices and provide as appropriate  - Encourage maximum independence but intervene and supervise when necessary  - Involve family in performance of ADLs  - Assess for home care needs following discharge   - Consider OT consult to assist with ADL evaluation and planning for discharge  - Provide patient education as appropriate  Outcome: Progressing  Goal: Maintains/Returns to pre admission functional level  Description: INTERVENTIONS:  - Perform BMAT or MOVE assessment daily.   - Set and communicate daily mobility goal to care team and patient/family/caregiver.    - Collaborate with rehabilitation services on mobility goals if consulted  - Perform Range of Motion x times a day. - Reposition patient every x hours.   - Dangle patient x times a day  - Stand patient x times a day  - Ambulate patient x times a day  - Out of bed to chair x times a day   - Out of bed for meals x times a day  - Out of bed for toileting  - Record patient progress and toleration of activity level   Outcome: Progressing     Problem: PAIN - ADULT  Goal: Verbalizes/displays adequate comfort level or baseline comfort level  Description: Interventions:  - Encourage patient to monitor pain and request assistance  - Assess pain using appropriate pain scale  - Administer analgesics based on type and severity of pain and evaluate response  - Implement non-pharmacological measures as appropriate and evaluate response  - Consider cultural and social influences on pain and pain management  - Notify physician/advanced practitioner if interventions unsuccessful or patient reports new pain  Outcome: Progressing     Problem: INFECTION - ADULT  Goal: Absence or prevention of progression during hospitalization  Description: INTERVENTIONS:  - Assess and monitor for signs and symptoms of infection  - Monitor lab/diagnostic results  - Monitor all insertion sites, i.e. indwelling lines, tubes, and drains  - Monitor endotracheal if appropriate and nasal secretions for changes in amount and color  - [place] appropriate cooling/warming therapies per order  - Administer medications as ordered  - Instruct and encourage patient and family to use good hand hygiene technique  - Identify and instruct in appropriate isolation precautions for identified infection/condition  Outcome: Progressing     Problem: SAFETY ADULT  Goal: Patient will remain free of falls  Description: INTERVENTIONS:  - Educate patient/family on patient safety including physical limitations  - Instruct patient to call for assistance with activity   - Consult OT/PT to assist with strengthening/mobility   - Keep Call bell within reach  - Keep bed low and locked with side rails adjusted as appropriate  - Keep care items and personal belongings within reach  - Initiate and maintain comfort rounds  - Make Fall Risk Sign visible to staff  - Offer Toileting every x Hours, in advance of need  - Initiate/Maintain xalarm  - Obtain necessary fall risk management equipment: xx  - Apply yellow socks and bracelet for high fall risk patients  - Consider moving patient to room near nurses station  Outcome: Progressing     Problem: DISCHARGE PLANNING  Goal: Discharge to home or other facility with appropriate resources  Description: INTERVENTIONS:  - Identify barriers to discharge w/patient and caregiver  - Arrange for needed discharge resources and transportation as appropriate  - Identify discharge learning needs (meds, wound care, etc.)  - Arrange for interpretive services to assist at discharge as needed  - Refer to Case Management Department for coordinating discharge planning if the patient needs post-hospital services based on physician/advanced practitioner order or complex needs related to functional status, cognitive ability, or social support system  Outcome: Progressing     Problem: Knowledge Deficit  Goal: Patient/family/caregiver demonstrates understanding of disease process, treatment plan, medications, and discharge instructions  Description: Complete learning assessment and assess knowledge base.   Interventions:  - Provide teaching at level of understanding  - Provide teaching via preferred learning methods  Outcome: Progressing     Problem: Prexisting or High Potential for Compromised Skin Integrity  Goal: Skin integrity is maintained or improved  Description: INTERVENTIONS:  - Identify patients at risk for skin breakdown  - Assess and monitor skin integrity  - Assess and monitor nutrition and hydration status  - Monitor labs   - Assess for incontinence   - Turn and reposition patient  - Assist with mobility/ambulation  - Relieve pressure over bony prominences  - Avoid friction and shearing  - Provide appropriate hygiene as needed including keeping skin clean and dry  - Evaluate need for skin moisturizer/barrier cream  - Collaborate with interdisciplinary team   - Patient/family teaching  - Consider wound care consult   Outcome: Progressing     Problem: Nutrition/Hydration-ADULT  Goal: Nutrient/Hydration intake appropriate for improving, restoring or maintaining nutritional needs  Description: Monitor and assess patient's nutrition/hydration status for malnutrition. Collaborate with interdisciplinary team and initiate plan and interventions as ordered. Monitor patient's weight and dietary intake as ordered or per policy. Utilize nutrition screening tool and intervene as necessary. Determine patient's food preferences and provide high-protein, high-caloric foods as appropriate.      INTERVENTIONS:  - Monitor oral intake, urinary output, labs, and treatment plans  - Assess nutrition and hydration status and recommend course of action  - Evaluate amount of meals eaten  - Assist patient with eating if necessary   - Allow adequate time for meals  - Recommend/ encourage appropriate diets, oral nutritional supplements, and vitamin/mineral supplements  - Order, calculate, and assess calorie counts as needed  - Recommend, monitor, and adjust tube feedings and TPN/PPN based on assessed needs  - Assess need for intravenous fluids  - Provide specific nutrition/hydration education as appropriate  - Include patient/family/caregiver in decisions related to nutrition  Outcome: Progressing     Problem: NEUROSENSORY - ADULT  Goal: Achieves stable or improved neurological status  Description: INTERVENTIONS  - Monitor and report changes in neurological status  - Monitor vital signs such as temperature, blood pressure, glucose, and any other labs ordered   - Initiate measures to prevent increased intracranial pressure  - Monitor for seizure activity and implement precautions if appropriate      Outcome: Progressing     Problem: GASTROINTESTINAL - ADULT  Goal: Minimal or absence of nausea and/or vomiting  Description: INTERVENTIONS:  - Administer IV fluids if ordered to ensure adequate hydration  - Maintain NPO status until nausea and vomiting are resolved  - Nasogastric tube if ordered  - Administer ordered antiemetic medications as needed  - Provide nonpharmacologic comfort measures as appropriate  - Advance diet as tolerated, if ordered  - Consider nutrition services referral to assist patient with adequate nutrition and appropriate food choices  Outcome: Progressing  Goal: Maintains or returns to baseline bowel function  Description: INTERVENTIONS:  - Assess bowel function  - Encourage oral fluids to ensure adequate hydration  - Administer IV fluids if ordered to ensure adequate hydration  - Administer ordered medications as needed  - Encourage mobilization and activity  - Consider nutritional services referral to assist patient with adequate nutrition and appropriate food choices  Outcome: Progressing  Goal: Maintains adequate nutritional intake  Description: INTERVENTIONS:  - Monitor percentage of each meal consumed  - Identify factors contributing to decreased intake, treat as appropriate  - Assist with meals as needed  - Monitor I&O, weight, and lab values if indicated  - Obtain nutrition services referral as needed  Outcome: Progressing     Problem: METABOLIC, FLUID AND ELECTROLYTES - ADULT  Goal: Electrolytes maintained within normal limits  Description: INTERVENTIONS:  - Monitor labs and assess patient for signs and symptoms of electrolyte imbalances  - Administer electrolyte replacement as ordered  - Monitor response to electrolyte replacements, including repeat lab results as appropriate  - Instruct patient on fluid and nutrition as appropriate  Outcome: Progressing  Goal: Fluid balance maintained  Description: INTERVENTIONS:  - Monitor labs   - Monitor I/O and WT  - Instruct patient on fluid and nutrition as appropriate  - Assess for signs & symptoms of volume excess or deficit  Outcome: Progressing     Problem: SKIN/TISSUE INTEGRITY - ADULT  Goal: Skin Integrity remains intact(Skin Breakdown Prevention)  Description: Assess:  -Perform Sung assessment every x  -Clean and moisturize skin every x  -Inspect skin when repositioning, toileting, and assisting with ADLS  -Assess under medical devices such as xx every x  -Assess extremities for adequate circulation and sensation     Bed Management:  -Have minimal linens on bed & keep smooth, unwrinkled  -Change linens as needed when moist or perspiring  -Avoid sitting or lying in one position for more than x hours while in bed  -Keep HOB at [facility]     Toileting:  -Offer bedside commode  -Assess for incontinence every x  -Use incontinent care products after each incontinent episode such as x    Activity:  -Mobilize patient x times a day  -Encourage activity and walks on unit  -Encourage or provide ROM exercises   -Turn and reposition patient every x Hours  -Use appropriate equipment to lift or move patient in bed  -Instruct/ Assist with weight shifting every x when out of bed in chair  -Consider limitation of chair time x hour intervals    Skin Care:  -Avoid use of baby powder, tape, friction and shearing, hot water or constrictive clothing  -Relieve pressure over bony prominences using x  -Do not massage red bony areas    Next Steps:  -Teach patient strategies to minimize risks such as xx   -Consider consults to  interdisciplinary teams such as xx  Outcome: Progressing     Problem: HEMATOLOGIC - ADULT  Goal: Maintains hematologic stability  Description: INTERVENTIONS  - Assess for signs and symptoms of bleeding or hemorrhage  - Monitor labs  - Administer supportive blood products/factors as ordered and appropriate  Outcome: Progressing     Problem: MUSCULOSKELETAL - ADULT  Goal: Maintain or return mobility to safest level of function  Description: INTERVENTIONS:  - Assess patient's ability to carry out ADLs; assess patient's baseline for ADL function and identify physical deficits which impact ability to perform ADLs (bathing, care of mouth/teeth, toileting, grooming, dressing, etc.)  - Assess/evaluate cause of self-care deficits   - Assess range of motion  - Assess patient's mobility  - Assess patient's need for assistive devices and provide as appropriate  - Encourage maximum independence but intervene and supervise when necessary  - Involve family in performance of ADLs  - Assess for home care needs following discharge   - Consider OT consult to assist with ADL evaluation and planning for discharge  - Provide patient education as appropriate  Outcome: Progressing

## 2023-10-18 NOTE — ASSESSMENT & PLAN NOTE
Pt presenting with 3 episodes of syncope ass/w lightheadedness and dizziness. Also having abdominal pain. Per review of records, has seen PCP and neurology end of September for same complaints.   Attributed to possible medication side effect and orthostatic hypotension -- orthostatics were positive in office visit    EKG showing non specific T wave abnormality  Troponin neg  CT head negative   BS and electrolytes WNL-> continue to monitor  Echo unremarkable  Orthostatics ordered  Consult cardio - cleared for discharge, likely vasovagal  Telemetry -- no events thus far

## 2023-10-18 NOTE — DISCHARGE SUMMARY
[facility] 37 [facility] MRN: [id number]  Unit/Bed#: -[facility] Encounter: [id number]  Primary Care Provider: Hoang Crenshaw DO   Date and time admitted to hospital: 10/16/2023 12:31 PM    * Syncope  Assessment & Plan  Pt presenting with 3 episodes of syncope ass/w lightheadedness and dizziness. Also having abdominal pain. Per review of records, has seen PCP and neurology end of September for same complaints. Attributed to possible medication side effect and orthostatic hypotension -- orthostatics were positive in office visit    EKG showing non specific T wave abnormality  Troponin neg  CT head negative   BS and electrolytes WNL-> continue to monitor  Echo unremarkable  Orthostatics ordered  Consult cardio - cleared for discharge, likely vasovagal  Telemetry -- no events thus far     Right lower quadrant abdominal pain  Assessment & Plan  Pt describe abd pain and hematuria. Had CT a/p at [facility] early October for RLQ pain as well, which was unremarkable aside from L ovarian cyst.  She reports she has had RLQ pain off and on for years but for the last several weeks at least it has been persistent   CTA a/p negative for any acute pathology. Does have large uterine mas ?fibroid, unclear if related to current sx   UA negative for UTI and hematuria. She does c/o difficulty urinating and dysuria. Consideration for interstitial cystitis -- would recommend outpt urology f/u   Has been followed by [facility] with Dr. Kristie Jimenes and WILLIAMS Ibrahim  Continue to have 10/10 pain ass/w nausea  Reglan joe, zofran prn  Tylenol, tramadol, oxy. There is no indication for IV dilaudid -- discontinued.    She reports poor PO intake and no BM x weeks -- no stool impaction is mentioned on CT   Continue home regimen of bentyl, colace, pepcid indomethacin and protonix  Consult GI  Aggressive outpatient bowel regimen  Outpatient follow up with GI    Uterine mass  Assessment & Plan  CTA A/P showing large lower uterine segment uterine mass extending into the cervix. Possibly a fibroid. Correlate with pelvic ultrasound is recommended on a nonemergent outpatient basis for further evaluation. Possible cause of pain? Abd and transvaginal US: No uterine mass identified. The finding on CT scan likely reflects slight prolapse of the cervix into the vagina. Simple left ovarian cyst.      Medical Problems       Resolved Problems  Date Reviewed: 10/18/2023   None       Discharging Physician / Practitioner: Dino Freeman PA-C  PCP: Joselyn Lu DO  Admission Date:   Admission Orders (From admission, onward)       Ordered        10/16/23 1731  Place in Observation  Once                          Discharge Date: 10/18/23    Consultations During Hospital Stay:  Gastroenterology  Cardiology    Procedures Performed:   VAS LE limb venous duplex 10/16:   RIGHT LOWER LIMB:  No evidence of acute or chronic deep vein thrombosis. No evidence of superficial thrombophlebitis noted. Doppler evaluation shows a normal response to augmentation maneuvers. .  Popliteal, posterior tibial and anterior tibial arterial Doppler waveforms are  triphasic. LEFT LOWER LIMB:  No evidence of acute or chronic deep vein thrombosis. No evidence of superficial thrombophlebitis noted. Doppler evaluation shows a normal response to augmentation maneuvers. Popliteal, posterior tibial and anterior tibial arterial Doppler waveforms are  triphasic. Echocardiogram 10/17:     Left Ventricle: Left ventricular cavity size is normal. Wall thickness is normal. The left ventricular ejection fraction is 60-65%. Systolic function is normal. Although no diagnostic regional wall motion abnormality was identified, this possibility cannot be completely excluded on the basis of this study.  Diastolic function is normal.    Right Ventricle: Right ventricular cavity size is normal. Systolic function is normal. Pericardium: There is a trivial pericardial effusion anterior to the heart. Significant Findings / Test Results:   CXR 10/16: No acute cardiopulmonary disease. CTA PE study 10/16: Trace bilateral pleural effusions. No lung consolidation or pulmonary embolism. Large lower uterine segment uterine mass extending into the cervix. Possibly a fibroid. Correlate with pelvic ultrasound is recommended on a nonemergent outpatient basis for further evaluation. CT head 10/16: No acute intracranial abnormality. US pelvis complete 10/17: No uterine mass identified. The finding on CT scan likely reflects slight prolapse of the cervix into the vagina. Simple left ovarian cyst.    Incidental Findings:   None    Test Results Pending at Discharge (will require follow up): None     Outpatient Tests Requested:  Colonoscopy    Complications:  None    Reason for Admission: Syncope    Hospital Course:   An Eason is a 37 y.o. female patient with a past medical history of GERD, RUQ abdominal pain, MDD, asthma who originally presented to the hospital on 10/16/2023 due to 3 falls on the morning of admission. Patient had significant abdominal pain and described hematuria. Patient has been followed outpatient for constipation, GERD and right upper quadrant abdominal pain. She has a history of cholecystectomy. She was noted to have constipation and syncopal events were determined to be vasovagal secondary to abdominal pain. She should be on an aggressive bowel regimen and follow-up outpatient with GI closely. Hemodynamically stable at time of discharge, tolerating diet. Appropriate for return home on bowel regimen with outpatient follow up. Please see above list of diagnoses and related plan for additional information. Condition at Discharge: stable    Discharge Day Visit / Exam:   Subjective: Patient reports she feels fine today, just wants to go home. Denies any further nausea or abdominal pain.   Vitals: Blood Pressure: 128/90 (10/18/23 0723)  Pulse: 68 (10/18/23 0723)  Temperature: 97.9 °F (36.6 °C) (10/18/23 0723)  Temp Source: Temporal (10/16/23 1205)  Respirations: 14 (10/18/23 0723)  Height: 5' 4" (162.6 cm) (10/17/23 1326)  Weight - Scale: 74.4 kg (164 lb) (10/17/23 1326)  SpO2: 96 % (10/18/23 0723)  Exam:   Physical Exam  Vitals and nursing note reviewed. Constitutional:       General: She is not in acute distress. Appearance: Normal appearance. She is well-developed. HENT:      Head: Normocephalic and atraumatic. Eyes:      General: No scleral icterus. Conjunctiva/sclera: Conjunctivae normal.   Cardiovascular:      Rate and Rhythm: Normal rate and regular rhythm. Heart sounds: No murmur heard. Pulmonary:      Effort: Pulmonary effort is normal.      Breath sounds: No wheezing, rhonchi or rales. Abdominal:      General: There is no distension. Palpations: Abdomen is soft. Skin:     General: Skin is warm and dry. Neurological:      General: No focal deficit present. Mental Status: She is alert. Psychiatric:         Mood and Affect: Mood normal.       Discussion with Family: Patient declined call to . Discharge instructions/Information to patient and family:   See after visit summary for information provided to patient and family. Provisions for Follow-Up Care:  See after visit summary for information related to follow-up care and any pertinent home health orders. Disposition:   Home    Planned Readmission: None     Discharge Statement:  I spent 65 minutes discharging the patient. This time was spent on the day of discharge. I had direct contact with the patient on the day of discharge. Greater than 50% of the total time was spent examining patient, answering all patient questions, arranging and discussing plan of care with patient as well as directly providing post-discharge instructions.   Additional time then spent on discharge activities. Discharge Medications:  See after visit summary for reconciled discharge medications provided to patient and/or family.       **Please Note: This note may have been constructed using a voice recognition system**

## 2023-10-18 NOTE — QUICK NOTE
Echo reviewed showing preserved LVEF and no hemodynamically significant valvular disease. She does have trivial pericardial effusion but this would not cause any hemodynamic compromise nor explain her syncopal episodes. She is no longer on telemetry. Discussed echo findings with patient, recommend repeat limited study in 4-6 weeks to reevaluate her trivial effusion. She already has an outpatient ambulatory monitor arranged through her primary care and cardiology team at [facility]. She is going to continue follow-up with them. Recommendation for repeat limited echo was discussed with patient. Okay for discharge from our standpoint.

## 2023-10-18 NOTE — ASSESSMENT & PLAN NOTE
CTA A/P showing large lower uterine segment uterine mass extending into the cervix. Possibly a fibroid. Correlate with pelvic ultrasound is recommended on a nonemergent outpatient basis for further evaluation. Possible cause of pain? Abd and transvaginal US: No uterine mass identified. The finding on CT scan likely reflects slight prolapse of the cervix into the vagina.  Simple left ovarian cyst.

## 2023-10-18 NOTE — NURSING NOTE
Patient discharged to home. Patient was given discharge education and paperwork which was reviewed with the nursing staff. Education included prescriptions, future appointments, and answering any questions. The patient was walked off the unit by nursing staff and the patient's  via wheelchair. Patient verbalized understanding.

## 2023-10-18 NOTE — ASSESSMENT & PLAN NOTE
Pt describe abd pain and hematuria. Had CT a/p at [facility] early October for RLQ pain as well, which was unremarkable aside from L ovarian cyst.  She reports she has had RLQ pain off and on for years but for the last several weeks at least it has been persistent   CTA a/p negative for any acute pathology. Does have large uterine mas ?fibroid, unclear if related to current sx   UA negative for UTI and hematuria. She does c/o difficulty urinating and dysuria. Consideration for interstitial cystitis -- would recommend outpt urology f/u   Has been followed by [facility] with Dr. Won Augustine and WILLIAMS Vega  Continue to have 10/10 pain ass/w nausea  Reglan joe, zofran prn  Tylenol, tramadol, oxy. There is no indication for IV dilaudid -- discontinued.    She reports poor PO intake and no BM x weeks -- no stool impaction is mentioned on CT   Continue home regimen of bentyl, colace, pepcid indomethacin and protonix  Consult GI  Aggressive outpatient bowel regimen  Outpatient follow up with GI

## 2023-10-18 NOTE — PLAN OF CARE
Problem: PAIN - ADULT  Goal: Verbalizes/displays adequate comfort level or baseline comfort level  Description: Interventions:  - Encourage patient to monitor pain and request assistance  - Assess pain using appropriate pain scale  - Administer analgesics based on type and severity of pain and evaluate response  - Implement non-pharmacological measures as appropriate and evaluate response  - Consider cultural and social influences on pain and pain management  - Notify physician/advanced practitioner if interventions unsuccessful or patient reports new pain  Outcome: Progressing     Problem: INFECTION - ADULT  Goal: Absence or prevention of progression during hospitalization  Description: INTERVENTIONS:  - Assess and monitor for signs and symptoms of infection  - Monitor lab/diagnostic results  - Monitor all insertion sites, i.e. indwelling lines, tubes, and drains  - Monitor endotracheal if appropriate and nasal secretions for changes in amount and color  - [place] appropriate cooling/warming therapies per order  - Administer medications as ordered  - Instruct and encourage patient and family to use good hand hygiene technique  - Identify and instruct in appropriate isolation precautions for identified infection/condition  Outcome: Progressing

## 2023-10-19 LAB
ATRIAL RATE: 67 BPM
ATRIAL RATE: 94 BPM
ATRIAL RATE: 94 BPM
P AXIS: 36 DEGREES
P AXIS: 44 DEGREES
P AXIS: 51 DEGREES
PR INTERVAL: 156 MS
PR INTERVAL: 160 MS
PR INTERVAL: 174 MS
QRS AXIS: 12 DEGREES
QRS AXIS: 25 DEGREES
QRS AXIS: 32 DEGREES
QRSD INTERVAL: 72 MS
QRSD INTERVAL: 76 MS
QRSD INTERVAL: 80 MS
QT INTERVAL: 346 MS
QT INTERVAL: 392 MS
QT INTERVAL: 394 MS
QTC INTERVAL: 416 MS
QTC INTERVAL: 432 MS
QTC INTERVAL: 490 MS
T WAVE AXIS: -25 DEGREES
T WAVE AXIS: -3 DEGREES
T WAVE AXIS: 18 DEGREES
VENTRICULAR RATE: 67 BPM
VENTRICULAR RATE: 94 BPM
VENTRICULAR RATE: 94 BPM

## 2023-10-19 PROCEDURE — 93010 ELECTROCARDIOGRAM REPORT: CPT | Performed by: INTERNAL MEDICINE

## 2024-02-21 PROBLEM — R05.9 COUGH: Status: RESOLVED | Noted: 2019-10-16 | Resolved: 2024-02-21

## 2024-06-15 DIAGNOSIS — Z00.6 ENCOUNTER FOR EXAMINATION FOR NORMAL COMPARISON OR CONTROL IN CLINICAL RESEARCH PROGRAM: ICD-10-CM

## 2024-07-01 ENCOUNTER — APPOINTMENT (OUTPATIENT)
Dept: LAB | Facility: CLINIC | Age: 44
End: 2024-07-01

## 2024-07-01 DIAGNOSIS — Z00.6 ENCOUNTER FOR EXAMINATION FOR NORMAL COMPARISON OR CONTROL IN CLINICAL RESEARCH PROGRAM: ICD-10-CM

## 2024-07-01 PROCEDURE — 36415 COLL VENOUS BLD VENIPUNCTURE: CPT

## 2024-07-12 LAB
APOB+LDLR+PCSK9 GENE MUT ANL BLD/T: NOT DETECTED
BRCA1+BRCA2 DEL+DUP + FULL MUT ANL BLD/T: NOT DETECTED
MLH1+MSH2+MSH6+PMS2 GN DEL+DUP+FUL M: NOT DETECTED

## 2024-11-14 ENCOUNTER — OFFICE VISIT (OUTPATIENT)
Dept: URGENT CARE | Facility: CLINIC | Age: 44
End: 2024-11-14
Payer: COMMERCIAL

## 2024-11-14 VITALS
BODY MASS INDEX: 24.59 KG/M2 | HEIGHT: 64 IN | TEMPERATURE: 98.5 F | WEIGHT: 144 LBS | OXYGEN SATURATION: 99 % | SYSTOLIC BLOOD PRESSURE: 120 MMHG | DIASTOLIC BLOOD PRESSURE: 74 MMHG | HEART RATE: 126 BPM | RESPIRATION RATE: 18 BRPM

## 2024-11-14 DIAGNOSIS — J02.9 SORE THROAT: Primary | ICD-10-CM

## 2024-11-14 LAB — S PYO AG THROAT QL: NEGATIVE

## 2024-11-14 PROCEDURE — 99213 OFFICE O/P EST LOW 20 MIN: CPT | Performed by: PHYSICIAN ASSISTANT

## 2024-11-14 PROCEDURE — 87880 STREP A ASSAY W/OPTIC: CPT | Performed by: PHYSICIAN ASSISTANT

## 2024-11-14 RX ORDER — ESCITALOPRAM OXALATE 10 MG/1
1 TABLET ORAL EVERY MORNING
COMMUNITY
Start: 2024-05-01

## 2024-11-14 RX ORDER — FLUTICASONE PROPIONATE 50 MCG
1 SPRAY, SUSPENSION (ML) NASAL DAILY
COMMUNITY

## 2024-11-14 RX ORDER — DOXEPIN HYDROCHLORIDE 10 MG/1
10 CAPSULE ORAL
COMMUNITY

## 2024-11-14 RX ORDER — GALCANEZUMAB 120 MG/ML
120 INJECTION, SOLUTION SUBCUTANEOUS
COMMUNITY
Start: 2023-01-22

## 2024-11-14 RX ORDER — CLONAZEPAM 0.5 MG/1
1 TABLET ORAL DAILY PRN
COMMUNITY
Start: 2024-05-21

## 2024-11-14 NOTE — PROGRESS NOTES
[facility]        NAME: An Eason is a 44 y.o. female  : 1980    MRN: [id number]  DATE: 2024  TIME: 2:57 PM    Assessment and Plan   Sore throat [J02.9]  1. Sore throat  POCT rapid strepA            Patient Instructions       Follow up with PCP in 3-5 days.  Proceed to  ER if symptoms worsen.    If tests have been performed at [facility], our office will contact you with results if changes need to be made to the care plan discussed with you at the visit.  [facility]    Chief Complaint     Chief Complaint   Patient presents with    Cold Like Symptoms     Pt c/o body aches, sinus congestion, dizziness and sore throat with onset of symptoms last week. States with onset of symptoms productive cough that has since had some improvement. Managing symptoms with cough drops and Advil.          History of Present Illness       Patient presents with cough, congestion, runny nose, body aches, sore throat for the past week.  Started mild and progressively got worse.  Denies chest pains, shortness of breath, dyspnea.  No sick contacts in the house.        Review of Systems   Review of Systems   Constitutional:  Positive for fatigue. Negative for chills and fever.   HENT:  Positive for congestion, rhinorrhea, sinus pressure and sore throat. Negative for postnasal drip.    Respiratory:  Positive for cough. Negative for chest tightness, shortness of breath and wheezing.    Cardiovascular:  Negative for chest pain and palpitations.   Musculoskeletal:  Positive for myalgias. Negative for arthralgias.   Neurological:  Positive for dizziness. Negative for weakness.   Psychiatric/Behavioral:  Negative for confusion.          Current Medications       Current Outpatient Medications:     albuterol (VENTOLIN HFA) 90 mcg/act inhaler, Inhale 2 puffs every 6 (six) hours as needed for wheezing, Disp: 18 g, Rfl: 0    clonazePAM (KlonoPIN) 0.5 mg tablet, Take 1 tablet by  mouth daily as needed, Disp: , Rfl:     doxepin (SINEquan) 10 mg capsule, Take 10 mg by mouth, Disp: , Rfl:     escitalopram (LEXAPRO) 10 mg tablet, Take 1 tablet by mouth every morning, Disp: , Rfl:     fluticasone (FLONASE) 50 mcg/act nasal spray, 1 spray into each nostril daily, Disp: , Rfl:     Galcanezumab-gnlm (Emgality) 120 MG/ML SOAJ, Inject 120 mg under the skin every 30 (thirty) days, Disp: , Rfl:     montelukast (SINGULAIR) 10 mg tablet, Take 1 tablet (10 mg total) by mouth daily at bedtime, Disp: 30 tablet, Rfl: 2    dicyclomine (BENTYL) 10 mg capsule, Take 1 capsule (10 mg total) by mouth every 6 (six) hours as needed (Abdominal cramping) for up to 12 doses, Disp: 12 capsule, Rfl: 0    docusate sodium (COLACE) 100 mg capsule, Take 1 capsule (100 mg total) by mouth every 12 (twelve) hours (Patient not taking: No sig reported), Disp: 60 capsule, Rfl: 0    famotidine (PEPCID) 40 MG tablet, Take 1 tablet (40 mg total) by mouth daily at bedtime, Disp: 30 tablet, Rfl: 6    mometasone (ASMANEX TWISTHALER) 220 MCG/INH inhaler, Inhale 220 mcg daily, Disp: , Rfl:     ondansetron (ZOFRAN-ODT) 4 mg disintegrating tablet, Take 1 tablet (4 mg total) by mouth every 6 (six) hours as needed for nausea or vomiting for up to 3 days, Disp: 12 tablet, Rfl: 0    pantoprazole (PROTONIX) 40 mg tablet, Take 1 tablet (40 mg total) by mouth 2 (two) times a day before meals, Disp: 60 tablet, Rfl: 6    polyethylene glycol (MIRALAX) 17 g packet, Take 17 g by mouth 2 (two) times a day, Disp: 100 each, Rfl: 0    sertraline (ZOLOFT) 100 mg tablet, Take 100 mg by mouth daily, Disp: , Rfl:     simethicone (MYLICON) 80 mg chewable tablet, Chew 1 tablet (80 mg total) every 6 (six) hours as needed for flatulence (Patient not taking: Reported on 11/14/2024), Disp: 30 tablet, Rfl: 0    traZODone (DESYREL) 50 mg tablet, Take 50 mg by mouth daily at bedtime (Patient not taking: No sig reported), Disp: , Rfl:     Current Allergies     Allergies  "as of 2024 - Reviewed 2024   Allergen Reaction Noted    Azithromycin GI Intolerance 2019    Cephalosporins Hives 2019    Fluoxetine Other (See Comments) 2023    Sulfa antibiotics Hives 2019            The following portions of the patient's history were reviewed and updated as appropriate: allergies, current medications, past family history, past medical history, past social history, past surgical history and problem list.     Past Medical History:   Diagnosis Date    Anxiety     Asthma     Depression     GERD (gastroesophageal reflux disease)     Hypoglycemia     Moderate episode of recurrent major depressive disorder (HCC) 10/03/2019    PTSD (post-traumatic stress disorder)        Past Surgical History:   Procedure Laterality Date    ABDOMINAL SURGERY      3 csections, gallbladder and umbilical hernia     ANKLE LIGAMENT RECONSTRUCTION       SECTION      CHOLECYSTECTOMY      CHOLECYSTECTOMY LAPAROSCOPIC N/A 2020    Procedure: CHOLECYSTECTOMY LAPAROSCOPIC;  Surgeon: Rachid Cline MD;  Location:  MAIN OR;  Service: General    KNEE SURGERY      SINUS SURGERY      TONSILLECTOMY      TONSILLECTOMY      TUBAL LIGATION      UMBILICAL HERNIA REPAIR N/A 2020    Procedure: REPAIR HERNIA UMBILICAL, Primary;  Surgeon: Rachid Cline MD;  Location:  MAIN OR;  Service: General    UPPER GASTROINTESTINAL ENDOSCOPY         Family History   Problem Relation Age of Onset    Depression Mother     Asthma Mother     Mental illness Mother     Mental illness Maternal Grandfather          Medications have been verified.        Objective   /74 (BP Location: Left arm, Patient Position: Sitting)   Pulse (!) 126   Temp 98.5 °F (36.9 °C)   Resp 18   Ht 5' 4\" (1.626 m)   Wt 65.3 kg (144 lb)   SpO2 99%   BMI 24.72 kg/m²   No LMP recorded.       Physical Exam     Physical Exam  Constitutional:       General: She is not in acute distress.     Appearance: " Normal appearance. She is not ill-appearing or diaphoretic.   HENT:      Right Ear: Tympanic membrane, ear canal and external ear normal.      Left Ear: Tympanic membrane, ear canal and external ear normal.      Nose: Nose normal.      Mouth/Throat:      Mouth: Mucous membranes are moist.      Pharynx: Oropharynx is clear.   Eyes:      Conjunctiva/sclera: Conjunctivae normal.   Cardiovascular:      Rate and Rhythm: Normal rate and regular rhythm.      Heart sounds: Normal heart sounds.   Pulmonary:      Effort: Pulmonary effort is normal.      Breath sounds: Normal breath sounds.   Skin:     General: Skin is warm and dry.   Neurological:      Mental Status: She is alert.   Psychiatric:         Mood and Affect: Mood normal.         Behavior: Behavior normal.

## 2024-12-19 ENCOUNTER — OFFICE VISIT (OUTPATIENT)
Dept: FAMILY MEDICINE CLINIC | Facility: CLINIC | Age: 44
End: 2024-12-19
Payer: COMMERCIAL

## 2024-12-19 VITALS
WEIGHT: 151.2 LBS | RESPIRATION RATE: 17 BRPM | DIASTOLIC BLOOD PRESSURE: 70 MMHG | SYSTOLIC BLOOD PRESSURE: 116 MMHG | HEIGHT: 65 IN | TEMPERATURE: 97.7 F | BODY MASS INDEX: 25.19 KG/M2 | OXYGEN SATURATION: 99 % | HEART RATE: 90 BPM

## 2024-12-19 DIAGNOSIS — J45.40 MODERATE PERSISTENT ASTHMA WITHOUT COMPLICATION: Primary | ICD-10-CM

## 2024-12-19 DIAGNOSIS — R53.82 CHRONIC FATIGUE: ICD-10-CM

## 2024-12-19 DIAGNOSIS — M35.3 POLYMYALGIA (HCC): ICD-10-CM

## 2024-12-19 DIAGNOSIS — F33.1 MODERATE EPISODE OF RECURRENT MAJOR DEPRESSIVE DISORDER (HCC): ICD-10-CM

## 2024-12-19 DIAGNOSIS — G43.009 MIGRAINE WITHOUT AURA AND WITHOUT STATUS MIGRAINOSUS, NOT INTRACTABLE: ICD-10-CM

## 2024-12-19 DIAGNOSIS — Z90.710 HISTORY OF HYSTERECTOMY: ICD-10-CM

## 2024-12-19 DIAGNOSIS — R79.89 ABNORMAL TSH: ICD-10-CM

## 2024-12-19 DIAGNOSIS — Z90.49 HISTORY OF CHOLECYSTECTOMY: ICD-10-CM

## 2024-12-19 DIAGNOSIS — Z13.220 SCREENING FOR LIPID DISORDERS: ICD-10-CM

## 2024-12-19 DIAGNOSIS — E61.1 LOW IRON: ICD-10-CM

## 2024-12-19 PROBLEM — R06.2 WHEEZING: Status: RESOLVED | Noted: 2019-10-16 | Resolved: 2024-12-19

## 2024-12-19 PROBLEM — J30.2 SEASONAL ALLERGIES: Status: ACTIVE | Noted: 2024-09-05

## 2024-12-19 PROBLEM — J45.909 ASTHMA: Status: ACTIVE | Noted: 2019-10-03

## 2024-12-19 PROBLEM — K58.2 IRRITABLE BOWEL SYNDROME WITH BOTH CONSTIPATION AND DIARRHEA: Chronic | Status: ACTIVE | Noted: 2023-02-16

## 2024-12-19 PROBLEM — M25.511 ACUTE PAIN OF RIGHT SHOULDER: Status: RESOLVED | Noted: 2021-03-18 | Resolved: 2024-12-19

## 2024-12-19 PROBLEM — G43.909 MIGRAINE: Status: ACTIVE | Noted: 2021-06-15

## 2024-12-19 PROBLEM — M75.31 CALCIFIC TENDONITIS OF RIGHT SHOULDER: Status: RESOLVED | Noted: 2021-03-18 | Resolved: 2024-12-19

## 2024-12-19 PROCEDURE — 99204 OFFICE O/P NEW MOD 45 MIN: CPT | Performed by: FAMILY MEDICINE

## 2024-12-19 NOTE — ASSESSMENT & PLAN NOTE
Depression Screening Follow-up Plan: Patient's depression screening was positive with a PHQ-9 score of 14. Continue regular follow-up with their psychologist/therapist/psychiatrist who is managing their mental health condition(s).    She receives medications through psychiatry  She also receives therapy

## 2024-12-19 NOTE — ASSESSMENT & PLAN NOTE
>>ASSESSMENT AND PLAN FOR MODERATE PERSISTENT ASTHMA WITHOUT COMPLICATION WRITTEN ON 12/19/2024 11:33 AM BY ELISEO CANTU, DO    Previously saw allergist but lost care due to insurance changes  Uses albuterol as needed

## 2024-12-19 NOTE — ASSESSMENT & PLAN NOTE
She had been seeing neurology at [facility]  Will be establishing care with [facility] neuro  Continue medication as prescribed by neuro

## 2024-12-19 NOTE — PROGRESS NOTES
[facility] 1980 female MRN: [id number]    Family Medicine New Patient    ASSESSMENT/PLAN  Problem List Items Addressed This Visit          Cardiovascular and Mediastinum    Migraine without aura and without status migrainosus, not intractable    She had been seeing neurology at [facility]  Will be establishing care with [facility] neuro  Continue medication as prescribed by neuro         Relevant Orders    Magnesium       Respiratory    Asthma - Primary     >>ASSESSMENT AND PLAN FOR MODERATE PERSISTENT ASTHMA WITHOUT COMPLICATION WRITTEN ON 12/19/2024 11:33 AM BY ELISEO CANTU,     Previously saw allergist but lost care due to insurance changes  Uses albuterol as needed          Relevant Orders    Ambulatory Referral to Allergy       Musculoskeletal and Integument    Polymyalgia (HCC)    Relevant Orders    Sjogren's Antibodies    Uric acid    Lyme Total AB W Reflex to IGM/IGG    C-reactive protein    Sedimentation rate, automated    CORINNE Screen w/Reflex Cascade    Lipid panel    Comprehensive metabolic panel    CBC and differential    Vitamin D 1,25 Dihydroxy    TSH, 3rd generation with Free T4 reflex    Fe+TIBC+Candido    Folate    Vitamin B12    Rheumatoid Arthritis Profile       Behavioral Health    Moderate episode of recurrent major depressive disorder (HCC)    Depression Screening Follow-up Plan: Patient's depression screening was positive with a PHQ-9 score of 14. Continue regular follow-up with their psychologist/therapist/psychiatrist who is managing their mental health condition(s).    She receives medications through psychiatry  She also receives therapy             Surgery/Wound/Pain    History of cholecystectomy    History of hysterectomy       Other    Abnormal TSH    Relevant Orders    Thyroid Antibodies Panel    Thyroid Antibodies Panel    Chronic fatigue    Relevant Orders    Sjogren's Antibodies    Uric acid    Lyme Total AB W Reflex to IGM/IGG    C-reactive protein    Sedimentation rate,  automated    CORINNE Screen w/Reflex Cascade    Lipid panel    Comprehensive metabolic panel    CBC and differential    Vitamin D 1,25 Dihydroxy    TSH, 3rd generation with Free T4 reflex    Fe+TIBC+Candido    Folate    Vitamin B12    Rheumatoid Arthritis Profile    Low iron    Relevant Orders    Lipid panel    Comprehensive metabolic panel    CBC and differential    Vitamin D 1,25 Dihydroxy    TSH, 3rd generation with Free T4 reflex    Fe+TIBC+Candido    Folate    Vitamin B12    Rheumatoid Arthritis Profile     Other Visit Diagnoses         Screening for lipid disorders        Relevant Orders    Lipid panel            Follow up in 2 months for CP with labs done prior            Future Appointments   Date Time Provider Department Center   12/30/2024 11:00 AM David Garcia DO NEURO [facility]          SUBJECTIVE  CC: Establish Care (New patient--chronic fatigue, blood pressure drops to the point she passes out, and history of low Iron.)      HPI:  An Eason is a 44 y.o. female who presents for new patient visit     HPI    Review of Systems   Constitutional:  Positive for fatigue. Negative for chills and fever.   HENT:  Negative for congestion, postnasal drip, rhinorrhea and sinus pressure.    Eyes:  Negative for photophobia and visual disturbance.   Respiratory:  Negative for cough and shortness of breath.    Cardiovascular:  Negative for chest pain, palpitations and leg swelling.   Gastrointestinal:  Positive for constipation. Negative for abdominal pain, diarrhea, nausea and vomiting.   Genitourinary:  Negative for difficulty urinating and dysuria.   Musculoskeletal:  Positive for arthralgias and myalgias.   Skin:  Negative for color change and rash.   Neurological:  Negative for dizziness, weakness, light-headedness and headaches.       Historical Information   The patient history was reviewed as follows:    Past Medical History:   Diagnosis Date    Anxiety     Asthma     Depression     GERD (gastroesophageal reflux  disease)     Hypoglycemia     Moderate episode of recurrent major depressive disorder (HCC) 10/03/2019    PTSD (post-traumatic stress disorder)      Past Surgical History:   Procedure Laterality Date    ABDOMINAL SURGERY      3 csections, gallbladder and umbilical hernia     ANKLE LIGAMENT RECONSTRUCTION       SECTION      CHOLECYSTECTOMY      CHOLECYSTECTOMY LAPAROSCOPIC N/A 2020    Procedure: CHOLECYSTECTOMY LAPAROSCOPIC;  Surgeon: Rachid Cline MD;  Location:  MAIN OR;  Service: General    KNEE SURGERY      SINUS SURGERY      TONSILLECTOMY      TONSILLECTOMY      TUBAL LIGATION      UMBILICAL HERNIA REPAIR N/A 2020    Procedure: REPAIR HERNIA UMBILICAL, Primary;  Surgeon: Rachid Cline MD;  Location:  MAIN OR;  Service: General    UPPER GASTROINTESTINAL ENDOSCOPY       Family History   Problem Relation Age of Onset    Depression Mother     Asthma Mother     Mental illness Mother     Mental illness Maternal Grandfather       Social History   Social History     Substance and Sexual Activity   Alcohol Use Not Currently     Social History     Substance and Sexual Activity   Drug Use Never     Social History     Tobacco Use   Smoking Status Never   Smokeless Tobacco Never   Tobacco Comments    Never smoked       Medications:     Current Outpatient Medications:     albuterol (VENTOLIN HFA) 90 mcg/act inhaler, Inhale 2 puffs every 6 (six) hours as needed for wheezing, Disp: 18 g, Rfl: 0    clonazePAM (KlonoPIN) 0.5 mg tablet, Take 1 tablet by mouth daily as needed, Disp: , Rfl:     doxepin (SINEquan) 10 mg capsule, Take 10 mg by mouth, Disp: , Rfl:     escitalopram (LEXAPRO) 10 mg tablet, Take 1 tablet by mouth every morning, Disp: , Rfl:     fluticasone (FLONASE) 50 mcg/act nasal spray, 1 spray into each nostril daily, Disp: , Rfl:     Galcanezumab-gnlm (Emgality) 120 MG/ML SOAJ, Inject 120 mg under the skin every 30 (thirty) days, Disp: , Rfl:     montelukast (SINGULAIR)  "10 mg tablet, Take 1 tablet (10 mg total) by mouth daily at bedtime, Disp: 30 tablet, Rfl: 2  Allergies   Allergen Reactions    Azithromycin GI Intolerance     Other reaction(s): GI Intolerance    Cephalosporins Hives    Fluoxetine Other (See Comments)    Gabapentin Arthralgia    Sulfa Antibiotics Hives       OBJECTIVE    Vitals:   Vitals:    12/19/24 1121 12/19/24 1203 12/19/24 1204   BP: 120/86 116/70    BP Location: Left arm     Patient Position: Sitting     Cuff Size: Standard     Pulse: (!) 115  90   Resp: 17     Temp: 97.7 °F (36.5 °C)     TempSrc: Tympanic     SpO2: 99%     Weight: 68.6 kg (151 lb 3.2 oz)     Height: 5' 4.5\" (1.638 m)             Physical Exam  Constitutional:       Appearance: She is well-developed.   HENT:      Head: Normocephalic and atraumatic.      Right Ear: Tympanic membrane, ear canal and external ear normal.      Left Ear: Tympanic membrane, ear canal and external ear normal.   Cardiovascular:      Rate and Rhythm: Normal rate and regular rhythm.      Heart sounds: Normal heart sounds.   Pulmonary:      Effort: Pulmonary effort is normal. No respiratory distress.      Breath sounds: Normal breath sounds. No wheezing.   Abdominal:      General: Bowel sounds are normal. There is no distension.      Palpations: Abdomen is soft.      Tenderness: There is abdominal tenderness.   Musculoskeletal:         General: No tenderness. Normal range of motion.      Cervical back: Normal range of motion and neck supple.   Skin:     General: Skin is warm and dry.   Neurological:      Mental Status: She is alert and oriented to person, place, and time.   Psychiatric:         Behavior: Behavior normal.            Labs:        Alexa Morgan DO    12/19/2024      "

## 2024-12-24 ENCOUNTER — TELEPHONE (OUTPATIENT)
Dept: NEUROLOGY | Facility: CLINIC | Age: 44
End: 2024-12-24

## 2024-12-24 NOTE — TELEPHONE ENCOUNTER
LMOM for pt to confirm their  11a   appt on   12/30  w/ Garcia . Reminded pt to arrive 15 mins prior to appt to check in with . Gave call back number [phone] to cancel/reschedule.

## 2024-12-31 ENCOUNTER — RESULTS FOLLOW-UP (OUTPATIENT)
Dept: FAMILY MEDICINE CLINIC | Facility: CLINIC | Age: 44
End: 2024-12-31

## 2024-12-31 LAB
1,25(OH)2D3 SERPL-MCNC: 58 PG/ML (ref 24.8–81.5)
ALBUMIN SERPL-MCNC: 4.3 G/DL (ref 3.9–4.9)
ALP SERPL-CCNC: 57 IU/L (ref 44–121)
ALT SERPL-CCNC: 21 IU/L (ref 0–32)
ANA TITR SER IF: NEGATIVE {TITER}
AST SERPL-CCNC: 21 IU/L (ref 0–40)
B BURGDOR IGG+IGM SER QL IA: NEGATIVE
BASOPHILS # BLD AUTO: 0.1 X10E3/UL (ref 0–0.2)
BASOPHILS NFR BLD AUTO: 1 %
BILIRUB SERPL-MCNC: 0.3 MG/DL (ref 0–1.2)
BUN SERPL-MCNC: 9 MG/DL (ref 6–24)
BUN/CREAT SERPL: 10 (ref 9–23)
CALCIUM SERPL-MCNC: 9.2 MG/DL (ref 8.7–10.2)
CCP IGA+IGG SERPL IA-ACNC: 3 UNITS (ref 0–19)
CHLORIDE SERPL-SCNC: 104 MMOL/L (ref 96–106)
CHOLEST SERPL-MCNC: 279 MG/DL (ref 100–199)
CHOLEST/HDLC SERPL: 5.8 RATIO (ref 0–4.4)
CO2 SERPL-SCNC: 21 MMOL/L (ref 20–29)
CREAT SERPL-MCNC: 0.91 MG/DL (ref 0.57–1)
CRP SERPL-MCNC: <1 MG/L (ref 0–10)
EGFR: 80 ML/MIN/1.73
ENA SS-A AB SER-ACNC: <0.2 AI (ref 0–0.9)
ENA SS-B AB SER-ACNC: <0.2 AI (ref 0–0.9)
EOSINOPHIL # BLD AUTO: 0.2 X10E3/UL (ref 0–0.4)
EOSINOPHIL NFR BLD AUTO: 4 %
ERYTHROCYTE [DISTWIDTH] IN BLOOD BY AUTOMATED COUNT: 12 % (ref 11.7–15.4)
ERYTHROCYTE [SEDIMENTATION RATE] IN BLOOD BY WESTERGREN METHOD: 2 MM/HR (ref 0–32)
FERRITIN SERPL-MCNC: 16 NG/ML (ref 15–150)
FOLATE SERPL-MCNC: 16.4 NG/ML
GLOBULIN SER-MCNC: 2.1 G/DL (ref 1.5–4.5)
GLUCOSE SERPL-MCNC: 97 MG/DL (ref 70–99)
HCT VFR BLD AUTO: 37 % (ref 34–46.6)
HDLC SERPL-MCNC: 48 MG/DL
HGB BLD-MCNC: 12 G/DL (ref 11.1–15.9)
IMM GRANULOCYTES # BLD: 0 X10E3/UL (ref 0–0.1)
IMM GRANULOCYTES NFR BLD: 0 %
IRON SATN MFR SERPL: 16 % (ref 15–55)
IRON SERPL-MCNC: 59 UG/DL (ref 27–159)
LABORATORY COMMENT REPORT: NORMAL
LDL DIRECT COMMENT: ABNORMAL
LDLC SERPL CALC-MCNC: 165 MG/DL (ref 0–99)
LDLC SERPL DIRECT ASSAY-MCNC: 169 MG/DL (ref 0–99)
LYMPHOCYTES # BLD AUTO: 3 X10E3/UL (ref 0.7–3.1)
LYMPHOCYTES NFR BLD AUTO: 52 %
MAGNESIUM SERPL-MCNC: 2.1 MG/DL (ref 1.6–2.3)
MCH RBC QN AUTO: 28.4 PG (ref 26.6–33)
MCHC RBC AUTO-ENTMCNC: 32.4 G/DL (ref 31.5–35.7)
MCV RBC AUTO: 88 FL (ref 79–97)
MONOCYTES # BLD AUTO: 0.3 X10E3/UL (ref 0.1–0.9)
MONOCYTES NFR BLD AUTO: 5 %
NEUTROPHILS # BLD AUTO: 2.2 X10E3/UL (ref 1.4–7)
NEUTROPHILS NFR BLD AUTO: 38 %
PLATELET # BLD AUTO: 312 X10E3/UL (ref 150–450)
POTASSIUM SERPL-SCNC: 4.3 MMOL/L (ref 3.5–5.2)
PROT SERPL-MCNC: 6.4 G/DL (ref 6–8.5)
RBC # BLD AUTO: 4.23 X10E6/UL (ref 3.77–5.28)
RHEUMATOID FACT SERPL-ACNC: 11.4 IU/ML
SL AMB VLDL CHOLESTEROL CALC: 66 MG/DL (ref 5–40)
SODIUM SERPL-SCNC: 140 MMOL/L (ref 134–144)
THYROGLOB AB SERPL-ACNC: <1 IU/ML (ref 0–0.9)
THYROGLOB AB SERPL-ACNC: <1 IU/ML (ref 0–0.9)
THYROPEROXIDASE AB SERPL-ACNC: <9 IU/ML (ref 0–34)
THYROPEROXIDASE AB SERPL-ACNC: <9 IU/ML (ref 0–34)
TIBC SERPL-MCNC: 379 UG/DL (ref 250–450)
TRIGL SERPL-MCNC: 346 MG/DL (ref 0–149)
TSH SERPL DL<=0.005 MIU/L-ACNC: 2.34 UIU/ML (ref 0.45–4.5)
UIBC SERPL-MCNC: 320 UG/DL (ref 131–425)
URATE SERPL-MCNC: 4.4 MG/DL (ref 2.6–6.2)
VIT B12 SERPL-MCNC: 622 PG/ML (ref 232–1245)
WBC # BLD AUTO: 5.7 X10E3/UL (ref 3.4–10.8)

## 2025-01-10 ENCOUNTER — TELEPHONE (OUTPATIENT)
Dept: NEUROLOGY | Facility: CLINIC | Age: 45
End: 2025-01-10

## 2025-01-14 ENCOUNTER — OFFICE VISIT (OUTPATIENT)
Dept: NEUROLOGY | Facility: CLINIC | Age: 45
End: 2025-01-14
Payer: COMMERCIAL

## 2025-01-14 VITALS
TEMPERATURE: 96.5 F | OXYGEN SATURATION: 100 % | HEIGHT: 64 IN | HEART RATE: 118 BPM | DIASTOLIC BLOOD PRESSURE: 80 MMHG | SYSTOLIC BLOOD PRESSURE: 104 MMHG | BODY MASS INDEX: 26.53 KG/M2 | WEIGHT: 155.4 LBS

## 2025-01-14 DIAGNOSIS — G43.709 CHRONIC MIGRAINE WITHOUT AURA: Primary | ICD-10-CM

## 2025-01-14 DIAGNOSIS — M54.2 CHRONIC NECK PAIN: ICD-10-CM

## 2025-01-14 DIAGNOSIS — G43.009 MIGRAINE WITHOUT AURA AND WITHOUT STATUS MIGRAINOSUS, NOT INTRACTABLE: ICD-10-CM

## 2025-01-14 DIAGNOSIS — G43.909 MIGRAINE: ICD-10-CM

## 2025-01-14 DIAGNOSIS — G89.29 CHRONIC NECK PAIN: ICD-10-CM

## 2025-01-14 PROCEDURE — 96372 THER/PROPH/DIAG INJ SC/IM: CPT | Performed by: PSYCHIATRY & NEUROLOGY

## 2025-01-14 PROCEDURE — 99204 OFFICE O/P NEW MOD 45 MIN: CPT | Performed by: PSYCHIATRY & NEUROLOGY

## 2025-01-14 RX ORDER — PROCHLORPERAZINE EDISYLATE 5 MG/ML
10 INJECTION INTRAMUSCULAR; INTRAVENOUS ONCE
Status: COMPLETED | OUTPATIENT
Start: 2025-01-14 | End: 2025-01-14

## 2025-01-14 RX ORDER — ALPRAZOLAM 1 MG/1
1 TABLET ORAL
COMMUNITY

## 2025-01-14 RX ORDER — ERENUMAB-AOOE 140 MG/ML
140 INJECTION, SOLUTION SUBCUTANEOUS
Qty: 200 ML | Refills: 3 | Status: SHIPPED | OUTPATIENT
Start: 2025-01-14

## 2025-01-14 RX ORDER — KETOROLAC TROMETHAMINE 30 MG/ML
30 INJECTION, SOLUTION INTRAMUSCULAR; INTRAVENOUS ONCE
Status: COMPLETED | OUTPATIENT
Start: 2025-01-14 | End: 2025-01-14

## 2025-01-14 RX ADMIN — PROCHLORPERAZINE EDISYLATE 10 MG: 5 INJECTION INTRAMUSCULAR; INTRAVENOUS at 11:54

## 2025-01-14 RX ADMIN — KETOROLAC TROMETHAMINE 30 MG: 30 INJECTION, SOLUTION INTRAMUSCULAR; INTRAVENOUS at 11:53

## 2025-01-14 NOTE — ASSESSMENT & PLAN NOTE
"Current  treatment plan -   Preventative medications:    continue with botox injections which she is getting at [facility], she will get her next one there in 2 weeks and then switch over. She says emgality does not seem to be working, so will switch her to aimovig.   Failed medications in the past - several    Abortive medications:   continue with tylenol as needed     Imaging - not at this time     Botox/CGRPs medications: continue botox injections     Acute treatment in the office: give toradol and compazine IM in the office.     Lifestyle Modifications:  1.Maintain headache diary.  We discussed an CURT for a smart phone is \"Migraine buddy\"  2. When patient has a moderate to severe headache, they should seek rest, initiate relaxation and apply cold compresses to the head.   3. Hydration - Please drink at least 64 ounces of water a day to help remain hydrated.  4. Sleep -  Maintain regular sleep schedule. Adults need at least 7-8 hours of uninterrupted a night. Maintain good sleep hygiene:Adults need at least 7-8 hours of uninterrupted a night.   5. Diet - Patient is to have regular frequent meals to prevent headache onset. Avoid dietary trigger. (aged cheese, peanuts, MSG, aspartame and nitrates).  6. Medications - Limit over the counter medications such as Tylenol, Ibuprofen, Aleve, Excedrin. (No more than 3 times a week).  7. Exercise - Daily exercise is not only good for your heart but also good for your mental health. Recommend 4-5 times a week for 20 minutes.      Orders:    ketorolac (TORADOL) injection 30 mg    Erenumab-aooe SOAJ 140 mg    prochlorperazine (COMPAZINE) injection 10 mg    "

## 2025-01-14 NOTE — PROGRESS NOTES
"Name: An Eason      : 1980      MRN: [id number]  Encounter Provider: Hannah Diamond MD  Encounter Date: 2025   Encounter department: [facility] [place]  :  Assessment & Plan  Chronic migraine without aura  Current  treatment plan -   Preventative medications:    continue with botox injections which she is getting at [facility], she will get her next one there in 2 weeks and then switch over. She says emgality does not seem to be working, so will switch her to aimovig.   Failed medications in the past - several    Abortive medications:   continue with tylenol as needed     Imaging - not at this time     Botox/CGRPs medications: continue botox injections     Acute treatment in the office: give toradol and compazine IM in the office.     Lifestyle Modifications:  1.Maintain headache diary.  We discussed an CURT for a smart phone is \"Migraine buddy\"  2. When patient has a moderate to severe headache, they should seek rest, initiate relaxation and apply cold compresses to the head.   3. Hydration - Please drink at least 64 ounces of water a day to help remain hydrated.  4. Sleep -  Maintain regular sleep schedule. Adults need at least 7-8 hours of uninterrupted a night. Maintain good sleep hygiene:Adults need at least 7-8 hours of uninterrupted a night.   5. Diet - Patient is to have regular frequent meals to prevent headache onset. Avoid dietary trigger. (aged cheese, peanuts, MSG, aspartame and nitrates).  6. Medications - Limit over the counter medications such as Tylenol, Ibuprofen, Aleve, Excedrin. (No more than 3 times a week).  7. Exercise - Daily exercise is not only good for your heart but also good for your mental health. Recommend 4-5 times a week for 20 minutes.      Orders:    ketorolac (TORADOL) injection 30 mg    Erenumab-aooe SOAJ 140 mg    prochlorperazine (COMPAZINE) injection 10 mg    Chronic neck pain    Orders:    Ambulatory Referral to Comprehensive Spine PT; " Future    Migraine               History of Present Illness   HPI    44-year-old female who is here as a new patient for chronic migraines.  Patient states that she is having both vestibular and ocular migraine.  Started at 41.  She is having them almost every day.  She states that Botox did seem to help Emgality however does not notice a difference with her headaches.  A scale of 1-10 headaches are about 1 out of 10.  Sometimes they are 10 out of 10.  Description of pain is throbbing/pulsating/achy/tight band/dull/stabbing/pressure.  They are located bifrontally by occipitally.  Associated symptoms are headaches are nausea, insomnia, sore/stiff neck, constipation problems, decreased appetite, nasal congestion, flushing), ringing in ears, prefers quiet/dark room, lightheadedness, dizziness.  Tingling or numbness in hands and feet.  Sensitive to light sound.  Triggers for headache are stress, exercise, coughing, fatigue, weather changes related to sleep.  Started after concussion in 2021.  Rescue medications include Antivert Excedrin Migraine Dilaudid and Imitrex.  Zofran as well.  Preventative medications for headaches are Botox nortriptyline trigger point junctions Topamax Emgality and gabapentin.    Chiropractor treatments, PT, massage and acupuncture does make a difference, she just cannot afford these and currently is taking a break from them.    Review of Systems   Constitutional:  Positive for fatigue.   HENT:  Positive for ear pain (ringing).    Eyes:  Positive for photophobia (phonophobia), pain (preassure) and visual disturbance (stars).   Respiratory: Negative.     Cardiovascular: Negative.    Gastrointestinal:  Positive for nausea and vomiting.   Endocrine: Negative.    Genitourinary: Negative.    Musculoskeletal:  Positive for neck pain and neck stiffness.   Skin: Negative.    Allergic/Immunologic: Negative.    Neurological:  Positive for dizziness, syncope (hx of syncope 2022), light-headedness (DX;  vertigo), numbness (hands(5-10) after resolved hands become painful) and headaches (more occular migraines, 3-10/10, squeesing).   Hematological: Negative.    Psychiatric/Behavioral: Negative.     All other systems reviewed and are negative.   I have personally reviewed the MA's review of systems and made changes as necessary.         Objective   [facility] 11/29/2022     Physical Exam.  General - patient is alert   Speech - no dysarthria noted, no aphasia noted.     Neuro:   Cranial nerves: PERRL, EOMI, facial sensation intact to soft touch in V1, V2 and V3, no facial asymmetry noted, uvula/palate midline, tongue midline.   Motor: 5/5 throughout, normal tone, no pronator drift noted.   Sensory - intact to soft touch throughout  Reflexes - 2+ throughout  Coordination - no ataxia/dysmetria noted  Gait - normal      Neurological Exam

## 2025-01-20 ENCOUNTER — HOSPITAL ENCOUNTER (OUTPATIENT)
Dept: RADIOLOGY | Facility: HOSPITAL | Age: 45
Discharge: HOME | End: 2025-01-20
Attending: ORTHOPAEDIC SURGERY
Payer: COMMERCIAL

## 2025-01-20 ENCOUNTER — OFFICE VISIT (OUTPATIENT)
Dept: OBGYN CLINIC | Facility: HOSPITAL | Age: 45
End: 2025-01-20
Payer: COMMERCIAL

## 2025-01-20 ENCOUNTER — TELEPHONE (OUTPATIENT)
Age: 45
End: 2025-01-20

## 2025-01-20 VITALS — BODY MASS INDEX: 26.53 KG/M2 | HEIGHT: 64 IN | WEIGHT: 155.42 LBS

## 2025-01-20 DIAGNOSIS — M54.2 NECK PAIN: ICD-10-CM

## 2025-01-20 DIAGNOSIS — R52 PAIN: ICD-10-CM

## 2025-01-20 DIAGNOSIS — R52 PAIN: Primary | ICD-10-CM

## 2025-01-20 PROCEDURE — 72050 X-RAY EXAM NECK SPINE 4/5VWS: CPT

## 2025-01-20 PROCEDURE — 99204 OFFICE O/P NEW MOD 45 MIN: CPT | Performed by: ORTHOPAEDIC SURGERY

## 2025-01-20 RX ORDER — TOPIRAMATE 25 MG/1
TABLET, FILM COATED ORAL
COMMUNITY
Start: 2025-01-14

## 2025-01-20 RX ORDER — CARBOXYMETHYLCELLULOSE SODIUM 5 MG/ML
SOLUTION/ DROPS OPHTHALMIC
COMMUNITY
Start: 2025-01-03

## 2025-01-20 NOTE — PROGRESS NOTES
Assessment & Plan/Medical Decision Makin y.o. female with Neck Pain and imaging findings most notable for cervical spondylosis        The clinical, physical and imaging findings were reviewed with the patient.  An  has a constellation of findings consistent with cervical myofascial pain, cervical radiculopathy in the setting of cervical degenerative disease.      Fortunately patient remains neurologically intact and functional. Physical exam showing decreased cervical spine ROM.  We discussed the treatment options including physical therapy, at home exercises, activity modifications, chiropractic medicine, oral medications, interventional spine procedures.  At this time recommend continued conservative treatments.    Referral previously placed for physical therapy by Neurology - recommend to work on cervical ROM, strengthening, and stretching exercises.   Referral to pain management for evaluation and treatment. Discussed potential role of steroid injection at or near the source of pain to provide targeted relief.     Patient instructed to return to office/ER sooner if symptoms are not improving, getting worse, or new worrisome/neurologic symptoms arise.  Patient will follow up in approx. 3 months for re-evaluation after further conservative treatments.       Subjective:      Chief Complaint: Neck Pain    HPI:  An Eason is a 44 y.o. female presenting for initial visit with chief complaint of neck pain.  Pain began  where she states she had a concussion and since then had neck pain.   Describes pain as sharp in nature.  Located in neck.  Patient denies any radiation of pain but does states she does get numbness and tingling within her hands. Denies burning.  Neck pain 90%, Periscapular/Arm pain 10%.  Left > Right pain is worse with neck movements and improves with rest.  Denies fever or chills, no night sweats. Denies any bladder or bowel changes.      Conservative therapy includes the following:    Medications: denies    Injections: tried lidocaine injections in trapezius muscle in 2023, no cervical spine CSI.   Physical Therapy: plans to initiate spine PT from neurology  Chiropractic Medicine: has attempted but pays out of pocket so only 1-2/month  Accupunture/Massage Therapy: has not attempted   These therapeutic modalities were ineffective at providing sustained pain relief/functional improvement.     Nicotine dependent: denies  Occupation: unemployed  Living situation: Lives with family   ADLs: patient is able to perform     Objective:     Family History   Problem Relation Age of Onset    Depression Mother     Asthma Mother     Mental illness Mother     Mental illness Maternal Grandfather        Past Medical History:   Diagnosis Date    Anxiety     Asthma     Depression     GERD (gastroesophageal reflux disease) 2020    Hypoglycemia     Moderate episode of recurrent major depressive disorder (HCC) 10/03/2019    PTSD (post-traumatic stress disorder)        Current Outpatient Medications   Medication Sig Dispense Refill    albuterol (VENTOLIN HFA) 90 mcg/act inhaler Inhale 2 puffs every 6 (six) hours as needed for wheezing 18 g 0    ALPRAZolam (XANAX) 1 mg tablet Take 1 mg by mouth daily at bedtime as needed for anxiety      Azelastine-Fluticasone (Dymista) 137-50 MCG/ACT SUSP 2 sprays into each nostril 2 (two) times a day as needed (rhinitis) 23 g 5    Botox 200 units SOLR       budesonide-formoterol (Symbicort) 160-4.5 mcg/act inhaler Inhale 2 puffs 2 (two) times a day Rinse mouth after use. 10.2 g 5    cetirizine (ZyrTEC) 10 mg tablet Take 1 tablet (10 mg total) by mouth daily 30 tablet 5    doxepin (SINEquan) 10 mg capsule Take 10 mg by mouth      Erenumab-aooe (Aimovig) 140 MG/ML SOAJ Inject 140 mg under the skin every 30 (thirty) days 200 mL 3    escitalopram (LEXAPRO) 10 mg tablet Take 1 tablet by mouth every morning      fluticasone (FLONASE) 50 mcg/act nasal spray 1 spray into each nostril daily       Galcanezumab-gnlm (Emgality) 120 MG/ML SOAJ Inject 120 mg under the skin every 30 (thirty) days      montelukast (SINGULAIR) 10 mg tablet Take 1 tablet (10 mg total) by mouth daily at bedtime 30 tablet 2    pantoprazole (PROTONIX) 40 mg       Refresh Plus 0.5 % SOLN INSTILL 1 DROP INTO EACH EYE 4 TIMES DAILY      simethicone (MYLICON) 125 MG chewable tablet CHEW AND SWALLOW 1 TABLET EVERY 6 HOURS AS NEEDED FOR FLATULENCE      topiramate (TOPAMAX) 25 mg tablet TAKE 1 TAB BY MOUTH AT 8:00 AM AND 8:00 PM FOR 10 DAYS      clonazePAM (KlonoPIN) 0.5 mg tablet Take 1 tablet by mouth daily as needed (Patient not taking: Reported on 2025)      clonazePAM (KlonoPIN) 1 mg tablet  (Patient not taking: Reported on 2025)       No current facility-administered medications for this visit.       Past Surgical History:   Procedure Laterality Date    ABDOMINAL SURGERY      3 csections, gallbladder and umbilical hernia     ANKLE LIGAMENT RECONSTRUCTION       SECTION      CHOLECYSTECTOMY      CHOLECYSTECTOMY LAPAROSCOPIC N/A 2020    Procedure: CHOLECYSTECTOMY LAPAROSCOPIC;  Surgeon: Rachid Cline MD;  Location:  MAIN OR;  Service: General    KNEE SURGERY      SINUS SURGERY      TONSILLECTOMY      TONSILLECTOMY      TUBAL LIGATION      UMBILICAL HERNIA REPAIR N/A 2020    Procedure: REPAIR HERNIA UMBILICAL, Primary;  Surgeon: Rachid Cline MD;  Location:  MAIN OR;  Service: General    UPPER GASTROINTESTINAL ENDOSCOPY         Social History     Socioeconomic History    Marital status: Single     Spouse name: Not on file    Number of children: Not on file    Years of education: Not on file    Highest education level: Not on file   Occupational History    Not on file   Tobacco Use    Smoking status: Never    Smokeless tobacco: Never    Tobacco comments:     Never smoked   Vaping Use    Vaping status: Never Used   Substance and Sexual Activity    Alcohol use: Not Currently    Drug use:  Never    Sexual activity: Yes     Partners: Male     Birth control/protection: Surgical   Other Topics Concern    Not on file   Social History Narrative    Do you have pets? none Is pet allowed in bedroom?NA    Are you a smoker? Never    Does anyone smoke in your home? No       Do you live with smokers? No    [place] frequently? No   How many times a year? N/A      Social Drivers of Health     Financial Resource Strain: Low Risk  (10/15/2024)    Received from [facility]    Overall Financial Resource Strain (CARDIA)     Difficulty of Paying Living Expenses: Not hard at all   Food Insecurity: Food Insecurity Present (10/15/2024)    Received from [facility]    Hunger Vital Sign     Worried About Running Out of Food in the Last Year: Sometimes true     Ran Out of Food in the Last Year: Sometimes true   Transportation Needs: Unmet Transportation Needs (10/15/2024)    Received from [facility]    PRAPARE - Transportation     Lack of Transportation (Medical): Yes     Lack of Transportation (Non-Medical): Yes   Physical Activity: Insufficiently Active (11/4/2019)    Exercise Vital Sign     Days of Exercise per Week: 1 day     Minutes of Exercise per Session: 60 min   Stress: Stress Concern Present (7/24/2024)    Received from [facility]    [place] [employer] - Occupational Stress Questionnaire     Feeling of Stress : Rather much   Social Connections: Feeling Somewhat Isolated (7/24/2024)    Received from [facility]    OASIS : Social Isolation     How often do you feel lonely or isolated from those around you?: Sometimes   Intimate Partner Violence: Not At Risk (10/15/2024)    Received from [facility]    Humiliation, Afraid, Rape, and Kick questionnaire     Fear of Current or Ex-Partner: No     Emotionally Abused: No     Physically Abused: No     Sexually Abused: No   Housing Stability:  "Low Risk  (10/15/2024)    Received from [facility]    Housing Stability Vital Sign     Unable to Pay for Housing in the Last Year: No     Number of Times Moved in the Last Year: 1     Homeless in the Last Year: No       Allergies   Allergen Reactions    Azithromycin GI Intolerance     Other reaction(s): GI Intolerance    Trazodone Syncope     Stopped -thought it caused syncope    Cephalosporins Hives    Fluoxetine Other (See Comments)    Gabapentin Arthralgia    Sulfa Antibiotics Hives       Review of Systems  General- denies fever/chills  HEENT- denies hearing loss or sore throat  Eyes- denies eye pain or visual disturbances, denies red eyes  Respiratory- denies cough or SOB  Cardio- denies chest pain or palpitations  GI- denies abdominal pain  Endocrine- denies urinary frequency  Urinary- denies pain with urination  Musculoskeletal- Negative except noted above  Skin- denies rashes or wounds  Neurological- denies dizziness or headache  Psychiatric- denies anxiety or difficulty concentrating    Physical Exam  Ht 5' 4\" (1.626 m)   Wt 70.5 kg (155 lb 6.8 oz)   LMP 11/29/2022   BMI 26.68 kg/m²     General/Constitutional: No apparent distress: well-nourished and well developed.  Lymphatic: No appreciable lymphadenopathy  Respiratory: Non-labored breathing  Vascular: No edema, swelling or tenderness, except as noted in detailed exam.  Integumentary: No impressive skin lesions present, except as noted in detailed exam.  Psych: Normal mood and affect, oriented to person, place and time.  MSK: normal other than stated in HPI and exam  Gait & balance: no evidence of myelopathic gait, ambulates Independently     Cervical  spine range of motion:  -Forward flexion chin to chest  -Extension to 40  There is no point tenderness with palpation along the posterior cervical, thoracic, lumbar spine.     Neurologic:  Upper Extremity Motor Function    Right  Left    Deltoid  5/5  5/5    Bicep  5/5  5/5    Wrist " "extension  5/5  5/5    Tricep  5/5  5/5    Finger flexion/  5/5  5/5    Hand intrinsic  5/5  5/5        Reflexes: intact    Other tests:  Spurling's: positive  Frias's: negative  Clonus: negative  Inverted Radial: negative   Shoulder: painless active & passive ROM bilateral     Diagnostic Tests   IMAGING: I have personally reviewed the images and these are my findings:  Cervical Spine X-rays from 1/20/2025: multi level cervical spondylosis with loss of disc height, osteophyte formation and uncovertebral hypertrophy, no apparent spondylolisthesis, no appreciated lytic/blastic lesions, no obvious instability. Degenerative changes most noted at C5-C6.    Cervical Spine MRI from 5/18/2024: multi level cervical disc degeneration with disc desiccation, loss of disc height, most noted at C5-6 with bilateral mild/moderate foraminal stenosis, also with central disc protrusion at C4-5 with mild indentation of the ventral thecal sac, no cord signal abnormalities appreciated    Electronic Medical Records were reviewed MRI reports, neurology notes    Procedures, if performed today     None performed       Portions of the record may have been created with voice recognition software.  Occasional wrong word or \"sound a like\" substitutions may have occurred due to the inherent limitations of voice recognition software.  Read the chart carefully and recognize, using context, where substitutions have occurred.    "

## 2025-01-20 NOTE — TELEPHONE ENCOUNTER
Caller: Patient    Doctor: FRANCOISE    Reason for call: Patient saw Dr Baker today who referred her to [facility] for injections per his office visit note on 1/20/25.    Please assist.    Call back#: [phone]

## 2025-01-21 NOTE — TELEPHONE ENCOUNTER
Good morning! I believe this patient will need to be seen for a consult. I did review Dr. Baker's not as well and it stated:    Referral to pain management for evaluation and treatment. Discussed potential role of steroid injection at or near the source of pain to provide targeted relief.     Please call patient to schedule consult to be evaluated and to discuss potential role of steroid injection as stated above.    Thank you.

## 2025-01-23 ENCOUNTER — TELEPHONE (OUTPATIENT)
Dept: NEUROLOGY | Facility: CLINIC | Age: 45
End: 2025-01-23

## 2025-01-23 NOTE — TELEPHONE ENCOUNTER
Called pt to talk about setting up a BX appt with us, notify pt she must use the already delivered BX with [facility] and give us a call back once she has an appt with them to start the pre-auth process with , pt will call back thanks

## 2025-01-28 ENCOUNTER — TELEPHONE (OUTPATIENT)
Dept: FAMILY MEDICINE CLINIC | Facility: CLINIC | Age: 45
End: 2025-01-28

## 2025-01-28 ENCOUNTER — CONSULT (OUTPATIENT)
Dept: PAIN MEDICINE | Facility: CLINIC | Age: 45
End: 2025-01-28
Payer: COMMERCIAL

## 2025-01-28 VITALS — HEART RATE: 116 BPM | TEMPERATURE: 98.6 F | BODY MASS INDEX: 26.46 KG/M2 | WEIGHT: 155 LBS | HEIGHT: 64 IN

## 2025-01-28 DIAGNOSIS — G89.29 CHRONIC NECK PAIN: Primary | ICD-10-CM

## 2025-01-28 DIAGNOSIS — M54.12 CERVICAL RADICULOPATHY: ICD-10-CM

## 2025-01-28 DIAGNOSIS — M54.2 NECK PAIN: ICD-10-CM

## 2025-01-28 DIAGNOSIS — M54.2 CHRONIC NECK PAIN: Primary | ICD-10-CM

## 2025-01-28 PROCEDURE — 99244 OFF/OP CNSLTJ NEW/EST MOD 40: CPT | Performed by: ANESTHESIOLOGY

## 2025-01-28 NOTE — TELEPHONE ENCOUNTER
Left message, Dr. BROTHERS is out sick today. We need to reschedule appt.   Sorry for the inconvenience.

## 2025-01-28 NOTE — PROGRESS NOTES
Assessment  1. Chronic neck pain    2. Neck pain    3. Cervical radiculopathy        Plan  Patient is a 44-year-old female sent in as referral from orthopedics for evaluation of neck pain.  Patient reports chronic neck pain radiating down bilateral upper extremities since June 2021 after sustaining a fall.  Patient states that she has not gone to physical therapy and is due to go next week and has been taking Excedrin Tylenol and ibuprofen as needed but is not helping with her symptoms.  Patient had x-ray cervical spine done in January 2024 and interpreted by me showing to space narrowing at C5-C6.  Patient also had MRI of cervical spine done in May 2024 and interpreted by me showing disc protrusion at C4-C5 and this protrusion at C5-C6.  At this time, I suspect that patient's symptoms are multifactorial with components from cervical radiculopathy and cervical facet arthritis.    Given that clinical presentation of cervical radiculopathy matches MRI findings, I would like to proceed forward with performing C7-T1 BECKY. Risks vs benefits discussed in detail with the patient. These risks include, but are not limited to, bleeding, infection, nerve damage, paralysis. Patient is on/not on anticoagulant therapy. Patient denies contrast allergy. All patient’s questions were answered. Patient understands risks and is willing to pursue the procedure. The approach was demonstrated using models and literature was provided. Verbal consent obtained.  If symptoms do not improve with cervical epidural steroid injection, then we will consider performing cervical medial branch blocks with the intention of moving forward with radiofrequency ablation if appropriate response.    My impressions and treatment recommendations were discussed in detail with the patient who verbalized understanding and had no further questions.  Discharge instructions were provided. I personally saw and examined the patient and I agree with the above discussed  plan of care.    No orders of the defined types were placed in this encounter.    No orders of the defined types were placed in this encounter.    Referred By: Juan Miguel Baker MD  History of Present Illness    An Eason is a 44 y.o. female sent in as referral from orthopedics for evaluation of neck pain.  Patient reports chronic neck pain radiating to bilateral upper extremity since June 2021 where she sustained a fall.  Patient says that intensity of pain is moderate to severe, rated as 8 out of 10, states that the pain occurs nearly constantly, described as a sharp and shooting like sensation, with associated numbness.  Patient states that she has been taking ibuprofen, Tylenol, Excedrin with no relief in symptoms and is due to go to physical therapy next week.    I have personally reviewed and/or updated the patient's past medical history, past surgical history, family history, social history, current medications, allergies, and vital signs today.     Review of Systems   Constitutional:  Positive for chills and unexpected weight change. Negative for fever.   HENT:  Positive for sore throat. Negative for trouble swallowing.    Eyes:  Positive for visual disturbance.   Respiratory:  Positive for cough and shortness of breath. Negative for wheezing.    Cardiovascular:  Positive for chest pain and palpitations.   Gastrointestinal:  Positive for abdominal pain and nausea. Negative for constipation, diarrhea and vomiting.   Endocrine: Positive for polydipsia and polyuria. Negative for cold intolerance and heat intolerance.   Genitourinary:  Negative for difficulty urinating and frequency.   Musculoskeletal:  Positive for arthralgias and myalgias. Negative for gait problem and joint swelling.   Skin:  Negative for rash.   Neurological:  Positive for dizziness, numbness and headaches. Negative for seizures, syncope and weakness.   Hematological:  Does not bruise/bleed easily.   Psychiatric/Behavioral:  Positive for  decreased concentration and dysphoric mood.    All other systems reviewed and are negative.  General: no fevers, chills, infections  Neuro: no saddle anesthesia, no dropping objects or balance issues  GI: no changes in bowel habits  : no changes in bladder habits  Hem: no bleeding      Patient Active Problem List   Diagnosis    Moderate episode of recurrent major depressive disorder (HCC)    Bronchitis    Asthma    Common bile duct dilatation    Depression    Right upper quadrant abdominal pain    Constipation    Gastroesophageal reflux disease with esophagitis    Calculus of gallbladder without cholecystitis without obstruction    Syncope    Abnormal TSH    Elevated d-dimer    Uterine mass    Right lower quadrant abdominal pain    Migraine    Seasonal allergies    Migraine without aura and without status migrainosus, not intractable    Irritable bowel syndrome with both constipation and diarrhea    Chronic fatigue    Low iron    Polymyalgia (HCC)    History of cholecystectomy    History of hysterectomy    Chronic migraine without aura    Chronic neck pain       Past Medical History:   Diagnosis Date    Anxiety     Asthma     Depression     GERD (gastroesophageal reflux disease)     Hypoglycemia     Moderate episode of recurrent major depressive disorder (HCC) 10/03/2019    PTSD (post-traumatic stress disorder)        Past Surgical History:   Procedure Laterality Date    ABDOMINAL SURGERY      3 csections, gallbladder and umbilical hernia     ANKLE LIGAMENT RECONSTRUCTION       SECTION      CHOLECYSTECTOMY      CHOLECYSTECTOMY LAPAROSCOPIC N/A 2020    Procedure: CHOLECYSTECTOMY LAPAROSCOPIC;  Surgeon: Rachid Cline MD;  Location:  MAIN OR;  Service: General    KNEE SURGERY      SINUS SURGERY      TONSILLECTOMY      TONSILLECTOMY      TUBAL LIGATION      UMBILICAL HERNIA REPAIR N/A 2020    Procedure: REPAIR HERNIA UMBILICAL, Primary;  Surgeon: Rachid Cline MD;  Location:  [facility] OR;  Service: General    UPPER GASTROINTESTINAL ENDOSCOPY  2020       Family History   Problem Relation Age of Onset    Depression Mother     Asthma Mother     Mental illness Mother     Mental illness Maternal Grandfather        Social History     Occupational History    Not on file   Tobacco Use    Smoking status: Never    Smokeless tobacco: Never    Tobacco comments:     Never smoked   Vaping Use    Vaping status: Never Used   Substance and Sexual Activity    Alcohol use: Not Currently    Drug use: Never    Sexual activity: Yes     Partners: Male     Birth control/protection: Surgical       Current Outpatient Medications on File Prior to Visit   Medication Sig    albuterol (VENTOLIN HFA) 90 mcg/act inhaler Inhale 2 puffs every 6 (six) hours as needed for wheezing    ALPRAZolam (XANAX) 1 mg tablet Take 1 mg by mouth daily at bedtime as needed for anxiety    Botox 200 units SOLR     budesonide-formoterol (Symbicort) 160-4.5 mcg/act inhaler Inhale 2 puffs 2 (two) times a day Rinse mouth after use.    cetirizine (ZyrTEC) 10 mg tablet Take 1 tablet (10 mg total) by mouth daily    doxepin (SINEquan) 10 mg capsule Take 10 mg by mouth    escitalopram (LEXAPRO) 10 mg tablet Take 1 tablet by mouth every morning    fluticasone (FLONASE) 50 mcg/act nasal spray 1 spray into each nostril daily    Galcanezumab-gnlm (Emgality) 120 MG/ML SOAJ Inject 120 mg under the skin every 30 (thirty) days    montelukast (SINGULAIR) 10 mg tablet Take 1 tablet (10 mg total) by mouth daily at bedtime    pantoprazole (PROTONIX) 40 mg     Refresh Plus 0.5 % SOLN INSTILL 1 DROP INTO EACH EYE 4 TIMES DAILY    simethicone (MYLICON) 125 MG chewable tablet CHEW AND SWALLOW 1 TABLET EVERY 6 HOURS AS NEEDED FOR FLATULENCE    topiramate (TOPAMAX) 25 mg tablet TAKE 1 TAB BY MOUTH AT 8:00 AM AND 8:00 PM FOR 10 DAYS    Azelastine-Fluticasone (Dymista) 137-50 MCG/ACT SUSP 2 sprays into each nostril 2 (two) times a day as needed (rhinitis)     "clonazePAM (KlonoPIN) 0.5 mg tablet Take 1 tablet by mouth daily as needed (Patient not taking: Reported on 1/14/2025)    clonazePAM (KlonoPIN) 1 mg tablet  (Patient not taking: Reported on 1/14/2025)    Erenumab-aooe (Aimovig) 140 MG/ML SOAJ Inject 140 mg under the skin every 30 (thirty) days    [DISCONTINUED] gabapentin (NEURONTIN) 300 mg capsule Take 1 capsule (300 mg total) by mouth 2 (two) times a day (Patient not taking: Reported on 3/18/2021)     No current facility-administered medications on file prior to visit.       Allergies   Allergen Reactions    Azithromycin GI Intolerance     Other reaction(s): GI Intolerance    Trazodone Syncope     Stopped -thought it caused syncope    Cephalosporins Hives    Fluoxetine Other (See Comments)    Gabapentin Arthralgia    Sulfa Antibiotics Hives       Physical Exam    Pulse (!) 116   Temp 98.6 °F (37 °C)   Ht 5' 4\" (1.626 m)   Wt 70.3 kg (155 lb)   LMP 11/29/2022   BMI 26.61 kg/m²     Vitals:    01/28/25 0938   Pulse: (!) 116   Temp: 98.6 °F (37 °C)     Constitutional: no apparent distress, does not appear sedated   HEENT: pupils equal and round, symmetric facial muscles   Neck: supple  Cardiovascular: well perfused, no peripheral cyanosis  Pulmonary: good chest wall excursion, breathing unlabored   Psych: appropriate affect and insight, No agitation. No evidence of aberrant behavior   Skin: No rashes or lesions  Neuro: cranial nerves II-XII grossly intact   MSK:  Inspection: no signs of infection to neck  Palpation: tender to palpation to cervical spine  ROM: Decreased extension and rotation of neck to left and right of cervical spine   MMT 5/5 strength in B/L UE  Sensation to light touch intact B/L UE  DTR: 2+ in B/L biceps, triceps reflex  Special test: + Spurling's, + Cervical facet loading maneuvars  Gait: ambulates unassisted, gait is not antalgic        Imaging  CERVICAL SPINE @  1-20-25     INDICATION:   Pain, unspecified.      COMPARISON: None.    "   VIEWS:  XR SPINE CERVICAL COMPLETE 4 OR 5 VW NON INJURY      FINDINGS:     No displaced fracture.      Straightening of the normally expected cervical lordosis. Slight retrolisthesis of C5 on C6. No gross instability with flexion or extension.     Mild degenerative disc space narrowing at C5-C6.     The prevertebral soft tissues are within normal limits.       The lung apices are clear.     IMPRESSION:        Straightening of the normally expected cervical lordosis. Slight retrolisthesis of C5 on C6. No gross instability with flexion or extension.     Mild degenerative disc space narrowing at C5-C6.    [facility]  Impression:   Moderate central disc protrusion eccentric to the right at C4-C5 moderately   impinging the ventral cord.     At C5-C6, there is a small broad-based protrusion eccentric to the right.   Prominent bilateral uncovertebral arthropathy contributing to at least moderate   bilateral neural foraminal stenoses with suspected impingement of both C6 nerve   roots. Mild ventral cord flattening/impingement.     No cord edema or myelomalacia appreciated.       I have personally reviewed pertinent films in PACS.

## 2025-01-29 ENCOUNTER — OFFICE VISIT (OUTPATIENT)
Dept: URGENT CARE | Facility: CLINIC | Age: 45
End: 2025-01-29
Payer: COMMERCIAL

## 2025-01-29 VITALS
HEART RATE: 114 BPM | SYSTOLIC BLOOD PRESSURE: 104 MMHG | RESPIRATION RATE: 16 BRPM | OXYGEN SATURATION: 99 % | WEIGHT: 157 LBS | TEMPERATURE: 97.4 F | BODY MASS INDEX: 26.95 KG/M2 | DIASTOLIC BLOOD PRESSURE: 88 MMHG

## 2025-01-29 DIAGNOSIS — Z20.822 ENCOUNTER FOR LABORATORY TESTING FOR COVID-19 VIRUS: ICD-10-CM

## 2025-01-29 DIAGNOSIS — J40 BRONCHITIS: Primary | ICD-10-CM

## 2025-01-29 LAB
SARS-COV-2 AG UPPER RESP QL IA: NEGATIVE
VALID CONTROL: NORMAL

## 2025-01-29 PROCEDURE — 99213 OFFICE O/P EST LOW 20 MIN: CPT | Performed by: PHYSICIAN ASSISTANT

## 2025-01-29 PROCEDURE — 87811 SARS-COV-2 COVID19 W/OPTIC: CPT | Performed by: PHYSICIAN ASSISTANT

## 2025-01-29 RX ORDER — MECLIZINE HYDROCHLORIDE 25 MG/1
TABLET ORAL
COMMUNITY
Start: 2025-01-29

## 2025-01-29 RX ORDER — PREDNISONE 10 MG/1
TABLET ORAL
Qty: 20 TABLET | Refills: 0 | Status: SHIPPED | OUTPATIENT
Start: 2025-01-29

## 2025-01-29 RX ORDER — DOXYCYCLINE 100 MG/1
100 TABLET ORAL 2 TIMES DAILY
Qty: 20 TABLET | Refills: 0 | Status: SHIPPED | OUTPATIENT
Start: 2025-01-29 | End: 2025-02-08

## 2025-01-29 RX ORDER — BENZONATATE 100 MG/1
100 CAPSULE ORAL 3 TIMES DAILY PRN
Qty: 20 CAPSULE | Refills: 0 | Status: SHIPPED | OUTPATIENT
Start: 2025-01-29

## 2025-01-29 NOTE — PROGRESS NOTES
[facility]        NAME: An Eason is a 44 y.o. female  : 1980    MRN: [id number]  DATE: 2025  TIME: 5:29 PM    /88   Pulse (!) 114   Temp (!) 97.4 °F (36.3 °C)   Resp 16   Wt 71.2 kg (157 lb)   LMP 2022   SpO2 99%   BMI 26.95 kg/m²     Assessment and Plan   Bronchitis [J40]  1. Bronchitis  Poct Covid 19 Rapid Antigen Test    doxycycline (ADOXA) 100 MG tablet    benzonatate (TESSALON PERLES) 100 mg capsule    predniSONE 10 mg tablet      2. Encounter for laboratory testing for COVID-19 virus              Patient Instructions       Follow up with PCP in 3-5 days.  Proceed to  ER if symptoms worsen.    Chief Complaint     Chief Complaint   Patient presents with    Cough     1.5 week b/l ear pain, sinus pressure, throat feels numb, chest congestion, cough with phlegm, SOB on exertion, chills.  Using inhaler  Hx asthma, pna, covid, bronchitis         History of Present Illness       Pt with 1 1/2 weeks slight productive cough and congestion     Cough        Review of Systems   Review of Systems   Constitutional: Negative.    HENT:  Positive for congestion.    Eyes: Negative.    Respiratory:  Positive for cough.    Cardiovascular: Negative.    Gastrointestinal: Negative.    Endocrine: Negative.    Genitourinary: Negative.    Musculoskeletal: Negative.    Skin: Negative.    Allergic/Immunologic: Negative.    Neurological: Negative.    Hematological: Negative.    Psychiatric/Behavioral: Negative.     All other systems reviewed and are negative.        Current Medications       Current Outpatient Medications:     albuterol (VENTOLIN HFA) 90 mcg/act inhaler, Inhale 2 puffs every 6 (six) hours as needed for wheezing, Disp: 18 g, Rfl: 0    benzonatate (TESSALON PERLES) 100 mg capsule, Take 1 capsule (100 mg total) by mouth 3 (three) times a day as needed for cough, Disp: 20 capsule, Rfl: 0    budesonide-formoterol (Symbicort) 160-4.5 mcg/act inhaler, Inhale 2 puffs 2 (two) times  a day Rinse mouth after use., Disp: 10.2 g, Rfl: 5    doxycycline (ADOXA) 100 MG tablet, Take 1 tablet (100 mg total) by mouth 2 (two) times a day for 10 days, Disp: 20 tablet, Rfl: 0    meclizine (ANTIVERT) 25 mg tablet, , Disp: , Rfl:     predniSONE 10 mg tablet, 4 tabs po qd x 2 days then 3 tabs po qd x 2 days then 2 tabs po qd x 2 days then 1 tab po qd x 2 days, Disp: 20 tablet, Rfl: 0    ALPRAZolam (XANAX) 1 mg tablet, Take 1 mg by mouth daily at bedtime as needed for anxiety, Disp: , Rfl:     Azelastine-Fluticasone (Dymista) 137-50 MCG/ACT SUSP, 2 sprays into each nostril 2 (two) times a day as needed (rhinitis), Disp: 23 g, Rfl: 5    Botox 200 units SOLR, , Disp: , Rfl:     cetirizine (ZyrTEC) 10 mg tablet, Take 1 tablet (10 mg total) by mouth daily, Disp: 30 tablet, Rfl: 5    clonazePAM (KlonoPIN) 0.5 mg tablet, Take 1 tablet by mouth daily as needed (Patient not taking: Reported on 1/14/2025), Disp: , Rfl:     clonazePAM (KlonoPIN) 1 mg tablet, , Disp: , Rfl:     doxepin (SINEquan) 10 mg capsule, Take 10 mg by mouth, Disp: , Rfl:     Erenumab-aooe (Aimovig) 140 MG/ML SOAJ, Inject 140 mg under the skin every 30 (thirty) days, Disp: 200 mL, Rfl: 3    escitalopram (LEXAPRO) 10 mg tablet, Take 1 tablet by mouth every morning, Disp: , Rfl:     fluticasone (FLONASE) 50 mcg/act nasal spray, 1 spray into each nostril daily, Disp: , Rfl:     Galcanezumab-gnlm (Emgality) 120 MG/ML SOAJ, Inject 120 mg under the skin every 30 (thirty) days, Disp: , Rfl:     montelukast (SINGULAIR) 10 mg tablet, Take 1 tablet (10 mg total) by mouth daily at bedtime, Disp: 30 tablet, Rfl: 2    pantoprazole (PROTONIX) 40 mg, , Disp: , Rfl:     Refresh Plus 0.5 % SOLN, INSTILL 1 DROP INTO EACH EYE 4 TIMES DAILY, Disp: , Rfl:     simethicone (MYLICON) 125 MG chewable tablet, CHEW AND SWALLOW 1 TABLET EVERY 6 HOURS AS NEEDED FOR FLATULENCE, Disp: , Rfl:     topiramate (TOPAMAX) 25 mg tablet, TAKE 1 TAB BY MOUTH AT 8:00 AM AND 8:00 PM FOR 10  DAYS, Disp: , Rfl:     Current Allergies     Allergies as of 2025 - Reviewed 2025   Allergen Reaction Noted    Azithromycin GI Intolerance 2019    Trazodone Syncope 2021    Cephalosporins Hives 2019    Fluoxetine Other (See Comments) 2023    Gabapentin Arthralgia 2024    Sulfa antibiotics Hives 2019            The following portions of the patient's history were reviewed and updated as appropriate: allergies, current medications, past family history, past medical history, past social history, past surgical history and problem list.     Past Medical History:   Diagnosis Date    Anxiety     Asthma     Depression     GERD (gastroesophageal reflux disease)     Hypoglycemia     Moderate episode of recurrent major depressive disorder (HCC) 10/03/2019    PTSD (post-traumatic stress disorder)        Past Surgical History:   Procedure Laterality Date    ABDOMINAL SURGERY      3 csections, gallbladder and umbilical hernia     ANKLE LIGAMENT RECONSTRUCTION       SECTION      CHOLECYSTECTOMY      CHOLECYSTECTOMY LAPAROSCOPIC N/A 2020    Procedure: CHOLECYSTECTOMY LAPAROSCOPIC;  Surgeon: Rachid Cline MD;  Location:  MAIN OR;  Service: General    KNEE SURGERY      SINUS SURGERY      TONSILLECTOMY      TONSILLECTOMY      TUBAL LIGATION      UMBILICAL HERNIA REPAIR N/A 2020    Procedure: REPAIR HERNIA UMBILICAL, Primary;  Surgeon: Rachid Cline MD;  Location: [facility]IN OR;  Service: General    UPPER GASTROINTESTINAL ENDOSCOPY         Family History   Problem Relation Age of Onset    Depression Mother     Asthma Mother     Mental illness Mother     Mental illness Maternal Grandfather          Medications have been verified.        Objective   /88   Pulse (!) 114   Temp (!) 97.4 °F (36.3 °C)   Resp 16   Wt 71.2 kg (157 lb)   LMP 2022   SpO2 99%   BMI 26.95 kg/m²        Physical Exam     Physical Exam  Vitals and nursing  note reviewed.   Constitutional:       Appearance: Normal appearance. She is normal weight.   HENT:      Head: Normocephalic and atraumatic.      Right Ear: Tympanic membrane, ear canal and external ear normal.      Left Ear: Tympanic membrane, ear canal and external ear normal.      Nose: Congestion and rhinorrhea present.      Mouth/Throat:      Mouth: Mucous membranes are moist.      Pharynx: Oropharynx is clear.   Eyes:      Extraocular Movements: Extraocular movements intact.      Conjunctiva/sclera: Conjunctivae normal.      Pupils: Pupils are equal, round, and reactive to light.   Cardiovascular:      Rate and Rhythm: Normal rate and regular rhythm.      Pulses: Normal pulses.      Heart sounds: Normal heart sounds.   Pulmonary:      Effort: Pulmonary effort is normal.      Comments: Minor coarse sounds cleared with cough   Abdominal:      General: Bowel sounds are normal.      Palpations: Abdomen is soft.   Musculoskeletal:         General: Normal range of motion.      Cervical back: Normal range of motion and neck supple.   Skin:     General: Skin is warm.      Capillary Refill: Capillary refill takes less than 2 seconds.   Neurological:      Mental Status: She is alert and oriented to person, place, and time.

## 2025-02-03 ENCOUNTER — EVALUATION (OUTPATIENT)
Dept: PHYSICAL THERAPY | Facility: CLINIC | Age: 45
End: 2025-02-03
Payer: COMMERCIAL

## 2025-02-03 DIAGNOSIS — M54.2 CHRONIC NECK PAIN: ICD-10-CM

## 2025-02-03 DIAGNOSIS — G89.29 CHRONIC NECK PAIN: ICD-10-CM

## 2025-02-03 DIAGNOSIS — M54.2 CERVICALGIA: Primary | ICD-10-CM

## 2025-02-03 PROCEDURE — 97110 THERAPEUTIC EXERCISES: CPT | Performed by: PHYSICAL THERAPIST

## 2025-02-03 PROCEDURE — 97162 PT EVAL MOD COMPLEX 30 MIN: CPT | Performed by: PHYSICAL THERAPIST

## 2025-02-03 NOTE — PROGRESS NOTES
PT Evaluation     Today's date: 2/3/2025  Patient name: An Eason  : 1980  MRN: [id number]  Referring provider: Hannah Diamond MD  Dx:   Encounter Diagnosis     ICD-10-CM    1. Cervicalgia  M54.2                  Assessment  Impairments: abnormal or restricted ROM, activity intolerance, impaired physical strength, lacks appropriate home exercise program, pain with function, poor posture  and poor body mechanics  Functional limitations: return to ballet, prolonged sit/read, head turns, dizziness, look up at ceiling    Assessment details: An Eason is a 44 y.o. female who presents with pain, decreased strength, decreased ROM, and postural dysfunction. Due to these impairments, patient has difficulty performing ADL's, recreational activities, ambulation, lifting/carrying, reaching. Patient's clinical presentation is consistent with their referring diagnosis of Cervicalgia  (primary encounter diagnosis), cervical radiculopathy, chronic neck pain. Patient has been educated in home exercise program and plan of care. Patient would benefit from skilled physical therapy services to address their aforementioned functional limitations and progress towards prior level of function and independence with home exercise program.        Prognosis: fair  Prognosis details: + factors: high motivation levels  - factors: PTSD, anxiety, depression, chronic pain    Goals  Short Term Goals to be accomplished by 3/3/25:  STG1: Pt will be I with HEP and I with posture management  STG2: Pt will deny symptom radiation beyond shoulder at <50% intensity.   STG3: Pt will demonstrate pain ROM restriction at min/mod restriction at worst with all motions.      Long Term Goals to be accomplished by 25:   LTG1: Pt will be able to perform repetitive lifting plates into overhead cabinets without pain or feeling of weakness.  LTG2: Pt will note no UE symptoms during prolonged sitting or with overhead lifting.  LTG3: Pt will deny sleep  disturbance due to pain.         Plan  Patient would benefit from: PT eval and skilled physical therapy  Planned modality interventions: cryotherapy, thermotherapy: hydrocollator packs and unattended electrical stimulation    Planned therapy interventions: manual therapy, neuromuscular re-education, self care, therapeutic activities, therapeutic exercise, home exercise program, balance, joint mobilization, postural training, patient education, strengthening, stretching and flexibility    Frequency: 2x week  Duration in weeks: 12  Plan of Care beginning date: 2/3/2025  Plan of Care expiration date: 4/28/2025  Treatment plan discussed with: patient  Plan details: HEP development, stretching, strengthening, A/AA/PROM, joint mobilizations, posture education, STM/MI as needed to reduce muscle tension, muscle reeducation, PLOC discussed and agreed upon with patient.            Subjective Evaluation    History of Present Illness  Mechanism of injury: Pt is a 43 y/o female who presents to PT through comp spine program with chronic neck pain radiating into b/l upper extremities since June 2021 following a fall with a concussion. Reports that she occasionally gets dizzy.   She stated that she was initially treated in a different hospital system seeing several specialists, however she felt as if she was not having a ton of progress so she switched to [facility]. She was seen at [facility] on 1/14/25 where she was given medication and prescribed comp spine PT.   1/20/25 x-ray revealed Straightening of the normally expected cervical lordosis. Slight retrolisthesis of C5 on C6. No gross instability with flexion or extension.  Mild degenerative disc space narrowing at C5-C6.   1/20/25 x-ray reviewed with ortho, where she was referred to pain management.   1/28/25 went to pain management where injection was scheduled.   Pain management 2/20/25 injection scheduled.   Currently still receiving botox injections at [facility] for  migraines.     She states that pain is worse at night and morning. Notes that yesterday over did it with work around home. Reports numbness/tingling into hands intermittent, unpredictable onset.    Patient states that she has been taking ibuprofen, Tylenol, Excedrin with no relief in symptoms and is due to go to physical therapy next week.    Reports intermittent dizziness an vertigo over the past few years since the accident    Imaging: MRI performed on  Moderate central disc protrusion eccentric to the right at C4-C5 moderately   impinging the ventral cord.     At C5-C6, there is a small broad-based protrusion eccentric to the right.   Prominent bilateral uncovertebral arthropathy contributing to at least moderate   bilateral neural foraminal stenoses with suspected impingement of both C6 nerve   roots. Mild ventral cord flattening/impingement.     No cord edema or myelomalacia appreciated.     Medication: botox for migraines,  Injection scheduled with pain management    Pain better with: Tylenol, Iburprofen, distraction, massage from fiance  Pain worse with: disturbed sleep, overhead lifting, push/pull, dance/ballerina    Denies loss of  strength/dropping things    Medical History:  Seizere as a result of medication withdraw  Hysterectomy Oct 15, 2024  3 c-sections  2 L knee and 1 R knee surgery  L ankle surgery  Denies diabetes, HA, stroke  Vertigo intermittent     Work: not working, disability hearing  Recreation: dance, ballet    Returned to [employer] classes at 1x a week for 45 min since the holidays. Adult beginner class. Simple proper pliates and out turns balance exercises. Stated that the classes do not make the symptoms worse.   Patient Goals  Patient goals for therapy: increased motion, decreased pain, increased strength and independence with ADLs/IADLs  Patient goal: return to ballet, prolonged sit/read, head turns, dizziness, look up at ceiling  Pain  Current pain ratin  At best pain rating:  3  At worst pain ratin  Location: cervical, b/l UE  Quality: sharp  Relieving factors: relaxation, rest and change in position  Aggravating factors: overhead activity, lifting, sitting, standing and walking    Treatments  Current treatment: medication and physical therapy        Objective     Concurrent Complaints  Positive for night pain, disturbed sleep, dizziness and headaches. Negative for faints, nausea/motion sickness, tinnitus, trouble swallowing, difficulty breathing, shortness of breath, respiratory pain and visual change    Postural Observations    Additional Postural Observation Details  Forward head and shoulders     Tenderness     Additional Tenderness Details  TTP over cervical paraspinals, levators, scalenes, UT's     Active Range of Motion   Cervical/Thoracic Spine       Cervical    Subcranial retraction:   Restriction level: moderate  Flexion:  Restriction level: moderate  Extension:  with pain Restriction level: maximal  Left lateral flexion:  Restriction level: moderate  Right lateral flexion:  with pain Restriction level moderate  Left rotation:  with pain Restriction level: moderate  Right rotation:  Restriction level: moderate    Thoracic    Extension:  Restriction level: moderate    Joint Play     Comments: Unable to assess mobility due to pain    Strength/Myotome Testing   Cervical Spine     Left   Interossei strength (t1): 5  Levator scapulae (C4): 5    Right   Interossei strength (t1): 5  Levator scapulae (C4): 5    Left Shoulder     Planes of Motion   Abduction: 5     Isolated Muscles   Levator scapulae: 5     Right Shoulder     Planes of Motion   Abduction: 5     Isolated Muscles   Levator scapulae: 5     Left Elbow   Flexion: 5  Extension: 5    Right Elbow   Flexion: 5  Extension: 5    Left Wrist/Hand   Wrist extension: 5  Wrist flexion: 5  Thumb extension: 5    Right Wrist/Hand   Wrist extension: 5  Wrist flexion: 5  Thumb extension: 5    Tests   Cervical     Left   Positive  cervical flexion-rotation test .   Negative Spurling's Test A.     Right   Positive cervical flexion-rotation test.   Negative Spurling's Test A.     Left Shoulder   Negative ULTT1.     Right Shoulder   Negative ULTT1.     Additional Tests Details  Spurlings (-) for b/l UE symptoms, however was + for pain b/l     Reports of dizziness, numbness following cervical flexion-rotation test. Symptoms resolved with laying supine briefly.   Neuro Exam:     Headaches   Patient reports headaches: Yes.        Precautions:   Past Medical History:   Diagnosis Date    Anxiety     Asthma     Depression     GERD (gastroesophageal reflux disease) 2020    Hypoglycemia     Moderate episode of recurrent major depressive disorder (HCC) 10/03/2019    PTSD (post-traumatic stress disorder)          Manuals 2/3        distraction         rina CHUA                           Neuro Re-Ed 2/3        Scap retract                                                                Ther Ex 2/3        Supine chin tucks 10x                                                     HEP Educated on posture positioning, HEP, and activity modification for pain control                 Ther Activity 2/3

## 2025-02-10 ENCOUNTER — TELEPHONE (OUTPATIENT)
Age: 45
End: 2025-02-10

## 2025-02-10 ENCOUNTER — APPOINTMENT (OUTPATIENT)
Dept: RADIOLOGY | Facility: CLINIC | Age: 45
End: 2025-02-10
Payer: COMMERCIAL

## 2025-02-10 ENCOUNTER — OFFICE VISIT (OUTPATIENT)
Dept: PHYSICAL THERAPY | Facility: CLINIC | Age: 45
End: 2025-02-10
Payer: COMMERCIAL

## 2025-02-10 ENCOUNTER — OFFICE VISIT (OUTPATIENT)
Dept: URGENT CARE | Facility: CLINIC | Age: 45
End: 2025-02-10
Payer: COMMERCIAL

## 2025-02-10 VITALS
BODY MASS INDEX: 26.46 KG/M2 | HEIGHT: 64 IN | SYSTOLIC BLOOD PRESSURE: 110 MMHG | DIASTOLIC BLOOD PRESSURE: 64 MMHG | WEIGHT: 155 LBS | HEART RATE: 125 BPM | TEMPERATURE: 100.1 F | RESPIRATION RATE: 16 BRPM | OXYGEN SATURATION: 98 %

## 2025-02-10 DIAGNOSIS — G89.29 CHRONIC NECK PAIN: ICD-10-CM

## 2025-02-10 DIAGNOSIS — R05.1 ACUTE COUGH: Primary | ICD-10-CM

## 2025-02-10 DIAGNOSIS — M54.2 CERVICALGIA: Primary | ICD-10-CM

## 2025-02-10 DIAGNOSIS — J06.9 ACUTE URI: ICD-10-CM

## 2025-02-10 DIAGNOSIS — R05.1 ACUTE COUGH: ICD-10-CM

## 2025-02-10 DIAGNOSIS — M54.2 CHRONIC NECK PAIN: ICD-10-CM

## 2025-02-10 PROCEDURE — 97110 THERAPEUTIC EXERCISES: CPT | Performed by: PHYSICAL THERAPIST

## 2025-02-10 PROCEDURE — 71046 X-RAY EXAM CHEST 2 VIEWS: CPT

## 2025-02-10 PROCEDURE — 99213 OFFICE O/P EST LOW 20 MIN: CPT | Performed by: PHYSICIAN ASSISTANT

## 2025-02-10 PROCEDURE — 87636 SARSCOV2 & INF A&B AMP PRB: CPT | Performed by: PHYSICIAN ASSISTANT

## 2025-02-10 PROCEDURE — 97112 NEUROMUSCULAR REEDUCATION: CPT | Performed by: PHYSICAL THERAPIST

## 2025-02-10 RX ORDER — DOXYCYCLINE 100 MG/1
100 TABLET ORAL 2 TIMES DAILY
Qty: 14 TABLET | Refills: 0 | Status: SHIPPED | OUTPATIENT
Start: 2025-02-10 | End: 2025-02-17

## 2025-02-10 RX ORDER — METHYLPREDNISOLONE 4 MG/1
TABLET ORAL
Qty: 1 EACH | Refills: 0 | Status: SHIPPED | OUTPATIENT
Start: 2025-02-10

## 2025-02-10 RX ORDER — ALBUTEROL SULFATE 0.83 MG/ML
2.5 SOLUTION RESPIRATORY (INHALATION) EVERY 6 HOURS PRN
Qty: 60 ML | Refills: 0 | Status: SHIPPED | OUTPATIENT
Start: 2025-02-10

## 2025-02-10 NOTE — PROGRESS NOTES
"Daily Note     Today's date: 2/10/2025  Patient name: An Eason  : 1980  MRN: [id number]  Referring provider: Hannah Diamond MD  Dx:   Encounter Diagnosis     ICD-10-CM    1. Cervicalgia  M54.2       2. Chronic neck pain  M54.2     G89.29              Subjective: Pt reported that it was a painful weekend because of personal history reasons. 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 Stated that \"I have been doing the chin tucks more than you told me to, because they feel good.\" Noted that there has been less crepitus when performing them. Notes persisting weakness with overhead lifting plates into overhead cabinets.     Objective: See treatment diary below    Assessment: Tolerated treatment well. Due to reports of UE weakness with overhead reaching and lifting, initiated scapular strengthening , which she performed well without pain. Required v/c for scapular positioning for resistance exercises. Noted improved flexibility and decreased pain with UT stretch. Patient would benefit from continued PT    Plan: Continue per plan of care.      Precautions:   Past Medical History:   Diagnosis Date    Anxiety     Asthma     Depression     GERD (gastroesophageal reflux disease) 2020    Hypoglycemia     Moderate episode of recurrent major depressive disorder (HCC) 10/03/2019    PTSD (post-traumatic stress disorder)          Manuals 2/3 2/10        distraction          rina CHUA                              Neuro Re-Ed 2/3 2/10        Scap retract           Scap retract row   Green 5x (easy)  Black 5 sec 2x10         Scap retract sh extension   3 sec x10 Black (L felt weaker than R - some soreness L cervical noted)  3 sec x 10 Red (denied pain)        Scap retract - tricep ext at side  3 sec 2x10 Red                                       Ther Ex 2/3 2/10        Supine chin tucks 10x         Seated chin tucks  5 sec x 10         UT stretch - no overpressure   " 5 sec x 10 ea                                      HEP Educated on posture positioning, HEP, and activity modification for pain control 15' updated HEP and educated on activity modification                  Ther Activity 2/3 2/10

## 2025-02-10 NOTE — PATIENT INSTRUCTIONS
COVID and Flu sent out    Patient was educated any chest pain, shortness of breath or worsening of symptoms go to ED.    Eat on antibiotics. Do not take OTC anti-inflammatories while on steroids.    Educated on nebulizer treatments

## 2025-02-10 NOTE — TELEPHONE ENCOUNTER
Caller: Patient    Doctor/Office: Dr Davis    Call regarding :  Patient calling to inform us she started her second round of ABX today as well as a medrol dose pack and has an upcoming procedure scheduled for 2/20/25     Call was transferred to: procedure

## 2025-02-10 NOTE — PROGRESS NOTES
[facility]        NAME: An Eason is a 44 y.o. female  : 1980    MRN: [id number]  DATE: February 10, 2025  TIME: 10:51 AM    Assessment and Plan   Acute cough [R05.1]  1. Acute cough  Covid/Flu- Office Collect Normal    Covid/Flu- Office Collect Normal    XR chest pa and lateral    doxycycline (ADOXA) 100 MG tablet    albuterol (2.5 mg/3 mL) 0.083 % nebulizer solution    methylPREDNISolone 4 MG tablet therapy pack      2. Acute URI  doxycycline (ADOXA) 100 MG tablet    albuterol (2.5 mg/3 mL) 0.083 % nebulizer solution    methylPREDNISolone 4 MG tablet therapy pack        COVID and Flu sent out  Chest- Xray- No acute pneumonia pending radiology report.     Patient Instructions   COVID and Flu sent out    Patient was educated any chest pain, shortness of breath or worsening of symptoms go to ED.    Eat on antibiotics. Do not take OTC anti-inflammatories while on steroids.    Educated on nebulizer treatments    Follow up with PCP in 3-5 days.  Proceed to  ER if symptoms worsen.    If tests have been performed at [facility], our office will contact you with results if changes need to be made to the care plan discussed with you at the visit.  You can review your full results on [facility].    Chief Complaint     Chief Complaint   Patient presents with    Cold Like Symptoms     Pt was seen in office and treated for uri on 2025 and prescribed Prednisone, Tessalon Perls, and Doxycycline. C/o non-productive cough continuing without symptom improvement.          History of Present Illness       Patient is a 44-year-old female who presents today to the urgent care complaining of dry cough, congestion, fatigue and not feeling well.  Patient reports symptoms started on 2025.  Patient reports on 2025 she was seen at the urgent care and prescribed doxycycline, Tessalon Perles and prednisone with no relief.  Patient reports he thinks the cough is getting worse.  Admits headache however  reports history of chronic migraines.  Admits allergies to azithromycin, trazodone, cephalosporins, fluoxetine, gabapentin, and sulfa.        Review of Systems   Review of Systems   Constitutional:  Positive for fatigue.   HENT:  Positive for congestion and sore throat.    Respiratory:  Positive for cough and chest tightness.    Cardiovascular: Negative.    Neurological:  Positive for headaches.   Psychiatric/Behavioral: Negative.           Current Medications       Current Outpatient Medications:     albuterol (2.5 mg/3 mL) 0.083 % nebulizer solution, Take 3 mL (2.5 mg total) by nebulization every 6 (six) hours as needed for wheezing or shortness of breath, Disp: 60 mL, Rfl: 0    albuterol (VENTOLIN HFA) 90 mcg/act inhaler, Inhale 2 puffs every 6 (six) hours as needed for wheezing, Disp: 18 g, Rfl: 0    ALPRAZolam (XANAX) 1 mg tablet, Take 1 mg by mouth daily at bedtime as needed for anxiety, Disp: , Rfl:     Botox 200 units SOLR, , Disp: , Rfl:     budesonide-formoterol (Symbicort) 160-4.5 mcg/act inhaler, Inhale 2 puffs 2 (two) times a day Rinse mouth after use., Disp: 10.2 g, Rfl: 5    cetirizine (ZyrTEC) 10 mg tablet, Take 1 tablet (10 mg total) by mouth daily, Disp: 30 tablet, Rfl: 5    doxepin (SINEquan) 10 mg capsule, Take 10 mg by mouth, Disp: , Rfl:     doxycycline (ADOXA) 100 MG tablet, Take 1 tablet (100 mg total) by mouth 2 (two) times a day for 7 days, Disp: 14 tablet, Rfl: 0    escitalopram (LEXAPRO) 10 mg tablet, Take 1 tablet by mouth every morning, Disp: , Rfl:     fluticasone (FLONASE) 50 mcg/act nasal spray, 1 spray into each nostril daily, Disp: , Rfl:     Galcanezumab-gnlm (Emgality) 120 MG/ML SOAJ, Inject 120 mg under the skin every 30 (thirty) days, Disp: , Rfl:     meclizine (ANTIVERT) 25 mg tablet, , Disp: , Rfl:     methylPREDNISolone 4 MG tablet therapy pack, Use as directed on package, Disp: 1 each, Rfl: 0    montelukast (SINGULAIR) 10 mg tablet, Take 1 tablet (10 mg total) by mouth  daily at bedtime, Disp: 30 tablet, Rfl: 2    pantoprazole (PROTONIX) 40 mg, , Disp: , Rfl:     Refresh Plus 0.5 % SOLN, INSTILL 1 DROP INTO EACH EYE 4 TIMES DAILY, Disp: , Rfl:     simethicone (MYLICON) 125 MG chewable tablet, CHEW AND SWALLOW 1 TABLET EVERY 6 HOURS AS NEEDED FOR FLATULENCE, Disp: , Rfl:     topiramate (TOPAMAX) 25 mg tablet, TAKE 1 TAB BY MOUTH AT 8:00 AM AND 8:00 PM FOR 10 DAYS, Disp: , Rfl:     Azelastine-Fluticasone (Dymista) 137-50 MCG/ACT SUSP, 2 sprays into each nostril 2 (two) times a day as needed (rhinitis), Disp: 23 g, Rfl: 5    benzonatate (TESSALON PERLES) 100 mg capsule, Take 1 capsule (100 mg total) by mouth 3 (three) times a day as needed for cough (Patient not taking: Reported on 2/10/2025), Disp: 20 capsule, Rfl: 0    clonazePAM (KlonoPIN) 0.5 mg tablet, Take 1 tablet by mouth daily as needed (Patient not taking: Reported on 1/14/2025), Disp: , Rfl:     clonazePAM (KlonoPIN) 1 mg tablet, , Disp: , Rfl:     Erenumab-aooe (Aimovig) 140 MG/ML SOAJ, Inject 140 mg under the skin every 30 (thirty) days, Disp: 200 mL, Rfl: 3    predniSONE 10 mg tablet, 4 tabs po qd x 2 days then 3 tabs po qd x 2 days then 2 tabs po qd x 2 days then 1 tab po qd x 2 days (Patient not taking: Reported on 2/10/2025), Disp: 20 tablet, Rfl: 0    Current Allergies     Allergies as of 02/10/2025 - Reviewed 02/10/2025   Allergen Reaction Noted    Azithromycin GI Intolerance 08/31/2019    Trazodone Syncope 06/17/2021    Cephalosporins Hives 04/18/2019    Fluoxetine Other (See Comments) 01/12/2023    Gabapentin Arthralgia 12/19/2024    Sulfa antibiotics Hives 04/18/2019            The following portions of the patient's history were reviewed and updated as appropriate: allergies, current medications, past family history, past medical history, past social history, past surgical history and problem list.     Past Medical History:   Diagnosis Date    Anxiety     Asthma     Depression     GERD (gastroesophageal reflux  "disease) 2020    Hypoglycemia     Moderate episode of recurrent major depressive disorder (HCC) 10/03/2019    PTSD (post-traumatic stress disorder)        Past Surgical History:   Procedure Laterality Date    ABDOMINAL SURGERY      3 csections, gallbladder and umbilical hernia     ANKLE LIGAMENT RECONSTRUCTION       SECTION      CHOLECYSTECTOMY      CHOLECYSTECTOMY LAPAROSCOPIC N/A 2020    Procedure: CHOLECYSTECTOMY LAPAROSCOPIC;  Surgeon: Rachid Cline MD;  Location:  MAIN OR;  Service: General    KNEE SURGERY      SINUS SURGERY      TONSILLECTOMY      TONSILLECTOMY      TUBAL LIGATION      UMBILICAL HERNIA REPAIR N/A 2020    Procedure: REPAIR HERNIA UMBILICAL, Primary;  Surgeon: Rachid Cline MD;  Location:  MAIN OR;  Service: General    UPPER GASTROINTESTINAL ENDOSCOPY         Family History   Problem Relation Age of Onset    Depression Mother     Asthma Mother     Mental illness Mother     Mental illness Maternal Grandfather          Medications have been verified.        Objective   /64 (BP Location: Right arm, Patient Position: Sitting, Cuff Size: Standard)   Pulse (!) 125   Temp 100.1 °F (37.8 °C) (Tympanic)   Resp 16   Ht 5' 4\" (1.626 m)   Wt 70.3 kg (155 lb)   LMP 2022   SpO2 98%   BMI 26.61 kg/m²   Patient's last menstrual period was 2022.       Physical Exam     Physical Exam  Vitals and nursing note reviewed.   Constitutional:       Appearance: Normal appearance.   HENT:      Head: Normocephalic.      Ears:      Comments: Bulging TMs with no signs of infection     Nose: Congestion present.      Mouth/Throat:      Mouth: Mucous membranes are moist.      Comments: Postnasal drip  Cardiovascular:      Rate and Rhythm: Normal rate and regular rhythm.      Heart sounds: Normal heart sounds.   Pulmonary:      Breath sounds: Normal breath sounds.   Neurological:      General: No focal deficit present.      Mental Status: She is alert and " oriented to person, place, and time.   Psychiatric:         Mood and Affect: Mood normal.         Behavior: Behavior normal.

## 2025-02-11 LAB
FLUAV RNA RESP QL NAA+PROBE: NEGATIVE
FLUBV RNA RESP QL NAA+PROBE: NEGATIVE
SARS-COV-2 RNA RESP QL NAA+PROBE: NEGATIVE

## 2025-02-17 ENCOUNTER — APPOINTMENT (OUTPATIENT)
Dept: PHYSICAL THERAPY | Facility: CLINIC | Age: 45
End: 2025-02-17
Payer: COMMERCIAL

## 2025-02-18 ENCOUNTER — OFFICE VISIT (OUTPATIENT)
Dept: FAMILY MEDICINE CLINIC | Facility: CLINIC | Age: 45
End: 2025-02-18
Payer: COMMERCIAL

## 2025-02-18 VITALS
DIASTOLIC BLOOD PRESSURE: 84 MMHG | TEMPERATURE: 98.3 F | WEIGHT: 159.2 LBS | RESPIRATION RATE: 16 BRPM | HEART RATE: 95 BPM | BODY MASS INDEX: 27.18 KG/M2 | SYSTOLIC BLOOD PRESSURE: 118 MMHG | HEIGHT: 64 IN | OXYGEN SATURATION: 99 %

## 2025-02-18 DIAGNOSIS — E78.2 MIXED HYPERLIPIDEMIA: Primary | ICD-10-CM

## 2025-02-18 DIAGNOSIS — R63.5 ABNORMAL WEIGHT GAIN: ICD-10-CM

## 2025-02-18 DIAGNOSIS — Z82.49 FAMILY HISTORY OF PREMATURE CAD: ICD-10-CM

## 2025-02-18 DIAGNOSIS — R55 SYNCOPE, UNSPECIFIED SYNCOPE TYPE: ICD-10-CM

## 2025-02-18 DIAGNOSIS — G43.009 MIGRAINE WITHOUT AURA AND WITHOUT STATUS MIGRAINOSUS, NOT INTRACTABLE: ICD-10-CM

## 2025-02-18 DIAGNOSIS — J45.40 MODERATE PERSISTENT ASTHMA, UNSPECIFIED WHETHER COMPLICATED: ICD-10-CM

## 2025-02-18 PROCEDURE — 99214 OFFICE O/P EST MOD 30 MIN: CPT | Performed by: FAMILY MEDICINE

## 2025-02-18 NOTE — ASSESSMENT & PLAN NOTE
Reviewed lipid panel from 12/2024  Patient does have strong family history  Discussed consideration of CT coronary scan  She would like to not start medication yet at this point   Will continue to monitor closely

## 2025-02-18 NOTE — ASSESSMENT & PLAN NOTE
An is wishing to consider weight loss medication  She reports exercise is challenging with her ongoing health issues  Will refer for pharmacy consultation

## 2025-02-18 NOTE — ASSESSMENT & PLAN NOTE
History of syncope.  Still with ongoing dizziness  Following with neurology  Will check echo and carotids

## 2025-02-18 NOTE — ASSESSMENT & PLAN NOTE
On inhalers and is following with allergy/immunology/asthma specialist   Lungs were clear on exam today  Recently seen at urgent care, notes reviewed, completed course of abx and steroids

## 2025-02-18 NOTE — PROGRESS NOTES
An Sites 1980 female MRN: [id number]    Family Medicine Follow-up Visit    ASSESSMENT/PLAN  Problem List Items Addressed This Visit          Cardiovascular and Mediastinum    Migraine without aura and without status migrainosus, not intractable    Following with neurology   Note reviewed in epic from last visit 1/14/2025            Respiratory    Asthma    On inhalers and is following with allergy/immunology/asthma specialist   Lungs were clear on exam today  Recently seen at urgent care, notes reviewed, completed course of abx and steroids            Neurology/Sleep    Syncope    History of syncope.  Still with ongoing dizziness  Following with neurology  Will check echo and carotids          Relevant Orders    Echo complete w/ contrast if indicated    VAS carotid complete study       Other    Mixed hyperlipidemia - Primary    Reviewed lipid panel from 12/2024  Patient does have strong family history  Discussed consideration of CT coronary scan  She would like to not start medication yet at this point   Will continue to monitor closely           Relevant Orders    CT coronary calcium score    Lipid panel    Abnormal weight gain    An is wishing to consider weight loss medication  She reports exercise is challenging with her ongoing health issues  Will refer for pharmacy consultation          Relevant Orders    Ambulatory referral to clinical pharmacy     Other Visit Diagnoses         Family history of premature CAD        Relevant Orders    CT coronary calcium score      BMI 27.0-27.9,adult        Relevant Orders    Ambulatory referral to clinical pharmacy            Follow up in 3 months for CP            Future Appointments   Date Time Provider Department Center   2/18/2025  4:45 PM Shravan Glynn MA [facility]  SPA   2/19/2025  9:30 AM Lex Carter, Pharmacist [facility]   2/19/2025  2:15 PM Adarsh Kim, PT [facility]   2/26/2025  8:00 AM Adarsh Kim, PT [facility]  [place] [facility]   2/27/2025  8:30 AM Gunner Lewis, DO SPA UP ALLER  SPA   3/3/2025 10:00 AM [facility] S&P 1 [facility]   3/31/2025  9:00 AM ANT Quezada [facility]   4/21/2025 10:15 AM Juan Miguel Baker MD ORTHO DAVID [facility]   5/9/2025 11:45 AM Hannah Diamond MD NEURO ALL [facility]   5/20/2025  8:40 AM DO SUSIE DahlN FP [facility]   5/29/2025  8:00 AM Hannah Diamond MD NEURO ALL [facility]          SUBJECTIVE  CC: Follow-up (Review labs and follow up from urgent care )      HPI:  An Eason is a 44 y.o. female who presents for follow up  Was in urgent care 2 times- was on steroids and abx from urgent care  Reports she is still coughing  Reports she will be starting allergy shots soon    HPI    Review of Systems   Constitutional:  Positive for fatigue. Negative for chills and fever.   HENT:  Negative for congestion, postnasal drip, rhinorrhea and sinus pressure.    Eyes:  Negative for photophobia and visual disturbance.   Respiratory:  Negative for cough and shortness of breath.    Cardiovascular:  Negative for chest pain, palpitations and leg swelling.   Gastrointestinal:  Negative for abdominal pain, constipation, diarrhea, nausea and vomiting.   Genitourinary:  Negative for difficulty urinating and dysuria.   Musculoskeletal:  Negative for arthralgias and myalgias.   Skin:  Negative for color change and rash.   Neurological:  Positive for dizziness. Negative for weakness, light-headedness and headaches.       Historical Information   The patient history was reviewed as follows:    Past Medical History:   Diagnosis Date    Anxiety     Asthma     Depression     GERD (gastroesophageal reflux disease) 2020    Hypoglycemia     Moderate episode of recurrent major depressive disorder (HCC) 10/03/2019    PTSD (post-traumatic stress disorder)      Past Surgical History:   Procedure Laterality Date    ABDOMINAL SURGERY      3 csections, gallbladder and  umbilical hernia     ANKLE LIGAMENT RECONSTRUCTION       SECTION      CHOLECYSTECTOMY      CHOLECYSTECTOMY LAPAROSCOPIC N/A 2020    Procedure: CHOLECYSTECTOMY LAPAROSCOPIC;  Surgeon: Rachid Cline MD;  Location: [facility];  Service: General    KNEE SURGERY      SINUS SURGERY      TONSILLECTOMY      TONSILLECTOMY      TUBAL LIGATION      UMBILICAL HERNIA REPAIR N/A 2020    Procedure: REPAIR HERNIA UMBILICAL, Primary;  Surgeon: Rachid Cline MD;  Location: [facility];  Service: General    UPPER GASTROINTESTINAL ENDOSCOPY       Family History   Problem Relation Age of Onset    Depression Mother     Asthma Mother     Mental illness Mother     Mental illness Maternal Grandfather       Social History   Social History     Substance and Sexual Activity   Alcohol Use Not Currently     Social History     Substance and Sexual Activity   Drug Use Never     Social History     Tobacco Use   Smoking Status Never   Smokeless Tobacco Never   Tobacco Comments    Never smoked       Medications:     Current Outpatient Medications:     albuterol (2.5 mg/3 mL) 0.083 % nebulizer solution, Take 3 mL (2.5 mg total) by nebulization every 6 (six) hours as needed for wheezing or shortness of breath, Disp: 60 mL, Rfl: 0    albuterol (VENTOLIN HFA) 90 mcg/act inhaler, Inhale 2 puffs every 6 (six) hours as needed for wheezing, Disp: 18 g, Rfl: 0    ALPRAZolam (XANAX) 1 mg tablet, Take 1 mg by mouth daily at bedtime as needed for anxiety, Disp: , Rfl:     Botox 200 units SOLR, , Disp: , Rfl:     budesonide-formoterol (Symbicort) 160-4.5 mcg/act inhaler, Inhale 2 puffs 2 (two) times a day Rinse mouth after use., Disp: 10.2 g, Rfl: 5    cetirizine (ZyrTEC) 10 mg tablet, Take 1 tablet (10 mg total) by mouth daily, Disp: 30 tablet, Rfl: 5    doxepin (SINEquan) 10 mg capsule, Take 10 mg by mouth, Disp: , Rfl:     Erenumab-aooe (Aimovig) 140 MG/ML SOAJ, Inject 140 mg under the skin every 30 (thirty) days, Disp:  "200 mL, Rfl: 3    escitalopram (LEXAPRO) 10 mg tablet, Take 1 tablet by mouth every morning, Disp: , Rfl:     fluticasone (FLONASE) 50 mcg/act nasal spray, 1 spray into each nostril daily, Disp: , Rfl:     meclizine (ANTIVERT) 25 mg tablet, , Disp: , Rfl:     methylPREDNISolone 4 MG tablet therapy pack, Use as directed on package, Disp: 1 each, Rfl: 0    montelukast (SINGULAIR) 10 mg tablet, Take 1 tablet (10 mg total) by mouth daily at bedtime, Disp: 30 tablet, Rfl: 2    pantoprazole (PROTONIX) 40 mg, , Disp: , Rfl:     Refresh Plus 0.5 % SOLN, INSTILL 1 DROP INTO EACH EYE 4 TIMES DAILY, Disp: , Rfl:     simethicone (MYLICON) 125 MG chewable tablet, CHEW AND SWALLOW 1 TABLET EVERY 6 HOURS AS NEEDED FOR FLATULENCE, Disp: , Rfl:   Allergies   Allergen Reactions    Azithromycin GI Intolerance     Other reaction(s): GI Intolerance    Trazodone Syncope     Stopped -thought it caused syncope    Cephalosporins Hives    Fluoxetine Other (See Comments)    Gabapentin Arthralgia    Sulfa Antibiotics Hives       OBJECTIVE    Vitals:   Vitals:    02/18/25 1318 02/18/25 1349   BP: 118/84    BP Location: Left arm    Patient Position: Sitting    Cuff Size: Standard    Pulse: (!) 131 95   Resp: 16    Temp: 98.3 °F (36.8 °C)    TempSrc: Tympanic    SpO2: 99%    Weight: 72.2 kg (159 lb 3.2 oz)    Height: 5' 4\" (1.626 m)            Physical Exam  Constitutional:       Appearance: She is well-developed.   HENT:      Head: Normocephalic and atraumatic.   Cardiovascular:      Rate and Rhythm: Normal rate and regular rhythm.      Heart sounds: Normal heart sounds.   Pulmonary:      Effort: Pulmonary effort is normal. No respiratory distress.      Breath sounds: Normal breath sounds. No wheezing.   Musculoskeletal:         General: No tenderness. Normal range of motion.      Cervical back: Normal range of motion and neck supple.   Skin:     General: Skin is warm and dry.   Neurological:      Mental Status: She is alert and oriented to " person, place, and time.   Psychiatric:         Behavior: Behavior normal.            Labs:        Alexa Morgan DO    2/18/2025

## 2025-02-19 ENCOUNTER — TELEMEDICINE (OUTPATIENT)
Dept: FAMILY MEDICINE CLINIC | Facility: CLINIC | Age: 45
End: 2025-02-19

## 2025-02-19 ENCOUNTER — OFFICE VISIT (OUTPATIENT)
Dept: PHYSICAL THERAPY | Facility: CLINIC | Age: 45
End: 2025-02-19
Payer: COMMERCIAL

## 2025-02-19 ENCOUNTER — TELEPHONE (OUTPATIENT)
Dept: FAMILY MEDICINE CLINIC | Facility: CLINIC | Age: 45
End: 2025-02-19

## 2025-02-19 DIAGNOSIS — G89.29 CHRONIC NECK PAIN: ICD-10-CM

## 2025-02-19 DIAGNOSIS — E66.3 OVERWEIGHT WITH BODY MASS INDEX (BMI) OF 27 TO 27.9 IN ADULT: Primary | ICD-10-CM

## 2025-02-19 DIAGNOSIS — R63.5 ABNORMAL WEIGHT GAIN: ICD-10-CM

## 2025-02-19 DIAGNOSIS — Z79.899 MEDICATION MANAGEMENT: ICD-10-CM

## 2025-02-19 DIAGNOSIS — M54.2 CERVICALGIA: Primary | ICD-10-CM

## 2025-02-19 DIAGNOSIS — M54.2 CHRONIC NECK PAIN: ICD-10-CM

## 2025-02-19 PROCEDURE — 97112 NEUROMUSCULAR REEDUCATION: CPT | Performed by: PHYSICAL THERAPIST

## 2025-02-19 PROCEDURE — 99999 PR OFFICE/OUTPT VISIT,PROCEDURE ONLY: CPT | Performed by: PHARMACIST

## 2025-02-19 PROCEDURE — 97110 THERAPEUTIC EXERCISES: CPT | Performed by: PHYSICAL THERAPIST

## 2025-02-19 RX ORDER — SEMAGLUTIDE 0.5 MG/.5ML
INJECTION, SOLUTION SUBCUTANEOUS
Qty: 2 ML | Refills: 0 | Status: SHIPPED | OUTPATIENT
Start: 2025-03-05

## 2025-02-19 RX ORDER — SEMAGLUTIDE 0.25 MG/.5ML
INJECTION, SOLUTION SUBCUTANEOUS
Qty: 2 ML | Refills: 0 | Status: SHIPPED | OUTPATIENT
Start: 2025-02-19

## 2025-02-19 NOTE — PROGRESS NOTES
"Daily Note     Today's date: 2025  Patient name: An Eason  : 1980  MRN: [id number]  Referring provider: Hannah Diamond MD  Dx:   Encounter Diagnosis     ICD-10-CM    1. Cervicalgia  M54.2       2. Chronic neck pain  M54.2     G89.29              Subjective: Pt reported that she has been limited in her ability to perform her HEP because she has had a migraine. She stated that her cervical epidural injection has been post poned till 3/3/25 because she recently took steriods due to upper respiratory infection (was scheduled for tomorrow).    Objective: See treatment diary below    Assessment: Tolerated treatment well. Advanced postural strengthening and stretching well without provocation of pain. Noted improved flexibility with stretches. Demonstrated good posture positioning with band resistance exercises. Patient exhibited good technique with therapeutic exercises and would benefit from continued PT    Plan: Continue per plan of care.      Precautions:   Past Medical History:   Diagnosis Date    Anxiety     Asthma     Depression     GERD (gastroesophageal reflux disease)     Hypoglycemia     Moderate episode of recurrent major depressive disorder (HCC) 10/03/2019    PTSD (post-traumatic stress disorder)          Manuals 2/3 2/10 2/19       distraction          STM SO, scalenes                              Neuro Re-Ed 2/3 2/10 2/19       Scap retract           Scap retract row   Green 5x (easy)  Black 5 sec 2x10  Black 3\" 2x10        Scap retract sh extension   3 sec x10 Black (L felt weaker than R - some soreness L cervical noted)  3 sec x 10 Red (denied pain) Black 3\" 2x10        Scap retract - tricep ext at side  3 sec 2x10 Red  Red 2x10        Scap retract bicep curl   Green 2x10                            Ther Ex 2/3 2/10 2/19       Supine chin tucks 10x         Seated chin tucks  5\" x 10  5\" x10        UT stretch - no overpressure   5\" x 10 ea 5\" x10 ea       Levator stretch   5\" x10 ea  " "     Seated thoracic extension - hands   3\" 2x10       SCM stretch   NV       HEP Educated on posture positioning, HEP, and activity modification for pain control 15' updated HEP and educated on activity modification Reviewed HEP and educated her on performing exercise in pain free ROM 15'                  Ther Activity 2/3 2/10 2/19                                                                                              "

## 2025-02-20 ENCOUNTER — HOSPITAL ENCOUNTER (OUTPATIENT)
Dept: NON INVASIVE DIAGNOSTICS | Age: 45
Discharge: HOME | End: 2025-02-20
Payer: COMMERCIAL

## 2025-02-20 VITALS
WEIGHT: 159 LBS | BODY MASS INDEX: 27.14 KG/M2 | DIASTOLIC BLOOD PRESSURE: 84 MMHG | SYSTOLIC BLOOD PRESSURE: 118 MMHG | HEIGHT: 64 IN | HEART RATE: 100 BPM

## 2025-02-20 DIAGNOSIS — R55 SYNCOPE, UNSPECIFIED SYNCOPE TYPE: ICD-10-CM

## 2025-02-20 LAB
AORTIC ROOT: 3.3 CM
ASCENDING AORTA: 3.3 CM
BSA FOR ECHO PROCEDURE: 1.77 M2
E WAVE DECELERATION TIME: 103 MS
E/A RATIO: 0.76
FRACTIONAL SHORTENING: 26 (ref 28–44)
INTERVENTRICULAR SEPTUM IN DIASTOLE (PARASTERNAL SHORT AXIS VIEW): 0.8 CM
INTERVENTRICULAR SEPTUM: 0.8 CM (ref 0.6–1.1)
LAAS-AP2: 11.9 CM2
LAAS-AP4: 14.8 CM2
LEFT ATRIUM SIZE: 2.7 CM
LEFT ATRIUM VOLUME (MOD BIPLANE): 35 ML
LEFT ATRIUM VOLUME INDEX (MOD BIPLANE): 19.7 ML/M2
LEFT INTERNAL DIMENSION IN SYSTOLE: 3.1 CM (ref 2.1–4)
LEFT VENTRICLE DIASTOLIC VOLUME (MOD BIPLANE): 83 ML
LEFT VENTRICLE DIASTOLIC VOLUME INDEX (MOD BIPLANE): 46.9 ML/M2
LEFT VENTRICLE SYSTOLIC VOLUME (MOD BIPLANE): 34 ML
LEFT VENTRICLE SYSTOLIC VOLUME INDEX (MOD BIPLANE): 19.2 ML/M2
LEFT VENTRICULAR INTERNAL DIMENSION IN DIASTOLE: 4.2 CM (ref 3.5–6)
LEFT VENTRICULAR POSTERIOR WALL IN END DIASTOLE: 0.9 CM
LEFT VENTRICULAR STROKE VOLUME: 40 ML
LV EF BIPLANE MOD: 59 %
LV EF US.2D.A4C+ESTIMATED: 63 %
LVSV (TEICH): 40 ML
MV E'TISSUE VEL-LAT: 10 CM/S
MV E'TISSUE VEL-SEP: 8 CM/S
MV PEAK A VEL: 0.82 M/S
MV PEAK E VEL: 62 CM/S
MV STENOSIS PRESSURE HALF TIME: 30 MS
MV VALVE AREA P 1/2 METHOD: 7.33
RIGHT ATRIUM AREA SYSTOLE A4C: 11.1 CM2
RIGHT VENTRICLE ID DIMENSION: 3.5 CM
SL CV LEFT ATRIUM LENGTH A2C: 3.6 CM
SL CV LV EF: 55
SL CV PED ECHO LEFT VENTRICLE DIASTOLIC VOLUME (MOD BIPLANE) 2D: 79 ML
SL CV PED ECHO LEFT VENTRICLE SYSTOLIC VOLUME (MOD BIPLANE) 2D: 39 ML
TRICUSPID ANNULAR PLANE SYSTOLIC EXCURSION: 1.7 CM

## 2025-02-20 PROCEDURE — 93306 TTE W/DOPPLER COMPLETE: CPT | Performed by: INTERNAL MEDICINE

## 2025-02-20 PROCEDURE — 93306 TTE W/DOPPLER COMPLETE: CPT

## 2025-02-21 ENCOUNTER — RESULTS FOLLOW-UP (OUTPATIENT)
Dept: FAMILY MEDICINE CLINIC | Facility: CLINIC | Age: 45
End: 2025-02-21

## 2025-02-21 DIAGNOSIS — R55 SYNCOPE, UNSPECIFIED SYNCOPE TYPE: ICD-10-CM

## 2025-02-21 DIAGNOSIS — Z82.49 FAMILY HISTORY OF PREMATURE CAD: Primary | ICD-10-CM

## 2025-02-21 NOTE — TELEPHONE ENCOUNTER
PA for (Wegovy) 0.25 MG/0.5ML SUBMITTED to [employer]    via    []CMM-KEY:   [x]Surescripts-Case ID # [id number]   []Availity-Auth ID # NDC #   []Faxed to plan   []Other website   []Phone call Case ID #     [x]PA sent as URGENT    All office notes, labs and other pertaining documents and studies sent. Clinical questions answered. Awaiting determination from insurance company.     Turnaround time for your insurance to make a decision on your Prior Authorization can take 7-21 business days.

## 2025-02-21 NOTE — TELEPHONE ENCOUNTER
PA for (Wegovy) 0.25 MG/0.5ML APPROVED     Date(s) approved 02/21/25-08/21/25    Case # [id number]    Patient advised by          []MyChart Message  [x]Phone call   []LMOM  []L/M to call office as no active Communication consent on file  []Unable to leave detailed message as VM not approved on Communication consent       Pharmacy advised by    [x]Fax  []Phone call       Approval letter scanned into [employer] No , not available at time of approval

## 2025-02-26 ENCOUNTER — APPOINTMENT (OUTPATIENT)
Dept: PHYSICAL THERAPY | Facility: CLINIC | Age: 45
End: 2025-02-26
Payer: COMMERCIAL

## 2025-02-26 DIAGNOSIS — M54.2 CERVICALGIA: Primary | ICD-10-CM

## 2025-02-26 DIAGNOSIS — M54.2 CHRONIC NECK PAIN: ICD-10-CM

## 2025-02-26 DIAGNOSIS — G89.29 CHRONIC NECK PAIN: ICD-10-CM

## 2025-03-03 ENCOUNTER — HOSPITAL ENCOUNTER (OUTPATIENT)
Dept: RADIOLOGY | Facility: CLINIC | Age: 45
Discharge: HOME | End: 2025-03-03
Payer: COMMERCIAL

## 2025-03-03 VITALS
HEART RATE: 84 BPM | OXYGEN SATURATION: 99 % | TEMPERATURE: 98 F | SYSTOLIC BLOOD PRESSURE: 109 MMHG | RESPIRATION RATE: 18 BRPM | DIASTOLIC BLOOD PRESSURE: 76 MMHG

## 2025-03-03 DIAGNOSIS — M54.12 CERVICAL RADICULOPATHY: ICD-10-CM

## 2025-03-03 PROCEDURE — 62321 NJX INTERLAMINAR CRV/THRC: CPT | Performed by: ANESTHESIOLOGY

## 2025-03-03 RX ORDER — METHYLPREDNISOLONE ACETATE 80 MG/ML
80 INJECTION, SUSPENSION INTRA-ARTICULAR; INTRALESIONAL; INTRAMUSCULAR; PARENTERAL; SOFT TISSUE ONCE
Status: COMPLETED | OUTPATIENT
Start: 2025-03-03 | End: 2025-03-03

## 2025-03-03 RX ADMIN — METHYLPREDNISOLONE ACETATE 80 MG: 80 INJECTION, SUSPENSION INTRA-ARTICULAR; INTRALESIONAL; INTRAMUSCULAR; SOFT TISSUE at 09:47

## 2025-03-03 RX ADMIN — IOHEXOL 1 ML: 300 INJECTION, SOLUTION INTRAVENOUS at 09:47

## 2025-03-03 NOTE — DISCHARGE INSTR - LAB
Epidural Steroid Injection   WHAT YOU NEED TO KNOW:   An epidural steroid injection (BECKY) is a procedure to inject steroid medicine into the epidural space. The epidural space is between your spinal cord and vertebrae. Steroids reduce inflammation and fluid buildup in your spine that may be causing pain. You may be given pain medicine along with the steroids.          ACTIVITY  Do not drive or operate machinery today.  No strenuous activity today - bending, lifting, etc.  You may resume normal activites starting tomorrow - start slowly and as tolerated.  You may shower today, but no tub baths or hot tubs.  You may have numbness for several hours from the local anesthetic. Please use caution and common sense, especially with weight-bearing activities.    CARE OF THE INJECTION SITE  If you have soreness or pain, apply ice to the area today (20 minutes on/20 minutes off).  Starting tomorrow, you may use warm, moist heat or ice if needed.  You may have an increase or change in your discomfort for 36-48 hours after your treatment.  Apply ice and continue with any pain medication you have been prescribed.  Notify the [facility] if you have any of the following: redness, drainage, swelling, headache, stiff neck or fever above 100°F.    SPECIAL INSTRUCTIONS  Our office will contact you in approximately 14 days for a progress report.    MEDICATIONS  Continue to take all routine medications.  Our office may have instructed you to hold some medications.    As no general anesthesia was used in today's procedure, you should not experience any side effects related to anesthesia.     If you are diabetic, the steroids used in today's injection may temporarily increase your blood sugar levels after the first few days after your injection. Please keep a close eye on your sugars and alert the doctor who manages your diabetes if your sugars are significantly high from your baseline or you are symptomatic.     If you have a  problem specifically related to your procedure, please call our office at [phone].  Problems not related to your procedure should be directed to your primary care physician.

## 2025-03-03 NOTE — H&P
History of Present Illness: The patient is a 44 y.o. female who presents with complaints of neck and arm pain.    Past Medical History:   Diagnosis Date    Anxiety     Asthma     Depression     GERD (gastroesophageal reflux disease)     Hypoglycemia     Moderate episode of recurrent major depressive disorder (HCC) 10/03/2019    PTSD (post-traumatic stress disorder)        Past Surgical History:   Procedure Laterality Date    ABDOMINAL SURGERY      3 csections, gallbladder and umbilical hernia     ANKLE LIGAMENT RECONSTRUCTION       SECTION      CHOLECYSTECTOMY      CHOLECYSTECTOMY LAPAROSCOPIC N/A 2020    Procedure: CHOLECYSTECTOMY LAPAROSCOPIC;  Surgeon: Rachid Cline MD;  Location:  MAIN OR;  Service: General    KNEE SURGERY      SINUS SURGERY      TONSILLECTOMY      TONSILLECTOMY      TUBAL LIGATION      UMBILICAL HERNIA REPAIR N/A 2020    Procedure: REPAIR HERNIA UMBILICAL, Primary;  Surgeon: Rachid Cline MD;  Location:  MAIN OR;  Service: General    UPPER GASTROINTESTINAL ENDOSCOPY           Current Outpatient Medications:     albuterol (2.5 mg/3 mL) 0.083 % nebulizer solution, Take 3 mL (2.5 mg total) by nebulization every 6 (six) hours as needed for wheezing or shortness of breath, Disp: 60 mL, Rfl: 0    albuterol (VENTOLIN HFA) 90 mcg/act inhaler, Inhale 2 puffs every 6 (six) hours as needed for wheezing, Disp: 18 g, Rfl: 0    ALPRAZolam (XANAX) 1 mg tablet, Take 1 mg by mouth daily at bedtime as needed for anxiety, Disp: , Rfl:     Botox 200 units SOLR, , Disp: , Rfl:     budesonide-formoterol (Symbicort) 160-4.5 mcg/act inhaler, Inhale 2 puffs 2 (two) times a day Rinse mouth after use., Disp: 10.2 g, Rfl: 5    cetirizine (ZyrTEC) 10 mg tablet, Take 1 tablet (10 mg total) by mouth daily, Disp: 30 tablet, Rfl: 5    doxepin (SINEquan) 10 mg capsule, Take 10 mg by mouth, Disp: , Rfl:     Erenumab-aooe (Aimovig) 140 MG/ML SOAJ, Inject 140 mg under the skin every  30 (thirty) days, Disp: 200 mL, Rfl: 3    escitalopram (LEXAPRO) 10 mg tablet, Take 1 tablet by mouth every morning, Disp: , Rfl:     fluticasone (FLONASE) 50 mcg/act nasal spray, 1 spray into each nostril daily, Disp: , Rfl:     meclizine (ANTIVERT) 25 mg tablet, , Disp: , Rfl:     methylPREDNISolone 4 MG tablet therapy pack, Use as directed on package, Disp: 1 each, Rfl: 0    montelukast (SINGULAIR) 10 mg tablet, Take 1 tablet (10 mg total) by mouth daily at bedtime, Disp: 30 tablet, Rfl: 2    pantoprazole (PROTONIX) 40 mg, , Disp: , Rfl:     Refresh Plus 0.5 % SOLN, INSTILL 1 DROP INTO EACH EYE 4 TIMES DAILY, Disp: , Rfl:     Semaglutide-Weight Management (Wegovy) 0.25 MG/0.5ML, Inject 0.25 mg under the skin weekly, Disp: 2 mL, Rfl: 0    [START ON 3/5/2025] Semaglutide-Weight Management (Wegovy) 0.5 MG/0.5ML, Inject 0.5 mg under the skin weekly Do not start before March 5, 2025., Disp: 2 mL, Rfl: 0    simethicone (MYLICON) 125 MG chewable tablet, CHEW AND SWALLOW 1 TABLET EVERY 6 HOURS AS NEEDED FOR FLATULENCE, Disp: , Rfl:     Current Facility-Administered Medications:     iohexol (OMNIPAQUE) 300 mg/mL injection 1 mL, 1 mL, Epidural, Once, Bean Davis DO    methylPREDNISolone acetate (DEPO-MEDROL) injection 80 mg, 80 mg, Epidural, Once, Bean Davis DO    Allergies   Allergen Reactions    Azithromycin GI Intolerance     Other reaction(s): GI Intolerance    Trazodone Syncope     Stopped -thought it caused syncope    Cephalosporins Hives    Fluoxetine Other (See Comments)    Gabapentin Arthralgia    Sulfa Antibiotics Hives       Physical Exam:   Vitals:    03/03/25 0934   BP: 126/89   Pulse: 87   Resp: 18   Temp: 98 °F (36.7 °C)   SpO2: 98%     General: Awake, Alert, Oriented x 3, Mood and affect appropriate  Respiratory: Respirations even and unlabored  Cardiovascular: Peripheral pulses intact; no edema  Musculoskeletal Exam: Decreased range of motion cervical spine    ASA Score: 2    Patient/Chart  Verification  Patient ID Verified: Verbal  ID Band Applied: No  Consents Confirmed: To be obtained in the Procedural area  H&P( within 30 days) Verified: To be obtained in the Procedural area  Interval H&P(within 24 hr) Complete (required for Outpatients and Surgery Admit only): To be obtained in the Procedural area  Allergies Reviewed: Yes  Anticoag/NSAID held?: Yes (excedrine LD 1 week ago)  Currently on antibiotics?: No  Pregnancy denied?: Yes    Assessment:   1. Cervical radiculopathy        Plan: C7-T1 BECKY

## 2025-03-05 ENCOUNTER — OFFICE VISIT (OUTPATIENT)
Dept: PHYSICAL THERAPY | Facility: CLINIC | Age: 45
End: 2025-03-05
Payer: COMMERCIAL

## 2025-03-05 DIAGNOSIS — M54.2 CERVICALGIA: Primary | ICD-10-CM

## 2025-03-05 DIAGNOSIS — G89.29 CHRONIC NECK PAIN: ICD-10-CM

## 2025-03-05 DIAGNOSIS — M54.2 CHRONIC NECK PAIN: ICD-10-CM

## 2025-03-05 PROCEDURE — 97112 NEUROMUSCULAR REEDUCATION: CPT | Performed by: PHYSICAL THERAPIST

## 2025-03-05 PROCEDURE — 97110 THERAPEUTIC EXERCISES: CPT | Performed by: PHYSICAL THERAPIST

## 2025-03-05 NOTE — PROGRESS NOTES
"Daily Note     Today's date: 3/5/2025  Patient name: An Eason  : 1980  MRN: [id number]  Referring provider: Hannah Diamond MD  Dx:   Encounter Diagnosis     ICD-10-CM    1. Cervicalgia  M54.2       2. Chronic neck pain  M54.2     G89.29             Subjective: Pt reported that she has been attending PT inconsistently due to personal issues. She stated that she has been having difficulty with her son with mental health issues. Noted that today has been a difficult day due to meetings with professionals with her son. She had a cervical injection on Monday, which she stated has improved her overall pain.     Objective: See treatment diary below    Assessment: Tolerated treatment well. Shortened session due to patient tardiness. She was able to perform all exercises without an increase in pain. Patient demonstrated fatigue post treatment, exhibited good technique with therapeutic exercises, and would benefit from continued PT    Plan: Continue per plan of care.      Precautions:   Past Medical History:   Diagnosis Date    Anxiety     Asthma     Depression     GERD (gastroesophageal reflux disease)     Hypoglycemia     Moderate episode of recurrent major depressive disorder (HCC) 10/03/2019    PTSD (post-traumatic stress disorder)          Manuals 2/3 2/10 2/19 3/5      distraction          STM SO, scalenes                              Neuro Re-Ed 2/3 2/10 2/19 3/5      Scap retract           Scap retract row   Green 5x (easy)  Black 5 sec 2x10  Black 3\" 2x10  Black 3x10       Scap retract sh extension    Black 3\" 2x10  Black 3x10       Scap retract - tricep ext at side  3 sec 2x10 Red  Red 2x10  Red 3x10       Scap retract bicep curl   Green 2x10  Blue 3x10                           Ther Ex 2/3 2/10 2/19 3      Supine chin tucks 10x         Seated chin tucks  5\" x 10  5\" x10  5\" 2x10      UT stretch - no overpressure   5\" x 10 ea 5\" x10 ea 5\" x10       Levator stretch   5\" x10 ea 5\"x10      Seated " "thoracic extension - hands   3\" 2x10 3\" 10x      SCM stretch   NV       HEP Educated on posture positioning, HEP, and activity modification for pain control 15' updated HEP and educated on activity modification Reviewed HEP and educated her on performing exercise in pain free ROM 15'  5'                Ther Activity 2/3 2/10 2/19 3/5                                                                                               "

## 2025-03-09 DIAGNOSIS — G43.709 CHRONIC MIGRAINE WITHOUT AURA: ICD-10-CM

## 2025-03-09 DIAGNOSIS — E66.3 OVERWEIGHT WITH BODY MASS INDEX (BMI) OF 27 TO 27.9 IN ADULT: ICD-10-CM

## 2025-03-10 ENCOUNTER — TELEPHONE (OUTPATIENT)
Dept: FAMILY MEDICINE CLINIC | Facility: CLINIC | Age: 45
End: 2025-03-10

## 2025-03-10 NOTE — TELEPHONE ENCOUNTER
[facility]  Leora Garcia, JohanD Candidate 2025      Communication with patient: per telephone     Reason for documentation: Wegovy f/u       Assessment:    Patient currently tolerating Wegovy with some side effects that resolved.       Plan:    1. Continue Wegovy 0.25 mg / 0.5 mg SC weekly       Discussion:      Patient scheduled for 4th dose of Wegovy 0.25 mg tomorrow (Tuesday 3/11). Experienced some painful bloating, nausea and vomiting the night of the first injection. Unsure of changes in appetite or portion sizes at this time.       Data:      Recent weight: 156 on 3/9/2025      Pharmacist Tracking Tool:     Reason For Outreach: Embedded Pharmacist  Demographics:  Intervention Method: Phone  Type of Intervention: Follow-Up  Topics Addressed: Obesity  Pharmacologic Interventions: Prevent or Manage ALPESH  Non-Pharmacologic Interventions: Medication/Device education  Time:  Direct Patient Care:  10  mins  Care Coordination:  10  mins  Recommendation Recipient: Patient/Caregiver  Outcome: No Action Taken by Provider

## 2025-03-11 RX ORDER — SEMAGLUTIDE 0.25 MG/.5ML
INJECTION, SOLUTION SUBCUTANEOUS
Qty: 2 ML | Refills: 0 | OUTPATIENT
Start: 2025-03-11

## 2025-03-11 RX ORDER — ERENUMAB-AOOE 140 MG/ML
140 INJECTION, SOLUTION SUBCUTANEOUS
Qty: 200 ML | Refills: 0 | OUTPATIENT
Start: 2025-03-11

## 2025-03-12 ENCOUNTER — OFFICE VISIT (OUTPATIENT)
Dept: PHYSICAL THERAPY | Facility: CLINIC | Age: 45
End: 2025-03-12
Payer: COMMERCIAL

## 2025-03-12 DIAGNOSIS — M54.2 CERVICALGIA: Primary | ICD-10-CM

## 2025-03-12 DIAGNOSIS — G89.29 CHRONIC NECK PAIN: ICD-10-CM

## 2025-03-12 DIAGNOSIS — M54.2 CHRONIC NECK PAIN: ICD-10-CM

## 2025-03-12 PROCEDURE — 97110 THERAPEUTIC EXERCISES: CPT | Performed by: PHYSICAL THERAPIST

## 2025-03-12 PROCEDURE — 97112 NEUROMUSCULAR REEDUCATION: CPT | Performed by: PHYSICAL THERAPIST

## 2025-03-12 NOTE — PROGRESS NOTES
"Daily Note     Today's date: 3/12/2025  Patient name: An Eason  : 1980  MRN: [id number]  Referring provider: Hannah Diamond MD  Dx:   Encounter Diagnosis     ICD-10-CM    1. Cervicalgia  M54.2       2. Chronic neck pain  M54.2     G89.29              Subjective: Pt reported that she has been trying to keep up with her exercises, and they have been generally helpful, however notes that at times of stress she has a hard time doing her exercises.     Objective: See treatment diary below    Assessment: Tolerated treatment well. To address ROM initiated SCM stretch, which she performed well and noted improved flexibility. Added to HEP and demonstrated understanding through teach back. Noted muscle fatigue with resistance exercises without increase in pain.  Patient demonstrated fatigue post treatment, exhibited good technique with therapeutic exercises, and would benefit from continued PT    Plan: Continue per plan of care.      Precautions:   Past Medical History:   Diagnosis Date    Anxiety     Asthma     Depression     GERD (gastroesophageal reflux disease)     Hypoglycemia     Moderate episode of recurrent major depressive disorder (HCC) 10/03/2019    PTSD (post-traumatic stress disorder)          Manuals 2/3 2/10 2/19 3/5 3/12     distraction          STM SO, rina                              Neuro Re-Ed 2/3 2/10 2/19 3/5 3/12     Scap retract           Scap retract row   Green 5x (easy)  Black 5 sec 2x10  Black 3\" 2x10  Black 3x10  Black 3x10     Scap retract sh extension    Black 3\" 2x10  Black 3x10  Black 3x10      Scap retract - tricep ext at side  3 sec 2x10 Red  Red 2x10  Red 3x10  Blue 3x10     Scap retract bicep curl   Green 2x10  Blue 3x10  Blue 3x10     Chicken wings     2\" 2x10                Ther Ex 2/3 2/10 2/19 3/5 3/12     Supine chin tucks 10x         Seated chin tucks  5\" x 10  5\" x10  5\" 2x10 5\" 2x10     UT stretch - no overpressure   5\" x 10 ea 5\" x10 ea 5\" x10  5\" 2x10   " "  Levator stretch   5\" x10 ea 5\"x10 5\" 10x ea     Seated thoracic extension - hands   3\" 2x10 3\" 10x 5\" x10     SCM stretch   NV  5\" 10x ea     HEP Educated on posture positioning, HEP, and activity modification for pain control 15' updated HEP and educated on activity modification Reviewed HEP and educated her on performing exercise in pain free ROM 15'  5' 5'               Ther Activity 2/3 2/10 2/19 3/5                                                                                                 "

## 2025-03-13 ENCOUNTER — HOSPITAL ENCOUNTER (OUTPATIENT)
Dept: NON INVASIVE DIAGNOSTICS | Facility: HOSPITAL | Age: 45
Discharge: HOME | End: 2025-03-13
Payer: COMMERCIAL

## 2025-03-13 DIAGNOSIS — R55 SYNCOPE, UNSPECIFIED SYNCOPE TYPE: ICD-10-CM

## 2025-03-13 PROCEDURE — 93880 EXTRACRANIAL BILAT STUDY: CPT | Performed by: SURGERY

## 2025-03-13 PROCEDURE — 93880 EXTRACRANIAL BILAT STUDY: CPT

## 2025-03-14 ENCOUNTER — TELEPHONE (OUTPATIENT)
Age: 45
End: 2025-03-14

## 2025-03-14 ENCOUNTER — HOSPITAL ENCOUNTER (OUTPATIENT)
Dept: CT IMAGING | Facility: HOSPITAL | Age: 45
Discharge: HOME | End: 2025-03-14
Payer: COMMERCIAL

## 2025-03-14 DIAGNOSIS — E78.2 MIXED HYPERLIPIDEMIA: ICD-10-CM

## 2025-03-14 DIAGNOSIS — Z82.49 FAMILY HISTORY OF PREMATURE CAD: ICD-10-CM

## 2025-03-14 PROCEDURE — 75571 CT HRT W/O DYE W/CA TEST: CPT

## 2025-03-14 NOTE — TELEPHONE ENCOUNTER
Caller: [employer]     Doctor: Dr. HAJI    Reason for call: Will be faxing over a form for patient records and disability     Call back#:

## 2025-03-17 ENCOUNTER — TELEPHONE (OUTPATIENT)
Dept: PAIN MEDICINE | Facility: CLINIC | Age: 45
End: 2025-03-17

## 2025-03-18 NOTE — TELEPHONE ENCOUNTER
Caller: Patient    Doctor: Dr. HAJI    Reason for call: Pt reports above 25  % improvement post injection  Pain level  5/10  Experiencing stiff neck    Having hard time with working with PT         Call back#:

## 2025-03-18 NOTE — TELEPHONE ENCOUNTER
If she is interested we can provide a short-term muscle relaxants to see if that provides some relief to help her with physical therapy.

## 2025-03-19 ENCOUNTER — OFFICE VISIT (OUTPATIENT)
Dept: PHYSICAL THERAPY | Facility: CLINIC | Age: 45
End: 2025-03-19
Payer: COMMERCIAL

## 2025-03-19 DIAGNOSIS — M54.2 CHRONIC NECK PAIN: ICD-10-CM

## 2025-03-19 DIAGNOSIS — G89.29 CHRONIC NECK PAIN: ICD-10-CM

## 2025-03-19 DIAGNOSIS — M54.2 CERVICALGIA: Primary | ICD-10-CM

## 2025-03-19 NOTE — PROGRESS NOTES
Appointment cancelled due to illness. Pt reported migraine, nausea upon arrival. Stated that her son has stomach bug.

## 2025-03-19 NOTE — TELEPHONE ENCOUNTER
Caller: pt    Doctor: Dr. figueroa    Reason for call: pt is calling back.    Call back#: [phone]

## 2025-03-19 NOTE — PROGRESS NOTES
"Daily Note     Today's date: 3/19/2025  Patient name: An Eason  : 1980  MRN: [id number]  Referring provider: Hannah Diamond MD  Dx:   Encounter Diagnosis     ICD-10-CM    1. Cervicalgia  M54.2       2. Chronic neck pain  M54.2     G89.29              Subjective: Pt reported that     Objective: See treatment diary below    Assessment: Tolerated treatment {Tolerated treatment :5908848219}. Patient {assessment:6484323559}    Plan: {PLAN:1629164305}     Precautions:   Past Medical History:   Diagnosis Date    Anxiety     Asthma     Depression     GERD (gastroesophageal reflux disease)     Hypoglycemia     Moderate episode of recurrent major depressive disorder (HCC) 10/03/2019    PTSD (post-traumatic stress disorder)          Manuals 2/3 2/10 2/19 3/5 3/12 3/19    distraction          STM SO, rina                              Neuro Re-Ed 2/3 2/10 2/19 3/5 3/12 3/19    Scap retract           Scap retract row   Green 5x (easy)  Black 5 sec 2x10  Black 3\" 2x10  Black 3x10  Black 3x10     Scap retract sh extension    Black 3\" 2x10  Black 3x10  Black 3x10      Scap retract - tricep ext at side  3 sec 2x10 Red  Red 2x10  Red 3x10  Blue 3x10     Scap retract bicep curl   Green 2x10  Blue 3x10  Blue 3x10     Chicken wings     2\" 2x10                Ther Ex 2/3 2/10 2/19 3/5 3/12 3/19    Supine chin tucks 10x         Seated chin tucks  5\" x 10  5\" x10  5\" 2x10 5\" 2x10     UT stretch - no overpressure   5\" x 10 ea 5\" x10 ea 5\" x10  5\" 2x10     Levator stretch   5\" x10 ea 5\"x10 5\" 10x ea     Seated thoracic extension - hands   3\" 2x10 3\" 10x 5\" x10     SCM stretch   NV  5\" 10x ea     HEP Educated on posture positioning, HEP, and activity modification for pain control 15' updated HEP and educated on activity modification Reviewed HEP and educated her on performing exercise in pain free ROM 15'  5' 5'               Ther Activity 2/3 2/10 2/19 3/5                                                                "

## 2025-03-24 ENCOUNTER — PATIENT MESSAGE (OUTPATIENT)
Dept: FAMILY MEDICINE CLINIC | Facility: CLINIC | Age: 45
End: 2025-03-24

## 2025-03-25 NOTE — PATIENT COMMUNICATION
[facility]  Lex Carter, Jolanta     Patient Message:  Message received via secure chat from patient regarding intolerable side effects on Wegovy 0.25 mg.    Summary/Chart Review:     Patient reports experiencing severe GI side effects after starting Wegovy 0.25 mg. She described an episode of intense vomiting and diarrhea lasting ~ 24 hours, followed by a prolonged recovery period. She also noted increased anxiety about continuing the medication,  and disruption to other health care appointments.     Clinical Decision/Action:  Assessment: Adverse reaction to Wegovy 0.25 mg; not tolerating initial dose  Plan: Given severity and functional impact, patient should not continue therapy      Message for Patient:     Patient notified via [employer]. Appointment scheduling tool sent for follow-up

## 2025-03-31 ENCOUNTER — OFFICE VISIT (OUTPATIENT)
Dept: PAIN MEDICINE | Facility: CLINIC | Age: 45
End: 2025-03-31
Payer: COMMERCIAL

## 2025-03-31 VITALS — HEIGHT: 64 IN | TEMPERATURE: 98.2 F | BODY MASS INDEX: 28 KG/M2 | WEIGHT: 164 LBS | HEART RATE: 100 BPM

## 2025-03-31 DIAGNOSIS — M48.02 CERVICAL SPINAL STENOSIS: ICD-10-CM

## 2025-03-31 DIAGNOSIS — M54.2 NECK PAIN: ICD-10-CM

## 2025-03-31 DIAGNOSIS — M47.812 CERVICAL SPONDYLOSIS: ICD-10-CM

## 2025-03-31 DIAGNOSIS — M50.120 CERVICAL DISC DISORDER WITH RADICULOPATHY OF MID-CERVICAL REGION: ICD-10-CM

## 2025-03-31 PROCEDURE — 99214 OFFICE O/P EST MOD 30 MIN: CPT | Performed by: NURSE PRACTITIONER

## 2025-03-31 NOTE — PROGRESS NOTES
Assessment:  1. Cervical disc disorder with radiculopathy of mid-cervical region    2. Cervical spinal stenosis    3. Neck pain    4. Cervical spondylosis        Plan:  The patient is a 44 y.o. female last seen on 03/03/2025 who presents for a follow up office visit in regards to chronic pain secondary to neck pain, after a fall in 2021, cervical stenosis, cervical disc protrusion, and cervical radiculopathy.  Patient presents today with ongoing neck pain.  The pain radiates into the shoulders.  She has difficulty turning her head from side-to-side and gets headaches often.  She underwent a cervical epidural steroid injection March 3, 2025 which provided 30% pain relief, but continues to be symptomatic.  The patient has been experiencing moderate to severe axial spine pain that is causing functional deficit.  The pain has been present for at least 3 months and is not improving with conservative care.  Currently the patient is not experiencing any radicular features nor neurogenic claudication.  Non-facet pathology has been ruled out on clinical evaluation.    To help with the neck pain, we discussed medial branch block/radiofrequency ablation. This is a 3 step process, where the patient would first undergo a diagnostic test called a medial branch block injection. If successful, the medial branch block injection would provide 50% or more pain relief for a few hours after the procedure. The next step would be to repeat the medial branch block to confirm the results at least 2 weeks after the first MBB. The last step is a radiofrequency ablation. Radiofrequency ablation decreases pain by creating a nerve lesion to interrupt the pain signals, and weaken the pain perceived by the brain. This procedure typically provides 1-2 years of pain relief. The risks, including bleeding, infection, and tissue reaction were reviewed with the patient and was scheduled for a bilateral C4-C6 medial branch block injection-2 %  lidocaine      The patient will follow-up after procedure for reevaluation. The patient was advised to contact the office should their symptoms worsen in the interim. The patient was agreeable and verbalized an understanding.        History of Present Illness:    The patient is a 44 y.o. female last seen on 03/03/2025 who presents for a follow up office visit in regards to chronic pain secondary to neck pain, after a fall in 2021, cervical stenosis, cervical disc protrusion, and cervical radiculopathy.  Her last office visit was March 3, 2025, which had a cervical epidural steroid injection.  Patient presents today with ongoing neck pain.  The neck pain is located on both sides of the neck and radiate into the shoulder. She has limited range of motion when turning her head side to side. The pain is constant and described as dull aching, throbbing, cramping, and pressure-like. She gets headaches often.  She feels the pain does interfere with her activities of daily living.  She is rating her pain a 5/10 on the numeric rating scale.    She had a cervical epidural steroid injection March 3, 2025 which provided 30% pain relief. She had trigger points with her PCP that did not help.   She sees neurology for botox for migraines.    She takes no prescription pain medications.    She is in physical therapy and getting some relief.       I have personally reviewed and/or updated the patient's past medical history, past surgical history, family history, social history, current medications, allergies, and vital signs today.       Review of Systems:    Review of Systems   Gastrointestinal:  Positive for nausea.   Musculoskeletal:         Decreased ROM, joint stiffness.   Neurological:  Positive for dizziness.         Past Medical History:   Diagnosis Date    Allergic     Anemia     Anxiety     Arthritis     Asthma     Cluster headache     Depression     GERD (gastroesophageal reflux disease) 2020    Head injury 2021     Headache(784.0)     Headache, tension-type     Hypoglycemia     Kidney stone     When i was pregnant with my first child    Migraine 2020    Vestibular migraines suffered severe concussion    Moderate episode of recurrent major depressive disorder (HCC) 10/03/2019    Otitis media     Pneumonia     PTSD (post-traumatic stress disorder)     Seizures (HCC) 2022    Only one due to medication    Shingles     Syncope     Varicella     Had it four times last one was in 94       Past Surgical History:   Procedure Laterality Date    ABDOMINAL SURGERY      3 csections, gallbladder and umbilical hernia     ANKLE LIGAMENT RECONSTRUCTION      ANKLE SURGERY       SECTION      CHOLECYSTECTOMY      CHOLECYSTECTOMY LAPAROSCOPIC N/A 2020    Procedure: CHOLECYSTECTOMY LAPAROSCOPIC;  Surgeon: Rachid Cline MD;  Location: [facility];  Service: General    HYSTERECTOMY  10/15/2024    KNEE SURGERY      SINUS SURGERY      TONSILLECTOMY      TONSILLECTOMY      TRIGGER POINT INJECTION      TUBAL LIGATION      UMBILICAL HERNIA REPAIR N/A 2020    Procedure: REPAIR HERNIA UMBILICAL, Primary;  Surgeon: Rachid Cline MD;  Location: [facility];  Service: General    UPPER GASTROINTESTINAL ENDOSCOPY         Family History   Problem Relation Age of Onset    Depression Mother     Asthma Mother     Mental illness Mother     Thyroid disease Mother         My aunt also has thyroid issues    Mental illness Maternal Grandfather     Asthma Maternal Grandfather     Heart disease Maternal Grandfather         Runs in family    Pulmonary embolism Maternal Grandmother     Asthma Son     Alcohol abuse Maternal Aunt     Drug abuse Maternal Aunt     Developmental delay Son     Mental illness Son     Migraines Son     Asthma Son     Migraines Son     Rashes / Skin problems Son         Eczema       Social History     Occupational History    Not on file   Tobacco Use    Smoking status: Never    Smokeless  tobacco: Never    Tobacco comments:     Never smoked   Vaping Use    Vaping status: Never Used   Substance and Sexual Activity    Alcohol use: Not Currently     Comment: Barely drink    Drug use: Never    Sexual activity: Not Currently     Partners: Male     Birth control/protection: Abstinence, Surgical     Comment: Hurts too much         Current Outpatient Medications:     albuterol (2.5 mg/3 mL) 0.083 % nebulizer solution, Take 3 mL (2.5 mg total) by nebulization every 6 (six) hours as needed for wheezing or shortness of breath, Disp: 60 mL, Rfl: 0    albuterol (VENTOLIN HFA) 90 mcg/act inhaler, Inhale 2 puffs every 6 (six) hours as needed for wheezing, Disp: 18 g, Rfl: 0    ALPRAZolam (XANAX) 1 mg tablet, Take 1 mg by mouth daily at bedtime as needed for anxiety, Disp: , Rfl:     Botox 200 units SOLR, , Disp: , Rfl:     budesonide-formoterol (Symbicort) 160-4.5 mcg/act inhaler, Inhale 2 puffs 2 (two) times a day Rinse mouth after use., Disp: 10.2 g, Rfl: 5    cetirizine (ZyrTEC) 10 mg tablet, Take 1 tablet (10 mg total) by mouth daily, Disp: 30 tablet, Rfl: 5    doxepin (SINEquan) 10 mg capsule, Take 10 mg by mouth, Disp: , Rfl:     Erenumab-aooe (Aimovig) 140 MG/ML SOAJ, Inject 140 mg under the skin every 30 (thirty) days, Disp: 200 mL, Rfl: 3    escitalopram (LEXAPRO) 10 mg tablet, Take 1 tablet by mouth every morning, Disp: , Rfl:     fluticasone (FLONASE) 50 mcg/act nasal spray, 1 spray into each nostril daily, Disp: , Rfl:     meclizine (ANTIVERT) 25 mg tablet, , Disp: , Rfl:     montelukast (SINGULAIR) 10 mg tablet, Take 1 tablet (10 mg total) by mouth daily at bedtime, Disp: 30 tablet, Rfl: 2    pantoprazole (PROTONIX) 40 mg, , Disp: , Rfl:     Refresh Plus 0.5 % SOLN, INSTILL 1 DROP INTO EACH EYE 4 TIMES DAILY, Disp: , Rfl:     simethicone (MYLICON) 125 MG chewable tablet, CHEW AND SWALLOW 1 TABLET EVERY 6 HOURS AS NEEDED FOR FLATULENCE, Disp: , Rfl:     Allergies   Allergen Reactions    Azithromycin GI  "Intolerance     Other reaction(s): GI Intolerance    Trazodone Syncope     Stopped -thought it caused syncope    Cephalosporins Hives    Fluoxetine Other (See Comments)    Gabapentin Arthralgia    Sulfa Antibiotics Hives       Physical Exam:    Pulse 100   Temp 98.2 °F (36.8 °C)   Ht 5' 4\" (1.626 m) Comment: Verbal  Wt 74.4 kg (164 lb)   LMP 11/29/2022   BMI 28.15 kg/m²     Constitutional:normal, well developed, well nourished, alert, in no distress and non-toxic and no overt pain behavior.  Eyes:anicteric  HEENT:grossly intact  Neck:supple, symmetric, trachea midline and no masses   Pulmonary:even and unlabored  Cardiovascular:No edema or pitting edema present  Skin:Normal without rashes or lesions and well hydrated  Psychiatric:Mood and affect appropriate  Neurologic:Cranial Nerves II-XII grossly intact  Musculoskeletal:normal    Cervical Spine Exam    Appearance:  Normal lordosis  Palpation/Tenderness:  left cervical paraspinal tenderness  right cervical paraspinal tenderness  Range of Motion:  Flexion:  No limitation  without pain  Extension:  Minimally limited  with pain  Lateral Flexion - Left:  Moderately limited  with pain  Lateral Flexion - Right:  Moderately limited  with pain  Rotation - Left:  Moderately limited  with pain  Rotation - Right:  Moderately limited  with pain  Special Tests:  Pain with facet loading bilaterally      Imaging    Procedure: MRI of the cervical spine  5/18/24    Comparison: None     Findings:     Alignment: There is straightening of the normal cervical lordosis which may be   related to positioning or spasm.     Marrow: There is no abnormal marrow signal on the STIR images.     Vertebral bodies: Unremarkable     Disk Spaces: Multilevel disk degeneration and dessication is present.      Cord: The spinal cord is normal in volume and signal intensity.      Please note, estimations of neural foraminal narrowing may contain error due to   motion. Oblique imaging provides a more " sensitive evaluation of the   neuroforamina.     C2-C3: No neuroforaminal or spinal canal stenosis.   C3-C4: Minimal central protrusion focally indenting the ventral thecal sac. No   significant neural foraminal narrowing or spinal canal stenosis.   C4-C5:Moderate central disc protrusion eccentric to the right moderately   impinging the ventral cord. No significant neural foraminal narrowing. No cord   edema or myelomalacia appreciated.   C5-C6:Small broad-based protrusion eccentric to the right. Prominent bilateral   uncovertebral arthropathy contributing to at least moderate bilateral neural   foraminal stenoses with suspected impingement of both C6 nerve roots. Mild   ventral cord flattening/impingement.   C6-C7:No neuroforaminal or spinal canal stenosis.   C7-T1: No neuroforaminal or spinal canal stenosis.     Impression:   Moderate central disc protrusion eccentric to the right at C4-C5 moderately   impinging the ventral cord.     At C5-C6, there is a small broad-based protrusion eccentric to the right.   Prominent bilateral uncovertebral arthropathy contributing to at least moderate   bilateral neural foraminal stenoses with suspected impingement of both C6 nerve   roots. Mild ventral cord flattening/impingement.     No cord edema or myelomalacia appreciated.     Orders Placed This Encounter   Procedures    FL spine and pain procedure

## 2025-04-03 ENCOUNTER — OFFICE VISIT (OUTPATIENT)
Dept: PHYSICAL THERAPY | Facility: CLINIC | Age: 45
End: 2025-04-03
Payer: COMMERCIAL

## 2025-04-03 DIAGNOSIS — M54.2 CERVICALGIA: Primary | ICD-10-CM

## 2025-04-03 DIAGNOSIS — M54.2 CHRONIC NECK PAIN: ICD-10-CM

## 2025-04-03 DIAGNOSIS — G89.29 CHRONIC NECK PAIN: ICD-10-CM

## 2025-04-03 PROCEDURE — 97112 NEUROMUSCULAR REEDUCATION: CPT | Performed by: PHYSICAL THERAPIST

## 2025-04-03 PROCEDURE — 97110 THERAPEUTIC EXERCISES: CPT | Performed by: PHYSICAL THERAPIST

## 2025-04-03 NOTE — PROGRESS NOTES
"Daily Note     Today's date: 4/3/2025  Patient name: An Eason  : 1980  MRN: [id number]  Referring provider: Hannah Diamond MD  Dx:   Encounter Diagnosis     ICD-10-CM    1. Cervicalgia  M54.2       2. Chronic neck pain  M54.2     G89.29              Subjective: Pt reported that she is schedule for Lidocaine trial on 25 and then if successful she will be scheduled for an ablation. Noted that she has been having persisting migraines. Notes pain with carrying groceries.     Objective: See treatment diary below    Assessment: Tolerated treatment well. Performed seated cervical exercises with moist heat, which she said improved her ability to perform the exercises. Performed postural strengthening exercises well without cervical pain. Patient demonstrated fatigue post treatment, exhibited good technique with therapeutic exercises, and would benefit from continued PT    Plan: Continue per plan of care.      Precautions:   Past Medical History:   Diagnosis Date    Anxiety     Asthma     Depression     GERD (gastroesophageal reflux disease)     Hypoglycemia     Moderate episode of recurrent major depressive disorder (HCC) 10/03/2019    PTSD (post-traumatic stress disorder)          Manuals 2/3 2/10 2/19 3/5 3/12 4/3    distraction          STM SO, rina                              Neuro Re-Ed 2/3 2/10 2/19 3/5 3/12 4/3    Scap retract           Scap retract row   Green 5x (easy)  Black 5 sec 2x10  Black 3\" 2x10  Black 3x10  Black 3x10 Black 3x10     Scap retract sh extension    Black 3\" 2x10  Black 3x10  Black 3x10  Black 3x10     Scap retract - tricep ext at side  3 sec 2x10 Red  Red 2x10  Red 3x10  Blue 3x10 Blue 3x10     Scap retract bicep curl   Green 2x10  Blue 3x10  Blue 3x10 Blue 3x10     Chicken wings     2\" 2x10  2\" 2x10    High row      Blue 2x10               Ther Ex 2/3 2/10 2/19 3/5 3/12 4/3    Supine chin tucks 10x         Seated chin tucks  5\" x 10  5\" x10  5\" 2x10 5\" 2x10 5\" 2x10  " "   UT stretch - no overpressure   5\" x 10 ea 5\" x10 ea 5\" x10  5\" 2x10 5\" 2x10    Levator stretch   5\" x10 ea 5\"x10 5\" 10x ea 5\" 2x10     Seated thoracic extension - hands   3\" 2x10 3\" 10x 5\" x10 5\" 2x10    SCM stretch   NV  5\" 10x ea 5\" 2x10    HEP Educated on posture positioning, HEP, and activity modification for pain control 15' updated HEP and educated on activity modification Reviewed HEP and educated her on performing exercise in pain free ROM 15'  5' 5'               Ther Activity 2/3 2/10 2/19 3/5  4/3                                                                                                 "

## 2025-04-10 ENCOUNTER — APPOINTMENT (OUTPATIENT)
Dept: PHYSICAL THERAPY | Facility: CLINIC | Age: 45
End: 2025-04-10
Payer: COMMERCIAL

## 2025-04-10 DIAGNOSIS — G43.709 CHRONIC MIGRAINE WITHOUT AURA: ICD-10-CM

## 2025-04-10 DIAGNOSIS — M54.2 CHRONIC NECK PAIN: ICD-10-CM

## 2025-04-10 DIAGNOSIS — G89.29 CHRONIC NECK PAIN: ICD-10-CM

## 2025-04-10 DIAGNOSIS — M54.2 CERVICALGIA: Primary | ICD-10-CM

## 2025-04-10 RX ORDER — ERENUMAB-AOOE 140 MG/ML
140 INJECTION, SOLUTION SUBCUTANEOUS
Qty: 200 ML | Refills: 3 | Status: CANCELLED | OUTPATIENT
Start: 2025-04-10

## 2025-04-10 NOTE — PROGRESS NOTES
"Daily Note     Today's date: 4/10/2025  Patient name: An Eason  : 1980  MRN: [id number]  Referring provider: Hannah Diamond MD  Dx:   Encounter Diagnosis     ICD-10-CM    1. Cervicalgia  M54.2       2. Chronic neck pain  M54.2     G89.29              Subjective: Pt reported that     Objective: See treatment diary below    Assessment: Tolerated treatment {Tolerated treatment :6974469532}. Patient {assessment:8914504501}    Plan: {PLAN:1869126509}     Precautions:   Past Medical History:   Diagnosis Date    Anxiety     Asthma     Depression     GERD (gastroesophageal reflux disease)     Hypoglycemia     Moderate episode of recurrent major depressive disorder (HCC) 10/03/2019    PTSD (post-traumatic stress disorder)          Manuals 2/3 2/10 2/19 3/5 3/12 4/3 4/10   distraction          STM SO, rina                              Neuro Re-Ed 2/3 2/10 2/19 3/5 3/12 4/3 4/10   Scap retract           Scap retract row   Green 5x (easy)  Black 5 sec 2x10  Black 3\" 2x10  Black 3x10  Black 3x10 Black 3x10  Blk   Scap retract sh extension    Black 3\" 2x10  Black 3x10  Black 3x10  Black 3x10  Blk   Scap retract - tricep ext at side  3 sec 2x10 Red  Red 2x10  Red 3x10  Blue 3x10 Blue 3x10  Nino   Scap retract bicep curl   Green 2x10  Blue 3x10  Blue 3x10 Blue 3x10  Nino   Chicken wings     2\" 2x10  2\" 2x10    High row      Blue 2x10  Nino             Ther Ex 2/3 2/10 2/19 3/5 3/12 4/3 4/10   Supine chin tucks 10x         Seated chin tucks  5\" x 10  5\" x10  5\" 2x10 5\" 2x10 5\" 2x10     UT stretch - no overpressure   5\" x 10 ea 5\" x10 ea 5\" x10  5\" 2x10 5\" 2x10    Levator stretch   5\" x10 ea 5\"x10 5\" 10x ea 5\" 2x10     Seated thoracic extension - hands   3\" 2x10 3\" 10x 5\" x10 5\" 2x10    SCM stretch   NV  5\" 10x ea 5\" 2x10    HEP Educated on posture positioning, HEP, and activity modification for pain control 15' updated HEP and educated on activity modification Reviewed HEP and educated her on performing exercise " in pain free ROM 15'  5' 5'               Ther Activity 2/3 2/10 2/19 3/5  4/3

## 2025-04-10 NOTE — TELEPHONE ENCOUNTER
An Eason to [facility] (supporting Hannah Diamond MD)         4/10/25  1:46 PM  I sent in a request for refill for the migraine medication. The pharmacy had the script hidden, I was able to place a refill on it. Sorry for the inconvenience and confusion.     Thank you  An    ________________________________________    May disregard this refill request.

## 2025-04-14 ENCOUNTER — TELEPHONE (OUTPATIENT)
Dept: FAMILY MEDICINE CLINIC | Facility: CLINIC | Age: 45
End: 2025-04-14

## 2025-04-14 ENCOUNTER — TELEMEDICINE (OUTPATIENT)
Dept: FAMILY MEDICINE CLINIC | Facility: CLINIC | Age: 45
End: 2025-04-14

## 2025-04-14 ENCOUNTER — TELEPHONE (OUTPATIENT)
Dept: PAIN MEDICINE | Facility: CLINIC | Age: 45
End: 2025-04-14

## 2025-04-14 ENCOUNTER — HOSPITAL ENCOUNTER (OUTPATIENT)
Dept: RADIOLOGY | Facility: CLINIC | Age: 45
Discharge: HOME | End: 2025-04-14
Payer: COMMERCIAL

## 2025-04-14 VITALS
OXYGEN SATURATION: 97 % | SYSTOLIC BLOOD PRESSURE: 124 MMHG | HEART RATE: 99 BPM | DIASTOLIC BLOOD PRESSURE: 76 MMHG | TEMPERATURE: 97.8 F | RESPIRATION RATE: 20 BRPM

## 2025-04-14 DIAGNOSIS — Z79.899 MEDICATION MANAGEMENT: ICD-10-CM

## 2025-04-14 DIAGNOSIS — T88.7XXA MEDICATION SIDE EFFECTS: ICD-10-CM

## 2025-04-14 DIAGNOSIS — M47.812 CERVICAL SPONDYLOSIS: ICD-10-CM

## 2025-04-14 DIAGNOSIS — M54.2 NECK PAIN: ICD-10-CM

## 2025-04-14 DIAGNOSIS — E66.3 OVERWEIGHT WITH BODY MASS INDEX (BMI) OF 27 TO 27.9 IN ADULT: Primary | ICD-10-CM

## 2025-04-14 PROCEDURE — 99999 PR OFFICE/OUTPT VISIT,PROCEDURE ONLY: CPT | Performed by: PHARMACIST

## 2025-04-14 PROCEDURE — 64491 INJ PARAVERT F JNT C/T 2 LEV: CPT | Performed by: ANESTHESIOLOGY

## 2025-04-14 PROCEDURE — 64490 INJ PARAVERT F JNT C/T 1 LEV: CPT | Performed by: ANESTHESIOLOGY

## 2025-04-14 RX ORDER — TIRZEPATIDE 5 MG/.5ML
5 INJECTION, SOLUTION SUBCUTANEOUS WEEKLY
Qty: 2 ML | Refills: 0 | Status: SHIPPED | OUTPATIENT
Start: 2025-04-28

## 2025-04-14 RX ORDER — TIRZEPATIDE 2.5 MG/.5ML
2.5 INJECTION, SOLUTION SUBCUTANEOUS WEEKLY
Qty: 2 ML | Refills: 0 | Status: SHIPPED | OUTPATIENT
Start: 2025-04-14 | End: 2025-05-12

## 2025-04-14 RX ADMIN — LIDOCAINE HYDROCHLORIDE 6 ML: 20 INJECTION, SOLUTION EPIDURAL; INFILTRATION; INTRACAUDAL at 13:50

## 2025-04-14 NOTE — NURSING NOTE
"Post procedure, while still on the procedure table, pt with c/o not feeling well, \"like I'm going to pass out\". Vs monitored from 1355 - 1404. Pt with increasing improvement with time and ice packs applied to the pt's neck / chest. Pt transported via wc to the holding area with no complaints.   "

## 2025-04-14 NOTE — H&P
History of Present Illness: The patient is a 44 y.o. female who presents with complaints of neck pain.    Past Medical History:   Diagnosis Date    Allergic     Anemia     Anxiety     Arthritis     Asthma     Cluster headache     Depression     GERD (gastroesophageal reflux disease)     Head injury     Headache(784.0)     Headache, tension-type     Hypoglycemia     Kidney stone     When i was pregnant with my first child    Migraine 2020    Vestibular migraines suffered severe concussion    Moderate episode of recurrent major depressive disorder (HCC) 10/03/2019    Otitis media     Pneumonia     PTSD (post-traumatic stress disorder)     Seizures (HCC) 2022    Only one due to medication    Shingles     Syncope     Varicella     Had it four times last one was in 94       Past Surgical History:   Procedure Laterality Date    ABDOMINAL SURGERY      3 csections, gallbladder and umbilical hernia     ANKLE LIGAMENT RECONSTRUCTION      ANKLE SURGERY       SECTION      CHOLECYSTECTOMY      CHOLECYSTECTOMY LAPAROSCOPIC N/A 2020    Procedure: CHOLECYSTECTOMY LAPAROSCOPIC;  Surgeon: Rachid Cline MD;  Location:  MAIN OR;  Service: General    HYSTERECTOMY  10/15/2024    KNEE SURGERY      SINUS SURGERY      TONSILLECTOMY      TONSILLECTOMY      TRIGGER POINT INJECTION      TUBAL LIGATION      UMBILICAL HERNIA REPAIR N/A 2020    Procedure: REPAIR HERNIA UMBILICAL, Primary;  Surgeon: Rachid Cline MD;  Location: [facility];  Service: General    UPPER GASTROINTESTINAL ENDOSCOPY           Current Outpatient Medications:     albuterol (2.5 mg/3 mL) 0.083 % nebulizer solution, Take 3 mL (2.5 mg total) by nebulization every 6 (six) hours as needed for wheezing or shortness of breath, Disp: 60 mL, Rfl: 0    albuterol (VENTOLIN HFA) 90 mcg/act inhaler, Inhale 2 puffs every 6 (six) hours as needed for wheezing, Disp: 18 g, Rfl: 0    ALPRAZolam (XANAX) 1 mg tablet,  Take 1 mg by mouth daily at bedtime as needed for anxiety, Disp: , Rfl:     Botox 200 units SOLR, , Disp: , Rfl:     budesonide-formoterol (Symbicort) 160-4.5 mcg/act inhaler, Inhale 2 puffs 2 (two) times a day Rinse mouth after use., Disp: 10.2 g, Rfl: 5    cetirizine (ZyrTEC) 10 mg tablet, Take 1 tablet (10 mg total) by mouth daily, Disp: 30 tablet, Rfl: 5    doxepin (SINEquan) 10 mg capsule, Take 10 mg by mouth, Disp: , Rfl:     Erenumab-aooe (Aimovig) 140 MG/ML SOAJ, Inject 140 mg under the skin every 30 (thirty) days, Disp: 200 mL, Rfl: 3    escitalopram (LEXAPRO) 10 mg tablet, Take 1 tablet by mouth every morning, Disp: , Rfl:     fluticasone (FLONASE) 50 mcg/act nasal spray, 1 spray into each nostril daily, Disp: , Rfl:     meclizine (ANTIVERT) 25 mg tablet, , Disp: , Rfl:     montelukast (SINGULAIR) 10 mg tablet, Take 1 tablet (10 mg total) by mouth daily at bedtime, Disp: 30 tablet, Rfl: 2    pantoprazole (PROTONIX) 40 mg, , Disp: , Rfl:     Refresh Plus 0.5 % SOLN, INSTILL 1 DROP INTO EACH EYE 4 TIMES DAILY, Disp: , Rfl:     simethicone (MYLICON) 125 MG chewable tablet, CHEW AND SWALLOW 1 TABLET EVERY 6 HOURS AS NEEDED FOR FLATULENCE, Disp: , Rfl:     Current Facility-Administered Medications:     lidocaine (PF) (XYLOCAINE-MPF) 2 % injection 6 mL, 6 mL, Perineural, Once, Bean Davis DO    Allergies   Allergen Reactions    Azithromycin GI Intolerance     Other reaction(s): GI Intolerance    Trazodone Syncope     Stopped -thought it caused syncope    Cephalosporins Hives    Fluoxetine Other (See Comments)    Gabapentin Arthralgia    Sulfa Antibiotics Hives       Physical Exam:   General: Awake, Alert, Oriented x 3, Mood and affect appropriate  Respiratory: Respirations even and unlabored  Cardiovascular: Peripheral pulses intact; no edema  Musculoskeletal Exam: Pain with cervical extension    ASA Score: 2    Patient/Chart Verification  Patient ID Verified: Verbal  ID Band Applied: No  Consents Confirmed: To  be obtained in the Procedural area  H&P( within 30 days) Verified: To be obtained in the Procedural area  Interval H&P(within 24 hr) Complete (required for Outpatients and Surgery Admit only): To be obtained in the Procedural area  Allergies Reviewed: Yes  Anticoag/NSAID held?: NA  Currently on antibiotics?: No  Pregnancy denied?: Yes    Assessment:   1. Neck pain    2. Cervical spondylosis        Plan: bilateral C4-C6 medial branch block

## 2025-04-14 NOTE — TELEPHONE ENCOUNTER
PA for (Zepbound) 2.5 mg/0.5 mL auto-injector SUBMITTED to [place]    via    []CMM-KEY:   [x]Surescripts-Case ID # [id number]   []Availity-Auth ID # NDC #   []Faxed to plan   []Other website   []Phone call Case ID #     [x]PA sent as URGENT    All office notes, labs and other pertaining documents and studies sent. Clinical questions answered. Awaiting determination from insurance company.     Turnaround time for your insurance to make a decision on your Prior Authorization can take 7-21 business days.

## 2025-04-14 NOTE — TELEPHONE ENCOUNTER
Medication needs to be in active medication list for a PA to be started. Last weight loss medication I see is Wegovy and this was discontinued. Please let our team know when Zepbound is in the chart.Thank you!

## 2025-04-14 NOTE — DISCHARGE INSTR - LAB

## 2025-04-14 NOTE — PROGRESS NOTES
[facility] Clinical Integration Pharmacy Services  Lex Carter PharmD     An Eason is a 44 y.o. female who was referred to the pharmacist for weight management, referred by Alexa Morgan DO . Patient presents via video for follow-up.     Virtual Care Documentation  Encounter provider Ana Rosa Otero    The Patient is located at Home and in the following state in which I hold an active license: [place].    The patient was identified by name and date of birth. An Eason was informed that this is a telemedicine visit and that the visit is being conducted through the Epic Embedded platform. She agrees to proceed.  My office door was closed. No one else was in the room. She acknowledged consent and understanding of privacy and security of the video platform. The patient has agreed to participate and understands they can discontinue the visit at any time.         Assessment & Plan  Medication side effects  Intolerance to Wegovy with severe gastrointestinal side effects  Overweight with body mass index (BMI) of 27 to 27.9 in adult  Patient has failed conservative treatment with weight loss through lifestyle changes after at least 6 months of consistent interventions, reasonable to consider medication options.      Contraindications to weight loss: none  Potential of weight loss to improve health: moderate     Interventions:   1. Provided Zepbound education to patient. Discussed mechanism of action, potential side effects, expected outcomes, and proper administration.  Answered patient's questions.   - Demonstration device utilized  2. Start Zepbound (tirzepatide): 2.5 mg SQ weekly x 4 weeks, then increase to 5 mg SQ weekly. May further increase dose in 2.5 mg/week increments every 4 weeks, if needed (maximum weekly dose: 15 mg/week).   - Requested prior authorization from dedicated support team  Prior Authorization Clinical Questions for Weight Management Pharmacotherapy    1. Does the patient have a  contrainidcation to medication prescribed for weight management?: No  2. Does the patient have a diagnosis of obesity, confirmed by a BMI greater than or equal to 30 kg/m^2?: No  3. Does the patient have a BMI of greater than or equal to 27 kg/m^2 with at least one weight-related comorbidity/risk factor/complication (e.g. diabetes, dyslipidemia, coronary artery disease)?: Yes  4. Weight-related co-morbidities/risk factors: prediabetes, dyslipidemia  5. WEGOVY CVA Indication: Does patient have established documented cardiovascular disease (history of a prior heart attack (myocardial infarction), stroke, or symptomatic peripheral arterial disease (PAD)?: N/A  6. ZEPBOUND TRACIE Indication: Does patient have documented TRACIE diagnosed via sleep study (insurance will require copy of sleep study results for approval)?: N/A  7. Has the patient been on a weight loss regimen of low-calorie diet, increased physical activity, and lifestyle modifications for a minimum of 6 months?: Yes  8. Has the patient completed a comprehensive weight loss program (ie, Weight Watchers, Noom, Bariatrics, other gordy on phone)? If so, what?: No  9. Does the patient have a history of type 2 diabetes?: No  10. Has the member tried and failed other weight loss medication within the past 12 months?: Yes   -- Q10 Further explanation: Intolerance to Wegovy - severe diarrhea and nausea.   11. Will the member use requested medication in combination with another GLP agonist or weight loss drug?: No  Additional comments: Wegovy has been discontinued. Patient has not taken in several weeks     Baseline weight (in pounds): 164 lbs       Medication management    Education:    Patient advised to contact via [email or web address] if experiencing side effects before scheduled follow-up  Reinforce adherence to diet and physical activity in combination with pharmacotherapy.  Encourage patient to start tracking weight and physical activity at home  Weight regain often occurs when  medications are discontinued.    Follow-up:   Follow-up with pharmacist in 4 weeks  Next PCP visit: 5/20/2025     Subjective:      Previous trial of Wegovy resulted in severe side effects including:  - Initial bloating and pain  - Severe vomiting and diarrhea lasting 24+ hours  Symptoms occurred with 0.25 mg dose, never titrated to 0.5 mg  Last dose taken ~ 2.5 weeks ago     Today she denies any ongoing GI side effects. Reports constipation at baseline.     Administrative Statements      Pharmacist Tracking Tool  Reason For Outreach: Embedded Pharmacist  Demographics:  Intervention Method: Video  Type of Intervention: Follow-Up  Topics Addressed: Obesity  Pharmacologic Interventions: Medication Initiation and Prevent or Manage ALPESH  Non-Pharmacologic Interventions: Care coordination and Medication/Device education  Time:  Direct Patient Care:  15  mins  Care Coordination:  20  mins  Recommendation Recipient: Patient/Caregiver  Outcome: Accepted    Documentation under collaborative practice agreement. Orders pended for PCP signature if needed.    Lex Carter PharmD  Clinical Integration Pharmacist  [facility] Physician [facility]

## 2025-04-15 ENCOUNTER — TELEPHONE (OUTPATIENT)
Dept: FAMILY MEDICINE CLINIC | Facility: CLINIC | Age: 45
End: 2025-04-15

## 2025-04-15 DIAGNOSIS — E66.3 OVERWEIGHT WITH BODY MASS INDEX (BMI) OF 27 TO 27.9 IN ADULT: ICD-10-CM

## 2025-04-15 RX ORDER — TIRZEPATIDE 5 MG/.5ML
5 INJECTION, SOLUTION SUBCUTANEOUS WEEKLY
Refills: 0 | OUTPATIENT
Start: 2025-04-15

## 2025-04-15 NOTE — TELEPHONE ENCOUNTER
PA for (Zepbound) 2.5 mg/0.5 mL auto-injector  APPROVED ALL DOSES    Date(s) approved 04/14/25-10/14/25    Case # [id number]    Patient advised by          [x][employer] Message  [x]Phone call   [x]LMOM  []L/M to call office as no active Communication consent on file  []Unable to leave detailed message as VM not approved on Communication consent       Pharmacy advised by    [x]Fax  []Phone call  []Secure Chat     Approval letter scanned into Media Yes

## 2025-04-15 NOTE — TELEPHONE ENCOUNTER
A Prior Authorization has been started for Zepbound 5 mg/0.5 ml Auto-injectors.  Thank you.    KEY:  [id number]

## 2025-04-15 NOTE — TELEPHONE ENCOUNTER
Duplicate encounter created, please see telephone encounter from 04/14/25 regarding Zepbound PA status. Please review patient's chart to see if there is already an encounter regarding the medication in question and to document anything regarding this medication in regards to anything regarding the authorization process etc before creating another encounter Thank You.

## 2025-04-16 ENCOUNTER — TELEPHONE (OUTPATIENT)
Dept: PAIN MEDICINE | Facility: CLINIC | Age: 45
End: 2025-04-16

## 2025-04-16 NOTE — TELEPHONE ENCOUNTER
----- Message from Bean Davis DO sent at 4/16/2025 12:31 PM EDT -----  Patient with negative diagnostic medial branch block diary recommend follow-up with NP/PA

## 2025-04-17 ENCOUNTER — OFFICE VISIT (OUTPATIENT)
Dept: PHYSICAL THERAPY | Facility: CLINIC | Age: 45
End: 2025-04-17
Payer: COMMERCIAL

## 2025-04-17 DIAGNOSIS — M54.2 CHRONIC NECK PAIN: ICD-10-CM

## 2025-04-17 DIAGNOSIS — G89.29 CHRONIC NECK PAIN: ICD-10-CM

## 2025-04-17 DIAGNOSIS — M54.2 CERVICALGIA: Primary | ICD-10-CM

## 2025-04-17 PROCEDURE — 97112 NEUROMUSCULAR REEDUCATION: CPT | Performed by: PHYSICAL THERAPIST

## 2025-04-17 PROCEDURE — 97110 THERAPEUTIC EXERCISES: CPT | Performed by: PHYSICAL THERAPIST

## 2025-04-17 NOTE — PROGRESS NOTES
"Daily Note     Today's date: 2025  Patient name: An Eason  : 1980  MRN: [id number]  Referring provider: Hannah Diamond MD  Dx:   Encounter Diagnosis     ICD-10-CM    1. Cervicalgia  M54.2       2. Chronic neck pain  M54.2     G89.29              Subjective: Pt reported that she had the lidocaine trial for her future ablation and she said it was successful and she had a reduction in pain. She stated that this morning she woke up with migraine and stiffness.     Objective: See treatment diary below    Assessment: Tolerated treatment well. Patient demonstrated fatigue post treatment, exhibited good technique with therapeutic exercises, and would benefit from continued PT. Plan to advance cervical strengthening per next visit.     Plan: Continue per plan of care.      Precautions:   Past Medical History:   Diagnosis Date    Anxiety     Asthma     Depression     GERD (gastroesophageal reflux disease)     Hypoglycemia     Moderate episode of recurrent major depressive disorder (HCC) 10/03/2019    PTSD (post-traumatic stress disorder)          Manuals   2/19 3/5 3/12 4/3 4/17   distraction          STM SO, rina                              Neuro Re-Ed   2/19 3/5 3/12 4/3 4/17   Scap retract           Scap retract row    Black 3\" 2x10  Black 3x10  Black 3x10 Black 3x10  Blk 3x10    Scap retract sh extension    Black 3\" 2x10  Black 3x10  Black 3x10  Black 3x10  Blk 3x10   Scap retract - tricep ext at side   Red 2x10  Red 3x10  Blue 3x10 Blue 3x10  Nino 3x10   Scap retract bicep curl   Green 2x10  Blue 3x10  Blue 3x10 Blue 3x10  Nino 3x10   Chicken wings     2\" 2x10  2\" 2x10 2\" 2x10   High row      Blue 2x10  Nino 3x10             Ther Ex   2/19 3/5 3/12 4/3 4/17   Supine chin tucks          Seated chin tucks   5\" x10  5\" 2x10 5\" 2x10 5\" 2x10  5\" 2x10    UT stretch - no overpressure    5\" x10 ea 5\" x10  5\" 2x10 5\" 2x10 5\" 2x10   Levator stretch   5\" x10 ea 5\"x10 5\" 10x ea 5\" 2x10  5\" 2x10   Seated " "thoracic extension - hands   3\" 2x10 3\" 10x 5\" x10 5\" 2x10 5\" 2x10   SCM stretch   NV  5\" 10x ea 5\" 2x10 5\" 10x ea   Overhead press *         scaption *  2#          Shrugs *         HEP   Reviewed HEP and educated her on performing exercise in pain free ROM 15'  5' 5'               Ther Activity   2/19 3/5  4/3                                                                                                     "

## 2025-04-18 ENCOUNTER — TELEPHONE (OUTPATIENT)
Dept: PAIN MEDICINE | Facility: CLINIC | Age: 45
End: 2025-04-18

## 2025-04-18 NOTE — TELEPHONE ENCOUNTER
Caller: An    Doctor: Dr. Davis    Reason for call: patient is currently scheduled 4/22 with Dr. Connell she is very upset and crying she needs clarity on what is happening why won't we do Ablation. Somebody told her they would cancel appt on 4/22 at 9 am       Call back#: [phone]

## 2025-04-18 NOTE — TELEPHONE ENCOUNTER
S/w Pt to reschedule ov with Shannan     Pt got VERY upset and angry. Pt keeps insisting that she wants the ablation that was canceled due to insurance. I explained that Dr Connell sees Pt for consults and procedures. Follows go to NP/PA per guide. Pt was yelling and cursing. At one point she did apologize for cursing but continued to do it    Pt states that Dr Davis canceled my appt so I want to see him and have him tell me to my face that he wont do. She does not want to see a nurse just for her to say we can't help you. I let her know that she doesn't have to schedule this office visit if she doesn't want    I repeated that we are canceling her appt with Dr Davis

## 2025-04-18 NOTE — TELEPHONE ENCOUNTER
"S/w pt, pt was crying at the start of the phone call. Nurse advised pt SL did not feel pt would benefit from ablation based off of pain diary results. Pt asked nurse to talk with SL again to see if SL would change his mind.  Nurse did discus further with SL, SL does not feel pt would benefit moving fwd.  Nurse offered a list of area pain providers to patient, pt declined stating she would call her insurance instead of wasting time calling number our office provides.  Nurse did apologize for our office not being able to aid in pain relief, pt stated she does not feel I am and stated \"thanks for nothing\" before disconnecting the call.    "

## 2025-04-22 ENCOUNTER — TELEPHONE (OUTPATIENT)
Dept: NEUROLOGY | Facility: CLINIC | Age: 45
End: 2025-04-22

## 2025-04-22 NOTE — TELEPHONE ENCOUNTER
Botox number of units: 200  Botox quantity: 1  Arrived at what location: [facility]  Botox at Correct Administering Location: [facility]  NDC number:[id number]  Lot number:[id number]  Expiration Date: 09/27  Appt notes indicate correct medication:  YES

## 2025-04-24 ENCOUNTER — OFFICE VISIT (OUTPATIENT)
Dept: PHYSICAL THERAPY | Facility: CLINIC | Age: 45
End: 2025-04-24
Payer: COMMERCIAL

## 2025-04-24 DIAGNOSIS — M54.2 CERVICALGIA: Primary | ICD-10-CM

## 2025-04-24 DIAGNOSIS — G89.29 CHRONIC NECK PAIN: ICD-10-CM

## 2025-04-24 DIAGNOSIS — M54.2 CHRONIC NECK PAIN: ICD-10-CM

## 2025-04-24 PROCEDURE — 97110 THERAPEUTIC EXERCISES: CPT | Performed by: PHYSICAL THERAPIST

## 2025-04-24 PROCEDURE — 97112 NEUROMUSCULAR REEDUCATION: CPT | Performed by: PHYSICAL THERAPIST

## 2025-04-24 NOTE — PROGRESS NOTES
"Daily Note     Today's date: 2025  Patient name: An Eason  : 1980  MRN: [id number]  Referring provider: Hannah Diamond MD  Dx:   Encounter Diagnosis     ICD-10-CM    1. Cervicalgia  M54.2       2. Chronic neck pain  M54.2     G89.29              Subjective: Pt reported that she is walking in today after an appointment with her chiropractor. She noted that \"I am feeling a little looser.\" Noted they performed cupping, manipulation, and estim.     Objective: See treatment diary below    Assessment: Tolerated treatment well. Noted muscle fatigue with new dumbbell and TB resisted exercises, however no pain. Reported improved flexibility and decreased stiffness with active stretches. Patient demonstrated fatigue post treatment, exhibited good technique with therapeutic exercises, and would benefit from continued PT    Plan: Continue per plan of care.      Precautions:   Past Medical History:   Diagnosis Date    Anxiety     Asthma     Depression     GERD (gastroesophageal reflux disease) 2020    Hypoglycemia     Moderate episode of recurrent major depressive disorder (HCC) 10/03/2019    PTSD (post-traumatic stress disorder)          Manuals 4/24  2/19 3/5 3/12 4/3 4/17   distraction          STM SO, rina                              Neuro Re-Ed 4/24  2/19 3/5 3/12 4/3 4/17   Scap retract           Scap retract row  Blk x10  2Blk x10ea  Black 3\" 2x10  Black 3x10  Black 3x10 Black 3x10  Blk 3x10    Scap retract sh extension palm forward RedTB 2x10  Black 3\" 2x10  Black 3x10  Black 3x10  Black 3x10  Blk 3x10   Scap retract - tricep ext at side BlueTB 3x10  Red 2x10  Red 3x10  Blue 3x10 Blue 3x10  Nino 3x10   Scap retract bicep curl BlueTB 3x10  Green 2x10  Blue 3x10  Blue 3x10 Blue 3x10  Nino 3x10   Chicken wings 2\" 2x10    2\" 2x10  2\" 2x10 2\" 2x10   High row BlueTB 3x10     Blue 2x10  Nino 3x10             Ther Ex 4/24  2/19 3/5 3/12 4/3 4/17   Supine chin tucks          Seated chin tucks 5\"x10ea  5\" x10 " " 5\" 2x10 5\" 2x10 5\" 2x10  5\" 2x10    UT stretch - no overpressure  5\"x10ea  5\" x10 ea 5\" x10  5\" 2x10 5\" 2x10 5\" 2x10   Levator stretch 5\"x10 ea  5\" x10 ea 5\"x10 5\" 10x ea 5\" 2x10  5\" 2x10   Seated thoracic extension - hands 5\"x10ea  3\" 2x10 3\" 10x 5\" x10 5\" 2x10 5\" 2x10   SCM stretch   NV  5\" 10x ea 5\" 2x10 5\" 10x ea   Overhead press 1# 10x  2# 10x         scaption 1# 10x  2# 10x         Shrugs 1# 10x  2# 10x         HEP   Reviewed HEP and educated her on performing exercise in pain free ROM 15'  5' 5'     Horizontal adduction RedTB 2x10ea                   Ther Activity 4/24 2/19 3/5  4/3                                                                                                       "

## 2025-05-01 ENCOUNTER — APPOINTMENT (OUTPATIENT)
Dept: PHYSICAL THERAPY | Facility: CLINIC | Age: 45
End: 2025-05-01
Attending: PSYCHIATRY & NEUROLOGY
Payer: COMMERCIAL

## 2025-05-02 ENCOUNTER — TELEPHONE (OUTPATIENT)
Dept: NEUROLOGY | Facility: CLINIC | Age: 45
End: 2025-05-02

## 2025-05-02 NOTE — TELEPHONE ENCOUNTER
Called and left vm to notify of last minute change of schedule and will need to r/s appt on 5/29/25 @8:30am. Offered on 6/10/25 @8:30 advised to call back to accept, thank you

## 2025-05-05 ENCOUNTER — TELEPHONE (OUTPATIENT)
Dept: NEUROLOGY | Facility: CLINIC | Age: 45
End: 2025-05-05

## 2025-05-05 NOTE — TELEPHONE ENCOUNTER
Called in attempt to conf appt on 5/9/25 as well as r/s appt on 5/29/25 due to a sudden change of schedule. Pls conf and assist with r/s if pt calls back, thank you

## 2025-05-06 ENCOUNTER — OFFICE VISIT (OUTPATIENT)
Dept: OBGYN CLINIC | Facility: CLINIC | Age: 45
End: 2025-05-06
Payer: COMMERCIAL

## 2025-05-06 VITALS
DIASTOLIC BLOOD PRESSURE: 62 MMHG | BODY MASS INDEX: 28.85 KG/M2 | WEIGHT: 169 LBS | SYSTOLIC BLOOD PRESSURE: 108 MMHG | HEIGHT: 64 IN

## 2025-05-06 DIAGNOSIS — R21 RASH: ICD-10-CM

## 2025-05-06 DIAGNOSIS — N64.4 DIFFUSE NON-CYCLICAL BREAST PAIN: Primary | ICD-10-CM

## 2025-05-06 PROCEDURE — 99203 OFFICE O/P NEW LOW 30 MIN: CPT | Performed by: NURSE PRACTITIONER

## 2025-05-06 RX ORDER — TRIAMCINOLONE ACETONIDE 1 MG/G
CREAM TOPICAL 2 TIMES DAILY
Qty: 28.4 G | Refills: 0 | Status: SHIPPED | OUTPATIENT
Start: 2025-05-06 | End: 2025-05-20

## 2025-05-06 NOTE — PROGRESS NOTES
[facility] [place]  [address] [place] [address]    Assessment/Plan:  1. Diffuse non-cyclical breast pain  -     Mammo diagnostic bilateral w 3d and cad; Future  -     US breast bilateral limited (diagnostic); Future; Expected date: 2025  2. Rash  -     Mammo diagnostic bilateral w 3d and cad; Future  -     US breast bilateral limited (diagnostic); Future; Expected date: 2025  -     triamcinolone (KENALOG) 0.1 % cream; Apply topically 2 (two) times a day for 14 days    Assessment & Plan  Diffuse non-cyclical breast pain  Bilateral squeezing sensation in both breasts x several months.  Imaging ordered.  Orders:    Mammo diagnostic bilateral w 3d and cad; Future    US breast bilateral limited (diagnostic); Future    Rash  Bilateral breasts with faint erythema, no raised lesions or exudate.   Will treat with course of topical steroid.   Breast imaging ordered.  If rash does not resolve, should see dermatology.  Orders:    Mammo diagnostic bilateral w 3d and cad; Future    US breast bilateral limited (diagnostic); Future    triamcinolone (KENALOG) 0.1 % cream; Apply topically 2 (two) times a day for 14 days         Subjective:   An Eason is a 44 y.o.  female.    HPI:   44 year old female presents for office visit with complaints of bilateral breast rash with itching and pain. Pt reports squeezing sensation behind nipples. Started months ago with rash, first on left then on right. Pain started shortly thereafter. Pt denies fever, chills or other symptoms. She does report being tired but is under a lot of stress with family concerns. Pt has supportive fiance.         Gyn History  Patient's last menstrual period was 2022.       Last pap smear: Not on file    She  reports that she is not currently sexually active and has had partner(s) who are male. She reports using the following methods of birth control/protection: Abstinence and Surgical.       OB History      Past Medical  History:  No date: Allergic  No date: Anemia  No date: Anxiety  No date: Arthritis  No date: Asthma  No date: Cluster headache  No date: Depression  2020: GERD (gastroesophageal reflux disease)  : Head injury  No date: Headache(784.0)  No date: Headache, tension-type  No date: Hypoglycemia  2011: Kidney stone      Comment:  When i was pregnant with my first child  2020: Migraine      Comment:  Vestibular migraines suffered severe concussion  10/03/2019: Moderate episode of recurrent major depressive disorder   (HCC)  No date: Otitis media  No date: Pneumonia  No date: PTSD (post-traumatic stress disorder)  2022: Seizures (HCC)      Comment:  Only one due to medication  2018: Shingles  No date: Syncope  No date: Varicella      Comment:  Had it four times last one was in 94     Past Surgical History:  No date: ABDOMINAL SURGERY      Comment:  3 csections, gallbladder and umbilical hernia   No date: ANKLE LIGAMENT RECONSTRUCTION  2017: ANKLE SURGERY  No date:  SECTION  2020: CHOLECYSTECTOMY  2020: CHOLECYSTECTOMY LAPAROSCOPIC; N/A      Comment:  Procedure: CHOLECYSTECTOMY LAPAROSCOPIC;  Surgeon:                Rachid Cline MD;  Location:  MAIN OR;  Service:                General  10/15/2024: HYSTERECTOMY  No date: KNEE SURGERY  No date: SINUS SURGERY  No date: TONSILLECTOMY  No date: TONSILLECTOMY  : TRIGGER POINT INJECTION  No date: TUBAL LIGATION  2020: UMBILICAL HERNIA REPAIR; N/A      Comment:  Procedure: REPAIR HERNIA UMBILICAL, Primary;  Surgeon:                Rachid Cline MD;  Location:  MAIN OR;  Service:                General  2020: UPPER GASTROINTESTINAL ENDOSCOPY     Social History     Tobacco Use    Smoking status: Never    Smokeless tobacco: Never    Tobacco comments:     Never smoked   Vaping Use    Vaping status: Never Used   Substance Use Topics    Alcohol use: Not Currently     Comment: Barely drink    Drug use: Never          Current Outpatient  Medications:     albuterol (2.5 mg/3 mL) 0.083 % nebulizer solution, Take 3 mL (2.5 mg total) by nebulization every 6 (six) hours as needed for wheezing or shortness of breath, Disp: 60 mL, Rfl: 0    albuterol (VENTOLIN HFA) 90 mcg/act inhaler, Inhale 2 puffs every 6 (six) hours as needed for wheezing, Disp: 18 g, Rfl: 0    ALPRAZolam (XANAX) 1 mg tablet, Take 1 mg by mouth daily at bedtime as needed for anxiety, Disp: , Rfl:     Botox 200 units SOLR, , Disp: , Rfl:     budesonide-formoterol (Symbicort) 160-4.5 mcg/act inhaler, Inhale 2 puffs 2 (two) times a day Rinse mouth after use., Disp: 10.2 g, Rfl: 5    cetirizine (ZyrTEC) 10 mg tablet, Take 1 tablet (10 mg total) by mouth daily, Disp: 30 tablet, Rfl: 5    doxepin (SINEquan) 10 mg capsule, Take 10 mg by mouth, Disp: , Rfl:     Erenumab-aooe (Aimovig) 140 MG/ML SOAJ, Inject 140 mg under the skin every 30 (thirty) days, Disp: 200 mL, Rfl: 3    escitalopram (LEXAPRO) 10 mg tablet, Take 1 tablet by mouth every morning, Disp: , Rfl:     fluticasone (FLONASE) 50 mcg/act nasal spray, 1 spray into each nostril daily, Disp: , Rfl:     meclizine (ANTIVERT) 25 mg tablet, , Disp: , Rfl:     montelukast (SINGULAIR) 10 mg tablet, Take 1 tablet (10 mg total) by mouth daily at bedtime, Disp: 30 tablet, Rfl: 2    pantoprazole (PROTONIX) 40 mg, , Disp: , Rfl:     Refresh Plus 0.5 % SOLN, INSTILL 1 DROP INTO EACH EYE 4 TIMES DAILY, Disp: , Rfl:     simethicone (MYLICON) 125 MG chewable tablet, CHEW AND SWALLOW 1 TABLET EVERY 6 HOURS AS NEEDED FOR FLATULENCE, Disp: , Rfl:     tirzepatide (Zepbound) 2.5 mg/0.5 mL auto-injector, Inject 0.5 mL (2.5 mg total) under the skin once a week for 28 days, Disp: 2 mL, Rfl: 0    tirzepatide (Zepbound) 5 mg/0.5 mL auto-injector, Inject 0.5 mL (5 mg total) under the skin once a week Do not start before April 28, 2025., Disp: 2 mL, Rfl: 0    triamcinolone (KENALOG) 0.1 % cream, Apply topically 2 (two) times a day for 14 days, Disp: 28.4 g, Rfl:  "0    She is allergic to azithromycin, trazodone, cephalosporins, fluoxetine, gabapentin, and sulfa antibiotics..    ROS: Review of Systems See HPI for details otherwise negative.     Objective:  /62 (BP Location: Left arm, Patient Position: Sitting, Cuff Size: Standard)   Ht 5' 4\" (1.626 m)   Wt 76.7 kg (169 lb)   LMP 11/29/2022   Breastfeeding No   BMI 29.01 kg/m²      Physical Exam  Constitutional:       General: She is not in acute distress.     Appearance: Normal appearance. She is not ill-appearing.   HENT:      Head: Normocephalic and atraumatic.   Chest:   Breasts:     Right: Normal. No swelling, mass, nipple discharge, skin change or tenderness.      Left: Normal. No swelling, mass, nipple discharge, skin change or tenderness.          Comments: Small patch of faint erythema noted on inner aspects of both breasts, no raised or distinct lesions or exudate.   Abdominal:      General: There is no distension.      Palpations: Abdomen is soft.      Tenderness: There is no abdominal tenderness.   Musculoskeletal:         General: No swelling.   Skin:     General: Skin is warm and dry.   Neurological:      Mental Status: She is alert.   Psychiatric:         Thought Content: Thought content normal.           "

## 2025-05-06 NOTE — PATIENT INSTRUCTIONS
Please schedule your breast imaging  Call with any concerns  See dermatology if breast rash persists after trial of topical steroid cream.

## 2025-05-07 ENCOUNTER — OFFICE VISIT (OUTPATIENT)
Dept: OBGYN CLINIC | Facility: HOSPITAL | Age: 45
End: 2025-05-07
Payer: COMMERCIAL

## 2025-05-07 VITALS — WEIGHT: 169.09 LBS | HEIGHT: 64 IN | BODY MASS INDEX: 28.87 KG/M2

## 2025-05-07 DIAGNOSIS — M50.30 DDD (DEGENERATIVE DISC DISEASE), CERVICAL: Primary | ICD-10-CM

## 2025-05-07 PROCEDURE — 99213 OFFICE O/P EST LOW 20 MIN: CPT | Performed by: ORTHOPAEDIC SURGERY

## 2025-05-07 NOTE — PROGRESS NOTES
Name: An Eason      : 1980       MRN: [id number]   Encounter Provider: Juan Miguel Baker MD   Encounter Date: 25  Encounter department: [facility] ORTHOPEDIC SPINE SURGERY    Medical Decision Making:     Assessment & Plan  DDD (degenerative disc disease), cervical              Diagnostic Tests   IMAGING: I have personally reviewed the images and these are my findings:  Cervical Spine X-rays from ***: multi level cervical spondylosis with loss of disc height, uncovertebral hypertrophy, no apparent spondylolisthesis, no appreciated lytic/blastic lesions, no obvious instability    Cervical Spine MRI from ***: multi level cervical disc degeneration with disc desiccation, loss of disc height,      Subjective:      Chief Complaint: Neck Pain    HPI:  An Eason is a 44 y.o. female presenting for initial visit with chief complaint of neck pain.  Pain began  where she states she had a concussion and since then had neck pain.   Describes pain as sharp in nature.  Located in neck.  Patient denies any radiation of pain but does states she does get numbness and tingling within her hands. Denies burning.  Neck pain 90%, Periscapular/Arm pain 10%.  Left > Right pain is worse with neck movements and improves with rest.  Denies fever or chills, no night sweats. Denies any bladder or bowel changes.      Conservative therapy includes the following:   Medications: denies    Injections: tried lidocaine injections in trapezius muscle in , no cervical spine CSI.   Physical Therapy: plans to initiate spine PT from neurology  Chiropractic Medicine: has attempted but pays out of pocket so only 1-2/month  Accupunture/Massage Therapy: has not attempted   These therapeutic modalities were ineffective at providing sustained pain relief/functional improvement.     Nicotine dependent: denies  Occupation: unemployed  Living situation: Lives with family   ADLs: patient is  able to perform       Updated HPI 5/7/25:  ***    Objective:     Family History   Problem Relation Age of Onset    Depression Mother     Asthma Mother     Mental illness Mother     Thyroid disease Mother         My aunt also has thyroid issues    Mental illness Maternal Grandfather     Asthma Maternal Grandfather     Heart disease Maternal Grandfather         Runs in family    Pulmonary embolism Maternal Grandmother     Asthma Son     Alcohol abuse Maternal Aunt     Drug abuse Maternal Aunt     Developmental delay Son     Mental illness Son     Migraines Son     Asthma Son     Migraines Son     Rashes / Skin problems Son         Eczema       Past Medical History:   Diagnosis Date    Allergic     Anemia     Anxiety     Arthritis     Asthma     Cluster headache     Depression     GERD (gastroesophageal reflux disease) 2020    Head injury 2021    Headache(784.0)     Headache, tension-type     Hypoglycemia     Kidney stone 2011    When i was pregnant with my first child    Migraine 6/2020    Vestibular migraines suffered severe concussion    Moderate episode of recurrent major depressive disorder (HCC) 10/03/2019    Otitis media     Pneumonia     PTSD (post-traumatic stress disorder)     Seizures (HCC) 12/5/2022    Only one due to medication    Shingles 2018    Syncope     Varicella     Had it four times last one was in 94       Current Outpatient Medications   Medication Sig Dispense Refill    albuterol (2.5 mg/3 mL) 0.083 % nebulizer solution Take 3 mL (2.5 mg total) by nebulization every 6 (six) hours as needed for wheezing or shortness of breath 60 mL 0    albuterol (VENTOLIN HFA) 90 mcg/act inhaler Inhale 2 puffs every 6 (six) hours as needed for wheezing 18 g 0    ALPRAZolam (XANAX) 1 mg tablet Take 1 mg by mouth daily at bedtime as needed for anxiety      Botox 200 units SOLR       budesonide-formoterol (Symbicort) 160-4.5 mcg/act inhaler Inhale 2 puffs 2 (two) times a day Rinse mouth after use. 10.2 g 5     cetirizine (ZyrTEC) 10 mg tablet Take 1 tablet (10 mg total) by mouth daily 30 tablet 5    doxepin (SINEquan) 10 mg capsule Take 10 mg by mouth      Erenumab-aooe (Aimovig) 140 MG/ML SOAJ Inject 140 mg under the skin every 30 (thirty) days 200 mL 3    escitalopram (LEXAPRO) 10 mg tablet Take 1 tablet by mouth every morning      fluticasone (FLONASE) 50 mcg/act nasal spray 1 spray into each nostril daily      meclizine (ANTIVERT) 25 mg tablet       montelukast (SINGULAIR) 10 mg tablet Take 1 tablet (10 mg total) by mouth daily at bedtime 30 tablet 2    pantoprazole (PROTONIX) 40 mg       Refresh Plus 0.5 % SOLN INSTILL 1 DROP INTO EACH EYE 4 TIMES DAILY      simethicone (MYLICON) 125 MG chewable tablet CHEW AND SWALLOW 1 TABLET EVERY 6 HOURS AS NEEDED FOR FLATULENCE      tirzepatide (Zepbound) 2.5 mg/0.5 mL auto-injector Inject 0.5 mL (2.5 mg total) under the skin once a week for 28 days 2 mL 0    tirzepatide (Zepbound) 5 mg/0.5 mL auto-injector Inject 0.5 mL (5 mg total) under the skin once a week Do not start before 2025. 2 mL 0    triamcinolone (KENALOG) 0.1 % cream Apply topically 2 (two) times a day for 14 days 28.4 g 0     No current facility-administered medications for this visit.       Past Surgical History:   Procedure Laterality Date    ABDOMINAL SURGERY      3 csections, gallbladder and umbilical hernia     ANKLE LIGAMENT RECONSTRUCTION      ANKLE SURGERY  2017     SECTION      CHOLECYSTECTOMY      CHOLECYSTECTOMY LAPAROSCOPIC N/A 2020    Procedure: CHOLECYSTECTOMY LAPAROSCOPIC;  Surgeon: Rachid Cline MD;  Location: [facility] MAIN OR;  Service: General    HYSTERECTOMY  10/15/2024    KNEE SURGERY      SINUS SURGERY      TONSILLECTOMY      TONSILLECTOMY      TRIGGER POINT INJECTION      TUBAL LIGATION      UMBILICAL HERNIA REPAIR N/A 2020    Procedure: REPAIR HERNIA UMBILICAL, Primary;  Surgeon: Rachid Cline MD;  Location: [facility] MAIN OR;  Service: General    UPPER  GASTROINTESTINAL ENDOSCOPY  2020       Social History     Socioeconomic History    Marital status: Single     Spouse name: Not on file    Number of children: Not on file    Years of education: Not on file    Highest education level: Not on file   Occupational History    Not on file   Tobacco Use    Smoking status: Never    Smokeless tobacco: Never    Tobacco comments:     Never smoked   Vaping Use    Vaping status: Never Used   Substance and Sexual Activity    Alcohol use: Not Currently     Comment: Barely drink    Drug use: Never    Sexual activity: Not Currently     Partners: Male     Birth control/protection: Abstinence, Surgical     Comment: Hurts too much   Other Topics Concern    Not on file   Social History Narrative    Do you have pets? none Is pet allowed in bedroom?NA    Are you a smoker? Never    Does anyone smoke in your home? No       Do you live with smokers? No    [place] frequently? No   How many times a year? N/A      Social Drivers of Health     Financial Resource Strain: Low Risk  (10/15/2024)    Received from [facility]    Overall Financial Resource Strain (CARDIA)     Difficulty of Paying Living Expenses: Not hard at all   Food Insecurity: Food Insecurity Present (10/15/2024)    Received from [facility]    Hunger Vital Sign     Worried About Running Out of Food in the Last Year: Sometimes true     Ran Out of Food in the Last Year: Sometimes true   Transportation Needs: Unmet Transportation Needs (10/15/2024)    Received from [facility]    PRAPARE - Transportation     Lack of Transportation (Medical): Yes     Lack of Transportation (Non-Medical): Yes   Physical Activity: Insufficiently Active (11/4/2019)    Exercise Vital Sign     Days of Exercise per Week: 1 day     Minutes of Exercise per Session: 60 min   Stress: Stress Concern Present (7/24/2024)    Received from [facility]    [place] [employer]  "- Occupational Stress Questionnaire     Feeling of Stress : Rather much   Social Connections: Feeling Somewhat Isolated (7/24/2024)    Received from [facility]    OASIS : Social Isolation     How often do you feel lonely or isolated from those around you?: Sometimes   Intimate Partner Violence: Not At Risk (10/15/2024)    Received from [facility], [facility]    Humiliation, Afraid, Rape, and Kick questionnaire     Fear of Current or Ex-Partner: No     Emotionally Abused: No     Physically Abused: No     Sexually Abused: No   Housing Stability: Low Risk  (10/15/2024)    Received from [facility]    Housing Stability Vital Sign     Unable to Pay for Housing in the Last Year: No     Number of Times Moved in the Last Year: 1     Homeless in the Last Year: No       Allergies   Allergen Reactions    Azithromycin GI Intolerance     Other reaction(s): GI Intolerance    Trazodone Syncope     Stopped -thought it caused syncope    Cephalosporins Hives    Fluoxetine Other (See Comments)    Gabapentin Arthralgia    Sulfa Antibiotics Hives       Review of Systems  General- denies fever/chills  HEENT- denies hearing loss or sore throat  Eyes- denies eye pain or visual disturbances, denies red eyes  Respiratory- denies cough or SOB  Cardio- denies chest pain or palpitations  GI- denies abdominal pain  Endocrine- denies urinary frequency  Urinary- denies pain with urination  Musculoskeletal- Negative except noted above  Skin- denies rashes or wounds  Neurological- denies dizziness or headache  Psychiatric- denies anxiety or difficulty concentrating    Physical Exam  Ht 5' 4\" (1.626 m)   Wt 76.7 kg (169 lb 1.5 oz)   LMP 11/29/2022   BMI 29.02 kg/m²     General/Constitutional: No apparent distress: well-nourished and well developed.  Lymphatic: No appreciable lymphadenopathy  Respiratory: Non-labored breathing  Vascular: No edema, swelling or tenderness, " "except as noted in detailed exam.  Integumentary: No impressive skin lesions present, except as noted in detailed exam.  Psych: Normal mood and affect, oriented to person, place and time.  MSK: normal other than stated in HPI and exam  Gait & balance: no evidence of myelopathic gait, ambulates {CWgait:76523}    Cervical  spine range of motion:  -Forward flexion chin to chest  -Extension to 60  -Lateral bend 30 right, 30 left  -Rotation 45 right, 45 left.    There is no point tenderness with palpation along the posterior cervical, thoracic, lumbar spine. ***    Neurologic:  Upper Extremity Motor Function ***   Right  Left    Deltoid  5/5  5/5    Bicep  5/5  5/5    Wrist extension  5/5  5/5    Tricep  5/5  5/5    Finger flexion/  5/5  5/5    Hand intrinsic  5/5  5/5      Lower Extremity Motor Function ***   Right  Left    Iliopsoas  5/5  5/5    Quadriceps 5/5 5/5   Tibialis anterior  5/5  5/5    EHL  5/5  5/5    Gastroc. muscle  5/5  5/5      Sensory: light touch is intact to bilateral upper and lower extremities     Reflexes:  intact, symmetric     Other tests:  Spurling's: {POSITIVE OR NEGATIVE:63796}  Frias's: {POSITIVE OR NEGATIVE:85737}  Inverted Radial: {POSITIVE OR NEGATIVE:08091}  Tandem gait: {POSITIVE OR NEGATIVE:66119}  Carpal Tinel/Phalen: negative bilateral ***  Cubital Tinel: negative bilateral ***  Shoulder: painless active & passive ROM bilateral ***    Electronic Medical Records were reviewed including***    Procedures, if performed today     None performed       Portions of the record may have been created with voice recognition software.  Occasional wrong word or \"sound a like\" substitutions may have occurred due to the inherent limitations of voice recognition software.  Read the chart carefully and recognize, using context, where substitutions have occurred.                          Assessment & Plan/Medical Decision Makin y.o. female with Neck Pain and imaging findings most notable " for cervical spondylosis        The clinical, physical and imaging findings were reviewed with the patient.  An  has a constellation of findings consistent with cervical myofascial pain, cervical radiculopathy in the setting of cervical degenerative disease.      Fortunately patient remains neurologically intact and functional. Physical exam showing decreased cervical spine ROM.  We discussed the treatment options including physical therapy, at home exercises, activity modifications, chiropractic medicine, oral medications, interventional spine procedures.  At this time recommend continued conservative treatments.    Referral previously placed for physical therapy by Neurology - recommend to work on cervical ROM, strengthening, and stretching exercises.   Referral to pain management for evaluation and treatment. Discussed potential role of steroid injection at or near the source of pain to provide targeted relief.     Patient instructed to return to office/ER sooner if symptoms are not improving, getting worse, or new worrisome/neurologic symptoms arise.  Patient will follow up in approx. 3 months for re-evaluation after further conservative treatments.       Subjective:      Chief Complaint: Neck Pain    HPI:  An Eason is a 44 y.o. female presenting for initial visit with chief complaint of neck pain.  Pain began 2021 where she states she had a concussion and since then had neck pain.   Describes pain as sharp in nature.  Located in neck.  Patient denies any radiation of pain but does states she does get numbness and tingling within her hands. Denies burning.  Neck pain 90%, Periscapular/Arm pain 10%.  Left > Right pain is worse with neck movements and improves with rest.  Denies fever or chills, no night sweats. Denies any bladder or bowel changes.      Conservative therapy includes the following:   Medications: denies    Injections: tried lidocaine injections in trapezius muscle in 2023, no cervical spine  CSI.   Physical Therapy: plans to initiate spine PT from neurology  Chiropractic Medicine: has attempted but pays out of pocket so only 1-2/month  Accupunture/Massage Therapy: has not attempted   These therapeutic modalities were ineffective at providing sustained pain relief/functional improvement.     Nicotine dependent: denies  Occupation: unemployed  Living situation: Lives with family   ADLs: patient is able to perform     Objective:     Family History   Problem Relation Age of Onset    Depression Mother     Asthma Mother     Mental illness Mother     Thyroid disease Mother         My aunt also has thyroid issues    Mental illness Maternal Grandfather     Asthma Maternal Grandfather     Heart disease Maternal Grandfather         Runs in family    Pulmonary embolism Maternal Grandmother     Asthma Son     Alcohol abuse Maternal Aunt     Drug abuse Maternal Aunt     Developmental delay Son     Mental illness Son     Migraines Son     Asthma Son     Migraines Son     Rashes / Skin problems Son         Eczema       Past Medical History:   Diagnosis Date    Allergic     Anemia     Anxiety     Arthritis     Asthma     Cluster headache     Depression     GERD (gastroesophageal reflux disease) 2020    Head injury 2021    Headache(784.0)     Headache, tension-type     Hypoglycemia     Kidney stone 2011    When i was pregnant with my first child    Migraine 6/2020    Vestibular migraines suffered severe concussion    Moderate episode of recurrent major depressive disorder (HCC) 10/03/2019    Otitis media     Pneumonia     PTSD (post-traumatic stress disorder)     Seizures ([facility]) 12/5/2022    Only one due to medication    Shingles 2018    Syncope     Varicella     Had it four times last one was in 94       Current Outpatient Medications   Medication Sig Dispense Refill    albuterol (2.5 mg/3 mL) 0.083 % nebulizer solution Take 3 mL (2.5 mg total) by nebulization every 6 (six) hours as needed for wheezing or shortness of  breath 60 mL 0    albuterol (VENTOLIN HFA) 90 mcg/act inhaler Inhale 2 puffs every 6 (six) hours as needed for wheezing 18 g 0    ALPRAZolam (XANAX) 1 mg tablet Take 1 mg by mouth daily at bedtime as needed for anxiety      Botox 200 units SOLR       budesonide-formoterol (Symbicort) 160-4.5 mcg/act inhaler Inhale 2 puffs 2 (two) times a day Rinse mouth after use. 10.2 g 5    cetirizine (ZyrTEC) 10 mg tablet Take 1 tablet (10 mg total) by mouth daily 30 tablet 5    doxepin (SINEquan) 10 mg capsule Take 10 mg by mouth      Erenumab-aooe (Aimovig) 140 MG/ML SOAJ Inject 140 mg under the skin every 30 (thirty) days 200 mL 3    escitalopram (LEXAPRO) 10 mg tablet Take 1 tablet by mouth every morning      fluticasone (FLONASE) 50 mcg/act nasal spray 1 spray into each nostril daily      meclizine (ANTIVERT) 25 mg tablet       montelukast (SINGULAIR) 10 mg tablet Take 1 tablet (10 mg total) by mouth daily at bedtime 30 tablet 2    pantoprazole (PROTONIX) 40 mg       Refresh Plus 0.5 % SOLN INSTILL 1 DROP INTO EACH EYE 4 TIMES DAILY      simethicone (MYLICON) 125 MG chewable tablet CHEW AND SWALLOW 1 TABLET EVERY 6 HOURS AS NEEDED FOR FLATULENCE      tirzepatide (Zepbound) 2.5 mg/0.5 mL auto-injector Inject 0.5 mL (2.5 mg total) under the skin once a week for 28 days 2 mL 0    tirzepatide (Zepbound) 5 mg/0.5 mL auto-injector Inject 0.5 mL (5 mg total) under the skin once a week Do not start before 2025. 2 mL 0    triamcinolone (KENALOG) 0.1 % cream Apply topically 2 (two) times a day for 14 days 28.4 g 0     No current facility-administered medications for this visit.       Past Surgical History:   Procedure Laterality Date    ABDOMINAL SURGERY      3 csections, gallbladder and umbilical hernia     ANKLE LIGAMENT RECONSTRUCTION      ANKLE SURGERY  2017     SECTION      CHOLECYSTECTOMY      CHOLECYSTECTOMY LAPAROSCOPIC N/A 2020    Procedure: CHOLECYSTECTOMY LAPAROSCOPIC;  Surgeon: Rachid  MD Son;  Location:  MAIN OR;  Service: General    HYSTERECTOMY  10/15/2024    KNEE SURGERY      SINUS SURGERY      TONSILLECTOMY      TONSILLECTOMY      TRIGGER POINT INJECTION  2023    TUBAL LIGATION      UMBILICAL HERNIA REPAIR N/A 06/18/2020    Procedure: REPAIR HERNIA UMBILICAL, Primary;  Surgeon: Rachid Cline MD;  Location:  MAIN OR;  Service: General    UPPER GASTROINTESTINAL ENDOSCOPY  2020       Social History     Socioeconomic History    Marital status: Single     Spouse name: Not on file    Number of children: Not on file    Years of education: Not on file    Highest education level: Not on file   Occupational History    Not on file   Tobacco Use    Smoking status: Never    Smokeless tobacco: Never    Tobacco comments:     Never smoked   Vaping Use    Vaping status: Never Used   Substance and Sexual Activity    Alcohol use: Not Currently     Comment: Barely drink    Drug use: Never    Sexual activity: Not Currently     Partners: Male     Birth control/protection: Abstinence, Surgical     Comment: Hurts too much   Other Topics Concern    Not on file   Social History Narrative    Do you have pets? none Is pet allowed in bedroom?NA    Are you a smoker? Never    Does anyone smoke in your home? No       Do you live with smokers? No    [place] frequently? No   How many times a year? N/A      Social Drivers of Health     Financial Resource Strain: Low Risk  (10/15/2024)    Received from [facility]    Overall Financial Resource Strain (CARDIA)     Difficulty of Paying Living Expenses: Not hard at all   Food Insecurity: Food Insecurity Present (10/15/2024)    Received from [facility]    Hunger Vital Sign     Worried About Running Out of Food in the Last Year: Sometimes true     Ran Out of Food in the Last Year: Sometimes true   Transportation Needs: Unmet Transportation Needs (10/15/2024)    Received from [facility]    ROSEY Maldonado  Transportation     Lack of Transportation (Medical): Yes     Lack of Transportation (Non-Medical): Yes   Physical Activity: Insufficiently Active (11/4/2019)    Exercise Vital Sign     Days of Exercise per Week: 1 day     Minutes of Exercise per Session: 60 min   Stress: Stress Concern Present (7/24/2024)    Received from [facility]    [employer] [employer] - Occupational Stress Questionnaire     Feeling of Stress : Rather much   Social Connections: Feeling Somewhat Isolated (7/24/2024)    Received from [facility]    OASIS : Social Isolation     How often do you feel lonely or isolated from those around you?: Sometimes   Intimate Partner Violence: Not At Risk (10/15/2024)    Received from [facility], [facility]    Humiliation, Afraid, Rape, and Kick questionnaire     Fear of Current or Ex-Partner: No     Emotionally Abused: No     Physically Abused: No     Sexually Abused: No   Housing Stability: Low Risk  (10/15/2024)    Received from [facility]    Housing Stability Vital Sign     Unable to Pay for Housing in the Last Year: No     Number of Times Moved in the Last Year: 1     Homeless in the Last Year: No       Allergies   Allergen Reactions    Azithromycin GI Intolerance     Other reaction(s): GI Intolerance    Trazodone Syncope     Stopped -thought it caused syncope    Cephalosporins Hives    Fluoxetine Other (See Comments)    Gabapentin Arthralgia    Sulfa Antibiotics Hives       Review of Systems  General- denies fever/chills  HEENT- denies hearing loss or sore throat  Eyes- denies eye pain or visual disturbances, denies red eyes  Respiratory- denies cough or SOB  Cardio- denies chest pain or palpitations  GI- denies abdominal pain  Endocrine- denies urinary frequency  Urinary- denies pain with urination  Musculoskeletal- Negative except noted above  Skin- denies rashes or wounds  Neurological-  "denies dizziness or headache  Psychiatric- denies anxiety or difficulty concentrating    Physical Exam  Ht 5' 4\" (1.626 m)   Wt 76.7 kg (169 lb 1.5 oz)   LMP 11/29/2022   BMI 29.02 kg/m²     General/Constitutional: No apparent distress: well-nourished and well developed.  Lymphatic: No appreciable lymphadenopathy  Respiratory: Non-labored breathing  Vascular: No edema, swelling or tenderness, except as noted in detailed exam.  Integumentary: No impressive skin lesions present, except as noted in detailed exam.  Psych: Normal mood and affect, oriented to person, place and time.  MSK: normal other than stated in HPI and exam  Gait & balance: no evidence of myelopathic gait, ambulates Independently     Cervical  spine range of motion:  -Forward flexion chin to chest  -Extension to 40  There is no point tenderness with palpation along the posterior cervical, thoracic, lumbar spine.     Neurologic:  Upper Extremity Motor Function    Right  Left    Deltoid  5/5  5/5    Bicep  5/5  5/5    Wrist extension  5/5  5/5    Tricep  5/5  5/5    Finger flexion/  5/5  5/5    Hand intrinsic  5/5  5/5        Reflexes: intact    Other tests:  Spurling's: positive  Frias's: negative  Clonus: negative  Inverted Radial: negative   Shoulder: painless active & passive ROM bilateral     Diagnostic Tests   IMAGING: I have personally reviewed the images and these are my findings:  Cervical Spine X-rays from 1/20/2025: multi level cervical spondylosis with loss of disc height, osteophyte formation and uncovertebral hypertrophy, no apparent spondylolisthesis, no appreciated lytic/blastic lesions, no obvious instability. Degenerative changes most noted at C5-C6.    Cervical Spine MRI from 5/18/2024: multi level cervical disc degeneration with disc desiccation, loss of disc height, most noted at C5-6 with bilateral mild/moderate foraminal stenosis, also with central disc protrusion at C4-5 with mild indentation of the ventral thecal sac, " "no cord signal abnormalities appreciated    Electronic Medical Records were reviewed MRI reports, neurology notes    Procedures, if performed today     None performed       Portions of the record may have been created with voice recognition software.  Occasional wrong word or \"sound a like\" substitutions may have occurred due to the inherent limitations of voice recognition software.  Read the chart carefully and recognize, using context, where substitutions have occurred.    "

## 2025-05-08 ENCOUNTER — TELEPHONE (OUTPATIENT)
Dept: MAMMOGRAPHY | Facility: CLINIC | Age: 45
End: 2025-05-08

## 2025-05-08 ENCOUNTER — OFFICE VISIT (OUTPATIENT)
Dept: PHYSICAL THERAPY | Facility: CLINIC | Age: 45
End: 2025-05-08
Attending: PSYCHIATRY & NEUROLOGY
Payer: COMMERCIAL

## 2025-05-08 DIAGNOSIS — M54.2 CERVICALGIA: Primary | ICD-10-CM

## 2025-05-08 DIAGNOSIS — G89.29 CHRONIC NECK PAIN: ICD-10-CM

## 2025-05-08 DIAGNOSIS — M54.2 CHRONIC NECK PAIN: ICD-10-CM

## 2025-05-08 PROCEDURE — 97110 THERAPEUTIC EXERCISES: CPT | Performed by: PHYSICAL THERAPIST

## 2025-05-08 PROCEDURE — 97140 MANUAL THERAPY 1/> REGIONS: CPT | Performed by: PHYSICAL THERAPIST

## 2025-05-08 NOTE — PROGRESS NOTES
Assessment & Plan/Medical Decision Makin y.o. female with Neck Pain and imaging findings most notable for cervical spondylosis        The clinical, physical and imaging findings were reviewed with the patient.  An  has a constellation of findings consistent with cervical myofascial pain, cervical radiculopathy in the setting of cervical degenerative disease.   Fortunately patient remains neurologically intact and functional however continues with pain that affects her activities of daily living.  Unfortunately she has not followed up with pain management after recent medial branch blocks.  She is requesting a new referral to pain management.  A referral was provided to [facility] physiatry for evaluation and treatments.  Recommend patient continue with activities as tolerated, home exercise program/physical therapy, strengthening and range of motion exercises, OTC medications as needed ice/heat.    Patient instructed to return to office/ER sooner if symptoms are not improving, getting worse, or new worrisome/neurologic symptoms arise.  Patient will follow up in approx. 3 months for re-evaluation after further conservative treatments.       Subjective:      Chief Complaint: Neck Pain    HPI:  An Eason is a 44 y.o. female presenting for initial visit with chief complaint of neck pain.  Pain began  where she states she had a concussion and since then had neck pain.   Describes pain as sharp in nature.  Located in neck.  Patient denies any radiation of pain but does states she does get numbness and tingling within her hands. Denies burning.  Neck pain 90%, Periscapular/Arm pain 10%.  Left > Right pain is worse with neck movements and improves with rest.  Denies fever or chills, no night sweats. Denies any bladder or bowel changes.      Conservative therapy includes the following:   Medications: denies    Injections: tried lidocaine injections in trapezius muscle in , no cervical spine CSI.    Physical Therapy: plans to initiate spine PT from neurology  Chiropractic Medicine: has attempted but pays out of pocket so only 1-2/month  Accupunture/Massage Therapy: has not attempted   These therapeutic modalities were ineffective at providing sustained pain relief/functional improvement.     Nicotine dependent: denies  Occupation: unemployed  Living situation: Lives with family   ADLs: patient is able to perform     5/7/2025 update  Patient is here for follow-up.  She continues with neck pain.  She did undergo interventional spine procedures with Dr. Davis.  Per EMR she did not have a positive response to medial branch block and ablation was not pursued.  Patient did not follow-up with pain management on 4/22/2025.  Today An denies any significant changes in her symptoms however continues with neck pain radiates into her head.  Denies any radiation of pain into her upper extremities, denies any weakness, denies any hand numbness or dexterity issues, denies any balance issues.    Objective:     Family History   Problem Relation Age of Onset    Depression Mother     Asthma Mother     Mental illness Mother     Thyroid disease Mother         My aunt also has thyroid issues    Mental illness Maternal Grandfather     Asthma Maternal Grandfather     Heart disease Maternal Grandfather         Runs in family    Pulmonary embolism Maternal Grandmother     Asthma Son     Alcohol abuse Maternal Aunt     Drug abuse Maternal Aunt     Developmental delay Son     Mental illness Son     Migraines Son     Asthma Son     Migraines Son     Rashes / Skin problems Son         Eczema       Past Medical History:   Diagnosis Date    Allergic     Anemia     Anxiety     Arthritis     Asthma     Cluster headache     Depression     GERD (gastroesophageal reflux disease) 2020    Head injury 2021    Headache(784.0)     Headache, tension-type     Hypoglycemia     Kidney stone 2011    When i was pregnant with my first child    Migraine  6/2020    Vestibular migraines suffered severe concussion    Moderate episode of recurrent major depressive disorder (HCC) 10/03/2019    Otitis media     Pneumonia     PTSD (post-traumatic stress disorder)     Seizures (HCC) 12/5/2022    Only one due to medication    Shingles 2018    Syncope     Varicella     Had it four times last one was in 94       Current Outpatient Medications   Medication Sig Dispense Refill    albuterol (2.5 mg/3 mL) 0.083 % nebulizer solution Take 3 mL (2.5 mg total) by nebulization every 6 (six) hours as needed for wheezing or shortness of breath 60 mL 0    albuterol (VENTOLIN HFA) 90 mcg/act inhaler Inhale 2 puffs every 6 (six) hours as needed for wheezing 18 g 0    ALPRAZolam (XANAX) 1 mg tablet Take 1 mg by mouth daily at bedtime as needed for anxiety      Botox 200 units SOLR       budesonide-formoterol (Symbicort) 160-4.5 mcg/act inhaler Inhale 2 puffs 2 (two) times a day Rinse mouth after use. 10.2 g 5    cetirizine (ZyrTEC) 10 mg tablet Take 1 tablet (10 mg total) by mouth daily 30 tablet 5    doxepin (SINEquan) 10 mg capsule Take 10 mg by mouth      Erenumab-aooe (Aimovig) 140 MG/ML SOAJ Inject 140 mg under the skin every 30 (thirty) days 200 mL 3    escitalopram (LEXAPRO) 10 mg tablet Take 1 tablet by mouth every morning      fluticasone (FLONASE) 50 mcg/act nasal spray 1 spray into each nostril daily      meclizine (ANTIVERT) 25 mg tablet       montelukast (SINGULAIR) 10 mg tablet Take 1 tablet (10 mg total) by mouth daily at bedtime 30 tablet 2    pantoprazole (PROTONIX) 40 mg       Refresh Plus 0.5 % SOLN INSTILL 1 DROP INTO EACH EYE 4 TIMES DAILY      simethicone (MYLICON) 125 MG chewable tablet CHEW AND SWALLOW 1 TABLET EVERY 6 HOURS AS NEEDED FOR FLATULENCE      tirzepatide (Zepbound) 2.5 mg/0.5 mL auto-injector Inject 0.5 mL (2.5 mg total) under the skin once a week for 28 days 2 mL 0    tirzepatide (Zepbound) 5 mg/0.5 mL auto-injector Inject 0.5 mL (5 mg total) under the  skin once a week Do not start before 2025. 2 mL 0    triamcinolone (KENALOG) 0.1 % cream Apply topically 2 (two) times a day for 14 days 28.4 g 0     No current facility-administered medications for this visit.       Past Surgical History:   Procedure Laterality Date    ABDOMINAL SURGERY      3 csections, gallbladder and umbilical hernia     ANKLE LIGAMENT RECONSTRUCTION      ANKLE SURGERY  2017     SECTION      CHOLECYSTECTOMY      CHOLECYSTECTOMY LAPAROSCOPIC N/A 2020    Procedure: CHOLECYSTECTOMY LAPAROSCOPIC;  Surgeon: Rachid Cline MD;  Location:  MAIN OR;  Service: General    HYSTERECTOMY  10/15/2024    KNEE SURGERY      SINUS SURGERY      TONSILLECTOMY      TONSILLECTOMY      TRIGGER POINT INJECTION      TUBAL LIGATION      UMBILICAL HERNIA REPAIR N/A 2020    Procedure: REPAIR HERNIA UMBILICAL, Primary;  Surgeon: Rachid Cline MD;  Location:  MAIN OR;  Service: General    UPPER GASTROINTESTINAL ENDOSCOPY         Social History     Socioeconomic History    Marital status: Single     Spouse name: Not on file    Number of children: Not on file    Years of education: Not on file    Highest education level: Not on file   Occupational History    Not on file   Tobacco Use    Smoking status: Never    Smokeless tobacco: Never    Tobacco comments:     Never smoked   Vaping Use    Vaping status: Never Used   Substance and Sexual Activity    Alcohol use: Not Currently     Comment: Barely drink    Drug use: Never    Sexual activity: Not Currently     Partners: Male     Birth control/protection: Abstinence, Surgical     Comment: Hurts too much   Other Topics Concern    Not on file   Social History Narrative    Do you have pets? none Is pet allowed in bedroom?NA    Are you a smoker? Never    Does anyone smoke in your home? No       Do you live with smokers? No    [place] frequently? No   How many times a year? N/A      Social Drivers of Health     Financial  Resource Strain: Low Risk  (10/15/2024)    Received from [facility] [address]    Overall Financial Resource Strain (CARDIA)     Difficulty of Paying Living Expenses: Not hard at all   Food Insecurity: Food Insecurity Present (10/15/2024)    Received from [facility] [address]    Hunger Vital Sign     Worried About Running Out of Food in the Last Year: Sometimes true     Ran Out of Food in the Last Year: Sometimes true   Transportation Needs: Unmet Transportation Needs (10/15/2024)    Received from [facility] [address]    PRAPARE - Transportation     Lack of Transportation (Medical): Yes     Lack of Transportation (Non-Medical): Yes   Physical Activity: Insufficiently Active (11/4/2019)    Exercise Vital Sign     Days of Exercise per Week: 1 day     Minutes of Exercise per Session: 60 min   Stress: Stress Concern Present (7/24/2024)    Received from [facility] [address]    [employer] [place] [employer] - Occupational Stress Questionnaire     Feeling of Stress : Rather much   Social Connections: Feeling Somewhat Isolated (7/24/2024)    Received from [facility] [address]    OASIS : Social Isolation     How often do you feel lonely or isolated from those around you?: Sometimes   Intimate Partner Violence: Not At Risk (10/15/2024)    Received from [facility] [address], [facility]    Humiliation, Afraid, Rape, and Kick questionnaire     Fear of Current or Ex-Partner: No     Emotionally Abused: No     Physically Abused: No     Sexually Abused: No   Housing Stability: Low Risk  (10/15/2024)    Received from [facility] [address]    Housing Stability Vital Sign     Unable to Pay for Housing in the Last Year: No     Number of Times Moved in the Last Year: 1     Homeless in the Last Year: No       Allergies   Allergen Reactions    Azithromycin GI Intolerance     Other reaction(s): GI Intolerance    Trazodone Syncope     Stopped -thought it  "caused syncope    Cephalosporins Hives    Fluoxetine Other (See Comments)    Gabapentin Arthralgia    Sulfa Antibiotics Hives       Review of Systems  General- denies fever/chills  HEENT- denies hearing loss or sore throat  Eyes- denies eye pain or visual disturbances, denies red eyes  Respiratory- denies cough or SOB  Cardio- denies chest pain or palpitations  GI- denies abdominal pain  Endocrine- denies urinary frequency  Urinary- denies pain with urination  Musculoskeletal- Negative except noted above  Skin- denies rashes or wounds  Neurological- denies dizziness or headache  Psychiatric- denies anxiety or difficulty concentrating    Physical Exam  Ht 5' 4\" (1.626 m)   Wt 76.7 kg (169 lb 1.5 oz)   LMP 11/29/2022   BMI 29.02 kg/m²     General/Constitutional: No apparent distress: well-nourished and well developed.  Lymphatic: No appreciable lymphadenopathy  Respiratory: Non-labored breathing  Vascular: No edema, swelling or tenderness, except as noted in detailed exam.  Integumentary: No impressive skin lesions present, except as noted in detailed exam.  Psych: Normal mood and affect, oriented to person, place and time.  MSK: normal other than stated in HPI and exam  Gait & balance: no evidence of myelopathic gait, ambulates Independently     Cervical  spine range of motion:  -Forward flexion chin to chest  -Extension to 40  There is no point tenderness with palpation along the posterior cervical, thoracic, lumbar spine.     Neurologic:  Upper Extremity Motor Function    Right  Left    Deltoid  5/5  5/5    Bicep  5/5  5/5    Wrist extension  5/5  5/5    Tricep  5/5  5/5    Finger flexion/  5/5  5/5    Hand intrinsic  5/5  5/5        Reflexes: intact    Other tests:  Spurling's: positive  Frias's: negative  Clonus: negative  Inverted Radial: negative   Shoulder: painless active & passive ROM bilateral     Diagnostic Tests   IMAGING: I have personally reviewed the images and these are my findings:  Cervical " "Spine X-rays from 1/20/2025: multi level cervical spondylosis with loss of disc height, osteophyte formation and uncovertebral hypertrophy, no apparent spondylolisthesis, no appreciated lytic/blastic lesions, no obvious instability. Degenerative changes most noted at C5-C6.    Cervical Spine MRI from 5/18/2024: multi level cervical disc degeneration with disc desiccation, loss of disc height, most noted at C5-6 with bilateral mild/moderate foraminal stenosis, also with central disc protrusion at C4-5 with mild indentation of the ventral thecal sac, no cord signal abnormalities appreciated    Electronic Medical Records were reviewed MRI reports, neurology notes    Procedures, if performed today     None performed       Portions of the record may have been created with voice recognition software.  Occasional wrong word or \"sound a like\" substitutions may have occurred due to the inherent limitations of voice recognition software.  Read the chart carefully and recognize, using context, where substitutions have occurred.    "

## 2025-05-08 NOTE — PROGRESS NOTES
"Daily Note     Today's date: 2025  Patient name: An Eason  : 1980  MRN: [id number]  Referring provider: Hannah Diamond MD  Dx:   Encounter Diagnosis     ICD-10-CM    1. Cervicalgia  M54.2       2. Chronic neck pain  M54.2     G89.29              Subjective: Pt reported that she went to the gym today and participated in 2 exercise classes prior to arriving to PT today. Noted that the first class was a weight training class and then the second one was a pilates class. She noted that she has been \"trying to get in better shape.\" Reported normal soreness and stiffness upon arrival. Noted that she continues to participate in ballet classes. Noted that since she had a denial of her rhizotomy, she is seeking to see another pain management provider.     Objective: See treatment diary below    Assessment: Tolerated treatment well. Initiated IASTM to b/l UT and cervical paraspinals, with patient consent, due to palpable muscle tightness, which she reported was helpful at reducing pain and tightness. Pt was educated on potential for bruising following IASTM. Held dumbbell weight training because she said that she had performed those exercises in her exercise classes prior to arrival. Focused exercises on stretches and flexibility, which she said improved her flexibility and decreased tightness.  Patient demonstrated fatigue post treatment, exhibited good technique with therapeutic exercises, and would benefit from continued PT    Plan: Continue per plan of care.      Precautions:   Past Medical History:   Diagnosis Date    Anxiety     Asthma     Depression     GERD (gastroesophageal reflux disease)     Hypoglycemia     Moderate episode of recurrent major depressive disorder (HCC) 10/03/2019    PTSD (post-traumatic stress disorder)          Manuals 4/24 5/8  3/5 3/12 4/3 4/17   distraction          rina CHUA          IASTM  B/l UT, cervical paraspinals b/l - low intensity                  Neuro " "Re-Ed 4/24 5/8  3/5 3/12 4/3 4/17   Scap retract           Scap retract row  Blk x10  2Blk x10ea 2Blk 2x10 ea  Black 3x10  Black 3x10 Black 3x10  Blk 3x10    Scap retract sh extension palm forward RedTB 2x10   Black 3x10  Black 3x10  Black 3x10  Blk 3x10   Scap retract - tricep ext at side BlueTB 3x10   Red 3x10  Blue 3x10 Blue 3x10  Nino 3x10   Scap retract bicep curl BlueTB 3x10   Blue 3x10  Blue 3x10 Blue 3x10  Nino 3x10   Chicken wings 2\" 2x10    2\" 2x10  2\" 2x10 2\" 2x10   High row BlueTB 3x10     Blue 2x10  Nino 3x10             Ther Ex 4/24 5/8  3/5 3/12 4/3 4/17   Supine chin tucks          Seated chin tucks 5\"x10ea 5\"2x10  5\" 2x10 5\" 2x10 5\" 2x10  5\" 2x10    UT stretch - no overpressure  5\"x10ea 5\"2x10  5\" x10  5\" 2x10 5\" 2x10 5\" 2x10   Levator stretch 5\"x10 ea 5\"2x10  5\"x10 5\" 10x ea 5\" 2x10  5\" 2x10   Seated thoracic extension - hands 5\"x10ea 5\"2x10  3\" 10x 5\" x10 5\" 2x10 5\" 2x10   SCM stretch  5\"2x10   5\" 10x ea 5\" 2x10 5\" 10x ea   Overhead press 1# 10x  2# 10x         scaption 1# 10x  2# 10x         Shrugs 1# 10x  2# 10x         Chicken wings   5\"2x10        Horizontal adduction RedTB 2x10ea RedTB                  Ther Activity 4/24   3/5  4/3                                                                                                         "

## 2025-05-09 ENCOUNTER — PROCEDURE VISIT (OUTPATIENT)
Dept: NEUROLOGY | Facility: CLINIC | Age: 45
End: 2025-05-09

## 2025-05-09 VITALS
BODY MASS INDEX: 29.02 KG/M2 | TEMPERATURE: 97.8 F | HEIGHT: 64 IN | DIASTOLIC BLOOD PRESSURE: 78 MMHG | SYSTOLIC BLOOD PRESSURE: 122 MMHG

## 2025-05-09 DIAGNOSIS — G43.009 MIGRAINE WITHOUT AURA AND WITHOUT STATUS MIGRAINOSUS, NOT INTRACTABLE: Primary | ICD-10-CM

## 2025-05-09 NOTE — PROGRESS NOTES
Chemodenervation     Date/Time  5/9/2025 11:45 AM     Performed by  Hannah Diamond MD   Authorized by  Hannah Diamond MD     Pre-procedure details      Prepped With: Alcohol     Anesthesia  (see MAR for exact dosages):     Anesthesia method:  None   Botox      Botox Type:  Type A    Brand:  Botox    mL's of Botulinum Toxin:  155   Procedures      Botox Procedures: chronic headache      Indications: migraines     Injection Location      Head / Face:  L frontalis, R frontalis, R , L , L inferior cervical paraspinal, R inferior cervical paraspinal, L superior cervical paraspinal, R superior cervical paraspinal, procerus, L inferior occipitalis, R inferior occipitalis, L inferior trapezius, R inferior trapezius, L temporalis and R temporalis    L  injection amount:  5 unit(s)    R  injection amount:  5 unit(s)    L lateral frontalis:  5 unit(s)    R lateral frontalis:  5 unit(s)    L medial frontalis:  5 unit(s)    R medial frontalis:  5 unit(s)    L temporalis injection amount:  20 unit(s)    R temporalis injection amount:  20 unit(s)    Procerus injection amount:  5 unit(s)    L inferior occipitalis injection amount:  15 unit(s)    R inferior occipitalis injection amount:  15 unit(s)    L inferior cervical paraspinal injection amount:  5 unit(s)    R inferior cervical paraspinal injection amount:  5 unit(s)    L superior cervical paraspinal injection amount:  5 unit(s)    R superior cervical paraspinal injection amount:  5 unit(s)    L inferior trapezius injection amount:  15 unit(s)    R inferior trapezius injection amount:  15 unit(s)   Total Units      Total units used:  155    Total units discarded:  45   Post-procedure details      Chemodenervation:  Chronic migraine    Facial Nerve Location::  Bilateral facial nerve    Patient tolerance of procedure:  Tolerated well, no immediate complications

## 2025-05-11 NOTE — PROGRESS NOTES
[facility]  Lex Carter PharmD     An Eason is a 44 y.o. female who was referred to the pharmacist for weight management, referred by Alexa Morgan DO . Patient presents via video for follow-up.     Virtual Care Documentation  Encounter provider Ana Rosa Otero    The Patient is located at Home and in the following state in which I hold an active license: [place].    The patient was identified by name and date of birth. An Eason was informed that this is a telemedicine visit and that the visit is being conducted through the Epic Embedded platform. She agrees to proceed.  My office door was closed. No one else was in the room. She acknowledged consent and understanding of privacy and security of the video platform. The patient has agreed to participate and understands they can discontinue the visit at any time.         Assessment & Plan  Overweight with body mass index (BMI) of 27 to 27.9 in adult  Patient reports appetite suppression with Zepbound.     Barriers: none  Adverse effects: none   Medication efficacy: meets patient expectations    Interventions:   1. Complete Zepbound 2.5 mg x 4 doses, then increase to 5 mg SQ weekly.  2. Continue titration with goal of maximum response   - prescriptions for Zepbound 7.5 mg , 10 mg, and 12.5 mg sent to pharmacy  Medication management    Education:   Reinforce adherence to diet and physical activity in combination with pharmacotherapy.  Encourage patient to start tracking weight and physical activity at home  Weight regain often occurs when medications are discontinued.    Follow-up:   Follow-up with pharmacist in 3 months  Next PCP visit: 5/20/2025    Patient-specific goals:  Weight loss of at least 5% of baseline body weight is recommended to achieve clinical benefit per AACE/ACE guidelines.  If goal is not reached, medication should be discontinued  - Goal weight: 155 lbs, by: 7/2025      Subjective:      Anti-obesity Medication:  Zepbound 2.5 mg    Patient with previous intolerance to Wegovy( N/V, bloating). Has taken several doses of Zepbound. Denies any GI adverse effects. No weight loss at this time but reports decreased appetite. Has started taking yoga classes at local [facility] [place].     Medication adherence: Yes  Duration: 3 weeks  Adverse effects:   - GI side effects: none   - Mood changes:  none    Current Status:  Appetite:decreased  Food cravings: not significantly changed  Portion size: not significantly changed  Physical activity level: increased    Administrative Statements      Pharmacist Tracking Tool  Reason For Outreach: Embedded Pharmacist  Demographics:  Intervention Method: Video  Type of Intervention: Follow-Up  Topics Addressed: Obesity  Pharmacologic Interventions: Dose or Frequency Adjusted  Non-Pharmacologic Interventions: Medication Monitoring  Time:  Direct Patient Care:  10  mins  Care Coordination:  10  mins  Recommendation Recipient: Patient/Caregiver  Outcome: Accepted    Documentation under collaborative practice agreement. Orders pended for PCP signature if needed.    Lex Carter PharmD  Clinical Integration Pharmacist  [facility]

## 2025-05-12 ENCOUNTER — TELEMEDICINE (OUTPATIENT)
Dept: FAMILY MEDICINE CLINIC | Facility: CLINIC | Age: 45
End: 2025-05-12

## 2025-05-12 DIAGNOSIS — E66.3 OVERWEIGHT WITH BODY MASS INDEX (BMI) OF 27 TO 27.9 IN ADULT: Primary | ICD-10-CM

## 2025-05-12 DIAGNOSIS — Z79.899 MEDICATION MANAGEMENT: ICD-10-CM

## 2025-05-12 PROCEDURE — 99999 PR OFFICE/OUTPT VISIT,PROCEDURE ONLY: CPT | Performed by: PHARMACIST

## 2025-05-12 RX ORDER — TIRZEPATIDE 10 MG/.5ML
10 INJECTION, SOLUTION SUBCUTANEOUS WEEKLY
Qty: 2 ML | Refills: 0 | Status: SHIPPED | OUTPATIENT
Start: 2025-05-26

## 2025-05-12 RX ORDER — TIRZEPATIDE 12.5 MG/.5ML
12.5 INJECTION, SOLUTION SUBCUTANEOUS WEEKLY
Qty: 2 ML | Refills: 0 | Status: SHIPPED | OUTPATIENT
Start: 2025-06-09

## 2025-05-12 RX ORDER — TIRZEPATIDE 7.5 MG/.5ML
7.5 INJECTION, SOLUTION SUBCUTANEOUS WEEKLY
Qty: 2 ML | Refills: 0 | Status: SHIPPED | OUTPATIENT
Start: 2025-05-12

## 2025-05-12 NOTE — TELEPHONE ENCOUNTER
I called pt back and offered to r/s her appt for 5/29/25 with Dr. Diamond.     I offered an appt in October, which was the next available but pt declined.     Pt stated that Alee was going to call back to assist with r/s appt due to Overbooking. She said that she needs to be seen in 6 weeks.     Please assist and call pt back to r/s.     Thank you!

## 2025-05-15 ENCOUNTER — APPOINTMENT (OUTPATIENT)
Dept: PHYSICAL THERAPY | Facility: CLINIC | Age: 45
End: 2025-05-15
Attending: PSYCHIATRY & NEUROLOGY
Payer: COMMERCIAL

## 2025-05-20 ENCOUNTER — OFFICE VISIT (OUTPATIENT)
Dept: FAMILY MEDICINE CLINIC | Facility: CLINIC | Age: 45
End: 2025-05-20
Payer: COMMERCIAL

## 2025-05-20 VITALS
DIASTOLIC BLOOD PRESSURE: 80 MMHG | RESPIRATION RATE: 16 BRPM | WEIGHT: 168.2 LBS | HEART RATE: 80 BPM | HEIGHT: 64 IN | SYSTOLIC BLOOD PRESSURE: 118 MMHG | OXYGEN SATURATION: 100 % | BODY MASS INDEX: 28.71 KG/M2

## 2025-05-20 DIAGNOSIS — R21 RASH AND OTHER NONSPECIFIC SKIN ERUPTION: ICD-10-CM

## 2025-05-20 DIAGNOSIS — M35.3 POLYMYALGIA (HCC): ICD-10-CM

## 2025-05-20 DIAGNOSIS — Z00.00 ANNUAL PHYSICAL EXAM: Primary | ICD-10-CM

## 2025-05-20 DIAGNOSIS — F33.1 MODERATE EPISODE OF RECURRENT MAJOR DEPRESSIVE DISORDER (HCC): ICD-10-CM

## 2025-05-20 PROCEDURE — 99396 PREV VISIT EST AGE 40-64: CPT | Performed by: FAMILY MEDICINE

## 2025-05-20 RX ORDER — VALACYCLOVIR HYDROCHLORIDE 1 G/1
1000 TABLET, FILM COATED ORAL 2 TIMES DAILY
Qty: 14 TABLET | Refills: 0 | Status: SHIPPED | OUTPATIENT
Start: 2025-05-20 | End: 2025-05-27

## 2025-05-20 NOTE — PROGRESS NOTES
Adult Annual Physical  Name: [facility]      : 1980      MRN: [id number]  Encounter Provider: Alexa Morgan DO  Encounter Date: 2025   Encounter department: [facility]    :  Assessment & Plan  Annual physical exam         Polymyalgia (HCC)    Orders:    Ambulatory Referral to Rheumatology; Future    Moderate episode of recurrent major depressive disorder (HCC)  Follows with psychiatry          Rash and other nonspecific skin eruption    Orders:    valACYclovir (VALTREX) 1,000 mg tablet; Take 1 tablet (1,000 mg total) by mouth 2 (two) times a day for 7 days        Preventive Screenings:  - Diabetes Screening: screening up-to-date  - Cholesterol Screening: screening not indicated and has hyperlipidemia   - Hepatitis C screening: screening up-to-date   - HIV screening: screening up-to-date   - Cervical cancer screening: screening not indicated   - Breast cancer screening: screening up-to-date   - Lung cancer screening: screening not indicated       Depression Screening and Follow-up Plan: Patient was screened for depression during today's encounter. They screened negative with a PHQ-9 score of 3.          History of Present Illness     Adult Annual Physical:  Patient presents for annual physical.     Diet and Physical Activity:  - Diet/Nutrition: well balanced diet.  - Exercise: walking.    Depression Screening:    - PHQ-9 Score: 3    /GYN Health:  - Follows with GYN: yes.     Review of Systems   Constitutional:  Negative for chills, fatigue and fever.   HENT:  Negative for congestion, postnasal drip, rhinorrhea and sinus pressure.    Eyes:  Negative for photophobia and visual disturbance.   Respiratory:  Negative for cough and shortness of breath.    Cardiovascular:  Negative for chest pain, palpitations and leg swelling.   Gastrointestinal:  Negative for abdominal pain, constipation, diarrhea, nausea and vomiting.   Genitourinary:  Negative for difficulty urinating  and dysuria.   Musculoskeletal:  Positive for arthralgias and neck pain. Negative for myalgias.   Skin:  Negative for color change and rash.   Neurological:  Negative for dizziness, weakness, light-headedness and headaches.     Pertinent Medical History         Medical History Reviewed by provider this encounter:  Tobacco  Allergies  Meds  Problems  Med Hx  Surg Hx  Fam Hx     .  Past Medical History   Past Medical History:   Diagnosis Date    Allergic     Anemia     Anxiety     Arthritis     Asthma     Cluster headache     Depression     GERD (gastroesophageal reflux disease)     Head injury     Headache(784.0)     Headache, tension-type     Hypoglycemia     Kidney stone     When i was pregnant with my first child    Migraine 2020    Vestibular migraines suffered severe concussion    Moderate episode of recurrent major depressive disorder (HCC) 10/03/2019    Otitis media     Pneumonia     PTSD (post-traumatic stress disorder)     Seizures (HCC) 2022    Only one due to medication    Shingles 2018    Syncope     Varicella     Had it four times last one was in 94     Past Surgical History:   Procedure Laterality Date    ABDOMINAL SURGERY      3 csections, gallbladder and umbilical hernia     ANKLE LIGAMENT RECONSTRUCTION      ANKLE SURGERY       SECTION      CHOLECYSTECTOMY      CHOLECYSTECTOMY LAPAROSCOPIC N/A 2020    Procedure: CHOLECYSTECTOMY LAPAROSCOPIC;  Surgeon: Rachid Cline MD;  Location:  MAIN OR;  Service: General    HYSTERECTOMY  10/15/2024    KNEE SURGERY      SINUS SURGERY      TONSILLECTOMY      TONSILLECTOMY      TRIGGER POINT INJECTION      TUBAL LIGATION      UMBILICAL HERNIA REPAIR N/A 2020    Procedure: REPAIR HERNIA UMBILICAL, Primary;  Surgeon: Rachid Cline MD;  Location:  MAIN OR;  Service: General    UPPER GASTROINTESTINAL ENDOSCOPY       Family History   Problem Relation Age of Onset    Depression Mother     Asthma  Mother     Mental illness Mother     Thyroid disease Mother         My aunt also has thyroid issues    Mental illness Maternal Grandfather     Asthma Maternal Grandfather     Heart disease Maternal Grandfather         Runs in family    Pulmonary embolism Maternal Grandmother     Asthma Son     Alcohol abuse Maternal Aunt     Drug abuse Maternal Aunt     Developmental delay Son     Mental illness Son     Migraines Son     Asthma Son     Migraines Son     Rashes / Skin problems Son         Eczema      reports that she has never smoked. She has never used smokeless tobacco. She reports that she does not currently use alcohol. She reports that she does not use drugs.  Current Outpatient Medications   Medication Instructions    Aimovig 140 mg, Subcutaneous, Every 30 days    albuterol (VENTOLIN HFA) 90 mcg/act inhaler 2 puffs, Inhalation, Every 6 hours PRN    albuterol 2.5 mg, Nebulization, Every 6 hours PRN    ALPRAZolam (XANAX) 1 mg, Daily at bedtime PRN    Botox 200 units SOLR     budesonide-formoterol (Symbicort) 160-4.5 mcg/act inhaler 2 puffs, Inhalation, 2 times daily, Rinse mouth after use.    cetirizine (ZYRTEC) 10 mg, Oral, Daily    doxepin (SINEQUAN) 10 mg    escitalopram (LEXAPRO) 10 mg tablet 1 tablet, Every morning    fluticasone (FLONASE) 50 mcg/act nasal spray 1 spray, Daily    meclizine (ANTIVERT) 25 mg tablet     montelukast (SINGULAIR) 10 mg, Oral, Daily at bedtime    pantoprazole (PROTONIX) 40 mg     Refresh Plus 0.5 % SOLN     simethicone (MYLICON) 125 MG chewable tablet     triamcinolone (KENALOG) 0.1 % cream Topical, 2 times daily    valACYclovir (VALTREX) 1,000 mg, Oral, 2 times daily    Zepbound 5 mg, Subcutaneous, Weekly    Zepbound 7.5 mg, Subcutaneous, Weekly    [START ON 5/26/2025] Zepbound 10 mg, Subcutaneous, Weekly    [START ON 6/9/2025] Zepbound 12.5 mg, Subcutaneous, Weekly   Allergies[1]   Medications Ordered Prior to Encounter[2]   Social History     Tobacco Use    Smoking status: Never  "   Smokeless tobacco: Never    Tobacco comments:     Never smoked   Vaping Use    Vaping status: Never Used   Substance and Sexual Activity    Alcohol use: Not Currently     Comment: Barely drink    Drug use: Never    Sexual activity: Not Currently     Partners: Male     Birth control/protection: Abstinence, Surgical     Comment: Hurts too much       Objective   /80 (Patient Position: Sitting, Cuff Size: Standard)   Pulse 80   Resp 16   Ht 5' 4\" (1.626 m)   Wt 76.3 kg (168 lb 3.2 oz)   LMP 11/29/2022   SpO2 100%   BMI 28.87 kg/m²     Physical Exam  Constitutional:       General: She is not in acute distress.     Appearance: Normal appearance. She is not ill-appearing, toxic-appearing or diaphoretic.   HENT:      Head: Normocephalic and atraumatic.      Right Ear: Tympanic membrane and ear canal normal.      Left Ear: Tympanic membrane and ear canal normal.      Nose: Nose normal. No congestion.      Mouth/Throat:      Mouth: Mucous membranes are moist.      Pharynx: Oropharynx is clear. No oropharyngeal exudate.     Eyes:      Extraocular Movements: Extraocular movements intact.      Conjunctiva/sclera: Conjunctivae normal.       Cardiovascular:      Rate and Rhythm: Normal rate and regular rhythm.      Pulses: Normal pulses.      Heart sounds: No murmur heard.  Pulmonary:      Effort: Pulmonary effort is normal.      Breath sounds: Normal breath sounds. No wheezing, rhonchi or rales.   Abdominal:      General: Bowel sounds are normal. There is no distension.      Palpations: Abdomen is soft.      Tenderness: There is no abdominal tenderness.     Musculoskeletal:         General: No swelling. Normal range of motion.      Cervical back: Normal range of motion and neck supple. Tenderness present.     Skin:     General: Skin is warm and dry.      Capillary Refill: Capillary refill takes less than 2 seconds.     Neurological:      General: No focal deficit present.      Mental Status: She is alert and " oriented to person, place, and time.      Cranial Nerves: No cranial nerve deficit.     Psychiatric:         Mood and Affect: Mood normal.         Behavior: Behavior normal.         Thought Content: Thought content normal.              [1]   Allergies  Allergen Reactions    Azithromycin GI Intolerance     Other reaction(s): GI Intolerance    Trazodone Syncope     Stopped -thought it caused syncope    Cephalosporins Hives    Fluoxetine Other (See Comments)    Gabapentin Arthralgia    Sulfa Antibiotics Hives   [2]   Current Outpatient Medications on File Prior to Visit   Medication Sig Dispense Refill    albuterol (2.5 mg/3 mL) 0.083 % nebulizer solution Take 3 mL (2.5 mg total) by nebulization every 6 (six) hours as needed for wheezing or shortness of breath 60 mL 0    albuterol (VENTOLIN HFA) 90 mcg/act inhaler Inhale 2 puffs every 6 (six) hours as needed for wheezing 18 g 0    ALPRAZolam (XANAX) 1 mg tablet Take 1 mg by mouth daily at bedtime as needed for anxiety (Patient taking differently: Take 1 mg by mouth daily at bedtime as needed for anxiety Taking it daily at night)      Botox 200 units SOLR       budesonide-formoterol (Symbicort) 160-4.5 mcg/act inhaler Inhale 2 puffs 2 (two) times a day Rinse mouth after use. 10.2 g 5    cetirizine (ZyrTEC) 10 mg tablet Take 1 tablet (10 mg total) by mouth daily 30 tablet 5    doxepin (SINEquan) 10 mg capsule Take 10 mg by mouth      Erenumab-aooe (Aimovig) 140 MG/ML SOAJ Inject 140 mg under the skin every 30 (thirty) days 200 mL 3    escitalopram (LEXAPRO) 10 mg tablet Take 1 tablet by mouth every morning      fluticasone (FLONASE) 50 mcg/act nasal spray 1 spray into each nostril in the morning.      meclizine (ANTIVERT) 25 mg tablet       montelukast (SINGULAIR) 10 mg tablet Take 1 tablet (10 mg total) by mouth daily at bedtime 30 tablet 2    pantoprazole (PROTONIX) 40 mg       Refresh Plus 0.5 % SOLN       simethicone (MYLICON) 125 MG chewable tablet       tirzepatide  (Zepbound) 5 mg/0.5 mL auto-injector Inject 0.5 mL (5 mg total) under the skin once a week Do not start before April 28, 2025. 2 mL 0    triamcinolone (KENALOG) 0.1 % cream Apply topically 2 (two) times a day for 14 days 28.4 g 0    [START ON 5/26/2025] tirzepatide (Zepbound) 10 mg/0.5 mL auto-injector Inject 0.5 mL (10 mg total) under the skin once a week Do not start before May 26, 2025. (Patient not taking: Reported on 5/20/2025 Do not start before May 26, 2025.) 2 mL 0    [START ON 6/9/2025] tirzepatide (Zepbound) 12.5 mg/0.5 mL auto-injector Inject 0.5 mL (12.5 mg total) under the skin once a week Do not start before June 9, 2025. (Patient not taking: Reported on 5/20/2025 Do not start before June 9, 2025.) 2 mL 0    tirzepatide (Zepbound) 7.5 mg/0.5 mL auto-injector Inject 0.5 mL (7.5 mg total) under the skin once a week (Patient not taking: Reported on 5/20/2025) 2 mL 0    [DISCONTINUED] gabapentin (NEURONTIN) 300 mg capsule Take 1 capsule (300 mg total) by mouth 2 (two) times a day (Patient not taking: Reported on 3/18/2021) 60 capsule 2     No current facility-administered medications on file prior to visit.

## 2025-05-20 NOTE — PATIENT INSTRUCTIONS
"Patient Education     Routine physical for adults   The Basics   Written by the doctors and editors at [facility]   What is a physical? -- A physical is a routine visit, or \"check-up,\" with your doctor. You might also hear it called a \"wellness visit\" or \"preventive visit.\"  During each visit, the doctor will:   Ask about your physical and mental health   Ask about your habits, behaviors, and lifestyle   Do an exam   Give you vaccines if needed   Talk to you about any medicines you take   Give advice about your health   Answer your questions  Getting regular check-ups is an important part of taking care of your health. It can help your doctor find and treat any problems you have. But it's also important for preventing health problems.  A routine physical is different from a \"sick visit.\" A sick visit is when you see a doctor because of a health concern or problem. Since physicals are scheduled ahead of time, you can think about what you want to ask the doctor.  How often should I get a physical? -- It depends on your age and health. In general, for people age 21 years and older:   If you are younger than 50 years, you might be able to get a physical every 3 years.   If you are 50 years or older, your doctor might recommend a physical every year.  If you have an ongoing health condition, like diabetes or high blood pressure, your doctor will probably want to see you more often.  What happens during a physical? -- In general, each visit will include:   Physical exam - The doctor or nurse will check your height, weight, heart rate, and blood pressure. They will also look at your eyes and ears. They will ask about how you are feeling and whether you have any symptoms that bother you.   Medicines - It's a good idea to bring a list of all the medicines you take to each doctor visit. Your doctor will talk to you about your medicines and answer any questions. Tell them if you are having any side effects that bother you. You " "should also tell them if you are having trouble paying for any of your medicines.   Habits and behaviors - This includes:   Your diet   Your exercise habits   Whether you smoke, drink alcohol, or use drugs   Whether you are sexually active   Whether you feel safe at home  Your doctor will talk to you about things you can do to improve your health and lower your risk of health problems. They will also offer help and support. For example, if you want to quit smoking, they can give you advice and might prescribe medicines. If you want to improve your diet or get more physical activity, they can help you with this, too.   Lab tests, if needed - The tests you get will depend on your age and situation. For example, your doctor might want to check your:   Cholesterol   Blood sugar   Iron level   Vaccines - The recommended vaccines will depend on your age, health, and what vaccines you already had. Vaccines are very important because they can prevent certain serious or deadly infections.   Discussion of screening - \"Screening\" means checking for diseases or other health problems before they cause symptoms. Your doctor can recommend screening based on your age, risk, and preferences. This might include tests to check for:   Cancer, such as breast, prostate, cervical, ovarian, colorectal, prostate, lung, or skin cancer   Sexually transmitted infections, such as chlamydia and gonorrhea   Mental health conditions like depression and anxiety  Your doctor will talk to you about the different types of screening tests. They can help you decide which screenings to have. They can also explain what the results might mean.   Answering questions - The physical is a good time to ask the doctor or nurse questions about your health. If needed, they can refer you to other doctors or specialists, too.  Adults older than 65 years often need other care, too. As you get older, your doctor will talk to you about:   How to prevent falling at " home   Hearing or vision tests   Memory testing   How to take your medicines safely   Making sure that you have the help and support you need at home  All topics are updated as new evidence becomes available and our peer review process is complete.  This topic retrieved from [id number] on: May 02, 2024.  Topic 446165 Version 1.0  Release: 32.4.3 - C32.122  © 2024 UpToDate, Inc. and/or its affiliates. All rights reserved.  Consumer Information Use and Disclaimer   Disclaimer: This generalized information is a limited summary of diagnosis, treatment, and/or medication information. It is not meant to be comprehensive and should be used as a tool to help the user understand and/or assess potential diagnostic and treatment options. It does NOT include all information about conditions, treatments, medications, side effects, or risks that may apply to a specific patient. It is not intended to be medical advice or a substitute for the medical advice, diagnosis, or treatment of a health care provider based on the health care provider's examination and assessment of a patient's specific and unique circumstances. Patients must speak with a health care provider for complete information about their health, medical questions, and treatment options, including any risks or benefits regarding use of medications. This information does not endorse any treatments or medications as safe, effective, or approved for treating a specific patient. UpToDate, Inc. and its affiliates disclaim any warranty or liability relating to this information or the use thereof.The use of this information is governed by the Terms of Use, available at https://www.wolterseMindfuluwer.com/en/know/clinical-effectiveness-terms. 2024© UpToDate, Inc. and its affiliates and/or licensors. All rights reserved.  Copyright   © 2024 UpToDate, Inc. and/or its affiliates. All rights reserved.

## 2025-05-21 ENCOUNTER — TELEPHONE (OUTPATIENT)
Dept: OBGYN CLINIC | Facility: CLINIC | Age: 45
End: 2025-05-21

## 2025-05-21 DIAGNOSIS — R21 RASH: Primary | ICD-10-CM

## 2025-05-21 NOTE — TELEPHONE ENCOUNTER
Spoke with pt, left breast still with itchy rash. PCP started on Valtrex yesterday due to hx shingles. Has mammogram scheduled. Recommend if no improvement with valtrex, should see Dermatology and of course keep mammogram appointment as ordered.

## 2025-05-22 ENCOUNTER — OFFICE VISIT (OUTPATIENT)
Dept: PHYSICAL THERAPY | Facility: CLINIC | Age: 45
End: 2025-05-22
Attending: PSYCHIATRY & NEUROLOGY
Payer: COMMERCIAL

## 2025-05-22 DIAGNOSIS — G89.29 CHRONIC NECK PAIN: ICD-10-CM

## 2025-05-22 DIAGNOSIS — M54.2 CHRONIC NECK PAIN: ICD-10-CM

## 2025-05-22 DIAGNOSIS — M54.2 CERVICALGIA: Primary | ICD-10-CM

## 2025-05-22 PROCEDURE — 97110 THERAPEUTIC EXERCISES: CPT | Performed by: PHYSICAL THERAPIST

## 2025-05-22 PROCEDURE — 97140 MANUAL THERAPY 1/> REGIONS: CPT | Performed by: PHYSICAL THERAPIST
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
## 2025-05-23 NOTE — PROGRESS NOTES
Name: An Eason      : 1980      MRN: [id number]  Encounter Provider: Maggy Garcia DO  Encounter Date: 2025   Encounter department: [facility] [address]  :  Assessment & Plan  Arthralgia, unspecified joint  Patient is a 45-year-old female presenting for further evaluation of diffuse arthralgias and myalgias.  Reports days where she has pain through her entire body.  Reports most bothersome joints are her knees, neck and hip.  Symptoms are exacerbated with physical activity.  Reports some improvement with rest.  Endorses brain fog, poor sleep and excessive fatigue.  Previous images consistent with DJD of the cervical neck.  Autoimmune testing has been negative including CORINNE, SSA, SSB, complements, RF and CCP.  Overall, low suspicion for a systemic autoimmune process.  No muscle weakness on exam, low suspicion for myositis.  Discussed with patient that neck pain is likely due to DJD with a component of myofascial pain syndrome.  Can consider possible patellofemoral syndrome causing knee discomfort.  Additionally in the setting of brain fog, fatigue, poor sleep with diffuse arthralgias and myalgias, fibromyalgia can be on the differential.   - Discussed conservative measures to help with arthralgias and myalgias including heat, topical agents and aqua therapy  -For fibromyalgia like pain, patient can discuss medicine such as duloxetine, gabapentin, Lyrica with PCP if feasible and not contraindicated       Myalgia  As above       RTC as needed       Pertinent Medical History   Reviewed         History of Present Illness   An Eason is a 45 y.o. female with a history of migraines, asthma, IBS, GERD, hyperlipidemia who presents for myalgias.      HPI   Patient reports that she has been dealing with joint pain for several years.  Reports pain in both her knees, neck and hips.  Reports that some days are worse than others.  Knee pain is constant.  Reports her back pain fluctuates  "depending on the day.  Endorses chronic stiffness that is worse with activity.  Walking or standing too long exacerbates the pain.  Patient reports that when she was in her 20s, she did ballet and was told that she had knocked knees.  Patient was doing physical therapy for that at the time.  Patient is following with pain management for neck DJD.  Had an epidural in the past which did provide some relief.  Reports that heat provides temporary relief.  Takes Tylenol and Advil which do not help.  Has done acupuncture as well as followed with a chiropractor.    Patient states that sometimes her whole body including all her muscles hurt.    Endorses brain fog and trouble concentrating.  Reports her sleep is very poor.  States that she has trouble staying asleep and falling asleep.  Endorses severe fatigue.  Reports that she gets exhausted from doing basic household activities and needs to rest for a couple hours before she can get up and get moving again.    Endorses recurrent episodes of bloating.  Reports she is sensitive to lactose.    Endorses occasional ulcerations in her mouth.    Family history: No known autoimmune diseases    Review of Systems  Complete ROS conducted as per HPI. In addition, denies:  Fever  Photosensitive rash  Sicca symptoms  Muscle weakness  Uveitis  Dactylitis  Chest pain  SOB  Pleurisy  Gross hematuria  Foamy urine  Raynaud's  Joint issues other than noted above    Medications Ordered Prior to Encounter[1]   Social History[2]      Objective   /80 (BP Location: Left arm, Patient Position: Sitting, Cuff Size: Standard)   Pulse 97   Temp 97.6 °F (36.4 °C) (Tympanic)   Ht 5' 4\" (1.626 m)   Wt 75.3 kg (166 lb)   LMP 11/29/2022   SpO2 98%   BMI 28.49 kg/m²     Physical Exam  General appearance: normal appearing, no acute distress  Skin: normal, no rashes  HEENT: normal, moist oropharynx, no nasal or oral ulcers  Lymph nodes: no palpable adenopathy  Lungs: normal respiratory effort, " comfortable on room air, lungs clear to auscultation b/l   Heart: normal heart sounds, normal rate, normal rhythm,  Abdomen: soft, normal bowel sounds, no tenderness  Neurologic: no obvious neurological deficits   Extremities: no edema, warm and well perfused     Musculoskeletal Exam:   - Observation: no obvious joint abnormalities    - Palpation: no joint tenderness, tenderness to palpation of the paraspinal muscles of the entire spine  - Synovitis: absent  - Joint effusions: absent  - ROM: intact throughout  - Muscle Strength: 5/5 throughout     Recent labs:  Lab Results   Component Value Date/Time    SODIUM 139 02/03/2025 09:24 AM    SODIUM 136 06/05/2024 04:23 PM    K 3.8 02/03/2025 09:24 AM    K 5.1 06/05/2024 04:23 PM    BUN 10 02/03/2025 09:24 AM    BUN 16 06/05/2024 04:23 PM    CREATININE 1.01 (H) 02/03/2025 09:24 AM    CREATININE 0.73 06/05/2024 04:23 PM    GLUC 106 (H) 02/03/2025 09:24 AM    GLUC 101 (H) 06/05/2024 04:23 PM    CALCIUM 9.1 06/05/2024 04:23 PM    AST 15 02/03/2025 09:24 AM    AST 42 (H) 06/05/2024 04:23 PM    ALT 12 02/03/2025 09:24 AM    ALT 24 06/05/2024 04:23 PM    ALB 4.4 02/03/2025 09:24 AM    ALB 4.5 06/05/2024 04:23 PM    TP 6.6 02/03/2025 09:24 AM    TP 7.2 06/05/2024 04:23 PM    EGFR 70 02/03/2025 09:24 AM    EGFR 104 06/05/2024 04:23 PM     Lab Results   Component Value Date/Time    HGB 12.6 02/03/2025 09:24 AM    HGB 11.6 10/18/2023 03:08 AM    WBC 8.7 02/03/2025 09:24 AM    WBC 5.70 10/18/2023 03:08 AM     02/03/2025 09:24 AM     10/18/2023 03:08 AM    INR 0.95 01/18/2021 04:46 PM    PTT 27 01/18/2021 04:46 PM     Lab Results   Component Value Date/Time    SGS6LTFQDANQ 6.223 (H) 10/16/2023 01:52 PM     CORINNE by IFA negative  SSA negative  SSB negative  C3 normal  C4 normal  Anti-CCP negative  RF negative      I have personally reviewed notes, labs, and imaging available in the chart.     Maggy Garcia DO, CCD, [facility]          [1]    Current Outpatient Medications on File Prior to Visit   Medication Sig Dispense Refill    albuterol (2.5 mg/3 mL) 0.083 % nebulizer solution Take 3 mL (2.5 mg total) by nebulization every 6 (six) hours as needed for wheezing or shortness of breath 60 mL 0    albuterol (VENTOLIN HFA) 90 mcg/act inhaler Inhale 2 puffs every 6 (six) hours as needed for wheezing 18 g 0    Botox 200 units SOLR       budesonide-formoterol (Symbicort) 160-4.5 mcg/act inhaler Inhale 2 puffs 2 (two) times a day Rinse mouth after use. 10.2 g 5    cetirizine (ZyrTEC) 10 mg tablet Take 1 tablet (10 mg total) by mouth daily 30 tablet 5    doxepin (SINEquan) 10 mg capsule Take 10 mg by mouth      Erenumab-aooe (Aimovig) 140 MG/ML SOAJ Inject 140 mg under the skin every 30 (thirty) days 200 mL 3    escitalopram (LEXAPRO) 10 mg tablet Take 1 tablet by mouth every morning      fluticasone (FLONASE) 50 mcg/act nasal spray 1 spray into each nostril in the morning.      meclizine (ANTIVERT) 25 mg tablet       montelukast (SINGULAIR) 10 mg tablet Take 1 tablet (10 mg total) by mouth daily at bedtime 30 tablet 2    pantoprazole (PROTONIX) 40 mg       Refresh Plus 0.5 % SOLN       simethicone (MYLICON) 125 MG chewable tablet       tirzepatide (Zepbound) 5 mg/0.5 mL auto-injector Inject 0.5 mL (5 mg total) under the skin once a week Do not start before April 28, 2025. 2 mL 0    valACYclovir (VALTREX) 1,000 mg tablet Take 1 tablet (1,000 mg total) by mouth 2 (two) times a day for 7 days 14 tablet 0    ALPRAZolam (XANAX) 1 mg tablet Take 1 mg by mouth daily at bedtime as needed for anxiety (Patient taking differently: Take 1 mg by mouth daily at bedtime as needed for anxiety Taking it daily at night)      tirzepatide (Zepbound) 10 mg/0.5 mL auto-injector Inject 0.5 mL (10 mg total) under the skin once a week Do not start before May 26, 2025. (Patient not taking: Reported on 5/20/2025 Do not start before May 26, 2025.) 2 mL 0    [START ON 6/9/2025]  tirzepatide (Zepbound) 12.5 mg/0.5 mL auto-injector Inject 0.5 mL (12.5 mg total) under the skin once a week Do not start before June 9, 2025. (Patient not taking: Reported on 5/20/2025 Do not start before June 9, 2025.) 2 mL 0    tirzepatide (Zepbound) 7.5 mg/0.5 mL auto-injector Inject 0.5 mL (7.5 mg total) under the skin once a week (Patient not taking: Reported on 5/20/2025) 2 mL 0    triamcinolone (KENALOG) 0.1 % cream Apply topically 2 (two) times a day for 14 days 28.4 g 0    [DISCONTINUED] gabapentin (NEURONTIN) 300 mg capsule Take 1 capsule (300 mg total) by mouth 2 (two) times a day (Patient not taking: Reported on 3/18/2021) 60 capsule 2     No current facility-administered medications on file prior to visit.   [2]   Social History  Tobacco Use    Smoking status: Never    Smokeless tobacco: Never    Tobacco comments:     Never smoked   Vaping Use    Vaping status: Never Used   Substance and Sexual Activity    Alcohol use: Not Currently     Comment: Barely drink    Drug use: Never    Sexual activity: Not Currently     Partners: Male     Birth control/protection: Abstinence, Surgical     Comment: Hurts too much

## 2025-05-27 ENCOUNTER — CONSULT (OUTPATIENT)
Age: 45
End: 2025-05-27
Payer: COMMERCIAL

## 2025-05-27 VITALS
HEIGHT: 64 IN | BODY MASS INDEX: 28.34 KG/M2 | OXYGEN SATURATION: 98 % | DIASTOLIC BLOOD PRESSURE: 80 MMHG | SYSTOLIC BLOOD PRESSURE: 118 MMHG | HEART RATE: 97 BPM | TEMPERATURE: 97.6 F | WEIGHT: 166 LBS

## 2025-05-27 DIAGNOSIS — M79.10 MYALGIA: ICD-10-CM

## 2025-05-27 DIAGNOSIS — M25.50 ARTHRALGIA, UNSPECIFIED JOINT: Primary | ICD-10-CM

## 2025-05-27 PROCEDURE — 99244 OFF/OP CNSLTJ NEW/EST MOD 40: CPT | Performed by: STUDENT IN AN ORGANIZED HEALTH CARE EDUCATION/TRAINING PROGRAM

## 2025-05-30 DIAGNOSIS — G43.709 CHRONIC MIGRAINE WITHOUT AURA: ICD-10-CM

## 2025-06-03 RX ORDER — ERENUMAB-AOOE 140 MG/ML
140 INJECTION, SOLUTION SUBCUTANEOUS
Qty: 200 ML | Refills: 0 | Status: SHIPPED | OUTPATIENT
Start: 2025-06-03 | End: 2025-06-06 | Stop reason: SDUPTHER

## 2025-06-06 DIAGNOSIS — G43.709 CHRONIC MIGRAINE WITHOUT AURA: ICD-10-CM

## 2025-06-06 RX ORDER — ERENUMAB-AOOE 140 MG/ML
140 INJECTION, SOLUTION SUBCUTANEOUS
Qty: 200 ML | Refills: 0 | Status: SHIPPED | OUTPATIENT
Start: 2025-06-06

## 2025-06-10 DIAGNOSIS — M79.18 MYOFASCIAL PAIN: ICD-10-CM

## 2025-06-10 DIAGNOSIS — M54.2 NECK PAIN: Primary | ICD-10-CM

## 2025-06-10 NOTE — PROGRESS NOTES
PT Evaluation     Today's date: 2025  Patient name: An Eason  : 1980  MRN: [id number]  Referring provider: Alexa Morgan DO  Dx:   Encounter Diagnosis     ICD-10-CM    1. Cervicalgia  M54.2       2. Chronic neck pain  M54.2     G89.29       3. Cervical radiculopathy  M54.12       4. Polymyalgia (HCC)  M35.3           Start Time: 0815  Stop Time: 0900  Total time in clinic (min): 45 minutes    Assessment  Impairments: abnormal muscle firing, abnormal muscle tone, abnormal or restricted ROM, activity intolerance, impaired physical strength, lacks appropriate home exercise program, pain with function, poor posture , participation limitations and activity limitations  Symptom irritability: high    Assessment details: The pt is a 45 year old female who presents to skilled physical therapy services due to a decline in functional status related to chronic cervicalgia with cervical radiculopathy bilaterally. Upon completion of the IE, the pt presents with impairments in pain, muscle tension, palpation tenderness, cervical mobility, and upper quarter activity capacity. As a result of these impairments, the pt has difficulty with the following functional activities: positional tolerance, sleeping, cervical rotation necessary for scanning environment, driving, reaching, UE dressing, self-care/grooming, as well as activity capacity necessary for ADLs/IADLs and parenting responsibilities. POC will include manual therapy for flexibility/mobility and symptom modulation, modalities (PRN), TE/TA/NR interventions for functional strength and neuromuscular control, HEP, and pt education. The pt will continue to benefit from skilled physical therapy services to address impairments in order to return to her PLOF without limitations due to pain.    Thank you for the referral.    Barriers to therapy: Chronicity of symptoms -- PMH includes: Chronic Migraines, Chronic Fatigue, Chronic Neck Pain, Cervical  Radiculopathy, Cervical Spondylosis, Depression, PTSD, Bronchitis, Asthma, RUQ/RLQ pain, Constipation, GERD, Syncope, IBS, Hypoglycemia    Barriers to intervention: medical complexity  Understanding of Dx/Px/POC: good     Prognosis: good    Goals  STG: Achieve within 6-8 weeks  - Decrease symptom severity to no greater than 5/10 pain in order to improve quality of life and activity capacity necessary for ADLs/IADLs.  - The pt will demonstrate improvement in cervical AROM (flexion, R/L rotation, R/L lateral flexion) by at least 10 degrees in order to participate in ADLs and safe-care activities.  - The pt will be independent with an HEP to aid in prognosis and symptom/treatment progression over the POC.    LTG: Achieve by discharge  - Decrease symptom severity to no greater than 3/10 pain in order to improve quality of life and activity capacity necessary for ADLs/IADLs.  - Increase FOTO Functional Status measure score to at least benchmark in order to demonstrate improvements in functional mobility and activity capacity.  - The pt will demonstrate improvement in cervical AROM (flexion, R/L rotation, R/L lateral flexion) by at least 15 degrees in order to participate in ADLs and safe-care activities.  - The pt will be independent with an HEP for management of symptoms and functional mobility.    Plan  Patient would benefit from: PT eval and skilled physical therapy  Planned modality interventions: thermotherapy: hydrocollator packs    Planned therapy interventions: IASTM, joint mobilization, manual therapy, body mechanics training, breathing training, neuromuscular re-education, postural training, patient/caregiver education, strengthening, stretching, therapeutic activities, therapeutic exercise, functional ROM exercises, flexibility and home exercise program    Frequency: 1-2x week  Plan of Care beginning date: 6/11/2025  Plan of Care expiration date: 8/15/2025  Treatment plan discussed with:  "patient      Subjective Evaluation    History of Present Illness  Mechanism of injury: The pt reports to skilled physical therapy due to a decline in functional status related to chronic cervicalgia with cervical radiculopathy bilaterally. Pain is worse on her left side and she experiences tingling/numbness down both UE \"on and off.\" Occasional dizziness, nausea, change in vision (w/ dizziness), unsteadiness on feet, headaches/migraines, difficulty speaking/swallowing, tinnitus    PMH includes: Fall/possible syncopal episode (2021) with injury and concussion    Recent treatment/intervention by Pain Management (limited results)          Recurrent probem    Patient Goals  Patient goals for therapy: decreased pain, increased motion, independence with ADLs/IADLs, return to sport/leisure activities and increased strength    Pain  Current pain ratin  At best pain rating: 10  At worst pain rating: 10  Location: Neck (left side > right side); B/L UE  Quality: radiating, sharp, pressure, dull ache, discomfort, knife-like and tight  Relieving factors: heat, medications, ice and rest (Limited relief)  Aggravating factors: overhead activity, lifting and keyboarding (positional tolerance, sleeping, cervical rotation necessary for scanning environment, driving, reaching, UE dressing, self-care/grooming, as well as activity capacity necessary for ADLs/IADLs and parenting responsibilities)    Social Support  Lives with: significant other and young children    Employment status: not working  Hand dominance: right      Diagnostic Tests  X-ray: abnormal  MRI studies: abnormal    FCE comments: - 2024: MRI  \"Moderate central disc protrusion eccentric to the right at C4-C5 moderately   impinging the ventral cord. -- At C5-C6, there is a small broad-based protrusion eccentric to the right. Prominent bilateral uncovertebral arthropathy contributing to at least moderate bilateral neural foraminal stenoses with suspected " "impingement of both C6 nerve roots. Mild ventral cord flattening/impingement -- No cord edema or myelomalacia appreciated.\"    1/20/2025: X-ray  \"Straightening of the normally expected cervical lordosis. Slight retrolisthesis of C5 on C6. No gross instability with flexion or extension. -- Mild degenerative disc space narrowing at C5-C6.\"Treatments  Previous treatment: injection treatment, medication, physical therapy and chiropractic  Current treatment: chiropractic, medication and physical therapy      Objective     Concurrent Complaints  Positive for disturbed sleep, dizziness, faints (Seizure), headaches, nausea/motion sickness (Sometimes nausea from pain), tinnitus, trouble swallowing (Off and on  past coupole of weeks), visual change (With dizzy spells) and history of trauma (June 2021: Fall - hit head/neck trauma, concussion). Negative for difficulty breathing    Additional Special Questions  TMJ recently worse        Static Posture     Shoulders  Elevated and rounded.    Palpation   Left   Tenderness of the cervical paraspinals, pectoralis major, pectoralis minor, suboccipitals and upper trapezius.     Right   Tenderness of the cervical paraspinals, pectoralis major, pectoralis minor, suboccipitals and upper trapezius.     Active Range of Motion   Cervical/Thoracic Spine       Cervical    Subcranial retraction:  with pain   Flexion: Neck active flexion: 25%  with pain  Extension: Neck active extension: Unable.     with pain Restriction level: maximal  Left lateral flexion: 25 degrees     with pain  Right lateral flexion: 30 degrees      Left rotation: Neck active rotation left: 25% with pain  Right rotation: Neck active rotation right: 25%    with pain    Ambulation   Weight-Bearing Status   Weight-Bearing Status (Left): full weight bearing   Weight-Bearing Status (Right): full weight-bearing    Assistive device used: none  Neuro Exam:     Headaches   Patient reports headaches: Yes.              Precautions: " "PMH includes: Chronic Migraines, Chronic Fatigue, Chronic Neck Pain, Cervical Radiculopathy, Cervical Spondylosis, Depression, PTSD, Bronchitis, Asthma, RUQ/RLQ pain, Constipation, GERD, Syncope, IBS, Hypoglycemia       6/11            Manuals             STM KS (UT, cervical paraspinals)            PA Mobilization: Cervical Spine             Suboccipital release KS            Fascial Mobilization                          Neuro Re-Ed                                                                                                        Ther Ex             AAROM Cervical Rotation w/ towel 5x w/ 3\" hold ea dir            UT stretch: Supine             LS Stretch: Supine             UBE - mobility, strength, activity capacity                                                    Anatomy as it relates to pt's presentation/symptoms             Address pt questions PRN             HEP                          Ther Activity                                       Gait Training                                       Modalities             MH - Cervical During subjective                              "

## 2025-06-11 ENCOUNTER — EVALUATION (OUTPATIENT)
Dept: PHYSICAL THERAPY | Facility: CLINIC | Age: 45
End: 2025-06-11
Attending: FAMILY MEDICINE
Payer: COMMERCIAL

## 2025-06-11 DIAGNOSIS — M35.3 POLYMYALGIA (HCC): ICD-10-CM

## 2025-06-11 DIAGNOSIS — M54.2 CERVICALGIA: Primary | ICD-10-CM

## 2025-06-11 DIAGNOSIS — G89.29 CHRONIC NECK PAIN: ICD-10-CM

## 2025-06-11 DIAGNOSIS — M54.2 CHRONIC NECK PAIN: ICD-10-CM

## 2025-06-11 DIAGNOSIS — M54.12 CERVICAL RADICULOPATHY: ICD-10-CM

## 2025-06-11 PROCEDURE — 97110 THERAPEUTIC EXERCISES: CPT

## 2025-06-11 PROCEDURE — 97010 HOT OR COLD PACKS THERAPY: CPT

## 2025-06-11 PROCEDURE — 97163 PT EVAL HIGH COMPLEX 45 MIN: CPT

## 2025-06-11 NOTE — HOME EXERCISE EDUCATION
Program_ID:[id number]   Access Code: [email or web address]  URL: [email or web address]  Date: 06-  Prepared By: Mackenzie Castillo    Program Notes      Exercises      - Seated Assisted Cervical Rotation with Towel - 3 x daily -  x weekly - 2 sets - 5 reps - 3 sec hold

## 2025-06-18 ENCOUNTER — APPOINTMENT (OUTPATIENT)
Dept: PHYSICAL THERAPY | Facility: CLINIC | Age: 45
End: 2025-06-18
Attending: FAMILY MEDICINE
Payer: COMMERCIAL

## 2025-06-22 ENCOUNTER — HOSPITAL ENCOUNTER (EMERGENCY)
Facility: HOSPITAL | Age: 45
Discharge: HOME | End: 2025-06-23
Attending: EMERGENCY MEDICINE
Payer: COMMERCIAL

## 2025-06-22 DIAGNOSIS — R55 SYNCOPE: Primary | ICD-10-CM

## 2025-06-22 LAB
ALBUMIN SERPL BCG-MCNC: 4.3 G/DL (ref 3.5–5)
ALP SERPL-CCNC: 45 U/L (ref 34–104)
ALT SERPL W P-5'-P-CCNC: 13 U/L (ref 7–52)
ANION GAP SERPL CALCULATED.3IONS-SCNC: 7 MMOL/L (ref 4–13)
AST SERPL W P-5'-P-CCNC: 20 U/L (ref 13–39)
BASOPHILS # BLD AUTO: 0.04 THOUSANDS/ÂΜL (ref 0–0.1)
BASOPHILS NFR BLD AUTO: 1 % (ref 0–1)
BILIRUB SERPL-MCNC: 0.4 MG/DL (ref 0.2–1)
BUN SERPL-MCNC: 10 MG/DL (ref 5–25)
CALCIUM SERPL-MCNC: 9.2 MG/DL (ref 8.4–10.2)
CARDIAC TROPONIN I PNL SERPL HS: <2 NG/L (ref ?–50)
CHLORIDE SERPL-SCNC: 104 MMOL/L (ref 96–108)
CO2 SERPL-SCNC: 26 MMOL/L (ref 21–32)
CREAT SERPL-MCNC: 0.98 MG/DL (ref 0.6–1.3)
EOSINOPHIL # BLD AUTO: 0.23 THOUSAND/ÂΜL (ref 0–0.61)
EOSINOPHIL NFR BLD AUTO: 3 % (ref 0–6)
ERYTHROCYTE [DISTWIDTH] IN BLOOD BY AUTOMATED COUNT: 11.9 % (ref 11.6–15.1)
GFR SERPL CREATININE-BSD FRML MDRD: 69 ML/MIN/1.73SQ M
GLUCOSE SERPL-MCNC: 91 MG/DL (ref 65–140)
HCT VFR BLD AUTO: 39.2 % (ref 34.8–46.1)
HGB BLD-MCNC: 12.6 G/DL (ref 11.5–15.4)
IMM GRANULOCYTES # BLD AUTO: 0.01 THOUSAND/UL (ref 0–0.2)
IMM GRANULOCYTES NFR BLD AUTO: 0 % (ref 0–2)
LYMPHOCYTES # BLD AUTO: 3.35 THOUSANDS/ÂΜL (ref 0.6–4.47)
LYMPHOCYTES NFR BLD AUTO: 48 % (ref 14–44)
MCH RBC QN AUTO: 28.2 PG (ref 26.8–34.3)
MCHC RBC AUTO-ENTMCNC: 32.1 G/DL (ref 31.4–37.4)
MCV RBC AUTO: 88 FL (ref 82–98)
MONOCYTES # BLD AUTO: 0.31 THOUSAND/ÂΜL (ref 0.17–1.22)
MONOCYTES NFR BLD AUTO: 5 % (ref 4–12)
NEUTROPHILS # BLD AUTO: 2.92 THOUSANDS/ÂΜL (ref 1.85–7.62)
NEUTS SEG NFR BLD AUTO: 43 % (ref 43–75)
NRBC BLD AUTO-RTO: 0 /100 WBCS
PLATELET # BLD AUTO: 195 THOUSANDS/UL (ref 149–390)
PMV BLD AUTO: 10.9 FL (ref 8.9–12.7)
POTASSIUM SERPL-SCNC: 4 MMOL/L (ref 3.5–5.3)
PROT SERPL-MCNC: 6.8 G/DL (ref 6.4–8.4)
RBC # BLD AUTO: 4.47 MILLION/UL (ref 3.81–5.12)
SODIUM SERPL-SCNC: 137 MMOL/L (ref 135–147)
WBC # BLD AUTO: 6.86 THOUSAND/UL (ref 4.31–10.16)

## 2025-06-22 PROCEDURE — 36415 COLL VENOUS BLD VENIPUNCTURE: CPT

## 2025-06-22 PROCEDURE — 85025 COMPLETE CBC W/AUTO DIFF WBC: CPT | Performed by: EMERGENCY MEDICINE

## 2025-06-22 PROCEDURE — 84484 ASSAY OF TROPONIN QUANT: CPT | Performed by: EMERGENCY MEDICINE

## 2025-06-22 PROCEDURE — 80053 COMPREHEN METABOLIC PANEL: CPT | Performed by: EMERGENCY MEDICINE

## 2025-06-22 PROCEDURE — 93005 ELECTROCARDIOGRAM TRACING: CPT

## 2025-06-22 PROCEDURE — 99284 EMERGENCY DEPT VISIT MOD MDM: CPT

## 2025-06-22 RX ORDER — ACETAMINOPHEN 325 MG/1
975 TABLET ORAL ONCE
Status: COMPLETED | OUTPATIENT
Start: 2025-06-22 | End: 2025-06-22

## 2025-06-22 RX ORDER — KETOROLAC TROMETHAMINE 30 MG/ML
15 INJECTION, SOLUTION INTRAMUSCULAR; INTRAVENOUS ONCE
Status: COMPLETED | OUTPATIENT
Start: 2025-06-22 | End: 2025-06-23

## 2025-06-22 RX ORDER — METOCLOPRAMIDE HYDROCHLORIDE 5 MG/ML
10 INJECTION INTRAMUSCULAR; INTRAVENOUS ONCE
Status: COMPLETED | OUTPATIENT
Start: 2025-06-22 | End: 2025-06-23

## 2025-06-22 RX ORDER — ONDANSETRON 2 MG/ML
4 INJECTION INTRAMUSCULAR; INTRAVENOUS ONCE
Status: COMPLETED | OUTPATIENT
Start: 2025-06-22 | End: 2025-06-23

## 2025-06-22 RX ORDER — DIPHENHYDRAMINE HYDROCHLORIDE 50 MG/ML
50 INJECTION, SOLUTION INTRAMUSCULAR; INTRAVENOUS ONCE
Status: COMPLETED | OUTPATIENT
Start: 2025-06-22 | End: 2025-06-23

## 2025-06-22 RX ADMIN — ACETAMINOPHEN 975 MG: 325 TABLET ORAL at 23:54

## 2025-06-23 VITALS
SYSTOLIC BLOOD PRESSURE: 124 MMHG | RESPIRATION RATE: 18 BRPM | BODY MASS INDEX: 28 KG/M2 | OXYGEN SATURATION: 100 % | HEIGHT: 64 IN | HEART RATE: 90 BPM | WEIGHT: 164 LBS | DIASTOLIC BLOOD PRESSURE: 80 MMHG | TEMPERATURE: 97.8 F

## 2025-06-23 LAB
ATRIAL RATE: 94 BPM
BILIRUB UR QL STRIP: NEGATIVE
CLARITY UR: CLEAR
COLOR UR: NORMAL
GLUCOSE UR STRIP-MCNC: NEGATIVE MG/DL
HGB UR QL STRIP.AUTO: NEGATIVE
KETONES UR STRIP-MCNC: NEGATIVE MG/DL
LEUKOCYTE ESTERASE UR QL STRIP: NEGATIVE
NITRITE UR QL STRIP: NEGATIVE
P AXIS: 42 DEGREES
PH UR STRIP.AUTO: 6.5 [PH]
PR INTERVAL: 158 MS
PROT UR STRIP-MCNC: NEGATIVE MG/DL
QRS AXIS: 8 DEGREES
QRSD INTERVAL: 80 MS
QT INTERVAL: 346 MS
QTC INTERVAL: 433 MS
SP GR UR STRIP.AUTO: 1.01 (ref 1–1.03)
T WAVE AXIS: 14 DEGREES
UROBILINOGEN UR STRIP-ACNC: <2 MG/DL
VENTRICULAR RATE: 94 BPM

## 2025-06-23 PROCEDURE — 96361 HYDRATE IV INFUSION ADD-ON: CPT

## 2025-06-23 PROCEDURE — 96374 THER/PROPH/DIAG INJ IV PUSH: CPT

## 2025-06-23 PROCEDURE — 81003 URINALYSIS AUTO W/O SCOPE: CPT | Performed by: EMERGENCY MEDICINE

## 2025-06-23 PROCEDURE — 96375 TX/PRO/DX INJ NEW DRUG ADDON: CPT

## 2025-06-23 PROCEDURE — 93010 ELECTROCARDIOGRAM REPORT: CPT | Performed by: INTERNAL MEDICINE

## 2025-06-23 PROCEDURE — 99284 EMERGENCY DEPT VISIT MOD MDM: CPT | Performed by: EMERGENCY MEDICINE

## 2025-06-23 RX ADMIN — DIPHENHYDRAMINE HYDROCHLORIDE 50 MG: 50 INJECTION, SOLUTION INTRAMUSCULAR; INTRAVENOUS at 00:49

## 2025-06-23 RX ADMIN — METOCLOPRAMIDE 10 MG: 5 INJECTION, SOLUTION INTRAMUSCULAR; INTRAVENOUS at 00:49

## 2025-06-23 RX ADMIN — ONDANSETRON 4 MG: 2 INJECTION, SOLUTION INTRAMUSCULAR; INTRAVENOUS at 00:45

## 2025-06-23 RX ADMIN — KETOROLAC TROMETHAMINE 15 MG: 30 INJECTION, SOLUTION INTRAMUSCULAR; INTRAVENOUS at 00:44

## 2025-06-23 RX ADMIN — SODIUM CHLORIDE 1000 ML: 0.9 INJECTION, SOLUTION INTRAVENOUS at 00:43

## 2025-06-23 NOTE — ED PROVIDER NOTES
Time reflects when diagnosis was documented in both MDM as applicable and the Disposition within this note       Time User Action Codes Description Comment    6/23/2025  1:40 AM Brooks Tadeo Add [R55] Syncope           ED Disposition       ED Disposition   Discharge    Condition   Stable    Date/Time   Mon Jun 23, 2025  1:40 AM    Comment   An Sites discharge to home/self care.                   Assessment & Plan       Medical Decision Making  45-year-old female presenting with concerns for syncopal episode.  Has had multiple syncopal episodes the past, most recently was 1 year ago.  States she is feeling slightly unwell has been having some diarrhea with poor water intake.  No focal abdominal pain.  Less likely to be cholecystitis, bowel obstruction or other severe electrolyte abnormality.  Benign abdominal exam, more likely to be viral symptoms.  Patient may have increased sensitivity for vasovagal episodes.  Medical records reviewed she has had carotid ultrasounds, MRIs and other advanced imaging to assess for source of why she passes out.  No known source.  Patient also endorses having headache consistent with her migraines.  Will give migraine cocktail.  Benign neuroexam, speaking clearly without slurred speech or facial droop.  If patient is able to ambulate after treatment without symptoms, can be discharged for outpatient follow-up.    Amount and/or Complexity of Data Reviewed  Labs: ordered.    Risk  OTC drugs.  Prescription drug management.             Medications   sodium chloride 0.9 % bolus 1,000 mL (0 mL Intravenous Stopped 6/23/25 0143)   ondansetron (ZOFRAN) injection 4 mg (4 mg Intravenous Given 6/23/25 0045)   ketorolac (TORADOL) injection 15 mg (15 mg Intravenous Given 6/23/25 0044)   acetaminophen (TYLENOL) tablet 975 mg (975 mg Oral Given 6/22/25 2354)   metoclopramide (REGLAN) injection 10 mg (10 mg Intravenous Given 6/23/25 0049)   diphenhydrAMINE (BENADRYL) injection 50 mg (50 mg  "Intravenous Given 6/23/25 0049)       ED Risk Strat Scores                    No data recorded                            History of Present Illness       Chief Complaint   Patient presents with    Syncope     EMS from home; pt has hx of \"blood pressure issues\", felt dizzy like she was going to pass out,  lowered her to the floor,  told EMS episodes usually last 10 min and this one lasted approx. 25 min; pt alert and oriented answering questions in triage       Past Medical History[1]   Past Surgical History[2]   Family History[3]   Social History[4]   E-Cigarette/Vaping    E-Cigarette Use Never User       E-Cigarette/Vaping Substances    Nicotine No     THC No     CBD No     Flavoring No     Other No     Unknown No       I have reviewed and agree with the history as documented.     Patient presents with:  Syncope: EMS from home; pt has hx of \"blood pressure issues\", felt dizzy like she was going to pass out,  lowered her to the floor,  told EMS episodes usually last 10 min and this one lasted approx. 25 min; pt alert and oriented answering questions in triage          Syncope  Associated symptoms: no chest pain, no dizziness, no fever, no nausea, no palpitations, no shortness of breath, no vomiting and no weakness        Review of Systems   Constitutional: Negative.  Negative for chills and fever.   HENT: Negative.  Negative for rhinorrhea, sore throat, trouble swallowing and voice change.    Eyes: Negative.  Negative for pain and visual disturbance.   Respiratory: Negative.  Negative for cough, shortness of breath and wheezing.    Cardiovascular:  Positive for syncope. Negative for chest pain and palpitations.   Gastrointestinal:  Negative for abdominal pain, diarrhea, nausea and vomiting.   Genitourinary: Negative.  Negative for dysuria and frequency.   Musculoskeletal: Negative.  Negative for neck pain and neck stiffness.   Skin: Negative.  Negative for rash.   Neurological:  Positive " for syncope. Negative for dizziness, speech difficulty, weakness, light-headedness and numbness.           Objective       ED Triage Vitals [06/22/25 2050]   Temperature Pulse Blood Pressure Respirations SpO2 Patient Position - Orthostatic VS   97.8 °F (36.6 °C) 88 118/75 16 100 % Sitting      Temp Source Heart Rate Source BP Location FiO2 (%) Pain Score    Temporal Monitor Left arm -- 8      Vitals      Date and Time Temp Pulse SpO2 Resp BP Pain Score FACES Pain Rating User   06/23/25 0100 -- 90 100 % 18 124/80 -- -- MG   06/23/25 0044 -- -- -- -- -- 8 -- AD   06/22/25 2354 -- -- -- -- -- 8 -- MG   06/22/25 2050 97.8 °F (36.6 °C) 88 100 % 16 118/75 8 -- MS            Physical Exam  Vitals and nursing note reviewed.   Constitutional:       General: She is not in acute distress.     Appearance: She is well-developed.   HENT:      Head: Normocephalic and atraumatic.     Eyes:      Conjunctiva/sclera: Conjunctivae normal.      Pupils: Pupils are equal, round, and reactive to light.     Neck:      Trachea: No tracheal deviation.     Cardiovascular:      Rate and Rhythm: Normal rate and regular rhythm.   Pulmonary:      Effort: Pulmonary effort is normal. No respiratory distress.      Breath sounds: Normal breath sounds. No wheezing or rales.   Abdominal:      General: Bowel sounds are normal. There is no distension.      Palpations: Abdomen is soft.      Tenderness: There is no abdominal tenderness. There is no guarding or rebound.     Musculoskeletal:         General: No tenderness or deformity. Normal range of motion.      Cervical back: Normal range of motion and neck supple.     Skin:     General: Skin is warm and dry.      Capillary Refill: Capillary refill takes less than 2 seconds.      Findings: No rash.     Neurological:      Mental Status: She is alert and oriented to person, place, and time.     Psychiatric:         Behavior: Behavior normal.         Results Reviewed       Procedure Component Value Units  Date/Time    UA w Reflex to Microscopic w Reflex to Culture [[id number]] Collected: 06/23/25 0146    Lab Status: Final result Specimen: Urine, Clean Catch Updated: 06/23/25 0203     Color, UA Light Yellow     Clarity, UA Clear     Specific Gravity, UA 1.015     pH, UA 6.5     Leukocytes, UA Negative     Nitrite, UA Negative     Protein, UA Negative mg/dl      Glucose, UA Negative mg/dl      Ketones, UA Negative mg/dl      Urobilinogen, UA <2.0 mg/dl      Bilirubin, UA Negative     Occult Blood, UA Negative    HS Troponin 0hr (reflex protocol) [[id number]]  (Normal) Collected: 06/22/25 2053    Lab Status: Final result Specimen: Blood from Arm, Left Updated: 06/22/25 2120     hs TnI 0hr <2 ng/L     Comprehensive metabolic panel [[id number]] Collected: 06/22/25 2053    Lab Status: Final result Specimen: Blood from Arm, Left Updated: 06/22/25 2114     Sodium 137 mmol/L      Potassium 4.0 mmol/L      Chloride 104 mmol/L      CO2 26 mmol/L      ANION GAP 7 mmol/L      BUN 10 mg/dL      Creatinine 0.98 mg/dL      Glucose 91 mg/dL      Calcium 9.2 mg/dL      AST 20 U/L      ALT 13 U/L      Alkaline Phosphatase 45 U/L      Total Protein 6.8 g/dL      Albumin 4.3 g/dL      Total Bilirubin 0.40 mg/dL      eGFR 69 ml/min/1.73sq m     Narrative:      [employer] guidelines for Chronic Kidney Disease (CKD):     Stage 1 with normal or high GFR (GFR > 90 mL/min/1.73 square meters)    Stage 2 Mild CKD (GFR = 60-89 mL/min/1.73 square meters)    Stage 3A Moderate CKD (GFR = 45-59 mL/min/1.73 square meters)    Stage 3B Moderate CKD (GFR = 30-44 mL/min/1.73 square meters)    Stage 4 Severe CKD (GFR = 15-29 mL/min/1.73 square meters)    Stage 5 End Stage CKD (GFR <15 mL/min/1.73 square meters)  Note: GFR calculation is accurate only with a steady state creatinine    CBC and differential [[id number]]  (Abnormal) Collected: 06/22/25 2053    Lab Status: Final result Specimen: Blood from Arm, Left Updated: 06/22/25 2058      WBC 6.86 Thousand/uL      RBC 4.47 Million/uL      Hemoglobin 12.6 g/dL      Hematocrit 39.2 %      MCV 88 fL      MCH 28.2 pg      MCHC 32.1 g/dL      RDW 11.9 %      MPV 10.9 fL      Platelets 195 Thousands/uL      nRBC 0 /100 WBCs      Segmented % 43 %      Immature Grans % 0 %      Lymphocytes % 48 %      Monocytes % 5 %      Eosinophils Relative 3 %      Basophils Relative 1 %      Absolute Neutrophils 2.92 Thousands/µL      Absolute Immature Grans 0.01 Thousand/uL      Absolute Lymphocytes 3.35 Thousands/µL      Absolute Monocytes 0.31 Thousand/µL      Eosinophils Absolute 0.23 Thousand/µL      Basophils Absolute 0.04 Thousands/µL             No orders to display       Procedures    ED Medication and Procedure Management   Prior to Admission Medications   Prescriptions Last Dose Informant Patient Reported? Taking?   ALPRAZolam (XANAX) 1 mg tablet  Self Yes No   Sig: Take 1 mg by mouth daily at bedtime as needed for anxiety   Patient taking differently: Take 1 mg by mouth daily at bedtime as needed for anxiety Taking it daily at night   Botox 200 units SOLR  Self Yes No   Erenumab-aooe (Aimovig) 140 MG/ML SOAJ   No No   Sig: Inject 140 mg under the skin every 30 (thirty) days   Refresh Plus 0.5 % SOLN  Self Yes No   albuterol (2.5 mg/3 mL) 0.083 % nebulizer solution  Self No No   Sig: Take 3 mL (2.5 mg total) by nebulization every 6 (six) hours as needed for wheezing or shortness of breath   albuterol (VENTOLIN HFA) 90 mcg/act inhaler  Self No No   Sig: Inhale 2 puffs every 6 (six) hours as needed for wheezing   doxepin (SINEquan) 10 mg capsule  Self Yes No   Sig: Take 10 mg by mouth   escitalopram (LEXAPRO) 10 mg tablet  Self Yes No   Sig: Take 1 tablet by mouth every morning   fluticasone (FLONASE) 50 mcg/act nasal spray  Self Yes No   Si spray into each nostril in the morning.   meclizine (ANTIVERT) 25 mg tablet  Self Yes No   montelukast (SINGULAIR) 10 mg tablet  Self No No   Sig: Take 1 tablet (10 mg  total) by mouth daily at bedtime   pantoprazole (PROTONIX) 40 mg  Self Yes No   simethicone (MYLICON) 125 MG chewable tablet  Self Yes No   tirzepatide (Zepbound) 10 mg/0.5 mL auto-injector   No No   Sig: Inject 0.5 mL (10 mg total) under the skin once a week Do not start before May 26, 2025.   Patient not taking: Reported on 5/20/2025 Do not start before May 26, 2025.   tirzepatide (Zepbound) 12.5 mg/0.5 mL auto-injector   No No   Sig: Inject 0.5 mL (12.5 mg total) under the skin once a week Do not start before June 9, 2025.   Patient not taking: Reported on 5/20/2025 Do not start before June 9, 2025.   tirzepatide (Zepbound) 5 mg/0.5 mL auto-injector   No No   Sig: Inject 0.5 mL (5 mg total) under the skin once a week Do not start before April 28, 2025.   tirzepatide (Zepbound) 7.5 mg/0.5 mL auto-injector   No No   Sig: Inject 0.5 mL (7.5 mg total) under the skin once a week   Patient not taking: Reported on 5/20/2025   triamcinolone (KENALOG) 0.1 % cream   No No   Sig: Apply topically 2 (two) times a day for 14 days   valACYclovir (VALTREX) 1,000 mg tablet   No No   Sig: Take 1 tablet (1,000 mg total) by mouth 2 (two) times a day for 7 days      Facility-Administered Medications: None     Discharge Medication List as of 6/23/2025  1:41 AM        CONTINUE these medications which have NOT CHANGED    Details   albuterol (2.5 mg/3 mL) 0.083 % nebulizer solution Take 3 mL (2.5 mg total) by nebulization every 6 (six) hours as needed for wheezing or shortness of breath, Starting Mon 2/10/2025, Normal      albuterol (VENTOLIN HFA) 90 mcg/act inhaler Inhale 2 puffs every 6 (six) hours as needed for wheezing, Starting Mon 10/14/2019, Normal      ALPRAZolam (XANAX) 1 mg tablet Take 1 mg by mouth daily at bedtime as needed for anxiety, Historical Med      Botox 200 units SOLR Historical Med      doxepin (SINEquan) 10 mg capsule Take 10 mg by mouth, Historical Med      Erenumab-aooe (Aimovig) 140 MG/ML SOAJ Inject 140 mg  under the skin every 30 (thirty) days, Starting Fri 6/6/2025, Normal      escitalopram (LEXAPRO) 10 mg tablet Take 1 tablet by mouth every morning, Starting Wed 5/1/2024, Historical Med      fluticasone (FLONASE) 50 mcg/act nasal spray 1 spray into each nostril in the morning., Historical Med      meclizine (ANTIVERT) 25 mg tablet Historical Med      montelukast (SINGULAIR) 10 mg tablet Take 1 tablet (10 mg total) by mouth daily at bedtime, Starting Thu 2/27/2020, Normal      pantoprazole (PROTONIX) 40 mg Historical Med      Refresh Plus 0.5 % SOLN Historical Med      simethicone (MYLICON) 125 MG chewable tablet Historical Med      tirzepatide (Zepbound) 10 mg/0.5 mL auto-injector Inject 0.5 mL (10 mg total) under the skin once a week Do not start before May 26, 2025., Starting Mon 5/26/2025, Normal      tirzepatide (Zepbound) 12.5 mg/0.5 mL auto-injector Inject 0.5 mL (12.5 mg total) under the skin once a week Do not start before June 9, 2025., Starting Mon 6/9/2025, Normal      tirzepatide (Zepbound) 5 mg/0.5 mL auto-injector Inject 0.5 mL (5 mg total) under the skin once a week Do not start before April 28, 2025., Starting Mon 4/28/2025, Normal      tirzepatide (Zepbound) 7.5 mg/0.5 mL auto-injector Inject 0.5 mL (7.5 mg total) under the skin once a week, Starting Mon 5/12/2025, Normal      triamcinolone (KENALOG) 0.1 % cream Apply topically 2 (two) times a day for 14 days, Starting Tue 5/6/2025, Until Tue 5/20/2025, Normal      valACYclovir (VALTREX) 1,000 mg tablet Take 1 tablet (1,000 mg total) by mouth 2 (two) times a day for 7 days, Starting Tue 5/20/2025, Until Tue 5/27/2025, Normal      budesonide-formoterol (Symbicort) 160-4.5 mcg/act inhaler Inhale 2 puffs 2 (two) times a day Rinse mouth after use., Starting Thu 1/2/2025, Normal      cetirizine (ZyrTEC) 10 mg tablet Take 1 tablet (10 mg total) by mouth daily, Starting Thu 1/2/2025, Normal           No discharge procedures on file.  ED SEPSIS  DOCUMENTATION   Time reflects when diagnosis was documented in both MDM as applicable and the Disposition within this note       Time User Action Codes Description Comment    2025  1:40 AM Brooks Tadeo Add [R55] Syncope                      [1]   Past Medical History:  Diagnosis Date    Allergic     Anemia     Anxiety     Arthritis     Asthma     Cluster headache     Depression     GERD (gastroesophageal reflux disease)     Head injury     Headache(784.0)     Headache, tension-type     Hypoglycemia     Kidney stone     When i was pregnant with my first child    Migraine 2020    Vestibular migraines suffered severe concussion    Moderate episode of recurrent major depressive disorder (HCC) 10/03/2019    Otitis media     Pneumonia     PTSD (post-traumatic stress disorder)     Seizures (HCC) 2022    Only one due to medication    Shingles     Syncope     Varicella     Had it four times last one was in 94   [2]   Past Surgical History:  Procedure Laterality Date    ABDOMINAL SURGERY      3 csections, gallbladder and umbilical hernia     ANKLE LIGAMENT RECONSTRUCTION      ANKLE SURGERY  2017     SECTION      CHOLECYSTECTOMY      CHOLECYSTECTOMY LAPAROSCOPIC N/A 2020    Procedure: CHOLECYSTECTOMY LAPAROSCOPIC;  Surgeon: Rachid Cline MD;  Location: [facility];  Service: General    HYSTERECTOMY  10/15/2024    KNEE SURGERY      SINUS SURGERY      TONSILLECTOMY      TONSILLECTOMY      TRIGGER POINT INJECTION      TUBAL LIGATION      UMBILICAL HERNIA REPAIR N/A 2020    Procedure: REPAIR HERNIA UMBILICAL, Primary;  Surgeon: Rachid Cline MD;  Location: [facility];  Service: General    UPPER GASTROINTESTINAL ENDOSCOPY     [3]   Family History  Problem Relation Name Age of Onset    Depression Mother Aleja Tej     Asthma Mother Aleja Tej     Mental illness Mother Aleja Toscanozachery     Thyroid disease Mother Aleja Tej         My aunt also has  thyroid issues    Mental illness Maternal Grandfather Alexis Burnham     Asthma Maternal Grandfather Alexis Burnham     Heart disease Maternal Grandfather Alexis Burnham         Runs in family    Pulmonary embolism Maternal Grandmother Bridget Diaz     Asthma Son Evans colon     Alcohol abuse Maternal Aunt Benjamin Tej     Drug abuse Maternal Aunt Benjamin Tej     Developmental delay Son Evans colon     Mental illness Son Evans colon     Migraines Son Evans colon     Asthma Son Evans colon     Migraines Son Evans colon     Rashes / Skin problems Son Evans colon         Eczema   [4]   Social History  Tobacco Use    Smoking status: Never    Smokeless tobacco: Never    Tobacco comments:     Never smoked   Vaping Use    Vaping status: Never Used   Substance Use Topics    Alcohol use: Not Currently     Comment: Barely drink    Drug use: Never        Brooks Tadeo DO  06/23/25 1242

## 2025-06-25 ENCOUNTER — OFFICE VISIT (OUTPATIENT)
Dept: PHYSICAL THERAPY | Facility: CLINIC | Age: 45
End: 2025-06-25
Attending: FAMILY MEDICINE
Payer: COMMERCIAL

## 2025-06-25 DIAGNOSIS — M54.12 CERVICAL RADICULOPATHY: ICD-10-CM

## 2025-06-25 DIAGNOSIS — M54.2 CERVICALGIA: Primary | ICD-10-CM

## 2025-06-25 DIAGNOSIS — M54.2 CHRONIC NECK PAIN: ICD-10-CM

## 2025-06-25 DIAGNOSIS — G89.29 CHRONIC NECK PAIN: ICD-10-CM

## 2025-06-25 DIAGNOSIS — M35.3 POLYMYALGIA (HCC): ICD-10-CM

## 2025-06-25 PROCEDURE — 97110 THERAPEUTIC EXERCISES: CPT

## 2025-06-25 PROCEDURE — 97010 HOT OR COLD PACKS THERAPY: CPT

## 2025-06-25 PROCEDURE — 97140 MANUAL THERAPY 1/> REGIONS: CPT

## 2025-06-25 NOTE — PROGRESS NOTES
Daily Note     Today's date: 2025  Patient name: An Eason  : 1980  MRN: [id number]  Referring provider: Alexa Morgan DO  Dx:   Encounter Diagnosis     ICD-10-CM    1. Cervicalgia  M54.2       2. Chronic neck pain  M54.2     G89.29       3. Cervical radiculopathy  M54.12       4. Polymyalgia (HCC)  M35.3           Start Time: 0820  Stop Time: 900  Total time in clinic (min): 40 minutes    Subjective: The pt reports that she went to the ER on Monday because she passed out and lost consciousness for at least 15 minutes. Testing was unable to clarify the cause of this event. -- Pt reports feelings of numbness in her hands and they feel achy.      Objective: See treatment diary below      Assessment: The pt participated in a skilled physical therapy session that focused on manual intervention. Positive Tinel's at carpal tunnel bilaterally. IASTM was provided along the wrist flexors to address muscle tension and to increase blood flow within the distal UE. This was effective as noted by visible increase in blood flow by change in skin color as well as pt report of an improvement in feel of her hands. She presented with significant muscle tension within the posterior cervicothoracic region, which was also addressed with manual intervention (IASTM, STM). She tolerated treatment well. The pt would benefit from continued PT to address impairments in order to return to her PLOF without limitations due to pain.       Plan: Continue per plan of care.      Precautions: PMH includes: Chronic Migraines, Chronic Fatigue, Chronic Neck Pain, Cervical Radiculopathy, Cervical Spondylosis, Depression, PTSD, Bronchitis, Asthma, RUQ/RLQ pain, Constipation, GERD, Syncope, IBS, Hypoglycemia                  Manuals             STM KS (UT, cervical paraspinals) KS (UT, cervical paraspinals)             PA Mobilization: Cervical Spine             Suboccipital release KS            Fascial Mobilization          "    IASTM  KS (wrist flexors; UT, cervical paraspinals)                        Neuro Re-Ed                                                                                                        Ther Ex             AAROM Cervical Rotation w/ towel 5x w/ 3\" hold ea dir            UT stretch: Supine             LS Stretch: Supine             UBE - mobility, strength, activity capacity                                                    Anatomy as it relates to pt's presentation/symptoms KS KS           Address pt questions PRN KS KS           HEP             Update on pt's symptoms/status  KS                        Ther Activity                                       Gait Training                                       Modalities             MH - Cervical During subjective KS - during IASTM to forearms                             "

## 2025-07-02 ENCOUNTER — OFFICE VISIT (OUTPATIENT)
Dept: PHYSICAL THERAPY | Facility: CLINIC | Age: 45
End: 2025-07-02
Attending: FAMILY MEDICINE
Payer: COMMERCIAL

## 2025-07-02 DIAGNOSIS — M35.3 POLYMYALGIA (HCC): ICD-10-CM

## 2025-07-02 DIAGNOSIS — M54.2 CERVICALGIA: Primary | ICD-10-CM

## 2025-07-02 DIAGNOSIS — M54.12 CERVICAL RADICULOPATHY: ICD-10-CM

## 2025-07-02 DIAGNOSIS — M54.2 CHRONIC NECK PAIN: ICD-10-CM

## 2025-07-02 DIAGNOSIS — G89.29 CHRONIC NECK PAIN: ICD-10-CM

## 2025-07-02 PROCEDURE — 97110 THERAPEUTIC EXERCISES: CPT

## 2025-07-02 PROCEDURE — 97010 HOT OR COLD PACKS THERAPY: CPT

## 2025-07-02 PROCEDURE — 97140 MANUAL THERAPY 1/> REGIONS: CPT

## 2025-07-02 NOTE — PROGRESS NOTES
"Daily Note     Today's date: 2025  Patient name: An Eason  : 1980  MRN: [id number]  Referring provider: Alexa Morgan DO  Dx:   Encounter Diagnosis     ICD-10-CM    1. Cervicalgia  M54.2       2. Chronic neck pain  M54.2     G89.29       3. Cervical radiculopathy  M54.12       4. Polymyalgia (HCC)  M35.3           Start Time: 820  Stop Time: 902  Total time in clinic (min): 42 minutes    Subjective: She notes feeling better after her steffanie treatment session. She has experienced less tingling in her arms/forearms. --- She notes waking up with migraines and has a headache upon arrival.      ---  Pt reports symptoms of \"a dizzy spell\" when getting out of the pool. She will do some exercises while in the pool and will have some relief, although feel a progressive increase in neck pain after her time in the pool. She notes that it has been a difficult week with her son. -- Pt has an appointment with an orthopedic physician on 2025.       Objective: See treatment diary below      Assessment: The pt participated in a skilled physical therapy session that focused on manual intervention. The pt presented with a progressive increase in neck pain/discomfort while performing cervical rotation in supine. Cervical retraction/chin tuck provoked a \"clicking\" sensation in her neck and, as a result, these movements were not included in the intervention program. Supine pectoral stretch/\"W\" were introduced as she presented with pectoral tension/shortening noted by posture (rounded shoulders). She presented with significant muscle tension/trigger points within the musculature of the cervicothoracic region (left side greater than right side), which were addressed to aid in symptom modulation. She tolerated treatment well. The pt would benefit from continued PT to address impairments in order to return to her PLOF without limitations due to pain.       Plan: Continue per plan of care.      Precautions: PMH " "includes: Chronic Migraines, Chronic Fatigue, Chronic Neck Pain, Cervical Radiculopathy, Cervical Spondylosis, Depression, PTSD, Bronchitis, Asthma, RUQ/RLQ pain, Constipation, GERD, Syncope, IBS, Hypoglycemia       6/11 6/25 7/2          Manuals             STM KS (UT, cervical paraspinals) KS (UT, cervical paraspinals) KS (UT, cervical paraspinals) - pt prone            PA Mobilization: Cervical Spine             Suboccipital release KS            Fascial Mobilization             IASTM  KS (wrist flexors; UT, cervical paraspinals) KS (wrist flexors; UT, cervical paraspinals)                       Neuro Re-Ed                                                                                                        Ther Ex             AAROM Cervical Rotation w/ towel 5x w/ 3\" hold ea dir            UT stretch: Supine             Cervical Rotation: Supine   10x w/ 2\" hold ea dir          Pec Stretch/\"W\": Supine    10x w/ 10\" hold          LS Stretch: Supine             UBE - mobility, strength, activity capacity                                                    Anatomy as it relates to pt's presentation/symptoms KS KS           Address pt questions PRN KS KS KS          HEP   nv          Update on pt's symptoms/status  KS KS                       Ther Activity                                       Gait Training                                       Modalities             MH - Cervical During subjective KS - during IASTM to forearms KS - during IASTM to forearms                              "

## 2025-07-04 DIAGNOSIS — G43.709 CHRONIC MIGRAINE WITHOUT AURA: ICD-10-CM

## 2025-07-06 NOTE — PROGRESS NOTES
"Daily Note     Today's date: 2025  Patient name: An Eason  : 1980  MRN: [id number]  Referring provider: Alexa Morgan DO  Dx:   Encounter Diagnosis     ICD-10-CM    1. Cervicalgia  M54.2       2. Chronic neck pain  M54.2     G89.29       3. Cervical radiculopathy  M54.12       4. Polymyalgia (HCC)  M35.3           Start Time: 1148  Stop Time: 1230  Total time in clinic (min): 42 minutes    Subjective: Pt went to the beach yesterday. She notes that the waves were a bit rough yesterday. She felt a \"pop\" in her neck after time at the beach. She has felt symptoms of a headaches/migraine, nausea, and some blurry vision since yesterday. She notes that she did not do much this past weekend because she does not like to do much while feeling dizzy. -- She felt sore in the mid-back shoulder area after her previous treatment session and lit lasted the rest of the day. -- She is having difficult time sleeping due to pain and life stressors.       Objective: See treatment diary below      Assessment: The pt participated in a skilled physical therapy session that focused on manual intervention. \"W\" stretch in supine was performed to address pectoral tension while also not provoking an increase in neural tension associated with cervical radiculopathy. A decrease in muscle tension within the LUE wrist flexors reflects good carryover between treatment sessions. The pt presented with significant muscle tension within the UT, sternocleidomastoid, and cervical paraspinals bilaterally. This was addressed with manual intervention to aid in symptom modulation and decrease muscle over-activity. She tolerated treatment well as noted by an increased willingness to move her neck at the end of the treatment session. Patient would benefit from continued PT to address impairments in order to return to her PLOF without limitations due to pain.       Plan: Continue per plan of care.      Precautions: PMH includes: Chronic " "Migraines, Chronic Fatigue, Chronic Neck Pain, Cervical Radiculopathy, Cervical Spondylosis, Depression, PTSD, Bronchitis, Asthma, RUQ/RLQ pain, Constipation, GERD, Syncope, IBS, Hypoglycemia       6/11 6/25 7/2 7/7         Manuals             STM KS (UT, cervical paraspinals) KS (UT, cervical paraspinals) KS (UT, cervical paraspinals) - pt prone KS (UT, cervical paraspinals)           PA Mobilization: Cervical Spine             Suboccipital release KS   KS         Fascial Mobilization    KS (SCM)         IASTM  KS (wrist flexors; UT, cervical paraspinals) KS (wrist flexors; UT, cervical paraspinals) KS (wrist flexors- LUE)                      Neuro Re-Ed                                                                                                        Ther Ex             AAROM Cervical Rotation w/ towel 5x w/ 3\" hold ea dir            UT stretch: Supine             Cervical Rotation: Supine   10x w/ 2\" hold ea dir          Pec Stretch/\"W\": Supine    10x w/ 10\" hold 10x w/ 10\" hold         LS Stretch: Supine             UBE - mobility, strength, activity capacity                                                    Anatomy as it relates to pt's presentation/symptoms KS KS           Address pt questions PRN KS KS KS KS         HEP   nv          Update on pt's symptoms/status  KS KS KS                      Ther Activity                                       Gait Training                                       Modalities             MH - Cervical During subjective KS - during IASTM to forearms KS - during IASTM to forearms KS - during subjective/IASTM                               "

## 2025-07-07 ENCOUNTER — OFFICE VISIT (OUTPATIENT)
Dept: PHYSICAL THERAPY | Facility: CLINIC | Age: 45
End: 2025-07-07
Attending: FAMILY MEDICINE
Payer: COMMERCIAL

## 2025-07-07 ENCOUNTER — PATIENT MESSAGE (OUTPATIENT)
Dept: NEUROLOGY | Facility: CLINIC | Age: 45
End: 2025-07-07

## 2025-07-07 DIAGNOSIS — M54.2 CERVICALGIA: Primary | ICD-10-CM

## 2025-07-07 DIAGNOSIS — M35.3 POLYMYALGIA (HCC): ICD-10-CM

## 2025-07-07 DIAGNOSIS — G89.29 CHRONIC NECK PAIN: ICD-10-CM

## 2025-07-07 DIAGNOSIS — M54.2 CHRONIC NECK PAIN: ICD-10-CM

## 2025-07-07 DIAGNOSIS — G43.709 CHRONIC MIGRAINE WITHOUT AURA: ICD-10-CM

## 2025-07-07 DIAGNOSIS — M54.12 CERVICAL RADICULOPATHY: ICD-10-CM

## 2025-07-07 PROCEDURE — 97140 MANUAL THERAPY 1/> REGIONS: CPT

## 2025-07-07 PROCEDURE — 97010 HOT OR COLD PACKS THERAPY: CPT

## 2025-07-07 PROCEDURE — 97110 THERAPEUTIC EXERCISES: CPT

## 2025-07-08 RX ORDER — ERENUMAB-AOOE 140 MG/ML
140 INJECTION, SOLUTION SUBCUTANEOUS
Qty: 200 ML | Refills: 0 | OUTPATIENT
Start: 2025-07-08

## 2025-07-08 RX ORDER — ERENUMAB-AOOE 140 MG/ML
140 INJECTION, SOLUTION SUBCUTANEOUS
Qty: 200 ML | Refills: 2 | Status: SHIPPED | OUTPATIENT
Start: 2025-07-08 | End: 2025-07-15 | Stop reason: CLARIF

## 2025-07-08 NOTE — TELEPHONE ENCOUNTER
Erenumab-aooe (Aimovig) 140 MG/ML, was sent to the [facility]/pharmacy #[id number] [address] on 7/8/25 with a qty of 200mL with 2 refills.    Messaged sent to patient via [employer]

## 2025-07-09 ENCOUNTER — OFFICE VISIT (OUTPATIENT)
Dept: OBGYN CLINIC | Facility: HOSPITAL | Age: 45
End: 2025-07-09
Payer: COMMERCIAL

## 2025-07-09 VITALS — WEIGHT: 164.02 LBS | BODY MASS INDEX: 28 KG/M2 | HEIGHT: 64 IN

## 2025-07-09 DIAGNOSIS — M50.30 DDD (DEGENERATIVE DISC DISEASE), CERVICAL: Primary | ICD-10-CM

## 2025-07-09 PROCEDURE — 99213 OFFICE O/P EST LOW 20 MIN: CPT | Performed by: ORTHOPAEDIC SURGERY

## 2025-07-09 NOTE — PATIENT COMMUNICATION
I called patient to discuss symptoms; she said the message was meant for rheumatology, she will call same to discuss.  Nothing further needed from neurology at this time.

## 2025-07-09 NOTE — PROGRESS NOTES
Assessment & Plan/Medical Decision Makin y.o. female with Neck Pain and imaging findings most notable for cervical spondylosis        The clinical, physical and imaging findings were reviewed with the patient.  An  has a constellation of findings consistent with cervical myofascial pain, in the setting of cervical degenerative disease.   Fortunately patient remains neurologically intact and functional however continues with pain that affects her activities of daily living.  Unfortunately she continues to have difficulty establish care with pain management and is now awaiting new consultation at outside institution.  Patient in pain management are requesting new MRI and considering her ongoing symptoms with worsening headaches we did provide her with a new cervical MRI.  Recommend patient continue with activities as tolerated, home exercise program/physical therapy, strengthening and range of motion exercises, OTC medications as needed ice/heat.      Subjective:      Chief Complaint: Neck Pain    HPI:  An Eason is a 45 y.o. female presenting for initial visit with chief complaint of neck pain.  Pain began  where she states she had a concussion and since then had neck pain.   Describes pain as sharp in nature.  Located in neck.  Patient denies any radiation of pain but does states she does get numbness and tingling within her hands. Denies burning.  Neck pain 90%, Periscapular/Arm pain 10%.  Left > Right pain is worse with neck movements and improves with rest.  Denies fever or chills, no night sweats. Denies any bladder or bowel changes.      Conservative therapy includes the following:   Medications: denies    Injections: tried lidocaine injections in trapezius muscle in , no cervical spine CSI.   Physical Therapy: plans to initiate spine PT from neurology  Chiropractic Medicine: has attempted but pays out of pocket so only 1-2/month  Accupunture/Massage Therapy: has not attempted   These  therapeutic modalities were ineffective at providing sustained pain relief/functional improvement.     Nicotine dependent: denies  Occupation: unemployed  Living situation: Lives with family   ADLs: patient is able to perform     5/7/2025 update  Patient is here for follow-up.  She continues with neck pain.  She did undergo interventional spine procedures with Dr. Davis.  Per EMR she did not have a positive response to medial branch block and ablation was not pursued.  Patient did not follow-up with pain management on 4/22/2025.  Today An denies any significant changes in her symptoms however continues with neck pain radiates into her head.  Denies any radiation of pain into her upper extremities, denies any weakness, denies any hand numbness or dexterity issues, denies any balance issues.    7/9/25 update  Patient is here for follow up.  Patient reports that she was not able to be evaluated at [facility] as they do not accept her insurance.  She also reports that she called [facility] spine and pain center however they reported that they will not be able to treat her.  She was referred to an outside pain management group and is awaiting a consultation.  She reports that her symptoms are relatively stable but notes worsening headaches.  She denies any radicular symptoms into her lower extremities and denies any myelopathic symptoms.    Objective:     Family History   Problem Relation Name Age of Onset    Depression Mother Aleja Burnham     Asthma Mother Aleja Burnham     Mental illness Mother Aleja Burnham     Thyroid disease Mother Aleja Burnham         My aunt also has thyroid issues    Mental illness Maternal Grandfather Alexis Tej     Asthma Maternal Grandfather Alexis Burnham     Heart disease Maternal Grandfather Alexis Tej         Runs in family    Pulmonary embolism Maternal Grandmother Bridget Diaz     Asthma Son Evans colon     Alcohol abuse Maternal Aunt Benjamin Tej     Drug abuse  Maternal Aunt Benjamin Burnham     Developmental delay Son Evans colon     Mental illness Son Evans colon     Migraines Son Evans colon     Asthma Son Evans colon     Migraines Son Evans colon     Rashes / Skin problems Son Evans colon         Eczema       Past Medical History:   Diagnosis Date    Allergic     Anemia     Anxiety     Arthritis     Asthma     Cluster headache     Depression     GERD (gastroesophageal reflux disease) 2020    Head injury 2021    Headache(784.0)     Headache, tension-type     Hypoglycemia     Kidney stone 2011    When i was pregnant with my first child    Migraine 6/2020    Vestibular migraines suffered severe concussion    Moderate episode of recurrent major depressive disorder ([facility]) 10/03/2019    Otitis media     Pneumonia     PTSD (post-traumatic stress disorder)     Seizures ([facility]) 12/5/2022    Only one due to medication    Shingles 2018    Syncope     Varicella     Had it four times last one was in 94       Current Outpatient Medications   Medication Sig Dispense Refill    albuterol (2.5 mg/3 mL) 0.083 % nebulizer solution Take 3 mL (2.5 mg total) by nebulization every 6 (six) hours as needed for wheezing or shortness of breath 60 mL 0    albuterol (VENTOLIN HFA) 90 mcg/act inhaler Inhale 2 puffs every 6 (six) hours as needed for wheezing 18 g 0    ALPRAZolam (XANAX) 1 mg tablet Take 1 mg by mouth daily at bedtime as needed for anxiety (Patient taking differently: Take 1 mg by mouth daily at bedtime as needed for anxiety Taking it daily at night)      Botox 200 units SOLR       budesonide-formoterol (Symbicort) 160-4.5 mcg/act inhaler INHALE 2 PUFFS BY MOUTH 2 TIMES A DAY RINSE MOUTH AFTER USE. 10.2 g 5    cetirizine (ZyrTEC) 10 mg tablet TAKE 1 TABLET BY MOUTH EVERY DAY 30 tablet 5    doxepin (SINEquan) 10 mg capsule Take 10 mg by mouth      Erenumab-aooe (Aimovig) 140 MG/ML SOAJ INJECT 140 MG UNDER THE SKIN EVERY 30 (THIRTY) DAYS 200 mL 2    escitalopram (LEXAPRO) 10 mg tablet  Take 1 tablet by mouth every morning      fluticasone (FLONASE) 50 mcg/act nasal spray 1 spray into each nostril in the morning.      meclizine (ANTIVERT) 25 mg tablet       montelukast (SINGULAIR) 10 mg tablet Take 1 tablet (10 mg total) by mouth daily at bedtime 30 tablet 2    pantoprazole (PROTONIX) 40 mg       Refresh Plus 0.5 % SOLN       simethicone (MYLICON) 125 MG chewable tablet       tirzepatide (Zepbound) 10 mg/0.5 mL auto-injector Inject 0.5 mL (10 mg total) under the skin once a week Do not start before May 26, 2025. (Patient not taking: Reported on 2025 Do not start before May 26, 2025.) 2 mL 0    tirzepatide (Zepbound) 12.5 mg/0.5 mL auto-injector Inject 0.5 mL (12.5 mg total) under the skin once a week Do not start before 2025. (Patient not taking: Reported on 2025 Do not start before 2025.) 2 mL 0    tirzepatide (Zepbound) 5 mg/0.5 mL auto-injector Inject 0.5 mL (5 mg total) under the skin once a week Do not start before 2025. 2 mL 0    tirzepatide (Zepbound) 7.5 mg/0.5 mL auto-injector Inject 0.5 mL (7.5 mg total) under the skin once a week (Patient not taking: Reported on 2025) 2 mL 0    triamcinolone (KENALOG) 0.1 % cream Apply topically 2 (two) times a day for 14 days 28.4 g 0    valACYclovir (VALTREX) 1,000 mg tablet Take 1 tablet (1,000 mg total) by mouth 2 (two) times a day for 7 days 14 tablet 0     No current facility-administered medications for this visit.       Past Surgical History:   Procedure Laterality Date    ABDOMINAL SURGERY      3 csections, gallbladder and umbilical hernia     ANKLE LIGAMENT RECONSTRUCTION      ANKLE SURGERY  2017     SECTION      CHOLECYSTECTOMY      CHOLECYSTECTOMY LAPAROSCOPIC N/A 2020    Procedure: CHOLECYSTECTOMY LAPAROSCOPIC;  Surgeon: Rachid Cline MD;  Location:  MAIN OR;  Service: General    HYSTERECTOMY  10/15/2024    KNEE SURGERY      SINUS SURGERY      TONSILLECTOMY       TONSILLECTOMY      TRIGGER POINT INJECTION  2023    TUBAL LIGATION      UMBILICAL HERNIA REPAIR N/A 06/18/2020    Procedure: REPAIR HERNIA UMBILICAL, Primary;  Surgeon: Rachid Cline MD;  Location:  MAIN OR;  Service: General    UPPER GASTROINTESTINAL ENDOSCOPY  2020       Social History     Socioeconomic History    Marital status: Single     Spouse name: Not on file    Number of children: Not on file    Years of education: Not on file    Highest education level: Not on file   Occupational History    Not on file   Tobacco Use    Smoking status: Never    Smokeless tobacco: Never    Tobacco comments:     Never smoked   Vaping Use    Vaping status: Never Used   Substance and Sexual Activity    Alcohol use: Not Currently     Comment: Barely drink    Drug use: Never    Sexual activity: Not Currently     Partners: Male     Birth control/protection: Abstinence, Surgical     Comment: Hurts too much   Other Topics Concern    Not on file   Social History Narrative    Do you have pets? none Is pet allowed in bedroom?NA    Are you a smoker? Never    Does anyone smoke in your home? No       Do you live with smokers? No    [place] frequently? No   How many times a year? N/A      Social Drivers of Health     Financial Resource Strain: Low Risk  (10/15/2024)    Received from [facility]    Overall Financial Resource Strain (CARDIA)     Difficulty of Paying Living Expenses: Not hard at all   Food Insecurity: Food Insecurity Present (10/15/2024)    Received from [facility]    Hunger Vital Sign     Within the past 12 months, you worried that your food would run out before you got the money to buy more.: Sometimes true     Within the past 12 months, the food you bought just didn't last and you didn't have money to get more.: Sometimes true   Transportation Needs: Unmet Transportation Needs (10/15/2024)    Received from [facility]    PRAPARE - Transportation     Lack of  Transportation (Medical): Yes     Lack of Transportation (Non-Medical): Yes   Physical Activity: Insufficiently Active (11/4/2019)    Exercise Vital Sign     Days of Exercise per Week: 1 day     Minutes of Exercise per Session: 60 min   Stress: Stress Concern Present (7/24/2024)    Received from [facility]    [employer] [place] [employer] - Occupational Stress Questionnaire     Feeling of Stress : Rather much   Social Connections: Feeling Somewhat Isolated (7/24/2024)    Received from [facility]    OASIS : Social Isolation     How often do you feel lonely or isolated from those around you?: Sometimes   Intimate Partner Violence: Not At Risk (10/15/2024)    Received from [facility]    Humiliation, Afraid, Rape, and Kick questionnaire     Within the last year, have you been afraid of your partner or ex-partner?: No     Within the last year, have you been humiliated or emotionally abused in other ways by your partner or ex-partner?: No     Within the last year, have you been kicked, hit, slapped, or otherwise physically hurt by your partner or ex-partner?: No     Within the last year, have you been raped or forced to have any kind of sexual activity by your partner or ex-partner?: No   Housing Stability: Low Risk  (10/15/2024)    Received from [facility]    Housing Stability Vital Sign     In the last 12 months, was there a time when you were not able to pay the mortgage or rent on time?: No     In the past 12 months, how many times have you moved where you were living?: 1     At any time in the past 12 months, were you homeless or living in a shelter (including now)?: No       Allergies   Allergen Reactions    Azithromycin GI Intolerance     Other reaction(s): GI Intolerance    Trazodone Syncope     Stopped -thought it caused syncope    Cephalosporins Hives    Fluoxetine Other (See Comments)    Gabapentin Arthralgia    Sulfa  "Antibiotics Hives       Review of Systems  General- denies fever/chills  HEENT- denies hearing loss or sore throat  Eyes- denies eye pain or visual disturbances, denies red eyes  Respiratory- denies cough or SOB  Cardio- denies chest pain or palpitations  GI- denies abdominal pain  Endocrine- denies urinary frequency  Urinary- denies pain with urination  Musculoskeletal- Negative except noted above  Skin- denies rashes or wounds  Neurological- denies dizziness or headache  Psychiatric- denies anxiety or difficulty concentrating    Physical Exam  Ht 5' 4\" (1.626 m)   Wt 74.4 kg (164 lb 0.4 oz)   LMP 11/29/2022   BMI 28.15 kg/m²     General/Constitutional: No apparent distress: well-nourished and well developed.  Lymphatic: No appreciable lymphadenopathy  Respiratory: Non-labored breathing  Vascular: No edema, swelling or tenderness, except as noted in detailed exam.  Integumentary: No impressive skin lesions present, except as noted in detailed exam.  Psych: Normal mood and affect, oriented to person, place and time.  MSK: normal other than stated in HPI and exam  Gait & balance: no evidence of myelopathic gait, ambulates Independently     Cervical  spine range of motion:  -Forward flexion chin to chest  -Extension to 40  There is no point tenderness with palpation along the posterior cervical, thoracic, lumbar spine.     Neurologic:  Upper Extremity Motor Function    Right  Left    Deltoid  5/5  5/5    Bicep  5/5  5/5    Wrist extension  5/5  5/5    Tricep  5/5  5/5    Finger flexion/  5/5  5/5    Hand intrinsic  5/5  5/5        Reflexes: intact    Other tests:  Spurling's: positive  Frias's: negative  Clonus: negative  Inverted Radial: negative   Shoulder: painless active & passive ROM bilateral     Diagnostic Tests   IMAGING: I have personally reviewed the images and these are my findings:  Cervical Spine X-rays from 1/20/2025: multi level cervical spondylosis with loss of disc height, osteophyte " "formation and uncovertebral hypertrophy, no apparent spondylolisthesis, no appreciated lytic/blastic lesions, no obvious instability. Degenerative changes most noted at C5-C6.    Cervical Spine MRI from 5/18/2024: multi level cervical disc degeneration with disc desiccation, loss of disc height, most noted at C5-6 with bilateral mild/moderate foraminal stenosis, also with central disc protrusion at C4-5 with mild indentation of the ventral thecal sac, no cord signal abnormalities appreciated    Electronic Medical Records were reviewed MRI reports, neurology notes    Procedures, if performed today     None performed       Portions of the record may have been created with voice recognition software.  Occasional wrong word or \"sound a like\" substitutions may have occurred due to the inherent limitations of voice recognition software.  Read the chart carefully and recognize, using context, where substitutions have occurred.    "

## 2025-07-10 ENCOUNTER — TELEPHONE (OUTPATIENT)
Age: 45
End: 2025-07-10

## 2025-07-10 NOTE — TELEPHONE ENCOUNTER
PA for Aimovig 140MG/ML auto-injectors  SUBMITTED to [employer]    via    [x][facility]-KEY: [id number]  []Surescripts-Case ID #   []Availity-Auth ID # NDC #   []Faxed to plan   []Other website   []Phone call Case ID #     []PA sent as URGENT    All office notes, labs and other pertaining documents and studies sent. Clinical questions answered. Awaiting determination from insurance company.     Turnaround time for your insurance to make a decision on your Prior Authorization can take 7-21 business days.

## 2025-07-11 NOTE — TELEPHONE ENCOUNTER
Called and spoke to pt to notify of ins denial. Pt notified she feels Aimovig has not been very helpful she has tried it for a period of 7 months since prescribed and would rather look for other alternatives. She has a follow up appt on 7/15/25 and will discuss medications then

## 2025-07-11 NOTE — TELEPHONE ENCOUNTER
PA for Aimovig 140MG/ML auto-injectors DENIED    Reason:(Screenshot if applicable)        Message sent to office clinical pool Yes    Denial letter scanned into Media Yes    We can gladly do an appeal but the process can take about 30-60 days to provide determination. Please have the office staff schedule a Peer to Peer at phone [phone] . If an appeal is truly warranted please have Provider send clinical documentation to the PA department to support the appeal.     **Please follow up with your patient regarding denial and next steps**

## 2025-07-15 ENCOUNTER — TELEMEDICINE (OUTPATIENT)
Dept: NEUROLOGY | Facility: CLINIC | Age: 45
End: 2025-07-15
Payer: COMMERCIAL

## 2025-07-15 ENCOUNTER — OFFICE VISIT (OUTPATIENT)
Dept: PHYSICAL THERAPY | Facility: CLINIC | Age: 45
End: 2025-07-15
Attending: FAMILY MEDICINE
Payer: COMMERCIAL

## 2025-07-15 VITALS — BODY MASS INDEX: 28.15 KG/M2 | HEIGHT: 64 IN

## 2025-07-15 DIAGNOSIS — G89.29 CHRONIC NECK PAIN: ICD-10-CM

## 2025-07-15 DIAGNOSIS — G43.709 CHRONIC MIGRAINE WITHOUT AURA: Primary | ICD-10-CM

## 2025-07-15 DIAGNOSIS — M54.2 CHRONIC NECK PAIN: ICD-10-CM

## 2025-07-15 DIAGNOSIS — M54.2 CERVICALGIA: Primary | ICD-10-CM

## 2025-07-15 DIAGNOSIS — M54.12 CERVICAL RADICULOPATHY: ICD-10-CM

## 2025-07-15 DIAGNOSIS — M35.3 POLYMYALGIA (HCC): ICD-10-CM

## 2025-07-15 PROCEDURE — 99215 OFFICE O/P EST HI 40 MIN: CPT | Performed by: PSYCHIATRY & NEUROLOGY

## 2025-07-15 PROCEDURE — 97110 THERAPEUTIC EXERCISES: CPT

## 2025-07-15 PROCEDURE — 97140 MANUAL THERAPY 1/> REGIONS: CPT

## 2025-07-15 RX ORDER — RIZATRIPTAN BENZOATE 10 MG/1
10 TABLET ORAL AS NEEDED
Qty: 18 TABLET | Refills: 1 | Status: SHIPPED | OUTPATIENT
Start: 2025-07-15

## 2025-07-15 RX ORDER — ERENUMAB-AOOE 140 MG/ML
140 INJECTION, SOLUTION SUBCUTANEOUS
Qty: 200 ML | Refills: 2 | Status: SHIPPED | OUTPATIENT
Start: 2025-07-15

## 2025-07-15 RX ORDER — ONDANSETRON 4 MG/1
4 TABLET, FILM COATED ORAL EVERY 8 HOURS PRN
Qty: 20 TABLET | Refills: 0 | Status: SHIPPED | OUTPATIENT
Start: 2025-07-15

## 2025-07-17 DIAGNOSIS — R21 RASH AND OTHER NONSPECIFIC SKIN ERUPTION: ICD-10-CM

## 2025-07-18 RX ORDER — VALACYCLOVIR HYDROCHLORIDE 1 G/1
1000 TABLET, FILM COATED ORAL 2 TIMES DAILY
Qty: 14 TABLET | Refills: 0 | Status: SHIPPED | OUTPATIENT
Start: 2025-07-18 | End: 2025-07-31

## 2025-07-24 ENCOUNTER — HOSPITAL ENCOUNTER (OUTPATIENT)
Dept: ULTRASOUND IMAGING | Facility: CLINIC | Age: 45
Discharge: HOME | End: 2025-07-24
Attending: NURSE PRACTITIONER
Payer: COMMERCIAL

## 2025-07-24 ENCOUNTER — OFFICE VISIT (OUTPATIENT)
Dept: PHYSICAL THERAPY | Facility: CLINIC | Age: 45
End: 2025-07-24
Attending: FAMILY MEDICINE
Payer: COMMERCIAL

## 2025-07-24 ENCOUNTER — HOSPITAL ENCOUNTER (OUTPATIENT)
Dept: MAMMOGRAPHY | Facility: CLINIC | Age: 45
Discharge: HOME | End: 2025-07-24
Attending: NURSE PRACTITIONER
Payer: COMMERCIAL

## 2025-07-24 VITALS — BODY MASS INDEX: 28 KG/M2 | HEIGHT: 64 IN | WEIGHT: 164 LBS

## 2025-07-24 DIAGNOSIS — G89.29 CHRONIC NECK PAIN: ICD-10-CM

## 2025-07-24 DIAGNOSIS — M35.3 POLYMYALGIA (HCC): ICD-10-CM

## 2025-07-24 DIAGNOSIS — M54.2 CHRONIC NECK PAIN: ICD-10-CM

## 2025-07-24 DIAGNOSIS — N64.4 DIFFUSE NON-CYCLICAL BREAST PAIN: ICD-10-CM

## 2025-07-24 DIAGNOSIS — R21 RASH: ICD-10-CM

## 2025-07-24 DIAGNOSIS — M54.12 CERVICAL RADICULOPATHY: ICD-10-CM

## 2025-07-24 DIAGNOSIS — M54.2 CERVICALGIA: Primary | ICD-10-CM

## 2025-07-24 PROCEDURE — G0279 TOMOSYNTHESIS, MAMMO: HCPCS

## 2025-07-24 PROCEDURE — 97010 HOT OR COLD PACKS THERAPY: CPT

## 2025-07-24 PROCEDURE — 76642 ULTRASOUND BREAST LIMITED: CPT

## 2025-07-24 PROCEDURE — 77066 DX MAMMO INCL CAD BI: CPT

## 2025-07-24 PROCEDURE — 97140 MANUAL THERAPY 1/> REGIONS: CPT

## 2025-07-24 PROCEDURE — 97110 THERAPEUTIC EXERCISES: CPT

## 2025-07-24 NOTE — PROGRESS NOTES
Daily Note     Today's date: 2025  Patient name: An Eason  : 1980  MRN: [id number]  Referring provider: Alexa Morgan DO  Dx:   Encounter Diagnosis     ICD-10-CM    1. Cervicalgia  M54.2       2. Chronic neck pain  M54.2     G89.29       3. Cervical radiculopathy  M54.12       4. Polymyalgia (HCC)  M35.3           Start Time: 1635  Stop Time: 1718  Total time in clinic (min): 43 minutes    Subjective: Pt reports neck/shoulder pain while getting positioned for her mammogram. Pt notes that her arms also felt fatigued.       Objective: See treatment diary below      Assessment: The pt participated in a skilled physical therapy session that focused on manual intervention and therapeutic exercise. Light TE were introduced to address impairments in UE strength and activity capacity. She presented with greater fatigue while performing interventions with the LUE. Side-lying shoulder ER provoked radicular symptoms down the LUE, which dissipated with rest and manual intervention. She tolerated treatment well. The pt would benefit from continued PT to address impairments in order to return to her PLOF without limitations due to pain.       Plan: Continue per plan of care.      Precautions: PMH includes: Chronic Migraines, Chronic Fatigue, Chronic Neck Pain, Cervical Radiculopathy, Cervical Spondylosis, Depression, PTSD, Bronchitis, Asthma, RUQ/RLQ pain, Constipation, GERD, Syncope, IBS, Hypoglycemia       6/11 6/25 7/2 7/7 7/15 7/24       Manuals             STM KS (UT, cervical paraspinals) KS (UT, cervical paraspinals) KS (UT, cervical paraspinals) - pt prone KS (UT, cervical paraspinals) KS (UT, cervical paraspinals) KS (UT, cervical paraspinals)         PA Mobilization: Cervical Spine             Suboccipital release KS   KS KS KS       Fascial Mobilization    KS (SCM)         IASTM  KS (wrist flexors; UT, cervical paraspinals) KS (wrist flexors; UT, cervical paraspinals) KS (wrist flexors- LUE) KS  "(wrist flexors)                     Neuro Re-Ed                                                                                                        Ther Ex             AAROM Cervical Rotation w/ towel 5x w/ 3\" hold ea dir            UT stretch: Supine             Cervical Rotation: Supine   10x w/ 2\" hold ea dir          Pec Stretch/\"W\": Supine    10x w/ 10\" hold 10x w/ 10\" hold         LS Stretch: Supine             UBE - mobility, strength, activity capacity      L1 2'/2'       Side-lying Shld Flex      2x10 ea UE       Side-lying Shld ER      2x10 ea UE                                              Anatomy as it relates to pt's presentation/symptoms KS KS           Address pt questions PRN KS KS KS KS KS        HEP   nv          Update on pt's symptoms/status  KS KS KS KS KS       RI:Objective Measures     KS                                  Ther Activity             FOTO Questionnaire     KS                     Gait Training                                       Modalities             MH - Cervical During subjective KS - during IASTM to forearms KS - during IASTM to forearms KS - during subjective/IASTM KS - during IASTM to forearms KS - during TE/manual                         "

## 2025-07-29 ENCOUNTER — OFFICE VISIT (OUTPATIENT)
Dept: PHYSICAL THERAPY | Facility: CLINIC | Age: 45
End: 2025-07-29
Attending: FAMILY MEDICINE
Payer: COMMERCIAL

## 2025-07-29 DIAGNOSIS — M54.2 CHRONIC NECK PAIN: ICD-10-CM

## 2025-07-29 DIAGNOSIS — M54.2 CERVICALGIA: Primary | ICD-10-CM

## 2025-07-29 DIAGNOSIS — M35.3 POLYMYALGIA (HCC): ICD-10-CM

## 2025-07-29 DIAGNOSIS — M54.12 CERVICAL RADICULOPATHY: ICD-10-CM

## 2025-07-29 DIAGNOSIS — G89.29 CHRONIC NECK PAIN: ICD-10-CM

## 2025-07-29 PROCEDURE — 97140 MANUAL THERAPY 1/> REGIONS: CPT

## 2025-07-29 PROCEDURE — 97110 THERAPEUTIC EXERCISES: CPT

## 2025-07-31 ENCOUNTER — OFFICE VISIT (OUTPATIENT)
Dept: FAMILY MEDICINE CLINIC | Facility: CLINIC | Age: 45
End: 2025-07-31
Payer: COMMERCIAL

## 2025-07-31 ENCOUNTER — OFFICE VISIT (OUTPATIENT)
Dept: PHYSICAL THERAPY | Facility: CLINIC | Age: 45
End: 2025-07-31
Attending: FAMILY MEDICINE
Payer: COMMERCIAL

## 2025-07-31 VITALS
TEMPERATURE: 98.8 F | BODY MASS INDEX: 26.77 KG/M2 | HEIGHT: 64 IN | RESPIRATION RATE: 16 BRPM | SYSTOLIC BLOOD PRESSURE: 110 MMHG | DIASTOLIC BLOOD PRESSURE: 78 MMHG | HEART RATE: 143 BPM | OXYGEN SATURATION: 98 % | WEIGHT: 156.8 LBS

## 2025-07-31 DIAGNOSIS — R63.5 ABNORMAL WEIGHT GAIN: ICD-10-CM

## 2025-07-31 DIAGNOSIS — M54.2 CHRONIC NECK PAIN: ICD-10-CM

## 2025-07-31 DIAGNOSIS — M54.12 CERVICAL RADICULOPATHY: ICD-10-CM

## 2025-07-31 DIAGNOSIS — M54.2 CERVICALGIA: Primary | ICD-10-CM

## 2025-07-31 DIAGNOSIS — M35.3 POLYMYALGIA (HCC): ICD-10-CM

## 2025-07-31 DIAGNOSIS — G43.009 MIGRAINE WITHOUT AURA AND WITHOUT STATUS MIGRAINOSUS, NOT INTRACTABLE: Primary | ICD-10-CM

## 2025-07-31 DIAGNOSIS — G89.29 CHRONIC NECK PAIN: ICD-10-CM

## 2025-07-31 DIAGNOSIS — R21 RASH AND OTHER NONSPECIFIC SKIN ERUPTION: ICD-10-CM

## 2025-07-31 DIAGNOSIS — F33.1 MODERATE EPISODE OF RECURRENT MAJOR DEPRESSIVE DISORDER (HCC): ICD-10-CM

## 2025-07-31 PROCEDURE — 97140 MANUAL THERAPY 1/> REGIONS: CPT

## 2025-07-31 PROCEDURE — 99214 OFFICE O/P EST MOD 30 MIN: CPT | Performed by: FAMILY MEDICINE

## 2025-07-31 PROCEDURE — 97110 THERAPEUTIC EXERCISES: CPT

## 2025-07-31 RX ORDER — CLOTRIMAZOLE 1 %
CREAM (GRAM) TOPICAL 2 TIMES DAILY
Qty: 60 G | Refills: 0 | Status: SHIPPED | OUTPATIENT
Start: 2025-07-31

## 2025-08-01 ENCOUNTER — TELEPHONE (OUTPATIENT)
Dept: NEUROLOGY | Facility: CLINIC | Age: 45
End: 2025-08-01

## 2025-08-04 ENCOUNTER — NURSE TRIAGE (OUTPATIENT)
Age: 45
End: 2025-08-04

## 2025-08-04 ENCOUNTER — HOSPITAL ENCOUNTER (OUTPATIENT)
Dept: MRI IMAGING | Facility: HOSPITAL | Age: 45
Discharge: HOME | End: 2025-08-04
Attending: ORTHOPAEDIC SURGERY
Payer: COMMERCIAL

## 2025-08-04 DIAGNOSIS — M50.30 DDD (DEGENERATIVE DISC DISEASE), CERVICAL: ICD-10-CM

## 2025-08-04 PROCEDURE — 72141 MRI NECK SPINE W/O DYE: CPT

## 2025-08-05 ENCOUNTER — CONSULT (OUTPATIENT)
Dept: DERMATOLOGY | Facility: CLINIC | Age: 45
End: 2025-08-05
Attending: NURSE PRACTITIONER

## 2025-08-05 VITALS — TEMPERATURE: 97.6 F

## 2025-08-05 DIAGNOSIS — R21 RASH: ICD-10-CM

## 2025-08-05 RX ORDER — CLOBETASOL PROPIONATE 0.5 MG/G
OINTMENT TOPICAL
Qty: 30 G | Refills: 0 | Status: SHIPPED | OUTPATIENT
Start: 2025-08-05

## 2025-08-11 ENCOUNTER — TELEPHONE (OUTPATIENT)
Dept: NEUROLOGY | Facility: CLINIC | Age: 45
End: 2025-08-11

## 2025-08-13 ENCOUNTER — OFFICE VISIT (OUTPATIENT)
Dept: PHYSICAL THERAPY | Facility: CLINIC | Age: 45
End: 2025-08-13
Attending: FAMILY MEDICINE
Payer: COMMERCIAL

## 2025-08-13 ENCOUNTER — OFFICE VISIT (OUTPATIENT)
Dept: OBGYN CLINIC | Facility: HOSPITAL | Age: 45
End: 2025-08-13
Payer: COMMERCIAL

## 2025-08-14 ENCOUNTER — CLINICAL SUPPORT (OUTPATIENT)
Dept: DERMATOLOGY | Facility: CLINIC | Age: 45
End: 2025-08-14

## 2025-08-15 DIAGNOSIS — G43.709 CHRONIC MIGRAINE WITHOUT AURA: ICD-10-CM

## 2025-08-16 DIAGNOSIS — E66.3 OVERWEIGHT WITH BODY MASS INDEX (BMI) OF 27 TO 27.9 IN ADULT: ICD-10-CM

## 2025-08-18 ENCOUNTER — TELEPHONE (OUTPATIENT)
Age: 45
End: 2025-08-18

## 2025-08-18 ENCOUNTER — OFFICE VISIT (OUTPATIENT)
Dept: PHYSICAL THERAPY | Facility: CLINIC | Age: 45
End: 2025-08-18
Attending: FAMILY MEDICINE
Payer: COMMERCIAL

## 2025-08-18 DIAGNOSIS — M54.12 CERVICAL RADICULOPATHY: ICD-10-CM

## 2025-08-18 DIAGNOSIS — M35.3 POLYMYALGIA (HCC): ICD-10-CM

## 2025-08-18 DIAGNOSIS — M54.2 CHRONIC NECK PAIN: ICD-10-CM

## 2025-08-18 DIAGNOSIS — M54.2 CERVICALGIA: Primary | ICD-10-CM

## 2025-08-18 DIAGNOSIS — G89.29 CHRONIC NECK PAIN: ICD-10-CM

## 2025-08-18 PROCEDURE — 97110 THERAPEUTIC EXERCISES: CPT

## 2025-08-18 PROCEDURE — 97140 MANUAL THERAPY 1/> REGIONS: CPT

## 2025-08-18 PROCEDURE — 97010 HOT OR COLD PACKS THERAPY: CPT

## 2025-08-18 RX ORDER — TIRZEPATIDE 10 MG/.5ML
10 INJECTION, SOLUTION SUBCUTANEOUS WEEKLY
Qty: 2 ML | Refills: 2 | Status: SHIPPED | OUTPATIENT
Start: 2025-08-18

## 2025-08-19 RX ORDER — ONDANSETRON 4 MG/1
4 TABLET, FILM COATED ORAL EVERY 8 HOURS PRN
Qty: 20 TABLET | Refills: 0 | Status: SHIPPED | OUTPATIENT
Start: 2025-08-19

## 2025-08-21 ENCOUNTER — PROCEDURE VISIT (OUTPATIENT)
Dept: NEUROLOGY | Facility: CLINIC | Age: 45
End: 2025-08-21
Payer: COMMERCIAL

## 2025-08-21 VITALS — TEMPERATURE: 97.4 F | DIASTOLIC BLOOD PRESSURE: 76 MMHG | SYSTOLIC BLOOD PRESSURE: 110 MMHG

## 2025-08-21 DIAGNOSIS — G43.709 CHRONIC MIGRAINE WITHOUT AURA: Primary | ICD-10-CM

## 2025-08-21 PROCEDURE — 64615 CHEMODENERV MUSC MIGRAINE: CPT | Performed by: PSYCHIATRY & NEUROLOGY

## 2025-08-25 PROBLEM — R55 VASOVAGAL SYNCOPE: Status: ACTIVE | Noted: 2025-08-25

## (undated) DEVICE — BLUE HEAT SCOPE WARMER

## (undated) DEVICE — IRRIG ENDO FLO TUBING

## (undated) DEVICE — PAD GROUNDING ADULT

## (undated) DEVICE — ELECTRODE LAP L WIRE E-Z CLEAN 33CM -0100

## (undated) DEVICE — NEEDLE 25G X 1 1/2

## (undated) DEVICE — CHLORAPREP HI-LITE 26ML ORANGE

## (undated) DEVICE — DRAPE EQUIPMENT RF WAND

## (undated) DEVICE — VIAL DECANTER

## (undated) DEVICE — PENCIL ELECTROSURG E-Z CLEAN -0035H

## (undated) DEVICE — ADHESIVE SKIN HIGH VISCOSITY EXOFIN 1ML

## (undated) DEVICE — TROCAR: Brand: KII FIOS FIRST ENTRY

## (undated) DEVICE — ENDOPATH 5MM CURVED SCISSORS WITH MONOPOLAR CAUTERY: Brand: ENDOPATH

## (undated) DEVICE — SUT VICRYL 3-0 SH 27 IN J416H

## (undated) DEVICE — GLOVE INDICATOR PI UNDERGLOVE SZ 6.5 BLUE

## (undated) DEVICE — INTENDED FOR TISSUE SEPARATION, AND OTHER PROCEDURES THAT REQUIRE A SHARP SURGICAL BLADE TO PUNCTURE OR CUT.: Brand: BARD-PARKER SAFETY BLADES SIZE 15, STERILE

## (undated) DEVICE — GLOVE SRG BIOGEL ECLIPSE 8

## (undated) DEVICE — TROCAR: Brand: KII® SLEEVE

## (undated) DEVICE — SUT MONOCRYL 4-0 PS-2 27 IN Y426H

## (undated) DEVICE — TUBING SUCTION 5MM X 12 FT

## (undated) DEVICE — GLOVE SRG BIOGEL 6.5

## (undated) DEVICE — TUBING SMOKE EVAC W/FILTRATION DEVICE PLUMEPORT ACTIV

## (undated) DEVICE — INTENDED FOR TISSUE SEPARATION, AND OTHER PROCEDURES THAT REQUIRE A SHARP SURGICAL BLADE TO PUNCTURE OR CUT.: Brand: BARD-PARKER SAFETY BLADES SIZE 11, STERILE

## (undated) DEVICE — SUT VICRYL 0 UR-6 27 IN J603H

## (undated) DEVICE — BETHLEHEM UNIVERSAL MINOR GEN: Brand: CARDINAL HEALTH

## (undated) DEVICE — 2000CC GUARDIAN II: Brand: GUARDIAN

## (undated) DEVICE — GLOVE INDICATOR PI UNDERGLOVE SZ 8 BLUE

## (undated) DEVICE — ALLENTOWN LAP CHOLE APP PACK: Brand: CARDINAL HEALTH

## (undated) DEVICE — LIGAMAX 5 MM ENDOSCOPIC MULTIPLE CLIP APPLIER: Brand: LIGAMAX

## (undated) DEVICE — TISSUE RETRIEVAL SYSTEM: Brand: INZII RETRIEVAL SYSTEM

## (undated) DEVICE — ELECTRODE BLADE MOD E-Z CLEAN 2.5IN 6.4CM -0012M

## (undated) DEVICE — TROCARS: Brand: KII® BALLOON BLUNT TIP SYSTEM

## (undated) DEVICE — SCD SEQUENTIAL COMPRESSION COMFORT SLEEVE MEDIUM KNEE LENGTH: Brand: KENDALL SCD

## (undated) DEVICE — MEDI-VAC YANKAUER SUCTION HANDLE W/BULBOUS AND CONTROL VENT: Brand: CARDINAL HEALTH

## (undated) DEVICE — SUT PROLENE 2-0 CT-2 30 IN 8411H